# Patient Record
Sex: MALE | Race: WHITE | NOT HISPANIC OR LATINO | Employment: UNEMPLOYED | URBAN - METROPOLITAN AREA
[De-identification: names, ages, dates, MRNs, and addresses within clinical notes are randomized per-mention and may not be internally consistent; named-entity substitution may affect disease eponyms.]

---

## 2017-05-26 ENCOUNTER — ALLSCRIPTS OFFICE VISIT (OUTPATIENT)
Dept: OTHER | Facility: OTHER | Age: 47
End: 2017-05-26

## 2017-05-26 LAB
BILIRUB UR QL STRIP: NORMAL
CLARITY UR: NORMAL
COLOR UR: YELLOW
GLUCOSE (HISTORICAL): NORMAL
HGB UR QL STRIP.AUTO: NORMAL
KETONES UR STRIP-MCNC: NORMAL MG/DL
LEUKOCYTE ESTERASE UR QL STRIP: NORMAL
NITRITE UR QL STRIP: NORMAL
PH UR STRIP.AUTO: 6 [PH]
PROT UR STRIP-MCNC: NORMAL MG/DL
SP GR UR STRIP.AUTO: 1.02
UROBILINOGEN UR QL STRIP.AUTO: NORMAL

## 2018-01-13 VITALS
HEART RATE: 76 BPM | HEIGHT: 69 IN | WEIGHT: 204.13 LBS | SYSTOLIC BLOOD PRESSURE: 136 MMHG | DIASTOLIC BLOOD PRESSURE: 84 MMHG | BODY MASS INDEX: 30.23 KG/M2

## 2018-04-11 NOTE — PROGRESS NOTES
Assessment/Plan:   I have sent your inhaler and the cream you need for your psoriasis  I have provided you with a script to bridge you on your alprazolam   We have provided you with the contact information to speak or meet with our  for assistance finding a new mental health office  Continue as scheduled with your pain management and [facility] endocrinologist     Angeles Boone will call to schedule a follow-up so that we may complete your visit and health maintenance  No problem-specific Assessment & Plan notes found for this encounter  Diagnoses and all orders for this visit:    Mild intermittent asthma without complication  -     albuterol (VENTOLIN HFA) 90 mcg/act inhaler; Inhale 2 puffs every 6 (six) hours as needed for wheezing    Psoriasis  -     triamcinolone (KENALOG) 0 1 % ointment; Apply topically 2 (two) times a day for 180 days    Anxiety  -     ALPRAZolam (XANAX) 1 mg tablet; Take 1 tablet (1 mg total) by mouth 2 (two) times a day as needed for anxiety for up to 30 days  -     Ambulatory referral to Social Work; Future    Other orders  -     fluorometholone (FML) 0 1 % ophthalmic suspension;   -     ketoconazole (NIZORAL) 2 % shampoo;   -     minocycline (MINOCIN) 100 mg capsule;   -     naproxen (NAPROSYN) 500 mg tablet;   -     oxyCODONE (ROXICODONE) 15 mg immediate release tablet; Earliest Fill Date: 3/13/18   -     OXYCONTIN 30 MG T12A; Earliest Fill Date: 3/13/18   -     testosterone cypionate (DEPO-TESTOSTERONE) 200 mg/mL SOLN;   -     Discontinue: triamcinolone (KENALOG) 0 1 % ointment;   -     zolpidem (AMBIEN) 10 mg tablet;           Subjective:      Patient ID: Lupillo Gan  is a 50 y o  male  Patient coming in to establish care as a new patient  Record review shows patient has been on chronic pain medications for many years through Pain Management in [facility] MP was reviewed and shows patient has consistently been getting pain medication every 30 days going back to prior to 2016  PD MP also reports medications for anxiety alprazolam 1mg bid  Last script 10/2017  Patient continues to follow with pain management once a month  This is for chronic neck and back pain  Patient reports he is aware the last script for anxiety medication was 6 months ago however he states he is provided medication to take twice a day but does not take it daily sometimes only takes half a pill other days does not need to take it at all  Patient reports he is down to his last 8 pills and the thought of possibly being without this medication creates anxiety as it is  Patient reports he was only going to [facility] for his medications however was receiving therapy at a different location as was not happy with that therapy provided at that particular office  Patient states he has called at least 5 different locations to try to get re-established however has been told either they do not accept his Medicare or the weight was over 6-8 months  Advise patient we would get him an appointment or at least a phone conversation with our  who can work with him and his insurance to find a new location  I discussed with him as there is proper documentation of the medication he was receiving on a regular basis I was okay to provide a one-month bridge of his alprazolam       Record review shows patient has been being followed by a [facility] Endocrinology for hypogonadism  One visit from [address] [place] [address] Urology for prostate check reports that patient has been on testosterone replacement for the past 30 years  Patient also did have an EGD in 2016 after had chronic stomach pain and hiccups  The EGD demonstrated severe erosive esophagitis in the distal portion of the esophagus  It was also noted that he had an gastric ulcer  Duodenum was normal   Patient was to take a PPI twice a day and then get a follow-up scope in 8 weeks    The follow-up scope was noted to be canceled  Patient reports after stopping ibuprofen and making the dietary changes he no longer has the pain  States no longer on BP meds, was 3-4 years ago  States did change diet  States here for asthma meds, cream for psoriasis and anxiety pills      Asthma since child states only rescue inhaler most times not use for 1-2 months unless sick will need to use more  Reports did have more than 1 ER visit as a child and young teenager for asthma exacerbations  Reports has not been on any daily inhalers since that time  Uses triamcinolone for psoriasis for many years areas affected, scalp, behind ears, lower back and chest wall         Reports labs were completed by ENDO last months for lipids and glucose reports told normal      Remainder of exam needed to be deferred to a follow-up visit because patient was receiving phone calls from his ex-wife and his son that he forgot he was supposed to pick him up today  Patient reports he will call back and schedule a follow-up appointment so that we may continue with his health maintenance        The following portions of the patient's history were reviewed and updated as appropriate: allergies, current medications, past family history, past medical history, past social history, past surgical history and problem list     Review of Systems   Constitutional: Negative  HENT: Negative  Respiratory: Negative for cough and shortness of breath  As noted in HPI currently no respiratory symptoms   Cardiovascular: Negative  Negative for chest pain and palpitations  Gastrointestinal: Negative  Negative for abdominal pain and blood in stool  As noted in HPI with lifestyle changes abdominal pain resolved   Endocrine: Negative for cold intolerance and heat intolerance  Genitourinary: Negative  Negative for dysuria and urgency  Musculoskeletal: Positive for back pain and myalgias          Patient followed by pain management for chronic neck and low back pain  Skin: Positive for rash  As noted in HPI patient reports psoriasis  Neurological: Negative for dizziness and light-headedness  Hematological: Negative  Psychiatric/Behavioral: Negative for self-injury, sleep disturbance and suicidal ideas  The patient is nervous/anxious  Objective:      /80 (BP Location: Left arm, Patient Position: Sitting, Cuff Size: Standard)   Pulse 92   Temp 98 2 °F (36 8 °C) (Oral)   Ht 5' 7 5" (1 715 m)   Wt 93 4 kg (205 lb 14 6 oz)   BMI 31 77 kg/m²          Physical Exam   Constitutional: He appears well-developed and well-nourished  Cardiovascular: Normal rate, regular rhythm and normal heart sounds  Pulmonary/Chest: Effort normal and breath sounds normal    Abdominal: Soft  Bowel sounds are normal    Skin: Skin is warm and dry  Rash noted  On exam to his scalp there was minimal dryness behind right ear however no silver plaques were noted  Also to anterior chest wall once again no plaques noted  Slight hyper pigmentation from previous episodes  Psychiatric: He has a normal mood and affect   His behavior is normal  Judgment and thought content normal

## 2018-04-12 ENCOUNTER — OFFICE VISIT (OUTPATIENT)
Dept: INTERNAL MEDICINE CLINIC | Facility: CLINIC | Age: 48
End: 2018-04-12
Payer: MEDICARE

## 2018-04-12 VITALS
SYSTOLIC BLOOD PRESSURE: 104 MMHG | HEART RATE: 92 BPM | TEMPERATURE: 98.2 F | BODY MASS INDEX: 31.21 KG/M2 | HEIGHT: 68 IN | WEIGHT: 205.91 LBS | DIASTOLIC BLOOD PRESSURE: 80 MMHG

## 2018-04-12 DIAGNOSIS — F41.9 ANXIETY: ICD-10-CM

## 2018-04-12 DIAGNOSIS — L40.9 PSORIASIS: ICD-10-CM

## 2018-04-12 DIAGNOSIS — J45.20 MILD INTERMITTENT ASTHMA WITHOUT COMPLICATION: Primary | ICD-10-CM

## 2018-04-12 PROBLEM — E29.1 HYPOGONADISM IN MALE: Status: ACTIVE | Noted: 2018-04-12

## 2018-04-12 PROCEDURE — 99204 OFFICE O/P NEW MOD 45 MIN: CPT | Performed by: PHYSICIAN ASSISTANT

## 2018-04-12 RX ORDER — NAPROXEN 500 MG/1
TABLET ORAL
COMMUNITY
Start: 2018-02-09 | End: 2022-02-17 | Stop reason: ALTCHOICE

## 2018-04-12 RX ORDER — OXYCODONE HYDROCHLORIDE 30 MG/1
TABLET, FILM COATED, EXTENDED RELEASE ORAL
COMMUNITY
Start: 2018-03-13 | End: 2022-02-17 | Stop reason: ALTCHOICE

## 2018-04-12 RX ORDER — MINOCYCLINE HYDROCHLORIDE 100 MG/1
CAPSULE ORAL
COMMUNITY
Start: 2018-02-01 | End: 2020-05-29 | Stop reason: ALTCHOICE

## 2018-04-12 RX ORDER — FLUOROMETHOLONE 0.1 %
SUSPENSION, DROPS(FINAL DOSAGE FORM)(ML) OPHTHALMIC (EYE)
COMMUNITY
Start: 2018-02-01 | End: 2020-05-29 | Stop reason: ALTCHOICE

## 2018-04-12 RX ORDER — KETOCONAZOLE 20 MG/ML
SHAMPOO TOPICAL
COMMUNITY
Start: 2018-01-16 | End: 2022-02-17 | Stop reason: ALTCHOICE

## 2018-04-12 RX ORDER — TESTOSTERONE CYPIONATE 200 MG/ML
INJECTION INTRAMUSCULAR
COMMUNITY
Start: 2018-01-08

## 2018-04-12 RX ORDER — ALPRAZOLAM 1 MG/1
1 TABLET ORAL 2 TIMES DAILY PRN
Qty: 60 TABLET | Refills: 0 | Status: SHIPPED | OUTPATIENT
Start: 2018-04-12 | End: 2022-08-25

## 2018-04-12 RX ORDER — ALBUTEROL SULFATE 90 UG/1
2 AEROSOL, METERED RESPIRATORY (INHALATION) EVERY 6 HOURS PRN
Qty: 1 INHALER | Refills: 0 | Status: SHIPPED | OUTPATIENT
Start: 2018-04-12 | End: 2018-12-06 | Stop reason: SDUPTHER

## 2018-04-12 RX ORDER — ZOLPIDEM TARTRATE 10 MG/1
TABLET ORAL
COMMUNITY
Start: 2018-03-12 | End: 2022-02-17 | Stop reason: ALTCHOICE

## 2018-04-12 RX ORDER — OXYCODONE HYDROCHLORIDE 15 MG/1
TABLET ORAL
COMMUNITY
Start: 2018-03-13 | End: 2022-02-17 | Stop reason: ALTCHOICE

## 2018-04-13 ENCOUNTER — PATIENT OUTREACH (OUTPATIENT)
Dept: INTERNAL MEDICINE CLINIC | Facility: CLINIC | Age: 48
End: 2018-04-13

## 2018-04-19 ENCOUNTER — PATIENT OUTREACH (OUTPATIENT)
Dept: INTERNAL MEDICINE CLINIC | Facility: CLINIC | Age: 48
End: 2018-04-19

## 2018-05-14 ENCOUNTER — TRANSCRIBE ORDERS (OUTPATIENT)
Dept: LAB | Facility: HOSPITAL | Age: 48
End: 2018-05-14

## 2018-05-14 ENCOUNTER — APPOINTMENT (OUTPATIENT)
Dept: LAB | Facility: HOSPITAL | Age: 48
End: 2018-05-14
Payer: MEDICARE

## 2018-05-14 DIAGNOSIS — E29.1 3-OXO-5 ALPHA-STEROID DELTA 4-DEHYDROGENASE DEFICIENCY: ICD-10-CM

## 2018-05-14 DIAGNOSIS — E29.1 3-OXO-5 ALPHA-STEROID DELTA 4-DEHYDROGENASE DEFICIENCY: Primary | ICD-10-CM

## 2018-05-14 LAB
EST. AVERAGE GLUCOSE BLD GHB EST-MCNC: 100 MG/DL
HBA1C MFR BLD: 5.1 % (ref 4.2–6.3)

## 2018-05-14 PROCEDURE — 84403 ASSAY OF TOTAL TESTOSTERONE: CPT

## 2018-05-14 PROCEDURE — 84402 ASSAY OF FREE TESTOSTERONE: CPT

## 2018-05-14 PROCEDURE — 36415 COLL VENOUS BLD VENIPUNCTURE: CPT

## 2018-05-14 PROCEDURE — 83036 HEMOGLOBIN GLYCOSYLATED A1C: CPT

## 2018-05-16 LAB
TESTOST FREE SERPL-MCNC: 9.4 PG/ML (ref 6.8–21.5)
TESTOST SERPL-MCNC: 447 NG/DL (ref 264–916)

## 2018-10-22 ENCOUNTER — OFFICE VISIT (OUTPATIENT)
Dept: UROLOGY | Facility: CLINIC | Age: 48
End: 2018-10-22
Payer: COMMERCIAL

## 2018-10-22 ENCOUNTER — TELEPHONE (OUTPATIENT)
Dept: UROLOGY | Facility: AMBULATORY SURGERY CENTER | Age: 48
End: 2018-10-22

## 2018-10-22 VITALS — HEIGHT: 68 IN | WEIGHT: 207 LBS | BODY MASS INDEX: 31.37 KG/M2

## 2018-10-22 DIAGNOSIS — R31.29 MICROHEMATURIA: ICD-10-CM

## 2018-10-22 DIAGNOSIS — R31.29 MICROSCOPIC HEMATURIA: Primary | ICD-10-CM

## 2018-10-22 DIAGNOSIS — N39.0 URINARY TRACT INFECTION WITH HEMATURIA, SITE UNSPECIFIED: Primary | ICD-10-CM

## 2018-10-22 DIAGNOSIS — R31.9 URINARY TRACT INFECTION WITH HEMATURIA, SITE UNSPECIFIED: Primary | ICD-10-CM

## 2018-10-22 LAB
SL AMB  POCT GLUCOSE, UA: NORMAL
SL AMB LEUKOCYTE ESTERASE,UA: NORMAL
SL AMB POCT BILIRUBIN,UA: NORMAL
SL AMB POCT BLOOD,UA: NORMAL
SL AMB POCT CLARITY,UA: CLEAR
SL AMB POCT COLOR,UA: YELLOW
SL AMB POCT KETONES,UA: NORMAL
SL AMB POCT NITRITE,UA: NORMAL
SL AMB POCT PH,UA: 5
SL AMB POCT SPECIFIC GRAVITY,UA: 1.01
SL AMB POCT URINE PROTEIN: NORMAL
SL AMB POCT UROBILINOGEN: NORMAL

## 2018-10-22 PROCEDURE — 81002 URINALYSIS NONAUTO W/O SCOPE: CPT | Performed by: PHYSICIAN ASSISTANT

## 2018-10-22 PROCEDURE — 99213 OFFICE O/P EST LOW 20 MIN: CPT | Performed by: PHYSICIAN ASSISTANT

## 2018-10-22 NOTE — TELEPHONE ENCOUNTER
Fax received from Patient Alena Fernandez reviewed the info and advised there is nothing urologically urgent that needs attention right away  Spoke with patient and informed him that the NP will look over his records and we will get back to him with advice on further imaging and instructions  Patient verbalized understanding and agrees to plan

## 2018-10-22 NOTE — TELEPHONE ENCOUNTER
Patient was seen at [facility] today by Kylee Ha and scheduled for a CT abdomen and pelvis for stone study and also scheduled with Dr Patel Earing on 11/8/18 for cystoscopy with bilateral retrograde pyelogram with possible bladder biopsies

## 2018-10-22 NOTE — TELEPHONE ENCOUNTER
Patient seen by Dr César Rick once in 2017 for prostate exam and low testosterone  Patient called today to report he needs an appt with urologist ASAP per [facility]   He was there last evening due to bronchitis and told he has blood in urine along with 5 kidney stones  Patient c/o shaking, chills, but states he has no fever  Informed patient there is no info in chart to comment on what findings were found during his exam last night  Offered to schedule patient at [facility] office today, but patient states he feels horrible and cannot make it that far, only can make it to [facility] office  Informed patient there are no openings in [facility] schedule today, even looked throughout week and could not find any availabilities  Suggested to patient if he feels that poorly, he should go to ER, but patient states if he isn't going to see urologist at ER, there is no need to go  Patient was advised to have [facility] forward all records from last evening to office so that an appt can be better triaged  Patient was advised to call back later today

## 2018-10-22 NOTE — TELEPHONE ENCOUNTER
Called patient back to review recommendations and he reports he was able to get a ride to the [facility] location and is on his way to an appt there this afternoon for further evaluation, this was scheduled by the phone center when the patient returned call  Plan of care will now be based on the results of today's appt  [facility]

## 2018-10-22 NOTE — PROGRESS NOTES
UROLOGY PROGRESS NOTE   Patient Identifiers: Luan Marquez (MRN [id number])  Date of Service: 10/22/2018    Subjective:     49-year-old male seen once in the past for prostate exam with a history testosterone replacement  He went to urgent care yesterday with upper respiratory infection  His urinalysis showed microscopic blood  He has no symptoms of UTI  They did an x-ray and told him he had kidney stones  The x-ray shows   A possible 8 millimeter left renal stone and  multiple phleboliths in the pelvis  He has no abdominal or flank pain  He has never had stones before  He denies any family history of prostate bladder kidney disease  He does have male hypogonadism and sees an endocrinologist for hormone replacement  Patient has  no complaints  Objective:     VITALS:    There were no vitals filed for this visit          LABS:  Lab Results   Component Value Date    HGB 18 0 (H) 03/22/2016    HCT 53 (H) 03/22/2016   ]    Lab Results   Component Value Date    GLUCOSE 97 03/22/2016   ]        INPATIENT MEDS:    Current Outpatient Prescriptions:     albuterol (VENTOLIN HFA) 90 mcg/act inhaler, Inhale 2 puffs every 6 (six) hours as needed for wheezing, Disp: 1 Inhaler, Rfl: 0    naproxen (NAPROSYN) 500 mg tablet, , Disp: , Rfl:     oxyCODONE (ROXICODONE) 15 mg immediate release tablet, , Disp: , Rfl:     OXYCONTIN 30 MG T12A, , Disp: , Rfl:     testosterone cypionate (DEPO-TESTOSTERONE) 200 mg/mL SOLN, , Disp: , Rfl:     zolpidem (AMBIEN) 10 mg tablet, , Disp: , Rfl:     ALPRAZolam (XANAX) 1 mg tablet, Take 1 tablet (1 mg total) by mouth 2 (two) times a day as needed for anxiety for up to 30 days, Disp: 60 tablet, Rfl: 0    fluorometholone (FML) 0 1 % ophthalmic suspension, , Disp: , Rfl:     ketoconazole (NIZORAL) 2 % shampoo, , Disp: , Rfl:     minocycline (MINOCIN) 100 mg capsule, , Disp: , Rfl:     triamcinolone (KENALOG) 0 1 % ointment, Apply topically 2 (two) times a day for 180 days, Disp: 80 g, Rfl: 1      Physical Exam:   Ht 5' 7 5" (1 715 m)   Wt 93 9 kg (207 lb)   BMI 31 94 kg/m²   GEN: no acute distress    RESP: breathing comfortably with no accessory muscle use    ABD: soft, non-tender, non-distended   INCISION:    EXT: no significant peripheral edema       RADIOLOGY:     None     Assessment:    1  Microscopic hematuria   2  Male hypogonadism     Plan:   - schedule CT abdomen and pelvis for stone study    His creatinine is 1 3  - schedule cystoscopy with bilateral retrograde pyelogram possible bladder biopsies  -  -

## 2018-10-22 NOTE — TELEPHONE ENCOUNTER
Please schedule patient for office visit at preferred location  He should have CT scan obtained to evaluate potential stone burden, as CXR from urgent care center was not definitive  Patient should also have repeat urine testing  All orders placed in EPIC  Please review ER precautions with patient should his symptoms worsen  Will review all results at office visit

## 2018-10-30 DIAGNOSIS — R31.29 MICROHEMATURIA: ICD-10-CM

## 2018-11-07 ENCOUNTER — TELEPHONE (OUTPATIENT)
Dept: UROLOGY | Facility: CLINIC | Age: 48
End: 2018-11-07

## 2018-11-07 NOTE — TELEPHONE ENCOUNTER
Mary Ambriz from [facility] dept called this morning and stated that one of their nurses spoke with pt  This morning and he told them they he no longer needed the procedure that is scheduled for tomorrow 11/08/18  I did try to reach out to pt  To inquire why he thinks that this procedure is no longer needed  There was no answer when I called pt  So I did leave a message asking pt to please call our office back so I can speak with him

## 2018-11-07 NOTE — TELEPHONE ENCOUNTER
Pt  Returned my call from earlier and left a voicemail stating that he did not think that this procedure that is scheduled for tomorrow was necessary  I did try to call pt back again to let him know that I did speak with Dr Nakita Blanco and he would still like to proceed with the surgery  I will wait for pt  To return my call

## 2018-11-07 NOTE — TELEPHONE ENCOUNTER
Pt returned my call and said that he does not feel that he needs this procedure  Pt  Chucky Flank that he was told that he does not have any kidney stones by Kaye Nickerson before he had his CT scan  Pt said that he never heard anything regarding CT results  Pt would like to speak with either Reina Wiseman or Dr Deric Pacheco before rescheduling this procedure

## 2018-12-06 ENCOUNTER — OFFICE VISIT (OUTPATIENT)
Dept: GASTROENTEROLOGY | Facility: CLINIC | Age: 48
End: 2018-12-06
Payer: MEDICARE

## 2018-12-06 ENCOUNTER — TELEPHONE (OUTPATIENT)
Dept: FAMILY MEDICINE CLINIC | Facility: CLINIC | Age: 48
End: 2018-12-06

## 2018-12-06 VITALS
DIASTOLIC BLOOD PRESSURE: 88 MMHG | SYSTOLIC BLOOD PRESSURE: 137 MMHG | TEMPERATURE: 98.4 F | HEART RATE: 89 BPM | WEIGHT: 215 LBS | HEIGHT: 68 IN | BODY MASS INDEX: 32.58 KG/M2

## 2018-12-06 DIAGNOSIS — K21.00 GASTROESOPHAGEAL REFLUX DISEASE WITH ESOPHAGITIS: Primary | ICD-10-CM

## 2018-12-06 DIAGNOSIS — J45.20 MILD INTERMITTENT ASTHMA WITHOUT COMPLICATION: ICD-10-CM

## 2018-12-06 DIAGNOSIS — Z12.11 COLON CANCER SCREENING: ICD-10-CM

## 2018-12-06 DIAGNOSIS — R06.6 INTRACTABLE HICCUPS: ICD-10-CM

## 2018-12-06 PROCEDURE — 99204 OFFICE O/P NEW MOD 45 MIN: CPT | Performed by: INTERNAL MEDICINE

## 2018-12-06 RX ORDER — PANTOPRAZOLE SODIUM 40 MG/1
40 TABLET, DELAYED RELEASE ORAL 2 TIMES DAILY
Qty: 60 TABLET | Refills: 3 | Status: SHIPPED | OUTPATIENT
Start: 2018-12-06 | End: 2020-05-29 | Stop reason: ALTCHOICE

## 2018-12-06 RX ORDER — METOCLOPRAMIDE 10 MG/1
10 TABLET ORAL 4 TIMES DAILY
Qty: 30 TABLET | Refills: 0 | Status: SHIPPED | OUTPATIENT
Start: 2018-12-06 | End: 2020-05-29 | Stop reason: ALTCHOICE

## 2018-12-06 RX ORDER — PREDNISONE 20 MG/1
TABLET ORAL
COMMUNITY
Start: 2018-12-01 | End: 2022-02-17 | Stop reason: ALTCHOICE

## 2018-12-06 RX ORDER — BACLOFEN 10 MG/1
10 TABLET ORAL 3 TIMES DAILY
Qty: 30 TABLET | Refills: 0 | Status: SHIPPED | OUTPATIENT
Start: 2018-12-06 | End: 2022-02-17 | Stop reason: SDUPTHER

## 2018-12-06 RX ORDER — ALBUTEROL SULFATE 90 UG/1
2 AEROSOL, METERED RESPIRATORY (INHALATION) EVERY 6 HOURS PRN
Qty: 1 INHALER | Refills: 0 | Status: SHIPPED | OUTPATIENT
Start: 2018-12-06 | End: 2019-10-05 | Stop reason: SDUPTHER

## 2018-12-06 NOTE — TELEPHONE ENCOUNTER
CAN YOU PLACE ORDER FOR THIS PATIENT? HE HAS BEEN GOING TO [facility] FOR DIAGNOSIS BELOW        Esophagitis [K20 9]      Acute gastric ulcer with hemorrhage [K25 0]    PLEASE AND THANK YOU!

## 2018-12-06 NOTE — PROGRESS NOTES
[facility] - Outpatient Consultation  Hilda Goltz  46 y o  male MRN: [id number]  Encounter: [id number]          ASSESSMENT AND PLAN:      Giuliano Ramires was seen today for intractable hiccups    1  Gastroesophageal reflux disease with esophagitis, complicated by Intractable hiccups  Intractable hiccups for 3 consecutive days  He had similar presentations in the past treated by reglan and thorazine  He also has history of severe esophagitis and gastric ulcer about a year ago  Not on PPI or had follow up egd to confirm healing  I will prescribe protonix 40mg twice a day and Baclofen 10mg 3 times a day  If baclofen doesn't help, he should take Reglan 10 mg every 6 hours as needed  Stop NSAIDs but continue Tylenol as needed  Reflux precautions discussed  I recommended eating smaller portions  I advised he stops smoking  I will schedule him for an EGD to assess for esophagitis and PUD  Patient is agreeable to the procedure  2 Family history of colon cancer  Given his family history of colon cancer, I highly recommend a colonoscopy  Risks and benefits of the procedure were discussed  Risks include but aren't limited to bleeding, perforation, missed lesion, and infection  Patient is agreeable with the procedure  Bowel prep instructions given   ______________________________________________________________________    HPI:  Hilda Goltz  is a 46 y o  male for treatment of intractable hiccups  They have been occurring for the past 3 consecutive days with onset on December 3 after a big meal  He is unable to sleep due to this  He needs to bend over for some relief  He has tried Xanax and Ambien to sleep, but with no relief at all  He also tried Prilosec and Zantac, but nothing is helping  He  Was on prednisone 20 mg  Bronchitis  Prior EGD on 3/24/16 revealed severe esophagitis and gastric ulcer  He has a family history of colon cancer (his father  of colon cancer in his 63's)   Patient endorses using medical marijuana and vaping  Patient also has a history of asthma  REVIEW OF SYSTEMS:    CONSTITUTIONAL: Denies any fever, chills, rigors, and weight loss  HEENT: No earache or tinnitus  Denies hearing loss or visual disturbances  CARDIOVASCULAR: No chest pain or palpitations  RESPIRATORY: Denies any cough, hemoptysis, shortness of breath or dyspnea on exertion  GASTROINTESTINAL: As noted in the History of Present Illness  GENITOURINARY: No problems with urination  Denies any hematuria or dysuria  NEUROLOGIC: No dizziness or vertigo, denies headaches  MUSCULOSKELETAL: Denies any muscle or joint pain  SKIN: Denies skin rashes or itching  ENDOCRINE: Denies excessive thirst  Denies intolerance to heat or cold  PSYCHOSOCIAL: Denies depression or anxiety  Denies any recent memory loss  Historical Information   Past Medical History:   Diagnosis Date    Anxiety     Asthma     Benign essential HTN     last assessed 05/26/2017    Degenerative joint disease     Hiccoughs     Hypertension     Spinal stenosis      Past Surgical History:   Procedure Laterality Date    ESOPHAGOGASTRODUODENOSCOPY N/A 3/24/2016    Procedure: ESOPHAGOGASTRODUODENOSCOPY (EGD); Surgeon: Iram Canseco MD;  Location: [facility];   Service:     JOINT REPLACEMENT      right hip sx     Social History   History   Alcohol Use    Yes     Comment: Social drinker     History   Drug Use    Types: Marijuana     Comment: medicinal card obtained     History   Smoking Status    Never Smoker   Smokeless Tobacco    Never Used     Family History   Problem Relation Age of Onset    Cancer Mother     Cancer Father        Meds/Allergies       Current Outpatient Prescriptions:     albuterol (VENTOLIN HFA) 90 mcg/act inhaler    clarithromycin (BIAXIN XL) 500 MG 24 hr tablet    fluorometholone (FML) 0 1 % ophthalmic suspension    ketoconazole (NIZORAL) 2 % shampoo    minocycline (MINOCIN) 100 mg capsule   naproxen (NAPROSYN) 500 mg tablet    oxyCODONE (ROXICODONE) 15 mg immediate release tablet    OXYCONTIN 30 MG T12A    predniSONE 20 mg tablet    testosterone cypionate (DEPO-TESTOSTERONE) 200 mg/mL SOLN    zolpidem (AMBIEN) 10 mg tablet    ALPRAZolam (XANAX) 1 mg tablet    triamcinolone (KENALOG) 0 1 % ointment    No Known Allergies        Objective     Blood pressure 137/88, pulse 89, temperature 98 4 °F (36 9 °C), temperature source Tympanic, height 5' 7 5" (1 715 m), weight 97 5 kg (215 lb)  Body mass index is 33 18 kg/m²  PHYSICAL EXAM:      General Appearance:   Alert, cooperative, no distress   HEENT:   Normocephalic, atraumatic, anicteric      Neck:  Supple, symmetrical, trachea midline   Lungs:   Clear to auscultation bilaterally; no rales, rhonchi, positive for wheezing; respirations unlabored    Heart[de-identified]   Regular rate and rhythm; no murmur, rub, or gallop  Abdomen:   Soft, non-tender, non-distended; normal bowel sounds; no masses, no organomegaly    Genitalia:   Deferred    Rectal:   Deferred    Extremities:  No cyanosis, clubbing or edema    Pulses:  2+ and symmetric    Skin:  No jaundice, rashes, or lesions    Lymph nodes:  No palpable cervical lymphadenopathy        Lab Results:   No visits with results within 1 Day(s) from this visit  Latest known visit with results is:   Office Visit on 10/22/2018   Component Date Value    LEUKOCYTE ESTERASE,UA 10/22/2018 NEG     NITRITE,UA 10/22/2018 NEG     [facility] POCT UROBILINOGEN 10/22/2018 NEG     POCT URINE PROTEIN 10/22/2018 NEG      PH,UA 10/22/2018 5     BLOOD,UA 10/22/2018 ++     SPECIFIC GRAVITY,UA 10/22/2018 1 015     KETONES,UA 10/22/2018 NEG     BILIRUBIN,UA 10/22/2018 NEG     GLUCOSE, UA 10/22/2018 NEG      COLOR,UA 10/22/2018 Yellow     CLARITY,UA 10/22/2018 Clear          Radiology Results:   No results found      Attestation:   By signing my name below, Vianca Sarkar, attest that this documentation has been prepared under the direction and in the presence of Clari Graham MD  Electronically Signed: [facility]  12/6/18     I, Clari Graham, personally performed the services described in this documentation  All medical record entries made by the aleishaibtita were at my direction and in my presence  I have reviewed the chart and discharge instructions and agree that the record reflects my personal performance and is accurate and complete   Clari Graham MD  12/6/18

## 2018-12-06 NOTE — PATIENT INSTRUCTIONS
Colon/EGD scheduled 12/10/18 at [address] with Albaro Chavez instructions and coupon given in office   Wili xieed room 4 12:15pm

## 2018-12-07 DIAGNOSIS — Z12.11 COLON CANCER SCREENING: ICD-10-CM

## 2018-12-07 DIAGNOSIS — K21.00 GASTROESOPHAGEAL REFLUX DISEASE WITH ESOPHAGITIS: Primary | ICD-10-CM

## 2019-10-05 DIAGNOSIS — J45.20 MILD INTERMITTENT ASTHMA WITHOUT COMPLICATION: ICD-10-CM

## 2020-05-29 ENCOUNTER — TELEMEDICINE (OUTPATIENT)
Dept: INTERNAL MEDICINE CLINIC | Facility: CLINIC | Age: 50
End: 2020-05-29

## 2020-05-29 VITALS — WEIGHT: 225 LBS | BODY MASS INDEX: 33.33 KG/M2 | HEIGHT: 69 IN

## 2020-05-29 DIAGNOSIS — J45.20 MILD INTERMITTENT ASTHMA WITHOUT COMPLICATION: Primary | ICD-10-CM

## 2020-05-29 DIAGNOSIS — L40.9 PSORIASIS: ICD-10-CM

## 2020-05-29 PROBLEM — R06.6 INTRACTABLE HICCUPS: Status: RESOLVED | Noted: 2018-12-06 | Resolved: 2020-05-29

## 2020-05-29 PROCEDURE — 99213 OFFICE O/P EST LOW 20 MIN: CPT | Performed by: PHYSICIAN ASSISTANT

## 2020-05-29 RX ORDER — ALBUTEROL SULFATE 90 UG/1
2 AEROSOL, METERED RESPIRATORY (INHALATION) EVERY 6 HOURS PRN
Qty: 1 INHALER | Refills: 0 | Status: SHIPPED | OUTPATIENT
Start: 2020-05-29 | End: 2020-10-16 | Stop reason: SDUPTHER

## 2020-05-29 RX ORDER — TRIAMCINOLONE ACETONIDE 5 MG/G
CREAM TOPICAL 2 TIMES DAILY
Qty: 30 G | Refills: 0 | Status: SHIPPED | OUTPATIENT
Start: 2020-05-29

## 2020-05-29 RX ORDER — TESTOSTERONE ENANTHATE 200 MG/ML
INJECTION, SOLUTION INTRAMUSCULAR
COMMUNITY
Start: 2019-06-03 | End: 2022-06-03 | Stop reason: ALTCHOICE

## 2020-10-16 ENCOUNTER — TELEPHONE (OUTPATIENT)
Dept: INTERNAL MEDICINE CLINIC | Facility: CLINIC | Age: 50
End: 2020-10-16

## 2020-10-16 DIAGNOSIS — J45.20 MILD INTERMITTENT ASTHMA WITHOUT COMPLICATION: ICD-10-CM

## 2020-10-16 RX ORDER — ALBUTEROL SULFATE 90 UG/1
2 AEROSOL, METERED RESPIRATORY (INHALATION) EVERY 6 HOURS PRN
Qty: 1 INHALER | Refills: 0 | Status: SHIPPED | OUTPATIENT
Start: 2020-10-16 | End: 2021-06-08 | Stop reason: SDUPTHER

## 2020-10-16 RX ORDER — ALBUTEROL SULFATE 90 UG/1
2 AEROSOL, METERED RESPIRATORY (INHALATION) EVERY 6 HOURS PRN
Qty: 1 INHALER | Refills: 0 | Status: CANCELLED | OUTPATIENT
Start: 2020-10-16 | End: 2021-10-16

## 2021-02-16 ENCOUNTER — TELEPHONE (OUTPATIENT)
Dept: INTERNAL MEDICINE CLINIC | Facility: CLINIC | Age: 51
End: 2021-02-16

## 2021-02-17 NOTE — TELEPHONE ENCOUNTER
Patient is requesting that the labs be ordered so that he can have it done and then come in to review -- patient states he has a lot of things going on    Please review and advise

## 2021-02-24 DIAGNOSIS — Z11.3 ROUTINE SCREENING FOR STI (SEXUALLY TRANSMITTED INFECTION): Primary | ICD-10-CM

## 2021-02-25 NOTE — TELEPHONE ENCOUNTER
Spoke to the patient, he is asking if you can put orders in for all STD testing  He is not having any symptoms but was with a female that he said was very promiscuous and wants to have all testing done

## 2021-02-28 DIAGNOSIS — Z11.3 ROUTINE SCREENING FOR STI (SEXUALLY TRANSMITTED INFECTION): Primary | ICD-10-CM

## 2021-06-01 ENCOUNTER — APPOINTMENT (OUTPATIENT)
Dept: LAB | Facility: HOSPITAL | Age: 51
End: 2021-06-01
Payer: MEDICARE

## 2021-06-01 ENCOUNTER — TRANSCRIBE ORDERS (OUTPATIENT)
Dept: LAB | Facility: HOSPITAL | Age: 51
End: 2021-06-01

## 2021-06-01 DIAGNOSIS — E29.1 SECONDARY MALE HYPOGONADISM: ICD-10-CM

## 2021-06-01 DIAGNOSIS — R19.4 CHANGE IN BOWEL HABIT: Primary | ICD-10-CM

## 2021-06-01 DIAGNOSIS — Z11.3 ROUTINE SCREENING FOR STI (SEXUALLY TRANSMITTED INFECTION): ICD-10-CM

## 2021-06-01 DIAGNOSIS — R19.4 CHANGE IN BOWEL HABIT: ICD-10-CM

## 2021-06-01 LAB
ALBUMIN SERPL BCP-MCNC: 3.5 G/DL (ref 3.5–5)
ALP SERPL-CCNC: 71 U/L (ref 46–116)
ALT SERPL W P-5'-P-CCNC: 64 U/L (ref 12–78)
ANION GAP SERPL CALCULATED.3IONS-SCNC: 6 MMOL/L (ref 4–13)
AST SERPL W P-5'-P-CCNC: 49 U/L (ref 5–45)
BASOPHILS # BLD AUTO: 0.03 THOUSANDS/ΜL (ref 0–0.1)
BASOPHILS NFR BLD AUTO: 1 % (ref 0–1)
BILIRUB SERPL-MCNC: 0.73 MG/DL (ref 0.2–1)
BUN SERPL-MCNC: 16 MG/DL (ref 5–25)
CALCIUM SERPL-MCNC: 9.6 MG/DL (ref 8.3–10.1)
CHLORIDE SERPL-SCNC: 107 MMOL/L (ref 100–108)
CO2 SERPL-SCNC: 28 MMOL/L (ref 21–32)
CREAT SERPL-MCNC: 1.46 MG/DL (ref 0.6–1.3)
EOSINOPHIL # BLD AUTO: 0.39 THOUSAND/ΜL (ref 0–0.61)
EOSINOPHIL NFR BLD AUTO: 8 % (ref 0–6)
ERYTHROCYTE [DISTWIDTH] IN BLOOD BY AUTOMATED COUNT: 14.5 % (ref 11.6–15.1)
GFR SERPL CREATININE-BSD FRML MDRD: 55 ML/MIN/1.73SQ M
GLUCOSE P FAST SERPL-MCNC: 93 MG/DL (ref 65–99)
HCT VFR BLD AUTO: 46.3 % (ref 36.5–49.3)
HCV AB SER QL: NORMAL
HGB BLD-MCNC: 16.3 G/DL (ref 12–17)
IGA SERPL-MCNC: 183 MG/DL (ref 70–400)
IMM GRANULOCYTES # BLD AUTO: 0.03 THOUSAND/UL (ref 0–0.2)
IMM GRANULOCYTES NFR BLD AUTO: 1 % (ref 0–2)
LYMPHOCYTES # BLD AUTO: 1.03 THOUSANDS/ΜL (ref 0.6–4.47)
LYMPHOCYTES NFR BLD AUTO: 20 % (ref 14–44)
MCH RBC QN AUTO: 34.9 PG (ref 26.8–34.3)
MCHC RBC AUTO-ENTMCNC: 35.2 G/DL (ref 31.4–37.4)
MCV RBC AUTO: 99 FL (ref 82–98)
MONOCYTES # BLD AUTO: 0.46 THOUSAND/ΜL (ref 0.17–1.22)
MONOCYTES NFR BLD AUTO: 9 % (ref 4–12)
NEUTROPHILS # BLD AUTO: 3.25 THOUSANDS/ΜL (ref 1.85–7.62)
NEUTS SEG NFR BLD AUTO: 61 % (ref 43–75)
NRBC BLD AUTO-RTO: 0 /100 WBCS
PLATELET # BLD AUTO: 239 THOUSANDS/UL (ref 149–390)
PMV BLD AUTO: 8.3 FL (ref 8.9–12.7)
POTASSIUM SERPL-SCNC: 3.5 MMOL/L (ref 3.5–5.3)
PROT SERPL-MCNC: 6.9 G/DL (ref 6.4–8.2)
PSA SERPL-MCNC: 1.6 NG/ML (ref 0–4)
RBC # BLD AUTO: 4.67 MILLION/UL (ref 3.88–5.62)
SODIUM SERPL-SCNC: 141 MMOL/L (ref 136–145)
TESTOST SERPL-MCNC: 151 NG/DL (ref 95–948)
TSH SERPL DL<=0.05 MIU/L-ACNC: 2.49 UIU/ML (ref 0.36–3.74)
WBC # BLD AUTO: 5.19 THOUSAND/UL (ref 4.31–10.16)

## 2021-06-01 PROCEDURE — 36415 COLL VENOUS BLD VENIPUNCTURE: CPT

## 2021-06-01 PROCEDURE — 84403 ASSAY OF TOTAL TESTOSTERONE: CPT

## 2021-06-01 PROCEDURE — G0103 PSA SCREENING: HCPCS

## 2021-06-01 PROCEDURE — 87389 HIV-1 AG W/HIV-1&-2 AB AG IA: CPT

## 2021-06-01 PROCEDURE — 85025 COMPLETE CBC W/AUTO DIFF WBC: CPT

## 2021-06-01 PROCEDURE — 83516 IMMUNOASSAY NONANTIBODY: CPT

## 2021-06-01 PROCEDURE — 87591 N.GONORRHOEAE DNA AMP PROB: CPT

## 2021-06-01 PROCEDURE — 86592 SYPHILIS TEST NON-TREP QUAL: CPT

## 2021-06-01 PROCEDURE — 87491 CHLMYD TRACH DNA AMP PROBE: CPT

## 2021-06-01 PROCEDURE — 84443 ASSAY THYROID STIM HORMONE: CPT

## 2021-06-01 PROCEDURE — 86803 HEPATITIS C AB TEST: CPT

## 2021-06-01 PROCEDURE — 80053 COMPREHEN METABOLIC PANEL: CPT

## 2021-06-01 PROCEDURE — 82784 ASSAY IGA/IGD/IGG/IGM EACH: CPT

## 2021-06-02 LAB
C TRACH DNA SPEC QL NAA+PROBE: NEGATIVE
HIV 1+2 AB+HIV1 P24 AG SERPL QL IA: NORMAL
N GONORRHOEA DNA SPEC QL NAA+PROBE: NEGATIVE
RPR SER QL: NORMAL
TTG IGA SER-ACNC: <2 U/ML (ref 0–3)

## 2021-06-08 DIAGNOSIS — J45.20 MILD INTERMITTENT ASTHMA WITHOUT COMPLICATION: ICD-10-CM

## 2021-06-08 RX ORDER — ALBUTEROL SULFATE 90 UG/1
2 AEROSOL, METERED RESPIRATORY (INHALATION) EVERY 6 HOURS PRN
Qty: 8 G | Refills: 0 | Status: SHIPPED | OUTPATIENT
Start: 2021-06-08 | End: 2021-10-11 | Stop reason: SDUPTHER

## 2021-06-08 NOTE — TELEPHONE ENCOUNTER
While calling patient will lab results, patient requesting refills  I advised patient you may want to see him in office first as he has not been seen since 4/2018  Patient stated he is completely out and is asking if we can please send over to hold him over until he can make an appointment  He stated he will schedule appointment as soon as he has all his other specialty procedures completed

## 2021-06-09 ENCOUNTER — TELEPHONE (OUTPATIENT)
Dept: UROLOGY | Facility: HOSPITAL | Age: 51
End: 2021-06-09

## 2021-06-09 DIAGNOSIS — R31.0 GROSS HEMATURIA: Primary | ICD-10-CM

## 2021-06-09 NOTE — TELEPHONE ENCOUNTER
----- Message from Blu Hernandez sent at 6/8/2021  4:56 PM EDT -----  Regarding: Non-Urgent Medical Question  Contact: [phone]  Moisés I need to schedule appointment could you please call me [phone]

## 2021-06-10 ENCOUNTER — OFFICE VISIT (OUTPATIENT)
Dept: INTERNAL MEDICINE CLINIC | Facility: CLINIC | Age: 51
End: 2021-06-10

## 2021-06-10 VITALS
TEMPERATURE: 97.9 F | WEIGHT: 241 LBS | HEART RATE: 88 BPM | OXYGEN SATURATION: 98 % | DIASTOLIC BLOOD PRESSURE: 95 MMHG | SYSTOLIC BLOOD PRESSURE: 134 MMHG | BODY MASS INDEX: 35.59 KG/M2

## 2021-06-10 DIAGNOSIS — G47.00 INSOMNIA, UNSPECIFIED TYPE: ICD-10-CM

## 2021-06-10 DIAGNOSIS — R06.83 SNORING: ICD-10-CM

## 2021-06-10 DIAGNOSIS — R60.0 BILATERAL LOWER EXTREMITY EDEMA: ICD-10-CM

## 2021-06-10 DIAGNOSIS — E66.09 OBESITY DUE TO EXCESS CALORIES WITH SERIOUS COMORBIDITY, UNSPECIFIED CLASSIFICATION: ICD-10-CM

## 2021-06-10 DIAGNOSIS — Z91.89 RISK FACTORS FOR OBSTRUCTIVE SLEEP APNEA: ICD-10-CM

## 2021-06-10 DIAGNOSIS — I16.0 HYPERTENSIVE URGENCY: Primary | ICD-10-CM

## 2021-06-10 PROCEDURE — 99214 OFFICE O/P EST MOD 30 MIN: CPT | Performed by: PHYSICIAN ASSISTANT

## 2021-06-10 RX ORDER — HYDROCHLOROTHIAZIDE 12.5 MG/1
12.5 TABLET ORAL DAILY
Qty: 30 TABLET | Refills: 2 | Status: SHIPPED | OUTPATIENT
Start: 2021-06-10 | End: 2021-07-15 | Stop reason: ALTCHOICE

## 2021-06-10 RX ORDER — PANTOPRAZOLE SODIUM 40 MG/1
TABLET, DELAYED RELEASE ORAL
COMMUNITY
Start: 2021-05-27 | End: 2022-02-17 | Stop reason: ALTCHOICE

## 2021-06-10 RX ORDER — LOSARTAN POTASSIUM 50 MG/1
50 TABLET ORAL DAILY
Qty: 30 TABLET | Refills: 2 | Status: SHIPPED | OUTPATIENT
Start: 2021-06-10 | End: 2021-07-15 | Stop reason: ALTCHOICE

## 2021-06-10 NOTE — TELEPHONE ENCOUNTER
Patient stated he is coming in because he sees blood in his urine sometimes    like a kelvin-aid color and he is retaining  urine, does he need to speak with a nurse before scheduling   Patient transferred to nurse

## 2021-06-10 NOTE — TELEPHONE ENCOUNTER
Patient previously of the [place] office, seen 10/2018 for microscopic hematuria and kidney stones  Patient was to have CT scan, cystoscopy and possible biopsy, which he cancelled  Patient has not been seen since  Called and left message for return call, number provided  When patient returns call please inquire if he would like to be seen for previous issue: microscopic hematuria or a new issue

## 2021-06-10 NOTE — TELEPHONE ENCOUNTER
Patient transferred to [facility] at this time for further triage  Spoke with patient  He states he has been having visible blood in his urine for 6 months  He denies other urinary symptoms  He reports sometimes the blood is red like kelvin aid and other times it is pink  He also states he recently had colonoscopy and EGD  After that he had water retention in his legs and elevated blood pressure  Reviewed that should be followed up with cardiology and/or PCP  He states he did follow up with them, he was just concerned by the retention of water and felt it was a kidney issue  Reviewed this is likely a cardiology issue, but this can be discussed  Advised out concern is the visible blood in the urine  Reviewed we would likely again recommend imaging and cystoscopy, as previously recommended in 10/2018  He states he needs to be put to sleep for this  Reviewed this can be discussed at appt  Appt scheduled for 6/18/21 in the [facility] office per patient preference for location  Orders placed for urine testing to rule out infection  He knows to have this done prior to appt

## 2021-06-10 NOTE — PROGRESS NOTES
Assessment/Plan:      Diagnoses and all orders for this visit:    Hypertensive urgency  -     Echo complete with contrast if indicated; Future  -     losartan (COZAAR) 50 mg tablet; Take 1 tablet (50 mg total) by mouth daily  -     hydrochlorothiazide (HYDRODIURIL) 12 5 mg tablet; Take 1 tablet (12 5 mg total) by mouth daily    Risk factors for obstructive sleep apnea  -     Ambulatory referral to Sleep Medicine; Future  -     Echo complete with contrast if indicated; Future    Insomnia, unspecified type  -     Ambulatory referral to Sleep Medicine; Future    Snoring  -     Ambulatory referral to Sleep Medicine; Future    Obesity due to excess calories with serious comorbidity, unspecified classification  -     Ambulatory referral to Sleep Medicine; Future  -     Echo complete with contrast if indicated; Future    Bilateral lower extremity edema  -     hydrochlorothiazide (HYDRODIURIL) 12 5 mg tablet; Take 1 tablet (12 5 mg total) by mouth daily    Other orders  -     pantoprazole (PROTONIX) 40 mg tablet      61-year-old male presenting today for same-day urgent appointment for significantly elevated blood pressure standing to be hypertensive urgency prior and during his colonoscopy procedure through [facility] just a few hours ago  He was advised to see his PCP ASAP and came over to the office right from his procedure  Patient notes that he has a history of hypertension and had been on lisinopril 10 mg daily but he decided to stop it after losing weight and blood pressure normalizing, stopped on his own  He has noticed systolic intermittently when he checks it at home has been elevated  Again according to patient's reports of his blood pressure seems to have had hypertensive urgency but asymptomatic which is good  Patient reported systolic in the 473I  He does note that they gave him blood pressure medication, most likely through IV however we are uncertain what they gave him    He would not like lisinopril again as he noted it causing erectile dysfunction  Blood pressure currently stable at 134/95, again patient is asymptomatic and exam is otherwise unremarkable for any acute cardiovascular issue  Patient agreeable to starting blood pressure medicine, will start him on losartan 50 mg daily  I did also note on his exam he has 1 to 2+ pitting edema pretibial with numerous brown spots consistent with a venous stasis dermatitis  He states he has had chronic swelling for a long time now  Also to add to his medical history he does admit to drinking hard liquor typically before bed to help him sleep a few times a week, 3-4 drinks per episode  Have advised complete cessation of alcohol  I have advised low-sodium diet, leg elevation and compression stockings  I did discuss with patient before starting him on losartan possibility of using HCTZ  However patient states he thinks he had some of that at home from previous doctor but he did not take it because it was making him urinate more throughout the night  He is agreeable to restarting the hydrochlorothiazide and I am okay with him taking this with the losartan 50, as I did explain to patient he may need more than 1 blood pressure medication depending  I believe the HCTZ may help a little bit with extra fluid in the body and his lower extremity swelling  I strongly feel it will be beneficial to check an echocardiogram to evaluate the function of his heart, considering his poorly controlled hypertension for unknown period of time, as well as his lower extremity edema and chronic alcohol use  Order provided  Therefore patient agreeable to take losartan 50 mg and hydrochlorothiazide 12 5 mg daily  We will need to monitor lower extremity swelling as well  Patient also noted being told by anesthesiologist he should have sleep medicine consult and sleep study    Referral provided as patient seems to be high risk with body habitus, neck girth as well as symptoms  Regarding his general insomnia patient states he has been on Ambien in the past [facility] but has not taken it in a while  I told patient that alcohol is not an appropriate way to treat insomnia and we could consider other non controlled substance options for chronic insomnia such as Remeron, trazodone, or other  However since this was an acute same-day appointment I will defer this to his next follow-up  Can be discussed with Sleep Medicine or PCP  I will plan on having patient follow-up with PCP in about 2 weeks for reassessment of his blood pressure, lower extremity swelling, compliance check as well as follow-up for his echocardiogram and insomnia  Chief Complaint   Patient presents with    High blood pressure from [place] office today     Patient doesn't feel any symptoms       Subjective:     Patient ID: Viva Essex is a 44 y o  male     44y/o male here today for same day appt scheduled ASAP for HTN urgency at GI for colonoscopy today  Patient states he does have a history of high blood pressure, but blood pressure severely high before and during his colonoscopy with GI through [facility]  He states that he was told to contact PCP right away and get an appointment to have it addressed  States before procedure blood pressure was around 177/95, then went to 230/120  States was given BP medication by anesthesiologist but doesn't know name or how it was given  States has been off lisinopril now for years, stopped it on his own, because he lost weight and BP was better  States when he does check it at home intermittently systolic is usually 461Q  He states the lisinopril did cause sexual dysfunction so doesn't want that medication, if medication needs to be restarted        Also was told he needs to see sleep med because of difficulty with jaw maneuver by anesthesiologist   He does admit to Chronic snoring, poor sleep quality, never wakes feeling well rested, does nap throughout the day due to poor sleep quality  States he does use ETOH to help him sleep  He admits to using hard liquor 3 x a week, about 3-4 drinks per episode  States [facility]  Patient currently states that he feels fine and has no immediate concerns or symptoms aside from his blood pressure being elevated during his procedure earlier today  Blood pressure in the office currently 134/95  He denies any specific chest pain, shortness of breath, dizziness or lightheadedness, sudden vision changes, weakness  Review of Systems   Constitutional: Negative  Respiratory: Negative  Negative for chest tightness and shortness of breath  Cardiovascular: Positive for leg swelling  Negative for chest pain  Gastrointestinal: Negative  Genitourinary: Negative  Neurological: Negative for dizziness and numbness  Psychiatric/Behavioral: Positive for sleep disturbance  The following portions of the patient's history were reviewed and updated as appropriate: allergies, current medications, past family history, past medical history, past social history, past surgical history and problem list       Objective:     Physical Exam  Vitals signs reviewed  Constitutional:       General: He is not in acute distress  Appearance: He is obese  He is not ill-appearing or toxic-appearing  Neck:      Musculoskeletal: Neck supple  Vascular: Normal carotid pulses  Trachea: Phonation normal    Cardiovascular:      Rate and Rhythm: Normal rate and regular rhythm  Pulses: Normal pulses  Heart sounds: Normal heart sounds  Comments: Patient appears to have venous stasis dermatitis with multiple brown spots on his ankles and anterior shins  No erythema or open wounds or blistering  Pulmonary:      Effort: Pulmonary effort is normal       Breath sounds: Normal breath sounds     Musculoskeletal:      Right lower leg: 2+ Edema present  Left lower le+ Edema present  Lymphadenopathy:      Head:      Right side of head: No submandibular or tonsillar adenopathy  Left side of head: No submandibular or tonsillar adenopathy  Neurological:      Mental Status: He is alert and oriented to person, place, and time  Psychiatric:         Mood and Affect: Mood normal          Speech: Speech normal          Behavior: Behavior normal  Behavior is cooperative           Vitals:    06/10/21 1337   BP: 134/95   BP Location: Left arm   Patient Position: Sitting   Cuff Size: Large   Pulse: 88   Temp: 97 9 °F (36 6 °C)   TempSrc: Temporal   SpO2: 98%   Weight: 109 kg (241 lb)

## 2021-06-14 ENCOUNTER — HOSPITAL ENCOUNTER (OUTPATIENT)
Dept: NON INVASIVE DIAGNOSTICS | Facility: CLINIC | Age: 51
Discharge: HOME | End: 2021-06-14
Payer: MEDICARE

## 2021-06-14 DIAGNOSIS — E66.09 OBESITY DUE TO EXCESS CALORIES WITH SERIOUS COMORBIDITY, UNSPECIFIED CLASSIFICATION: ICD-10-CM

## 2021-06-14 DIAGNOSIS — Z91.89 RISK FACTORS FOR OBSTRUCTIVE SLEEP APNEA: ICD-10-CM

## 2021-06-14 DIAGNOSIS — I16.0 HYPERTENSIVE URGENCY: ICD-10-CM

## 2021-06-14 PROCEDURE — 93306 TTE W/DOPPLER COMPLETE: CPT

## 2021-06-15 PROCEDURE — 93306 TTE W/DOPPLER COMPLETE: CPT | Performed by: INTERNAL MEDICINE

## 2021-07-15 ENCOUNTER — OFFICE VISIT (OUTPATIENT)
Dept: INTERNAL MEDICINE CLINIC | Facility: CLINIC | Age: 51
End: 2021-07-15

## 2021-07-15 VITALS
TEMPERATURE: 98.1 F | BODY MASS INDEX: 35.99 KG/M2 | DIASTOLIC BLOOD PRESSURE: 96 MMHG | HEART RATE: 101 BPM | HEIGHT: 69 IN | OXYGEN SATURATION: 96 % | WEIGHT: 243 LBS | SYSTOLIC BLOOD PRESSURE: 151 MMHG

## 2021-07-15 DIAGNOSIS — Z13.220 SCREENING, LIPID: ICD-10-CM

## 2021-07-15 DIAGNOSIS — F41.9 ANXIETY: ICD-10-CM

## 2021-07-15 DIAGNOSIS — R21 RASH: ICD-10-CM

## 2021-07-15 DIAGNOSIS — R31.9 HEMATURIA, UNSPECIFIED TYPE: ICD-10-CM

## 2021-07-15 DIAGNOSIS — J45.30 MILD PERSISTENT ASTHMA WITHOUT COMPLICATION: ICD-10-CM

## 2021-07-15 DIAGNOSIS — E29.1 HYPOGONADISM IN MALE: ICD-10-CM

## 2021-07-15 DIAGNOSIS — I10 ESSENTIAL HYPERTENSION: Primary | ICD-10-CM

## 2021-07-15 DIAGNOSIS — R22.43 LOCALIZED SWELLING OF BOTH LOWER LEGS: ICD-10-CM

## 2021-07-15 DIAGNOSIS — G47.09 OTHER INSOMNIA: ICD-10-CM

## 2021-07-15 DIAGNOSIS — I87.2 VENOUS STASIS DERMATITIS OF BOTH LOWER EXTREMITIES: ICD-10-CM

## 2021-07-15 DIAGNOSIS — E66.01 CLASS 2 SEVERE OBESITY DUE TO EXCESS CALORIES WITH SERIOUS COMORBIDITY AND BODY MASS INDEX (BMI) OF 35.0 TO 35.9 IN ADULT (HCC): ICD-10-CM

## 2021-07-15 PROBLEM — E66.812 CLASS 2 SEVERE OBESITY DUE TO EXCESS CALORIES WITH SERIOUS COMORBIDITY AND BODY MASS INDEX (BMI) OF 35.0 TO 35.9 IN ADULT (HCC): Status: ACTIVE | Noted: 2021-07-15

## 2021-07-15 PROCEDURE — 99214 OFFICE O/P EST MOD 30 MIN: CPT | Performed by: PHYSICIAN ASSISTANT

## 2021-07-15 RX ORDER — LOSARTAN POTASSIUM AND HYDROCHLOROTHIAZIDE 12.5; 1 MG/1; MG/1
1 TABLET ORAL DAILY
Qty: 90 TABLET | Refills: 1 | Status: SHIPPED | OUTPATIENT
Start: 2021-07-15 | End: 2022-02-17 | Stop reason: SDUPTHER

## 2021-07-15 NOTE — PATIENT INSTRUCTIONS
On your visit today we reviewed your follow-up testing from your last visit  Good news is the echocardiogram does not show any significant abnormalities given your underlying high blood pressure  We did review that while your blood pressure is reportedly improved per your history it is still above goal and I have adjusted your medication  It will still be losartan /hydrochlorothiazide but at a stronger dose and in a combination pill this will make it easier for you to remember to take 1 pill daily  We also discussed the swelling in your legs that has been present for at least 1 year as well as the skin changes consistent with venous stasis  Please schedule the ultrasound for further evaluation  We also reviewed swelling in legs can be due to heart or kidney problems  We did review that on your blood tests it does show elevation in 1 of her kidney tests which can be caused from chronic high blood pressure that has not been treated  Please also avoid salty foods and all NSAIDs including ibuprofen, Motrin, Advil and Aleve as these can affect your kidneys  Please get the follow-up kidney test done screening for cholesterol also included in  labs and will review at your follow-up appointment  You are scheduled for consultation with Sleep Medicine next month  This is to address possible sleep apnea as well as your insomnia  For your asthma which has not been well controlled I have sent a prescription for [facility] inhaler this is to be used twice a day every day please remember to rinse and spit after use  Will re-evaluate asthma symptoms at your follow-up appointment and adjust medications if needed  On this visit you do not have any current rash consistent with psoriasis given her history and you are agreeable to referral to our dermatologist which has been provided today        Also reviewed the importance that if you develop any chest pain shortness of breath difficulty breathing to contact our office or go to the emergency room

## 2021-07-15 NOTE — PROGRESS NOTES
Assessment/Plan: On your visit today we reviewed your follow-up testing from your last visit  Good news is the echocardiogram does not show any significant abnormalities given your underlying high blood pressure  We did review that while your blood pressure is reportedly improved per your history it is still above goal and I have adjusted your medication  It will still be losartan /hydrochlorothiazide but at a stronger dose and in a combination pill this will make it easier for you to remember to take 1 pill daily  We also discussed the swelling in your legs that has been present for at least 1 year as well as the skin changes consistent with venous stasis  Please schedule the ultrasound for further evaluation  We also reviewed swelling in legs can be due to heart or kidney problems  We did review that on your blood tests it does show elevation in 1 of her kidney tests which can be caused from chronic high blood pressure that has not been treated  Please also avoid salty foods and all NSAIDs including ibuprofen, Motrin, Advil and Aleve as these can affect your kidneys  Please get the follow-up kidney test done screening for cholesterol also included in  labs and will review at your follow-up appointment  You are scheduled for consultation with Sleep Medicine next month  This is to address possible sleep apnea as well as your insomnia  For your asthma which has not been well controlled I have sent a prescription for [facility] inhaler this is to be used twice a day every day please remember to rinse and spit after use  Will re-evaluate asthma symptoms at your follow-up appointment and adjust medications if needed  On this visit you do not have any current rash consistent with psoriasis given her history and you are agreeable to referral to our dermatologist which has been provided today        Also reviewed the importance that if you develop any chest pain shortness of breath difficulty breathing to contact our office or go to the emergency room  No problem-specific Assessment & Plan notes found for this encounter  Diagnoses and all orders for this visit:    Essential hypertension  -     losartan-hydrochlorothiazide (HYZAAR) 100-12 5 MG per tablet; Take 1 tablet by mouth daily    Mild persistent asthma without complication  -     mometasone-formoterol (Dulera) 100-5 MCG/ACT inhaler; Inhale 2 puffs 2 (two) times a day Rinse mouth after use  Other insomnia    Localized swelling of both lower legs  -     VAS lower limb venous duplex study, complete bilateral; Future    Venous stasis dermatitis of both lower extremities    Rash  -     Ambulatory referral to Dermatology; Future    Class 2 severe obesity due to excess calories with serious comorbidity and body mass index (BMI) of 35 0 to 35 9 in [facility])    Anxiety    Screening, lipid  -     Comprehensive metabolic panel  -     Lipid Panel with Direct LDL reflex    Hypogonadism in male    Hematuria, unspecified type          Subjective:      Patient ID: Cee Guillen is a 44 y o  male  Pt here for follow up  Was seen for urgent care due to really elevated BP at GI, went to urgent care and given some meds  Was seen here and started on Losartan and HCTZ thinks taking but unsure  Admits not always taking the HCTZ due to inc urination  Is getting some swelling in his legs that started 1 year ago    Thinks needs daily med for asthma  Using his rescue inhaler 2X a day for past 2-3 months  Before that could go weeks without  Has not seen his dermatologist in over 1 year has PMH psoriasis,   On evaluation I do not see any psoriatic plaques or evidence of same  Patient states that when he shaves his head or his face it will get red and itchy but then goes away  Discussed that is not consistent with psoriasis  Patient was seeing a dermatologist but states every time they give him a cream at never works    Is agreeable to a new referral     Had ECHO and   No significant abnormalities noted  Ejection fraction was 60% no left ventricular or atrial hypertrophy  Sched with sleep med  Next month for insomnia possible sleep apnea,   Patient states he cannot fall asleep her stay asleep unless he takes Ambien or drink but he does state he never   Takes both at the same time  Patient states he just can not turn off the thoughts  Reviewed with patient that is something that I would suggest that we get him in with mental health services to assist however patient declines stating he has been through that before and it did not help  Patient does have 1+ nonpitting bilateral edema and evidence of some  venous stasis mottling  Discussed with patient based on the skin changes this has been happening for a lot longer and he does admit noticing it for over a year  Did review with patient that labs a month ago did show abnormalities on his kidney function likely secondary to uncontrolled high blood pressure  Patient's blood pressure remains elevated but he does state it was better than in the hospital when it was over 200  Will adjust his medication order follow-up testing to monitor kidney functions he also needs cholesterol checked  And get him scheduled for a follow-up  No definitive cause of heart failure or acute renal failure for bilateral lower extremity swelling  Will order Doppler ultrasound for further evaluation  Patient continues to follow with his endocrinologist for testosterone replacement  Also noted other than visit with other provider here last month had not been seen in the office since 2018  Further review of patient's record after the appointment was completed shows that he had contacted the urologist office regarding episodes of blood-tinged urine  He had been evaluated in 2018 was to be scheduled for cystoscopy which was not completed at that time    Patient was scheduled for Urology in  however did not show for that appointment in did not complete the urinary testing  Patient will be contacted to get the urine testing completed which I can review and provide new referral to Urology if he does have blood in his urine  The following portions of the patient's history were reviewed and updated as appropriate: allergies, current medications, past family history, past medical history, past social history, past surgical history and problem list     Review of Systems   Constitutional: Negative  Negative for chills and fever  HENT: Negative  Eyes: Negative  Negative for visual disturbance  Respiratory: Positive for wheezing  Negative for shortness of breath  Cardiovascular: Positive for leg swelling  Negative for chest pain and palpitations  Musculoskeletal: Negative  Skin: Positive for color change and rash  Neurological: Negative  Negative for dizziness, light-headedness and headaches  Psychiatric/Behavioral: Positive for sleep disturbance  The patient is nervous/anxious  Objective:      /96 (BP Location: Left arm, Patient Position: Sitting, Cuff Size: Large)   Pulse 101   Temp 98 1 °F (36 7 °C) (Temporal)   Ht 5' 9" (1 753 m)   Wt 110 kg (243 lb)   SpO2 96%   BMI 35 88 kg/m²          Physical Exam  Vitals and nursing note reviewed  Constitutional:       General: He is not in acute distress  Appearance: He is obese  HENT:      Head: Normocephalic  Eyes:      Conjunctiva/sclera: Conjunctivae normal    Cardiovascular:      Rate and Rhythm: Normal rate and regular rhythm  Heart sounds: Normal heart sounds  Pulmonary:      Effort: Pulmonary effort is normal       Breath sounds: Normal breath sounds  No wheezing  Abdominal:      General: Bowel sounds are normal    Musculoskeletal:      Right lower le+ Edema present  Left lower le+ Edema present  Skin:     Findings: No bruising or rash        Comments: Patient does not have any current psoriatic plaques and no evidence of previous plaques with hyper pigmented areas  Bilateral lower extremities with hyper pigmented spots more consistent with venous stasis  Neurological:      General: No focal deficit present  Mental Status: He is alert  Psychiatric:         Mood and Affect: Mood is anxious

## 2021-08-25 ENCOUNTER — OFFICE VISIT (OUTPATIENT)
Dept: SLEEP CENTER | Facility: CLINIC | Age: 51
End: 2021-08-25
Payer: MEDICARE

## 2021-08-25 VITALS
SYSTOLIC BLOOD PRESSURE: 158 MMHG | HEIGHT: 69 IN | HEART RATE: 102 BPM | DIASTOLIC BLOOD PRESSURE: 98 MMHG | BODY MASS INDEX: 35.87 KG/M2 | WEIGHT: 242.2 LBS

## 2021-08-25 DIAGNOSIS — Z91.89 RISK FACTORS FOR OBSTRUCTIVE SLEEP APNEA: ICD-10-CM

## 2021-08-25 DIAGNOSIS — G47.00 INSOMNIA, UNSPECIFIED TYPE: ICD-10-CM

## 2021-08-25 DIAGNOSIS — G47.33 OSA (OBSTRUCTIVE SLEEP APNEA): Primary | ICD-10-CM

## 2021-08-25 DIAGNOSIS — E66.09 OBESITY DUE TO EXCESS CALORIES WITH SERIOUS COMORBIDITY, UNSPECIFIED CLASSIFICATION: ICD-10-CM

## 2021-08-25 DIAGNOSIS — R06.83 SNORING: ICD-10-CM

## 2021-08-25 PROCEDURE — 99203 OFFICE O/P NEW LOW 30 MIN: CPT | Performed by: INTERNAL MEDICINE

## 2021-08-25 NOTE — PROGRESS NOTES
Consultation - [address] : 1970  MRN: [id number]      Assessment:  The patient has symptoms of obstructive sleep apnea and in fact had acute respiratory failure during endoscopy  Plan:  Polysomnography  I instructed the patient to bring Ambien with him, which he has at home  Follow up: After testing    History of Present Illness:   44 y o male with a longstanding history of loud snoring, excessive daytime sleepiness and awakening with a choking sensation  The patient also has a history of hypertension  While undergoing endoscopy, his airway could not be maintained and he required intubation  Patient has a number of musculoskeletal pains due to prior injuries and surgeries  He has severe insomnia and is unable to fall asleep without either anxiolytics, hypnotics or alcohol  He sleeps approximately 2 to 5 hours per night  He reports that he does not combined knees peer he occasionally has severe hiccups, which does not keep him awake  He reports that driving is a problem is he is going for more than 1 hour  The patient has unexplained abdominal distension        Review of Systems      Genitourinary need to urinate more than twice a night   Cardiology ankle/leg swelling   Gastrointestinal frequent heartburn/acid reflux   Neurology need to move extremities and numbness/tingling of an extremity   Constitutional weight change   Integumentary none   Psychiatry anxiety and depression   Musculoskeletal legs twitching/jerking   Pulmonary snoring   ENT none   Endocrine frequent urination   Hematological none           I have reviewed and updated the review of systems as necessary    Historical Information    Past Medical History:    GERD, asthma, hypertension, hypogonadism (the patient has a history of body building)    Family History: non-contributory    Social History     Socioeconomic History    Marital status: /Civil Union     Spouse name: None    Number of children: None    Years of education: None    Highest education level: None   Occupational History    None   Tobacco Use    Smoking status: Never Smoker    Smokeless tobacco: Never Used   Vaping Use    Vaping Use: Never used   Substance and Sexual Activity    Alcohol use: Yes     Comment: Social drinker    Drug use: Yes     Types: Marijuana     Comment: medicinal card obtained    Sexual activity: Yes     Partners: Female     Birth control/protection: None   Other Topics Concern    None   Social History Narrative    None     Social Determinants of Health     Financial Resource Strain:     Difficulty of Paying Living Expenses:    Food Insecurity:     Worried About Running Out of Food in the Last Year:     Ran Out of Food in the Last Year:    Transportation Needs:     Lack of Transportation (Medical):      Lack of Transportation (Non-Medical):    Physical Activity:     Days of Exercise per Week:     Minutes of Exercise per Session:    Stress:     Feeling of Stress :    Social Connections:     Frequency of Communication with Friends and Family:     Frequency of Social Gatherings with Friends and Family:     Attends Yazidi Services:     Active Member of Clubs or Organizations:     Attends Club or Organization Meetings:     Marital Status:    Intimate Partner Violence:     Fear of Current or Ex-Partner:     Emotionally Abused:     Physically Abused:     Sexually Abused:          Sleep Schedule: unremarkable    Snoring:    Yes    Witnessed Apnea:   no    Medications/Allergies:    Current Outpatient Medications:     albuterol (Ventolin HFA) 90 mcg/act inhaler, Inhale 2 puffs every 6 (six) hours as needed for wheezing, Disp: 8 g, Rfl: 0    ALPRAZolam (XANAX) 1 mg tablet, Take 1 tablet (1 mg total) by mouth 2 (two) times a day as needed for anxiety for up to 30 days (Patient taking differently: Take 1 mg by mouth as needed for anxiety ), Disp: 60 tablet, Rfl: 0    baclofen 10 mg tablet, Take 1 tablet (10 mg total) by mouth 3 (three) times a day, Disp: 30 tablet, Rfl: 0    Diclofenac Epolamine 1 3 % PTCH, Place 1 patch on the skin 2 (two) times a day, Disp: , Rfl:     ketoconazole (NIZORAL) 2 % shampoo, , Disp: , Rfl:     losartan-hydrochlorothiazide (HYZAAR) 100-12 5 MG per tablet, Take 1 tablet by mouth daily, Disp: 90 tablet, Rfl: 1    mometasone-formoterol (Dulera) 100-5 MCG/ACT inhaler, Inhale 2 puffs 2 (two) times a day Rinse mouth after use , Disp: 13 g, Rfl: 2    naproxen (NAPROSYN) 500 mg tablet, , Disp: , Rfl:     oxyCODONE (ROXICODONE) 15 mg immediate release tablet, , Disp: , Rfl:     OXYCONTIN 30 MG T12A, , Disp: , Rfl:     pantoprazole (PROTONIX) 40 mg tablet, , Disp: , Rfl:     predniSONE 20 mg tablet, , Disp: , Rfl:     testosterone cypionate (DEPO-TESTOSTERONE) 200 mg/mL SOLN, , Disp: , Rfl:     Testosterone Enanthate 200 MG/ML SOLN, 200 mg weekly IM E29 1, Disp: , Rfl:     triamcinolone (KENALOG) 0 5 % cream, Apply topically 2 (two) times a day, Disp: 30 g, Rfl: 0    zolpidem (AMBIEN) 10 mg tablet, , Disp: , Rfl:         No notes on file                  Objective:    Vital Signs:   Vitals:    08/25/21 1237   BP: 158/98   Pulse: 102   Weight: 110 kg (242 lb 3 2 oz)   Height: 5' 9" (1 753 m)     Neck Circumference: 18 5      [place] Sleepiness Scale: Total score: 10    Physical Exam:    General: Alert, appropriate, cooperative,  Severely overweight    Head: NC/AT,  mild retrognathia    Nose: No septal deviation, nares  not obstructed, mucosa normal    Throat: Airway diminished, tongue base thickened,  1+ tonsils visualized    Neck: Normal, no thyromegaly or lymphadenopathy, no JVD    Heart: RR, normal S1 and S2, no murmurs    Chest: Clear bilaterally    Extremity: No clubbing, cyanosis,  no edema    Skin: Warm, dry    Neuro: No motor abnormalities, cranial nerves appear intact      Counseling / Coordination of Care  A description of the counseling / coordination of care:   We discussed the pathophysiology of obstructive sleep apnea as well as the possible treatment options  We also discussed the rationale for positive airway pressure therapy  Board Certified Sleep Specialist    Portions of the record may have been created with voice recognition software  Occasional wrong word or "sound a like" substitutions may have occurred due to the inherent limitations of voice recognition software  Read the chart carefully and recognize, using context, where substitutions have occurred

## 2021-10-11 ENCOUNTER — HOSPITAL ENCOUNTER (OUTPATIENT)
Dept: SLEEP CENTER | Facility: CLINIC | Age: 51
Discharge: HOME | End: 2021-10-11
Payer: MEDICARE

## 2021-10-11 DIAGNOSIS — G47.33 OSA (OBSTRUCTIVE SLEEP APNEA): ICD-10-CM

## 2021-10-11 DIAGNOSIS — J45.20 MILD INTERMITTENT ASTHMA WITHOUT COMPLICATION: ICD-10-CM

## 2021-10-11 PROCEDURE — 95810 POLYSOM 6/> YRS 4/> PARAM: CPT

## 2021-10-11 RX ORDER — ALBUTEROL SULFATE 90 UG/1
2 AEROSOL, METERED RESPIRATORY (INHALATION) EVERY 6 HOURS PRN
Qty: 8 G | Refills: 0 | Status: SHIPPED | OUTPATIENT
Start: 2021-10-11 | End: 2021-12-17 | Stop reason: SDUPTHER

## 2021-10-15 ENCOUNTER — TELEPHONE (OUTPATIENT)
Dept: SLEEP CENTER | Facility: CLINIC | Age: 51
End: 2021-10-15

## 2021-10-17 DIAGNOSIS — G47.33 OSA (OBSTRUCTIVE SLEEP APNEA): Primary | ICD-10-CM

## 2021-12-17 DIAGNOSIS — J45.20 MILD INTERMITTENT ASTHMA WITHOUT COMPLICATION: ICD-10-CM

## 2021-12-17 RX ORDER — ALBUTEROL SULFATE 90 UG/1
2 AEROSOL, METERED RESPIRATORY (INHALATION) EVERY 6 HOURS PRN
Qty: 8 G | Refills: 3 | Status: SHIPPED | OUTPATIENT
Start: 2021-12-17 | End: 2022-12-17

## 2021-12-29 ENCOUNTER — TELEPHONE (OUTPATIENT)
Dept: INTERNAL MEDICINE CLINIC | Facility: CLINIC | Age: 51
End: 2021-12-29

## 2021-12-29 NOTE — TELEPHONE ENCOUNTER
Folder Color- BLUE    Name of Form- Pre op clearance     Form to be filled out by-    Form to be Faxed [phone]    Patient made aware of 10 business day policy

## 2021-12-30 ENCOUNTER — HOSPITAL ENCOUNTER (OUTPATIENT)
Dept: SLEEP CENTER | Facility: CLINIC | Age: 51
Discharge: HOME | End: 2021-12-30
Payer: MEDICARE

## 2021-12-30 DIAGNOSIS — G47.33 OSA (OBSTRUCTIVE SLEEP APNEA): ICD-10-CM

## 2021-12-30 PROCEDURE — 95811 POLYSOM 6/>YRS CPAP 4/> PARM: CPT

## 2021-12-30 PROCEDURE — 95811 POLYSOM 6/>YRS CPAP 4/> PARM: CPT | Performed by: INTERNAL MEDICINE

## 2021-12-30 NOTE — TELEPHONE ENCOUNTER
Clerical- please reach out to the patient to schedule a pre-op clearance appointment   Once appointment is made please send back to clinical

## 2021-12-31 NOTE — PROGRESS NOTES
Sleep Study Documentation    Pre-Sleep Study       Sleep testing procedure explained to patient:YES    Patient napped prior to study:YES- more than 30 minutes  Napped after 2PM: no    Caffeine:Dayshift worker after 12PM   Caffeine use:NO    Alcohol:Dayshift workers after 5PM: Alcohol use:NO    Typical day for patient:YES       Study Documentation    Sleep Study Indications: BOY, snoring, asthma, anxiety, GERD, hypertension, class 2 severe obesity,     Sleep Study: Treatment   Optimal PAP pressure: 16CM  Leak:Small  Snore:Eliminated  REM Obtained:yes  Supplemental O2: no    Minimum SaO2 84%  Baseline SaO2 94%  PAP mask tried (list all) large resmed airfit F20  PAP mask choice (final) large resmed airfit F20  PAP mask type:full face  PAP pressure at which snoring was eliminated 8CM  Minimum SaO2 at final PAP pressure 93%  Mode of Therapy:CPAP  ETCO2:No  CPAP changed to BiPAP:No    Mode of Therapy:CPAP    EKG abnormalities: no     EEG abnormalities: no    Sleep Study Recorded < 2 hours: N/A    Sleep Study Recorded > 2 hours but incomplete study: N/A    Sleep Study Recorded 6 hours but no sleep obtained: NO    Patient classification: employed       Post-Sleep Study    Medication used at bedtime or during sleep study:YES other prescription medications    Patient reports time it took to fall asleep:20 to 30 minutes    Patient reports waking up during study:3 or more times  Patient reports returning to sleep without difficulty  Patient reports sleeping 4 to 6 hours without dreaming  Patient reports sleep during study:typical    Patient rated sleepiness: Somewhat sleepy or tired    PAP treatment:yes: Post PAP treatment patient reports feeling better and  would wear PAP mask at home

## 2022-01-04 ENCOUNTER — TELEPHONE (OUTPATIENT)
Dept: SLEEP CENTER | Facility: CLINIC | Age: 52
End: 2022-01-04

## 2022-01-04 NOTE — TELEPHONE ENCOUNTER
Left message for the patient to call back for CPAP study results  APAP ordered   Patient needs DME set up and compliance appointments

## 2022-01-04 NOTE — TELEPHONE ENCOUNTER
Spoke with patient, advised above      Scheduled DME set up 1/18/2022 in [facility] representative aware    complaince follow up scheduled 4/28/2022, placed on wait list, compliance before 4/18/2022

## 2022-01-06 NOTE — TELEPHONE ENCOUNTER
LMOM to call back to schedule   Will have form scanned in for future reference until patient calls back

## 2022-02-02 ENCOUNTER — TELEPHONE (OUTPATIENT)
Dept: SLEEP CENTER | Facility: CLINIC | Age: 52
End: 2022-02-02

## 2022-02-02 NOTE — TELEPHONE ENCOUNTER
Lilo 3B,set @ 12-16cm, heated humidifier, standard tubing, F39i med ffm   Set up @ [facility],per script Dr Chacho Bernard

## 2022-02-17 ENCOUNTER — OFFICE VISIT (OUTPATIENT)
Dept: INTERNAL MEDICINE CLINIC | Facility: CLINIC | Age: 52
End: 2022-02-17

## 2022-02-17 VITALS
HEART RATE: 99 BPM | WEIGHT: 253 LBS | BODY MASS INDEX: 37.47 KG/M2 | DIASTOLIC BLOOD PRESSURE: 89 MMHG | SYSTOLIC BLOOD PRESSURE: 144 MMHG | HEIGHT: 69 IN | TEMPERATURE: 97.9 F

## 2022-02-17 DIAGNOSIS — R20.0 FINGER NUMBNESS: Primary | ICD-10-CM

## 2022-02-17 DIAGNOSIS — I10 ESSENTIAL HYPERTENSION: ICD-10-CM

## 2022-02-17 DIAGNOSIS — R06.6 INTRACTABLE HICCUPS: ICD-10-CM

## 2022-02-17 DIAGNOSIS — Z79.1 NSAID LONG-TERM USE: ICD-10-CM

## 2022-02-17 PROCEDURE — 99213 OFFICE O/P EST LOW 20 MIN: CPT | Performed by: INTERNAL MEDICINE

## 2022-02-17 RX ORDER — CAPSAICIN 0.025 %
1 CREAM (GRAM) TOPICAL 2 TIMES DAILY
Qty: 60 G | Refills: 0 | Status: SHIPPED | OUTPATIENT
Start: 2022-02-17 | End: 2022-06-03 | Stop reason: ALTCHOICE

## 2022-02-17 RX ORDER — BACLOFEN 10 MG/1
10 TABLET ORAL 2 TIMES DAILY PRN
Qty: 30 TABLET | Refills: 0 | Status: SHIPPED | OUTPATIENT
Start: 2022-02-17 | End: 2022-06-03 | Stop reason: SDUPTHER

## 2022-02-17 RX ORDER — LOSARTAN POTASSIUM AND HYDROCHLOROTHIAZIDE 12.5; 1 MG/1; MG/1
1 TABLET ORAL DAILY
Qty: 90 TABLET | Refills: 1 | Status: SHIPPED | OUTPATIENT
Start: 2022-02-17

## 2022-02-17 RX ORDER — GABAPENTIN 300 MG/1
300 CAPSULE ORAL
Qty: 30 CAPSULE | Refills: 1 | Status: SHIPPED | OUTPATIENT
Start: 2022-02-17 | End: 2022-06-03 | Stop reason: ALTCHOICE

## 2022-02-17 NOTE — PROGRESS NOTES
[facility] Visit Note  Birdie Payment 46 y o  male   MRN: [id number]    Assessment and Plan    Diagnoses and all orders for this visit:    Right hand finger numbness  In 2nd, 3rd, 4th digits  Negative tinel's sign, phalen +at 30 seconds for tingling in same 3 digits  4/5 strength in 1-4th No tenderness to medial or lateral epicondyles  No pain elicited with resistance against wrist extension  Despite negative history for repetitive motions or carrying heavy objects/pressure loading, symptoms are still most consistent with carpal tunnel syndrome  I advised the patient to buy an OTC wrist splint and to wear if for a minimum of 24 hrs after first getting it, only taking it off breifly eg while showering but making sure to wear it to sleep  We will revisit at next appt to see how symptoms have evolved and if we will need to pursue neuro/EMG  For pain:  -     capsaicin (ZOSTRIX) 0 025 % cream; Apply 1 application topically 2 (two) times a day To R wrist and fingers  -     Splint  -     gabapentin (Neurontin) 300 mg capsule; Take 1 capsule (300 mg total) by mouth daily at bedtime  -     Vitamin B12; Future - to r/o vitain deficiency leading to neuropathy    Essential hypertension  Elevated BP read today but patient states he has a cuff at home  I asked him to take BP and educated him on appropriate technique  We will readdress at next visit to see if he needs regimen increased or not  -     losartan-hydrochlorothiazide (HYZAAR) 100-12 5 MG per tablet; Take 1 tablet by mouth daily    NSAID long-term use  Patient states that he has been taking 600 mg ibuprofen every day  Strongly advised patient to discontinue NSAID use to help prevent further injury to his kidneys; will check Cr, BUN  -     Comprehensive metabolic panel;  Future    Intractable hiccups  Erratic occurrence, happens a few days a month to once every 6 months at times, but when they occur they can last 2-3 days at a time  -     baclofen 10 mg tablet; Take 1 tablet (10 mg total) by mouth 2 (two) times a day as needed for muscle spasms (Diaphragmatic spasm)    Sinus tachycardia  In low 100s on physical exam  Regular rhythm and valve sounds - continue to monitor in future visits  Health Maintenance:  Defer  Will need eventual Medicare AWV to address BMI and other issues        Schedule a nursing spirometry visit in next 7 days, follow-up appointment in 2 weeks for spirometry check/R finger pain/BP recheck  Chief Complaint: R hand finger numbness  Subjective     History of Present Illness:  Patient is a 45 yo M pmhx HTN, exercise-induced asthma w/o spirometry, BOY on CPAP, GERD, obesity (bmi 37) who presents with 2-day history of finger numbness from digits 2-4 on the R hand  Pt states he first noticed the pain at home while sitting on his couch watching TV  His hand was resting comfortably when he noticed a tingling and then pins/needles sensation  He states that the surface of these fingers feels numb to him but the pain comes and goes  Today, it was severe enough that he felt like he needed to see a doctor for it  He has tried motrin with limited relief, but otherwise has not tried heat/ice  He has tried stretching and flexing his wrist, elbow, and shoulder without improvement  He has not had this type of fiinger pain before  He denies local trauma, overuse (eg typing), or frequent exercise/stress on the fingers or wrist  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 and is mostly talking on the phone all day and has been rather sedentary  Patient sleeps on his back to use CPAP and does not toss/turn or bear weight on his arms while asleep  He has unintentionally gained 50 lbs in 2 years, attributing it to junk food diet but feels like at least half of it is fluid retention  He used to bodybuild and used anabolic steroids for 10 years but never had any heart issues   He has needed to use his inhaler more frequently, primarily upon waking, to relieve wheezing (although his exercise-induced asthma was diagnosed w/o spirometry)  This is despite consistent use of CPAP machine  Echo last year (2021) ordered for similar office presentation of shortness of breath was normal in anatomy and function  Pt also c/o intractable hiccups that last 2-3 days at a time and persist through sleep  He does not have them today at visit but is requesting a PRN medicine to help terminate them so he can get better sleep  He confirmed these hiccups by showing me a video of when they happen  They appear to be a strong diaphragmatic spasm every 7-10 seconds  Pt states he does not exercise anymore and is very sedentary  He denies hx of tobacco products  He drinks alcohol 2 cocktails qOD  He uses marijuana once a month in either smoked or edible format  Review of Systems   Constitutional: Negative for chills and fever  Respiratory: Positive for shortness of breath and wheezing (only with awakening in the morning)  Negative for cough  No orthopnea   Cardiovascular: Negative for chest pain and palpitations  Gastrointestinal: Negative for nausea and vomiting  Musculoskeletal: Negative for gait problem  Skin: Negative for rash  Neurological: Negative for tremors and headaches               Current Outpatient Medications:     albuterol (Ventolin HFA) 90 mcg/act inhaler, Inhale 2 puffs every 6 (six) hours as needed for wheezing, Disp: 8 g, Rfl: 3    ALPRAZolam (XANAX) 1 mg tablet, Take 1 tablet (1 mg total) by mouth 2 (two) times a day as needed for anxiety for up to 30 days (Patient taking differently: Take 1 mg by mouth as needed for anxiety ), Disp: 60 tablet, Rfl: 0    Diclofenac Epolamine 1 3 % PTCH, Place 1 patch on the skin 2 (two) times a day, Disp: , Rfl:     losartan-hydrochlorothiazide (HYZAAR) 100-12 5 MG per tablet, Take 1 tablet by mouth daily, Disp: 90 tablet, Rfl: 1    testosterone cypionate (DEPO-TESTOSTERONE) 200 mg/mL SOLN, , Disp: , Rfl:     Testosterone Enanthate 200 MG/ML SOLN, 200 mg weekly IM E29 1, Disp: , Rfl:     baclofen 10 mg tablet, Take 1 tablet (10 mg total) by mouth 2 (two) times a day as needed for muscle spasms (Diaphragmatic spasm), Disp: 30 tablet, Rfl: 0    capsaicin (ZOSTRIX) 0 025 % cream, Apply 1 application topically 2 (two) times a day To R wrist and fingers, Disp: 60 g, Rfl: 0    gabapentin (Neurontin) 300 mg capsule, Take 1 capsule (300 mg total) by mouth daily at bedtime, Disp: 30 capsule, Rfl: 1    triamcinolone (KENALOG) 0 5 % cream, Apply topically 2 (two) times a day (Patient not taking: Reported on 2/17/2022 ), Disp: 30 g, Rfl: 0  Past Medical History:   Diagnosis Date    Anxiety     Asthma     Benign essential HTN     last assessed 05/26/2017    Degenerative joint disease     Hiccoughs     Hypertension     Skin cancer     Spinal stenosis      Past Surgical History:   Procedure Laterality Date    ESOPHAGOGASTRODUODENOSCOPY N/A 3/24/2016    Procedure: ESOPHAGOGASTRODUODENOSCOPY (EGD); Surgeon: Radha Guerra MD;  Location: [facility];   Service:     JOINT REPLACEMENT      right hip sx     Social History     Socioeconomic History    Marital status: /Civil Union     Spouse name: Not on file    Number of children: Not on file    Years of education: Not on file    Highest education level: Not on file   Occupational History    Not on file   Tobacco Use    Smoking status: Never Smoker    Smokeless tobacco: Never Used   Vaping Use    Vaping Use: Never used   Substance and Sexual Activity    Alcohol use: Yes     Comment: Social drinker    Drug use: Yes     Types: Marijuana     Comment: medicinal card obtained    Sexual activity: Yes     Partners: Female     Birth control/protection: None   Other Topics Concern    Not on file   Social History Narrative    Not on file     Social Determinants of Health     Financial Resource Strain: Not on file Food Insecurity: Not on file   Transportation Needs: Not on file   Physical Activity: Not on file   Stress: Not on file   Social Connections: Not on file   Intimate Partner Violence: Not on file   Housing Stability: Not on file     Family History   Problem Relation Age of Onset    Cancer Mother     Cancer Father      No Known Allergies    Objective     Vitals:    02/17/22 1644   BP: 144/89   BP Location: Right arm   Patient Position: Sitting   Cuff Size: Large   Pulse: 99   Temp: 97 9 °F (36 6 °C)   TempSrc: Temporal   Weight: 115 kg (253 lb)   Height: 5' 9" (1 753 m)       Physical exam:   Physical Exam  Vitals reviewed  Constitutional:       General: He is not in acute distress  Appearance: He is obese  He is not ill-appearing or diaphoretic  Eyes:      General: No scleral icterus  Extraocular Movements: Extraocular movements intact  Cardiovascular:      Rate and Rhythm: Regular rhythm  Tachycardia present  Heart sounds: No murmur heard  No friction rub  No gallop  Pulmonary:      Effort: Pulmonary effort is normal  No respiratory distress  Breath sounds: No wheezing, rhonchi or rales  Abdominal:      Palpations: Abdomen is soft  Tenderness: There is no abdominal tenderness  Musculoskeletal:      Right lower leg: Edema (3+ pitting) present  Left lower leg: Edema (3+ pitting) present  Comments: -tinel's sign b/l  +phalen on R wrist  -finkelstein b/l  -tenderness to palpation of medial and lateral epicondyles b/l  Skin:     General: Skin is warm and dry  Comments: Venostasis changes along anterior tibia bilaterally   Neurological:      Mental Status: He is alert and oriented to person, place, and time  Psychiatric:         Mood and Affect: Mood normal               MD Luigi Renner [facility] Internal Medicine PGY-1  [address] , [address] [place] [address]    ==  PLEASE NOTE:  This encounter was completed utilizing the M- Mod[employer]Fluency Direct Speech Voice Recognition Software  Grammatical errors, random word insertions, pronoun errors and incomplete sentences are occasional consequences of the system due to software limitations, ambient noise and hardware issues  These may be missed by proof reading prior to affixing electronic signature  Any questions or concerns about the content, text or information contained within the body of this dictation should be directly addressed to the physician for clarification  Please do not hesitate to call me directly if you have any any questions or concerns

## 2022-02-18 ENCOUNTER — TELEPHONE (OUTPATIENT)
Dept: INTERNAL MEDICINE CLINIC | Facility: CLINIC | Age: 52
End: 2022-02-18

## 2022-02-18 NOTE — TELEPHONE ENCOUNTER
Patient was seen 2/17/22 with Dr Milagros Lantigua   Spirometry scheduled for 2/24/22 1pm with the nurse   At check out, please schedule the follow up visit      "Return in about 4 weeks (around 3/17/2022), or 2 weeks follow-up for spirometry results, R finger pain, BP recheck"    thanks

## 2022-06-01 ENCOUNTER — TELEPHONE (OUTPATIENT)
Dept: INTERNAL MEDICINE CLINIC | Facility: CLINIC | Age: 52
End: 2022-06-01

## 2022-06-01 NOTE — TELEPHONE ENCOUNTER
Patient will be going to have lab work done 6/2/22 and wants to know if he needs further labs done CBC   --- he has CMP, lipid panel vit B12, urine test and urinalysis     Patient is scared of needles and would prefer to have all labs ordered that are required  Patient is having abdominal bloating/hard/pain when he eats and leg swelling     Gained 50 lbs within a year    Please check all pending labs and see if there are any further labs that the patient needs done    thanks

## 2022-06-03 ENCOUNTER — APPOINTMENT (OUTPATIENT)
Dept: LAB | Facility: CLINIC | Age: 52
End: 2022-06-03
Payer: MEDICARE

## 2022-06-03 ENCOUNTER — OFFICE VISIT (OUTPATIENT)
Dept: INTERNAL MEDICINE CLINIC | Facility: CLINIC | Age: 52
End: 2022-06-03

## 2022-06-03 VITALS
HEART RATE: 92 BPM | WEIGHT: 250 LBS | SYSTOLIC BLOOD PRESSURE: 131 MMHG | BODY MASS INDEX: 37.03 KG/M2 | HEIGHT: 69 IN | TEMPERATURE: 98.1 F | DIASTOLIC BLOOD PRESSURE: 92 MMHG

## 2022-06-03 DIAGNOSIS — E29.1 HYPOGONADISM IN MALE: ICD-10-CM

## 2022-06-03 DIAGNOSIS — R06.6 INTRACTABLE HICCUPS: ICD-10-CM

## 2022-06-03 DIAGNOSIS — G47.09 OTHER INSOMNIA: ICD-10-CM

## 2022-06-03 DIAGNOSIS — R31.0 GROSS HEMATURIA: ICD-10-CM

## 2022-06-03 DIAGNOSIS — R20.0 FINGER NUMBNESS: ICD-10-CM

## 2022-06-03 DIAGNOSIS — E66.01 CLASS 2 SEVERE OBESITY DUE TO EXCESS CALORIES WITH SERIOUS COMORBIDITY AND BODY MASS INDEX (BMI) OF 35.0 TO 35.9 IN ADULT (HCC): ICD-10-CM

## 2022-06-03 DIAGNOSIS — G47.33 OSA (OBSTRUCTIVE SLEEP APNEA): ICD-10-CM

## 2022-06-03 DIAGNOSIS — I10 ESSENTIAL HYPERTENSION: Primary | ICD-10-CM

## 2022-06-03 DIAGNOSIS — N18.31 STAGE 3A CHRONIC KIDNEY DISEASE (HCC): ICD-10-CM

## 2022-06-03 DIAGNOSIS — R31.29 MICROSCOPIC HEMATURIA: ICD-10-CM

## 2022-06-03 DIAGNOSIS — I10 ESSENTIAL HYPERTENSION: ICD-10-CM

## 2022-06-03 DIAGNOSIS — J45.20 MILD INTERMITTENT ASTHMA WITHOUT COMPLICATION: ICD-10-CM

## 2022-06-03 LAB
25(OH)D3 SERPL-MCNC: 23.6 NG/ML (ref 30–100)
ALBUMIN SERPL BCP-MCNC: 3.6 G/DL (ref 3.5–5)
ALP SERPL-CCNC: 60 U/L (ref 46–116)
ALT SERPL W P-5'-P-CCNC: 77 U/L (ref 12–78)
ANION GAP SERPL CALCULATED.3IONS-SCNC: 2 MMOL/L (ref 4–13)
AST SERPL W P-5'-P-CCNC: 43 U/L (ref 5–45)
BACTERIA UR QL AUTO: ABNORMAL /HPF
BILIRUB SERPL-MCNC: 0.72 MG/DL (ref 0.2–1)
BILIRUB UR QL STRIP: NEGATIVE
BUN SERPL-MCNC: 12 MG/DL (ref 5–25)
CALCIUM SERPL-MCNC: 9.5 MG/DL (ref 8.3–10.1)
CHLORIDE SERPL-SCNC: 104 MMOL/L (ref 100–108)
CHOLEST SERPL-MCNC: 196 MG/DL
CLARITY UR: CLEAR
CO2 SERPL-SCNC: 32 MMOL/L (ref 21–32)
COLOR UR: YELLOW
CREAT SERPL-MCNC: 1.46 MG/DL (ref 0.6–1.3)
GFR SERPL CREATININE-BSD FRML MDRD: 54 ML/MIN/1.73SQ M
GLUCOSE P FAST SERPL-MCNC: 96 MG/DL (ref 65–99)
GLUCOSE UR STRIP-MCNC: NEGATIVE MG/DL
HDLC SERPL-MCNC: 52 MG/DL
HGB UR QL STRIP.AUTO: NEGATIVE
KETONES UR STRIP-MCNC: NEGATIVE MG/DL
LDLC SERPL CALC-MCNC: 120 MG/DL (ref 0–100)
LEUKOCYTE ESTERASE UR QL STRIP: ABNORMAL
NITRITE UR QL STRIP: NEGATIVE
NON-SQ EPI CELLS URNS QL MICRO: ABNORMAL /HPF
PH UR STRIP.AUTO: 6.5 [PH]
POTASSIUM SERPL-SCNC: 3.8 MMOL/L (ref 3.5–5.3)
PROT SERPL-MCNC: 7.1 G/DL (ref 6.4–8.2)
PROT UR STRIP-MCNC: ABNORMAL MG/DL
RBC #/AREA URNS AUTO: ABNORMAL /HPF
SODIUM SERPL-SCNC: 138 MMOL/L (ref 136–145)
SP GR UR STRIP.AUTO: 1.02 (ref 1–1.03)
T4 FREE SERPL-MCNC: 0.97 NG/DL (ref 0.76–1.46)
TRIGL SERPL-MCNC: 120 MG/DL
TSH SERPL DL<=0.05 MIU/L-ACNC: 2.54 UIU/ML (ref 0.45–4.5)
UROBILINOGEN UR STRIP-ACNC: <2 MG/DL
VIT B12 SERPL-MCNC: 241 PG/ML (ref 100–900)
WBC #/AREA URNS AUTO: ABNORMAL /HPF

## 2022-06-03 PROCEDURE — 84443 ASSAY THYROID STIM HORMONE: CPT

## 2022-06-03 PROCEDURE — 80053 COMPREHEN METABOLIC PANEL: CPT | Performed by: PHYSICIAN ASSISTANT

## 2022-06-03 PROCEDURE — 87147 CULTURE TYPE IMMUNOLOGIC: CPT

## 2022-06-03 PROCEDURE — 36415 COLL VENOUS BLD VENIPUNCTURE: CPT | Performed by: PHYSICIAN ASSISTANT

## 2022-06-03 PROCEDURE — 82306 VITAMIN D 25 HYDROXY: CPT

## 2022-06-03 PROCEDURE — 84439 ASSAY OF FREE THYROXINE: CPT

## 2022-06-03 PROCEDURE — 81001 URINALYSIS AUTO W/SCOPE: CPT

## 2022-06-03 PROCEDURE — 80061 LIPID PANEL: CPT | Performed by: PHYSICIAN ASSISTANT

## 2022-06-03 PROCEDURE — 82607 VITAMIN B-12: CPT

## 2022-06-03 PROCEDURE — 99215 OFFICE O/P EST HI 40 MIN: CPT | Performed by: HOSPITALIST

## 2022-06-03 PROCEDURE — 87086 URINE CULTURE/COLONY COUNT: CPT

## 2022-06-03 RX ORDER — MONTELUKAST SODIUM 10 MG/1
10 TABLET ORAL
Qty: 30 TABLET | Refills: 3 | Status: SHIPPED | OUTPATIENT
Start: 2022-06-03

## 2022-06-03 RX ORDER — DEXAMETHASONE 4 MG/1
2 TABLET ORAL 2 TIMES DAILY
Qty: 12 G | Refills: 2 | Status: SHIPPED | OUTPATIENT
Start: 2022-06-03

## 2022-06-03 RX ORDER — BACLOFEN 10 MG/1
10 TABLET ORAL 2 TIMES DAILY PRN
Qty: 30 TABLET | Refills: 0 | Status: SHIPPED | OUTPATIENT
Start: 2022-06-03

## 2022-06-03 RX ORDER — ZOLPIDEM TARTRATE 10 MG/1
5 TABLET ORAL
Qty: 30 TABLET | Refills: 0 | Status: SHIPPED | OUTPATIENT
Start: 2022-06-03 | End: 2022-06-20 | Stop reason: SDUPTHER

## 2022-06-03 NOTE — PROGRESS NOTES
[address] [facility]  INTERNAL MEDICINE OFFICE VISIT     PATIENT INFORMATION     Qing Haji   46 y o  male   MRN: [id number]    ASSESSMENT/PLAN     1  Essential hypertension  Assessment & Plan:   Echo within normal limits January 2022   Denies any chest pain, palpitations or changes in vision   Compliant with losartan hydrochlorothiazide 100-12 5 mg daily, may consider increasing in future   Continue tobacco abstinence   Advised patient to cut down on drinking however will revisit again in future   May also be compounded by obstructive sleep apnea, please see BOY for more details of assessment and plan   continue healthy diet and lifestyle modifications   will rule out secondary causes in setting of weight gain such as Cushing with a m  cortisol and urine metanephrines    Orders:  -     Cortisol Level, AM Specimen; Future  -     Metanephrine, Fractionated Plasma Free; Future  -     Metanephrines Fractionated, urine, 24 hour; Future  -     TSH + Free T4; Future  -     Comprehensive metabolic panel; Future  -     Vitamin D 25 hydroxy; Future    2  Intractable hiccups  -     baclofen 10 mg tablet; Take 1 tablet (10 mg total) by mouth 2 (two) times a day as needed for muscle spasms (Diaphragmatic spasm)    3  Mild intermittent asthma without complication  Assessment & Plan:   Patient does not have maintenance inhaler, prescribed  Encouraged patient to avoid allergen triggers and secondhand smoke   Continue zyrtec however may up titrate to 4 times amount dosage   prescribed montelukast  Albuterol p r n  Consider PFTs in future    Orders:  -     fluticasone (Flovent HFA) 110 MCG/ACT inhaler; Inhale 2 puffs 2 (two) times a day Rinse mouth after use  -     montelukast (SINGULAIR) 10 mg tablet; Take 1 tablet (10 mg total) by mouth daily at bedtime    4   BOY (obstructive sleep apnea)  Assessment & Plan:   Patient on compliant with sleep apnea a machine and previous AHI 40     encourage patient to get mask refitting   patient does have deviated septum and declines ENT referral at this time, will revisit in future and encourage patient to strongly reconsider steptal surgery   extensively educated and counseled patient for over 30 minutes in regards to  Risk of stroke, heart attack and death from sleep apnea if remains untreated,  Prognosis and physiology   consider inspire implantation   continue healthy diet and lifestyle modifications      5  Microscopic hematuria  -     UA w Reflex to Microscopic w Reflex to Culture -Lab Collect; Future; Expected date: 06/03/2022    6  Other insomnia  -     zolpidem (AMBIEN) 10 mg tablet; Take 0 5 tablets (5 mg total) by mouth daily at bedtime as needed for sleep    7  Stage 3a chronic kidney disease [facility])  Assessment & Plan:  Lab Results   Component Value Date    EGFR 55 06/01/2021    EGFR 59 2 03/22/2016    CREATININE 1 46 (H) 06/01/2021      repeat CMP  Avoid NSAIDs   Treat hypertension as stated above      8  Class 2 severe obesity due to excess calories with serious comorbidity and body mass index (BMI) of 35 0 to 35 9 in adult [facility])  Assessment & Plan: May be secondary to elevated cortisol differential diagnosis includes Cushing 1st hypothyroidism versus depression versus hypogonadism and compounding conditions     TSH, BOY mask refitting,  Vitamin-D and  A m  cortisol ordered, will follow-up   Continue healthy diet and exercise lifestyle modifications  Will continue counseling on decreasing alcohol intake    Orders:  -     TSH + Free T4; Future  -     Vitamin D 25 hydroxy; Future    9  Hypogonadism in male  Assessment & Plan:   Does have history of anabolic steroid use as was  several years ago  Does follow endocrinology and has testosterone replacement, will defer to them   Stable          Schedule a follow-up appointment in 6-8 weeks with me for review of labs, recheck HTN and discussion of sleep apnea/deviated septum sx      HEALTH MAINTENANCE There is no immunization history on file for this patient  Immunizations:  Needs flu and covid  Screening:  Needs depression and MAWV    BMI Counseling: Body mass index is 36 92 kg/m²  The BMI is above normal  Nutrition recommendations include limiting drinks that contain sugar, moderation in carbohydrate intake, increasing intake of lean protein and reducing intake of cholesterol  Exercise recommendations include exercising 3-5 times per week  Rationale for BMI follow-up plan is due to patient being overweight or obese  CHIEF COMPLAINT     Chief Complaint   Patient presents with    Follow-up     Abdominal pain & bloating for 2 years, gained 50 pounds within a year  Bilateral leg swelling      HISTORY OF PRESENT ILLNESS     Patient is a 46year old male pmhx of HTN, BOY (non-compliant on machine), Hypogonadism, anxiety, insomnia who presents today with complaints of weight gain  Patient states that he has had weight gain of approximately 40-50 lb over the past 2 years  He states he has severe abdominal discomfort and states his abdomen is "as hard as a brick"  He has changed his diet,  And use to be a  so this is distressing for him  His bowel movements are regular  He has had an extensive workup to investigate his abdominal swelling and discomfort including EGD, colonoscopy, CT scan, echo all of which were within normal limits  He also endorses intractable hiccups which, intermittently and alleviated by baclofen  He states it is random and has no increase in frequency with certain foods, weight or position  States he is compliant with his medication however he is not compliant with his CPAP machine as his mask which is too tight  Patient does have fhx colon cancer in dad,  Ultimately which did cause  His death    Patient has previous colonoscopy unremarkable however he did code and patient is afraid of undergoing 2nd colonoscopy or any other procedures which may involve anesthesia  Patient does endorse drinking 216 outs cans of beer approximately every 3 days he does have a medical marijuana card and remote history of snorting cocaine  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     patient denies any fatigue, fever, changes in vision, nausea, vomiting, diarrhea, constipation, heat or cold intolerance, issues with urinating, shortness of breath or cough  Patient does endorse sleep disturbance primarily from snoring and sleep apnea  REVIEW OF SYSTEMS     Review of Systems   Constitutional: Positive for unexpected weight change  Negative for chills, fatigue and fever  Eyes: Negative for visual disturbance  Respiratory: Negative for cough and shortness of breath  Cardiovascular: Negative for chest pain and palpitations  Gastrointestinal: Negative for constipation, diarrhea, nausea and vomiting  Endocrine: Negative for cold intolerance and heat intolerance  Neurological: Positive for headaches  Psychiatric/Behavioral: Positive for sleep disturbance  OBJECTIVE     Vitals:    06/03/22 1120   BP: 131/92   BP Location: Left arm   Patient Position: Sitting   Cuff Size: Large   Pulse: 92   Temp: 98 1 °F (36 7 °C)   TempSrc: Temporal   Weight: 113 kg (250 lb)   Height: 5' 9" (1 753 m)     Physical Exam  Constitutional:       Appearance: He is obese  He is not ill-appearing  HENT:      Mouth/Throat:      Mouth: Mucous membranes are moist    Eyes:      Conjunctiva/sclera: Conjunctivae normal    Cardiovascular:      Rate and Rhythm: Normal rate and regular rhythm  Heart sounds: No murmur heard  Pulmonary:      Effort: Pulmonary effort is normal       Breath sounds: No wheezing or rales  Abdominal:      General: Bowel sounds are normal       Palpations: Abdomen is soft  Tenderness: There is no abdominal tenderness  There is no guarding  Musculoskeletal:      Right lower leg: Edema (mild, trace) present        Left lower leg: Edema (mild, trace) present  Skin:     General: Skin is warm and dry  Findings: No erythema  Neurological:      Mental Status: He is alert  Psychiatric:         Mood and Affect: Mood normal          Behavior: Behavior normal        CURRENT MEDICATIONS     Current Outpatient Medications:     albuterol (Ventolin HFA) 90 mcg/act inhaler, Inhale 2 puffs every 6 (six) hours as needed for wheezing, Disp: 8 g, Rfl: 3    ALPRAZolam (XANAX) 1 mg tablet, Take 1 tablet (1 mg total) by mouth 2 (two) times a day as needed for anxiety for up to 30 days (Patient taking differently: Take 1 mg by mouth as needed for anxiety), Disp: 60 tablet, Rfl: 0    baclofen 10 mg tablet, Take 1 tablet (10 mg total) by mouth 2 (two) times a day as needed for muscle spasms (Diaphragmatic spasm), Disp: 30 tablet, Rfl: 0    fluticasone (Flovent HFA) 110 MCG/ACT inhaler, Inhale 2 puffs 2 (two) times a day Rinse mouth after use , Disp: 12 g, Rfl: 2    losartan-hydrochlorothiazide (HYZAAR) 100-12 5 MG per tablet, Take 1 tablet by mouth daily, Disp: 90 tablet, Rfl: 1    montelukast (SINGULAIR) 10 mg tablet, Take 1 tablet (10 mg total) by mouth daily at bedtime, Disp: 30 tablet, Rfl: 3    testosterone cypionate (DEPO-TESTOSTERONE) 200 mg/mL SOLN, , Disp: , Rfl:     triamcinolone (KENALOG) 0 5 % cream, Apply topically 2 (two) times a day, Disp: 30 g, Rfl: 0    zolpidem (AMBIEN) 10 mg tablet, Take 0 5 tablets (5 mg total) by mouth daily at bedtime as needed for sleep, Disp: 30 tablet, Rfl: 0    PAST MEDICAL & SURGICAL HISTORY     Past Medical History:   Diagnosis Date    Anxiety     Asthma     Benign essential HTN     last assessed 05/26/2017    Degenerative joint disease     Hiccoughs     Hypertension     Skin cancer     Spinal stenosis      Past Surgical History:   Procedure Laterality Date    ESOPHAGOGASTRODUODENOSCOPY N/A 3/24/2016    Procedure: ESOPHAGOGASTRODUODENOSCOPY (EGD);   Surgeon: Nereida Garrett MD;  [facility]; Service:     JOINT REPLACEMENT      right hip sx     SOCIAL & FAMILY HISTORY     Social History     Socioeconomic History    Marital status: /Civil Union     Spouse name: Not on file    Number of children: Not on file    Years of education: Not on file    Highest education level: Not on file   Occupational History    Not on file   Tobacco Use    Smoking status: Never Smoker    Smokeless tobacco: Never Used   Vaping Use    Vaping Use: Never used   Substance and Sexual Activity    Alcohol use: Yes     Comment: Social drinker    Drug use: Yes     Types: Marijuana     Comment: medicinal card obtained    Sexual activity: Yes     Partners: Female     Birth control/protection: None   Other Topics Concern    Not on file   Social History Narrative    Not on file     Social Determinants of Health     Financial Resource Strain: Not on file   Food Insecurity: Not on file   Transportation Needs: Not on file   Physical Activity: Not on file   Stress: Not on file   Social Connections: Not on file   Intimate Partner Violence: Not on file   Housing Stability: Not on file     Social History     Substance and Sexual Activity   Alcohol Use Yes    Comment: Social drinker     Substance and Sexual Activity   Alcohol Use Yes    Comment: Social drinker        Substance and Sexual Activity   Drug Use Yes    Types: Marijuana    Comment: medicinal card obtained     Social History     Tobacco Use   Smoking Status Never Smoker   Smokeless Tobacco Never Used     Family History   Problem Relation Age of Onset    Cancer Mother     Cancer Father      ==  DO Luigi Collado [facility]  [address] [facility] [place] , [address] [address] [facility] [address] [facility]  Office: [phone]  Fax: [phone]

## 2022-06-04 LAB — BACTERIA UR CULT: ABNORMAL

## 2022-06-07 DIAGNOSIS — I10 ESSENTIAL HYPERTENSION: Primary | ICD-10-CM

## 2022-06-07 NOTE — TELEPHONE ENCOUNTER
Hi there,    The patient has orders from last week that are still awaiting completion, including TSH, cortisol, and metanephrine testing  These three are blood tests, but he also has a 24 hr urine collection for metanephrines  I have added on a 24 hr cortisol to the order today since they can use the same sample for both tests and has better reliability than the AM cortisol  Please advise patient to visit the lab to  his urine collection container that he will void into for 24 hrs before bringing it back to lab next-day  He will also need AM cortisol to be collected exactly at 8 AM if possible, so I would advise him to get to the lab early/ask them for specific instructions on how to time it appropriately for their collection on either day  He will also need to write down the time of first urine collection to ensure it is 24 hrs total  No additional testing beyond these above tests for now  Thank you      Betty Fernando MD   PGY-1 Internal Medicine  [facility]

## 2022-06-09 DIAGNOSIS — M79.89 LEG SWELLING: Primary | ICD-10-CM

## 2022-06-09 DIAGNOSIS — E55.9 VITAMIN D DEFICIENCY: ICD-10-CM

## 2022-06-09 RX ORDER — MELATONIN
1000 DAILY
Qty: 30 TABLET | Refills: 5 | Status: SHIPPED | OUTPATIENT
Start: 2022-06-09 | End: 2022-12-06

## 2022-06-09 RX ORDER — HYDROCORTISONE ACETATE 0.5 %
300 CREAM (GRAM) TOPICAL DAILY
Qty: 30 CAPSULE | Refills: 1 | Status: SHIPPED | OUTPATIENT
Start: 2022-06-09 | End: 2022-06-10

## 2022-06-09 NOTE — TELEPHONE ENCOUNTER
Called patient and extensively discussed results of his labs  Explained that <10k colonies GBS is likely skin contaminant  Thyroid function WNL, lipids do not warrant medical therapy at this time (ASCVD risk 5 3%)  He was concerned for low vitamin D for which I wrote for Vitamin D  Also c/o chronic leg swelling so I have written for horse chestnut for 60 days and this can be reviewed at next visit       Dane Lou MD   PGY-1 Internal Medicine  [facility]

## 2022-06-09 NOTE — TELEPHONE ENCOUNTER
Patient called back  Please call patient to instruct him of this, patient also has multiple questions in regards to lab results from 6/3/2022  Please call patient and review  Informed patient he will receive a call from the Doctor to review

## 2022-06-10 DIAGNOSIS — M79.89 LEG SWELLING: Primary | ICD-10-CM

## 2022-06-10 RX ORDER — HYDROCORTISONE ACETATE 0.5 %
300 CREAM (GRAM) TOPICAL DAILY
Qty: 60 CAPSULE | Refills: 0 | Status: SHIPPED | OUTPATIENT
Start: 2022-06-10

## 2022-06-10 NOTE — TELEPHONE ENCOUNTER
I already wrote scripts for D3 yesterday to [facility]  They should be available there  If I write a hard copy script he will need to pick it up  I have changed it to print and signed it  He can  the script from our office at his convenience      Polina Queen MD   PGY-1 Internal Medicine  [facility]

## 2022-06-10 NOTE — TELEPHONE ENCOUNTER
Patient called regarding scripts for the D3 (which he can get over the counter but states he does not take anything over the counter because FDA does not approve those type of Supplements)     The [address]  does not carry that  I  spoke to [facility] Pharmacist suggested that he should try [employer] or a [facility] stores    Patient is requesting a hard copy script so he can take it the [email or web address] store or [facility] himself

## 2022-06-20 ENCOUNTER — APPOINTMENT (OUTPATIENT)
Dept: LAB | Facility: CLINIC | Age: 52
End: 2022-06-20
Payer: MEDICARE

## 2022-06-20 ENCOUNTER — TELEPHONE (OUTPATIENT)
Dept: INTERNAL MEDICINE CLINIC | Facility: CLINIC | Age: 52
End: 2022-06-20

## 2022-06-20 DIAGNOSIS — E29.1 SECONDARY MALE HYPOGONADISM: ICD-10-CM

## 2022-06-20 DIAGNOSIS — I10 ESSENTIAL HYPERTENSION: ICD-10-CM

## 2022-06-20 DIAGNOSIS — G47.09 OTHER INSOMNIA: ICD-10-CM

## 2022-06-20 LAB
CORTIS AM PEAK SERPL-MCNC: 4.8 UG/DL (ref 4.2–22.4)
HCT VFR BLD AUTO: 51.6 % (ref 36.5–49.3)
HGB BLD-MCNC: 17.9 G/DL (ref 12–17)
PSA SERPL-MCNC: 1.7 NG/ML (ref 0–4)

## 2022-06-20 PROCEDURE — 82533 TOTAL CORTISOL: CPT

## 2022-06-20 PROCEDURE — 36415 COLL VENOUS BLD VENIPUNCTURE: CPT

## 2022-06-20 PROCEDURE — 84403 ASSAY OF TOTAL TESTOSTERONE: CPT

## 2022-06-20 PROCEDURE — G0103 PSA SCREENING: HCPCS

## 2022-06-20 PROCEDURE — 83835 ASSAY OF METANEPHRINES: CPT

## 2022-06-20 PROCEDURE — 85018 HEMOGLOBIN: CPT

## 2022-06-20 PROCEDURE — 85014 HEMATOCRIT: CPT

## 2022-06-20 PROCEDURE — 84402 ASSAY OF FREE TESTOSTERONE: CPT

## 2022-06-20 RX ORDER — ZOLPIDEM TARTRATE 10 MG/1
10 TABLET ORAL
Qty: 30 TABLET | Refills: 0 | Status: SHIPPED | OUTPATIENT
Start: 2022-06-20 | End: 2022-08-31 | Stop reason: SDUPTHER

## 2022-06-20 NOTE — TELEPHONE ENCOUNTER
Patient is requesting a cat scan order be placed or an ultrasound order be placed to rule out a hernia on his belly button/abdomen area  Patient states so far today he only had 3 cookies and his stomach is super upset and painful  He met with Dr Thomas on June 3rd and forgot to have his PCP place these orders  He stresses the need of getting this done being that his dad had a hernia in the past and ended up passing shortly after  Patient is almost certain that he has a hernia and would like to take care of it now while it is not progressed  Being that the patient has a medicare plan insurance may not cover the cat scan right away with out doctors notes pertaining to this so I suggested an ultrasound maybe his first resort  Please place this STAT, patient is in severe pain

## 2022-06-20 NOTE — TELEPHONE ENCOUNTER
Pt stopped into the office today to report that he was given the wrong dose of Ambien  Per patient he has been taking 10mg  1 tablet daily at bedtime for many years and the recent dosage that was sent over was incorrect (Take 0 5 tablets (5 mg total) by mouth daily at bedtime as needed for sleep)  Pt reports that he has been taking the full 10mg dosage regardless and will be running out of medication soon  Pt is requesting his script be corrected and sent to his pharmacy, [facility]

## 2022-06-21 ENCOUNTER — TELEPHONE (OUTPATIENT)
Dept: UROLOGY | Facility: AMBULATORY SURGERY CENTER | Age: 52
End: 2022-06-21

## 2022-06-21 DIAGNOSIS — K43.9 HERNIA OF ABDOMINAL WALL: ICD-10-CM

## 2022-06-21 DIAGNOSIS — R06.6 INTRACTABLE HICCUPS: Primary | ICD-10-CM

## 2022-06-21 LAB
TESTOST FREE SERPL-MCNC: 21.9 PG/ML (ref 7.2–24)
TESTOST SERPL-MCNC: 717 NG/DL (ref 264–916)

## 2022-06-21 NOTE — TELEPHONE ENCOUNTER
Please inform patient that ultrasound placed  Please advise laxative +/- bentyl, etc if no bowel moment in last 2-3 days to trial symptom relief  If he is in extreme pain or not passing gas, he should go to ED to be evaluated for obstruction  If fevers, hernia may be incarcerated (closed off and infected), which would also warrant hospital evaluation/surgical intervention   Thank you

## 2022-06-22 NOTE — TELEPHONE ENCOUNTER
Called patient, made patient aware as per note  Patient understood and had no questions at this time   Gave patient central scheduling phone number [phone]

## 2022-06-28 LAB
METANEPH FREE SERPL-MCNC: 29 PG/ML (ref 0–88)
NORMETANEPHRINE SERPL-MCNC: 196.2 PG/ML (ref 0–244)

## 2022-07-01 ENCOUNTER — HOSPITAL ENCOUNTER (OUTPATIENT)
Dept: RADIOLOGY | Facility: HOSPITAL | Age: 52
Discharge: HOME | End: 2022-07-01
Payer: MEDICARE

## 2022-07-01 ENCOUNTER — TELEPHONE (OUTPATIENT)
Dept: INTERNAL MEDICINE CLINIC | Facility: CLINIC | Age: 52
End: 2022-07-01

## 2022-07-01 DIAGNOSIS — R06.6 INTRACTABLE HICCUPS: ICD-10-CM

## 2022-07-01 DIAGNOSIS — K43.9 HERNIA OF ABDOMINAL WALL: ICD-10-CM

## 2022-07-01 DIAGNOSIS — K42.9 PERIUMBILICAL HERNIA: Primary | ICD-10-CM

## 2022-07-01 PROCEDURE — 76705 ECHO EXAM OF ABDOMEN: CPT

## 2022-07-01 RX ORDER — SENNOSIDES 8.6 MG
650 CAPSULE ORAL EVERY 8 HOURS PRN
Qty: 30 TABLET | Refills: 0 | Status: SHIPPED | OUTPATIENT
Start: 2022-07-01

## 2022-07-01 NOTE — TELEPHONE ENCOUNTER
Tiger text from [facility] sent to Dr Mahsa Carpenter regarding this patient  Message was as follows:    [address] is read and has immediate findings on Zuly Allen  [birth year]  Report is in Epic for your review      Can you please review this as doc of the day and advise

## 2022-07-01 NOTE — TELEPHONE ENCOUNTER
Reviewed ultrasound findings  There is an irreducible fat containing hernia near his belly button  Placed referral for General surgery  He will likely have to have elective repair of his hernia  Also prescribed Tylenol for pain  Patient is to avoid NSAIDs (Advil, Motrin) as he has chronic kidney disease  If pain becomes unbearable please have patient go to the emergency department

## 2022-07-27 ENCOUNTER — CONSULT (OUTPATIENT)
Dept: SURGERY | Facility: CLINIC | Age: 52
End: 2022-07-27
Payer: MEDICARE

## 2022-07-27 VITALS
BODY MASS INDEX: 37.23 KG/M2 | HEART RATE: 100 BPM | DIASTOLIC BLOOD PRESSURE: 100 MMHG | RESPIRATION RATE: 14 BRPM | WEIGHT: 251.4 LBS | HEIGHT: 69 IN | SYSTOLIC BLOOD PRESSURE: 167 MMHG

## 2022-07-27 DIAGNOSIS — K42.9 PERIUMBILICAL HERNIA: ICD-10-CM

## 2022-07-27 PROCEDURE — 99204 OFFICE O/P NEW MOD 45 MIN: CPT | Performed by: SURGERY

## 2022-07-27 NOTE — ASSESSMENT & PLAN NOTE
I reviewed the ultrasound  I discussed with him what a hernia is and how would be repaired  I discussed the procedure in detail including risks, benefits, alternatives, and what to expect postoperatively  He understands and has to think things over  He will give us call if he wishes to go ahead

## 2022-07-27 NOTE — PROGRESS NOTES
Office Visit - General Surgery  Jillian Crabtree MRN: [id number]  Encounter: [id number]    Assessment and Plan  Problem List Items Addressed This Visit        Other    Periumbilical hernia     I reviewed the ultrasound  I discussed with him what a hernia is and how would be repaired  I discussed the procedure in detail including risks, benefits, alternatives, and what to expect postoperatively  He understands and has to think things over  He will give us call if he wishes to go ahead  Chief Complaint:  Jillian Crabtree is a 46 y o  male who presents for Hernia (Consult umbilical hernia )    Subjective  46year old male comes in for umbilical hernia evaluation  He has had this for some time  Little bit of discomfort at the site  He also has been having some abdominal bloating and fullness after eating  No real change in bowel or bladder habits    Past Medical History:   Diagnosis Date    Anxiety     Asthma     Benign essential HTN     last assessed 05/26/2017    Degenerative joint disease     Hiccoughs     Hypertension     Skin cancer     Spinal stenosis        Past Surgical History:   Procedure Laterality Date    ESOPHAGOGASTRODUODENOSCOPY N/A 3/24/2016    Procedure: ESOPHAGOGASTRODUODENOSCOPY (EGD); Surgeon: Jeremiah Rodrigues MD;  [facility];   Service:     JOINT REPLACEMENT      right hip sx       Family History   Problem Relation Age of Onset    Cancer Mother     Cancer Father        Social History     Tobacco Use    Smoking status: Never Smoker    Smokeless tobacco: Never Used   Vaping Use    Vaping Use: Never used   Substance Use Topics    Alcohol use: Yes     Comment: Social drinker    Drug use: Yes     Types: Marijuana     Comment: medicinal card obtained        Medications  Current Outpatient Medications on File Prior to Visit   Medication Sig Dispense Refill    acetaminophen (TYLENOL) 650 mg CR tablet Take 1 tablet (650 mg total) by mouth every 8 (eight) hours as needed for mild pain 30 tablet 0    albuterol (Ventolin HFA) 90 mcg/act inhaler Inhale 2 puffs every 6 (six) hours as needed for wheezing 8 g 3    ALPRAZolam (XANAX) 1 mg tablet Take 1 tablet (1 mg total) by mouth 2 (two) times a day as needed for anxiety for up to 30 days (Patient taking differently: Take 1 mg by mouth as needed for anxiety) 60 tablet 0    baclofen 10 mg tablet Take 1 tablet (10 mg total) by mouth 2 (two) times a day as needed for muscle spasms (Diaphragmatic spasm) 30 tablet 0    cholecalciferol (VITAMIN D3) 1,000 units tablet Take 1 tablet (1,000 Units total) by mouth daily Start by taking 3000 units daily for one month, then continue on 1000 units daily  30 tablet 5    fluticasone (Flovent HFA) 110 MCG/ACT inhaler Inhale 2 puffs 2 (two) times a day Rinse mouth after use  12 g 2    Horse [place] 300 MG CAPS Take 1 capsule (300 mg total) by mouth daily 60 capsule 0    losartan-hydrochlorothiazide (HYZAAR) 100-12 5 MG per tablet Take 1 tablet by mouth daily 90 tablet 1    montelukast (SINGULAIR) 10 mg tablet Take 1 tablet (10 mg total) by mouth daily at bedtime 30 tablet 3    testosterone cypionate (DEPO-TESTOSTERONE) 200 mg/mL SOLN       triamcinolone (KENALOG) 0 5 % cream Apply topically 2 (two) times a day 30 g 0    zolpidem (AMBIEN) 10 mg tablet Take 1 tablet (10 mg total) by mouth daily at bedtime as needed for sleep 30 tablet 0     No current facility-administered medications on file prior to visit  Allergies  No Known Allergies    Review of Systems   Constitutional: Negative for chills and fever  HENT: Negative for ear pain and sore throat  Eyes: Negative for pain and visual disturbance  Respiratory: Negative for cough and shortness of breath  Cardiovascular: Negative for chest pain and palpitations  Gastrointestinal: Negative for abdominal pain and vomiting  Genitourinary: Negative for dysuria and hematuria  Musculoskeletal: Negative for arthralgias and back pain  Skin: Negative for color change and rash  Neurological: Negative for seizures and syncope  All other systems reviewed and are negative  Objective  Vitals:    07/27/22 1452   BP: 167/100   Pulse: 100   Resp: 14       Physical Exam  Vitals and nursing note reviewed  Constitutional:       General: He is not in acute distress  Appearance: He is well-developed  He is obese  He is not diaphoretic  HENT:      Head: Normocephalic and atraumatic  Right Ear: External ear normal       Left Ear: External ear normal    Eyes:      General: No scleral icterus  Conjunctiva/sclera: Conjunctivae normal    Neck:      Thyroid: No thyromegaly  Trachea: No tracheal deviation  Cardiovascular:      Rate and Rhythm: Normal rate and regular rhythm  Heart sounds: Normal heart sounds  No murmur heard  Pulmonary:      Effort: Pulmonary effort is normal  No respiratory distress  Breath sounds: Normal breath sounds  No wheezing or rales  Abdominal:      General: There is no distension  Palpations: Abdomen is soft  There is no mass  Tenderness: There is no abdominal tenderness  There is no guarding or rebound  Comments: Bulging at the umbilicus and partially reducible  About 2 cm across  Remaining abdomen soft nontender   Musculoskeletal:         General: Normal range of motion  Cervical back: Normal range of motion and neck supple  Lymphadenopathy:      Cervical: No cervical adenopathy  Skin:     General: Skin is warm and dry  Neurological:      Mental Status: He is alert and oriented to person, place, and time  Psychiatric:         Behavior: Behavior normal          Thought Content:  Thought content normal          Judgment: Judgment normal

## 2022-08-01 ENCOUNTER — TELEPHONE (OUTPATIENT)
Dept: INTERNAL MEDICINE CLINIC | Facility: CLINIC | Age: 52
End: 2022-08-01

## 2022-08-01 NOTE — TELEPHONE ENCOUNTER
Folder Color- RED    Name of Form- ASD- PHYSICIAN ORDER FORM FOR SLEEP DEVICE    Form to be filled out by- DR [facility]    Form to be Faxed [phone]    Patient made aware of 10 business day policy

## 2022-08-02 ENCOUNTER — TELEPHONE (OUTPATIENT)
Dept: INTERNAL MEDICINE CLINIC | Facility: CLINIC | Age: 52
End: 2022-08-02

## 2022-08-02 DIAGNOSIS — G47.09 OTHER INSOMNIA: Primary | ICD-10-CM

## 2022-08-02 NOTE — TELEPHONE ENCOUNTER
----- Message from Harvin Eisenmenger sent at 8/2/2022  2:17 PM EDT -----  Regarding: PHYSICIAN REFERRAL/ORDER REQUIRED  Patient is scheduled w/ sleep medicine physician 8/10 (not a study) and requires physician referral/order from PCP office    Please enter in epic    Dx:  [phone]

## 2022-08-03 NOTE — TELEPHONE ENCOUNTER
Form completed by Dr Ade Graff and placed along with office notes in RED clerical folder to be faxed

## 2022-08-08 NOTE — TELEPHONE ENCOUNTER
Johanne Hills from [facility] called and stated that they needed an attendings signature on the physician order form that was received  Dr Nina Mckeon signed next to Dr Bere Johnson signature and form faxed back to [facility]  Copy placed in scanning folder to be scanned to chart

## 2022-08-16 ENCOUNTER — TELEPHONE (OUTPATIENT)
Dept: INTERNAL MEDICINE CLINIC | Facility: CLINIC | Age: 52
End: 2022-08-16

## 2022-08-16 DIAGNOSIS — J45.20 MILD INTERMITTENT ASTHMA WITHOUT COMPLICATION: ICD-10-CM

## 2022-08-16 RX ORDER — FLUTICASONE PROPIONATE 110 UG/1
2 AEROSOL, METERED RESPIRATORY (INHALATION) 2 TIMES DAILY
Qty: 36 G | Refills: 2 | Status: SHIPPED | OUTPATIENT
Start: 2022-08-16

## 2022-08-16 NOTE — TELEPHONE ENCOUNTER
Teagan @ Dr Natali Roa wants to speak to someone in clinical regarding Kamille  [birth year] wants to find out if there are any notes that the patient cannot tolerate the cpap or addendem can be made     Please call Teagan at [phone]    thanks

## 2022-08-17 ENCOUNTER — OFFICE VISIT (OUTPATIENT)
Dept: SLEEP CENTER | Facility: CLINIC | Age: 52
End: 2022-08-17
Payer: MEDICARE

## 2022-08-17 VITALS
DIASTOLIC BLOOD PRESSURE: 100 MMHG | WEIGHT: 251 LBS | SYSTOLIC BLOOD PRESSURE: 148 MMHG | HEIGHT: 69 IN | BODY MASS INDEX: 37.18 KG/M2

## 2022-08-17 DIAGNOSIS — G47.33 OSA (OBSTRUCTIVE SLEEP APNEA): Primary | ICD-10-CM

## 2022-08-17 DIAGNOSIS — G47.09 OTHER INSOMNIA: ICD-10-CM

## 2022-08-17 PROCEDURE — 99214 OFFICE O/P EST MOD 30 MIN: CPT | Performed by: INTERNAL MEDICINE

## 2022-08-17 NOTE — TELEPHONE ENCOUNTER
Please review office note from 6/3/22 and addend to add whether or not patient can tolerate the CPAP   Thanks

## 2022-08-17 NOTE — PROGRESS NOTES
Progress Note - Sleep Center   Rafat Mendoza TQD:[birth year] MRN: [id number]      Reason for Visit:    46 y o male with severe obstructive sleep apnea (AHI = 40 6)    Assessment:  The patient was unable to tolerate positive airway pressure therapy  We explored therapeutic alternatives including a mandibular advancement device  I have authorized use of a MAD and the patient has identified a company that will make the appliance for him  Plan:  Mandibular advancement device    Follow up: One year    History of Present Illness: The patient was diagnosed with severe obstructive sleep apnea and underwent a positive airway pressure titration  He was unable to tolerate treatment and had to discontinue  Review of Systems      Genitourinary none   Cardiology ankle/leg swelling   Gastrointestinal frequent heartburn/acid reflux   Neurology numbness/tingling of an extremity   Constitutional none   Integumentary none   Psychiatry none   Musculoskeletal none   Pulmonary none   ENT none   Endocrine none   Hematological none       I have reviewed and updated the review of systems as necessary     Historical Information    Past Medical History:   Diagnosis Date    Anxiety     Asthma     Benign essential HTN     last assessed 05/26/2017    Degenerative joint disease     Hiccoughs     Hypertension     Skin cancer     Spinal stenosis          Past Surgical History:   Procedure Laterality Date    ESOPHAGOGASTRODUODENOSCOPY N/A 3/24/2016    Procedure: ESOPHAGOGASTRODUODENOSCOPY (EGD); Surgeon: Keiko Montgomery MD;  Location: [facility];   Service:     JOINT REPLACEMENT      right hip sx         Social History     Socioeconomic History    Marital status: /Civil Union     Spouse name: Not on file    Number of children: Not on file    Years of education: Not on file    Highest education level: Not on file   Occupational History    Not on file   Tobacco Use    Smoking status: Never Smoker    Smokeless tobacco: Never Used   Vaping Use    Vaping Use: Never used   Substance and Sexual Activity    Alcohol use: Yes     Comment: Social drinker    Drug use: Yes     Types: Marijuana     Comment: medicinal card obtained    Sexual activity: Yes     Partners: Female     Birth control/protection: None   Other Topics Concern    Not on file   Social History Narrative    Not on file     Social Determinants of Health     Financial Resource Strain: Not on file   Food Insecurity: Not on file   Transportation Needs: Not on file   Physical Activity: Not on file   Stress: Not on file   Social Connections: Not on file   Intimate Partner Violence: Not on file   Housing Stability: Not on file           History   Alcohol use: Not on file       History   Smoking Status    Not on file   Smokeless Tobacco    Not on file       Family History:   Family History   Problem Relation Age of Onset    Cancer Mother     Cancer Father        Medications/Allergies:      Current Outpatient Medications:     acetaminophen (TYLENOL) 650 mg CR tablet, Take 1 tablet (650 mg total) by mouth every 8 (eight) hours as needed for mild pain, Disp: 30 tablet, Rfl: 0    albuterol (Ventolin HFA) 90 mcg/act inhaler, Inhale 2 puffs every 6 (six) hours as needed for wheezing, Disp: 8 g, Rfl: 3    ALPRAZolam (XANAX) 1 mg tablet, Take 1 tablet (1 mg total) by mouth 2 (two) times a day as needed for anxiety for up to 30 days (Patient taking differently: Take 1 mg by mouth as needed for anxiety), Disp: 60 tablet, Rfl: 0    baclofen 10 mg tablet, Take 1 tablet (10 mg total) by mouth 2 (two) times a day as needed for muscle spasms (Diaphragmatic spasm), Disp: 30 tablet, Rfl: 0    cholecalciferol (VITAMIN D3) 1,000 units tablet, Take 1 tablet (1,000 Units total) by mouth daily Start by taking 3000 units daily for one month, then continue on 1000 units daily  , Disp: 30 tablet, Rfl: 5    fluticasone (Flovent HFA) 110 MCG/ACT inhaler, Inhale 2 puffs 2 (two) times a day Rinse mouth after use , Disp: 36 g, Rfl: 2    Horse [place] 300 MG CAPS, Take 1 capsule (300 mg total) by mouth daily, Disp: 60 capsule, Rfl: 0    losartan-hydrochlorothiazide (HYZAAR) 100-12 5 MG per tablet, Take 1 tablet by mouth daily, Disp: 90 tablet, Rfl: 1    montelukast (SINGULAIR) 10 mg tablet, Take 1 tablet (10 mg total) by mouth daily at bedtime, Disp: 30 tablet, Rfl: 3    testosterone cypionate (DEPO-TESTOSTERONE) 200 mg/mL SOLN, , Disp: , Rfl:     triamcinolone (KENALOG) 0 5 % cream, Apply topically 2 (two) times a day, Disp: 30 g, Rfl: 0    zolpidem (AMBIEN) 10 mg tablet, Take 1 tablet (10 mg total) by mouth daily at bedtime as needed for sleep, Disp: 30 tablet, Rfl: 0      Objective    Vital Signs:   Vitals:    08/17/22 0954   BP: 148/100   Weight: 114 kg (251 lb)   Height: 5' 9" (1 753 m)     [place] Sleepiness Scale: Total score: 9    Physical Exam:    General: Alert, appropriate, cooperative, overweight    Head: NC/AT    Skin: Warm, dry    Neuro: No motor abnormalities, cranial nerves appear intact    Psych: Normal affect            BENITA Barr    Board Certified Sleep Specialist

## 2022-08-18 NOTE — TELEPHONE ENCOUNTER
Dr Daniel Arreola office called again requesting an addendum to office note to state if patient can or cannot tolerate CPAP  Please addend note as as you have time!

## 2022-08-26 ENCOUNTER — TELEPHONE (OUTPATIENT)
Dept: SLEEP CENTER | Facility: CLINIC | Age: 52
End: 2022-08-26

## 2022-08-26 NOTE — TELEPHONE ENCOUNTER
I called (# [phone]) Cathleen's daughter Glory Kathleen today for f/u on yesterday's conversation. Glory Kathleen did talk with SREEKANTH LovettR RN. See telephone note. I was able to change pt's 4/12 at 10:20 am with Maeve Padron, HF NP to Virtual visit due to the difficulties of transporting pt to appts. [facility] did send out Physical therapist today. They were able to get pt to [facility] and to chair with max assist. Therapist told Glory Kathleen that pt may need inpt rehab. The plan is for therapist to come back to her home on Monday and re-evaluate pt and then decide at that point if pt needs inpt therapy care. If this is the case, the dtr is aware the facility can assist on getting pt to the 4/16 TAVR outpt testing appts. Glory Kathleen appreciative of call. She has our [facility]  number if needed. Patient left message stating Dr Baudilio Evans was supposed to fax a letter to [address] [place] [address] saying he is unable to tolerate CPAP  Office note from 8/17/22 stating this was faxed as requested  [phone]  Left message for patient and made aware

## 2022-08-31 DIAGNOSIS — L40.9 PSORIASIS: ICD-10-CM

## 2022-08-31 DIAGNOSIS — G47.09 OTHER INSOMNIA: ICD-10-CM

## 2022-08-31 NOTE — TELEPHONE ENCOUNTER
Patient left message requesting refills  Please review if this is appropriate to refill as you're covering for Dr Daisy Dubose

## 2022-09-01 RX ORDER — TRIAMCINOLONE ACETONIDE 5 MG/G
CREAM TOPICAL 2 TIMES DAILY
Qty: 30 G | Refills: 0 | Status: SHIPPED | OUTPATIENT
Start: 2022-09-01

## 2022-09-01 NOTE — TELEPHONE ENCOUNTER
I am on night shift and unfortunately cannot addend the note without attestation by attending removed   Thank you

## 2022-09-01 NOTE — TELEPHONE ENCOUNTER
Could you please remove your attestation from Dr Jami Salter note on 6/3/22 so that she can addend her note  Thanks

## 2022-09-02 ENCOUNTER — TELEPHONE (OUTPATIENT)
Dept: INTERNAL MEDICINE CLINIC | Facility: CLINIC | Age: 52
End: 2022-09-02

## 2022-09-02 DIAGNOSIS — I10 ESSENTIAL HYPERTENSION: ICD-10-CM

## 2022-09-02 DIAGNOSIS — J45.20 MILD INTERMITTENT ASTHMA WITHOUT COMPLICATION: ICD-10-CM

## 2022-09-02 RX ORDER — ZOLPIDEM TARTRATE 10 MG/1
10 TABLET ORAL
Qty: 30 TABLET | Refills: 0 | Status: SHIPPED | OUTPATIENT
Start: 2022-09-02 | End: 2022-10-24 | Stop reason: SDUPTHER

## 2022-09-02 RX ORDER — LOSARTAN POTASSIUM AND HYDROCHLOROTHIAZIDE 12.5; 1 MG/1; MG/1
1 TABLET ORAL DAILY
Qty: 90 TABLET | Refills: 1 | Status: SHIPPED | OUTPATIENT
Start: 2022-09-02

## 2022-09-02 RX ORDER — ALBUTEROL SULFATE 90 UG/1
2 AEROSOL, METERED RESPIRATORY (INHALATION) EVERY 6 HOURS PRN
Qty: 8 G | Refills: 3 | Status: SHIPPED | OUTPATIENT
Start: 2022-09-02 | End: 2023-09-02

## 2022-09-02 NOTE — TELEPHONE ENCOUNTER
Requested Prescriptions     Pending Prescriptions Disp Refills    losartan-hydrochlorothiazide (HYZAAR) 100-12 5 MG per tablet 90 tablet 1     Sig: Take 1 tablet by mouth daily    albuterol (Ventolin HFA) 90 mcg/act inhaler 8 g 3     Sig: Inhale 2 puffs every 6 (six) hours as needed for wheezing

## 2022-09-02 NOTE — TELEPHONE ENCOUNTER
[facility]  He thought it was a URI but he indeed did test positive  Right now he feels as though he does not need to see a doctor  He says all he has is a headache he is treating it with motrin  I advised patient if he does start to experience SOB, tight ness of the chest, chills, fever or any severe symptoms to give us a call or go to the nearest ED  Patient did also understand he must remain quarantined for 5 days and wear a mask if he goes out in public after quarintine  He verbalized understanding   OLEG

## 2022-09-21 DIAGNOSIS — J45.20 MILD INTERMITTENT ASTHMA WITHOUT COMPLICATION: ICD-10-CM

## 2022-09-21 NOTE — TELEPHONE ENCOUNTER
Requested Prescriptions     Pending Prescriptions Disp Refills    montelukast (SINGULAIR) 10 mg tablet 30 tablet 3     Sig: Take 1 tablet (10 mg total) by mouth daily at bedtime

## 2022-09-22 RX ORDER — MONTELUKAST SODIUM 10 MG/1
10 TABLET ORAL
Qty: 90 TABLET | Refills: 3 | Status: SHIPPED | OUTPATIENT
Start: 2022-09-22

## 2022-09-23 ENCOUNTER — RA CDI HCC (OUTPATIENT)
Dept: OTHER | Facility: HOSPITAL | Age: 52
End: 2022-09-23

## 2022-09-23 NOTE — PROGRESS NOTES
[address]  coding opportunities       Chart reviewed, no opportunity found: CHART REVIEWED, NO OPPORTUNITY FOUND        Patients Insurance     Medicare Insurance: Medicare

## 2022-10-24 DIAGNOSIS — G47.09 OTHER INSOMNIA: ICD-10-CM

## 2022-10-24 NOTE — PROGRESS NOTES
Bariatric Behavioral Health Evaluation  Presenting Problem:  Amanda Tamayo  is a 46 y o    male   :  1970   Presents for a bariatric evaluation for surgery  Patient has history weight loss attempts and has struggled with success  Is the patient seeking Bariatric Surgery Eval?  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  Physical Activity: no physical activity    Family History-Drug or alcohol hx: no    family obesity: no     traditions- around food continued to adulthood: none   The patient explained he gained weight over past three years  In the past it was easy for him to gain and lose the weight, but now he is having difficulty losing  Busy with work he stopped working out  Eat whatever he wants  Currently reports he can hardly finish a hamburg because when he eats it hurts   The patient also shared he does not eat slow  Reporting he is a grazer, snacker, late night eater, boredom eating, mindless eating  Hydration drinks water all day and currently is retain water  Drinks alcohol periodically  rum and coke and coffee one time a month  Mental Health, Trauma and Substance use Assessment    Psychiatric/Psychological Treatment Diagnosis: Anxiety (xanax) prescribed by his PCP  History of Eating Disorders: no     Outpatient Counselor yes hx of seeing a   and marriage counselor  Psychiatrist yes couple of years ago  Have you had any Mental Health Treatment? No   Drug and/or Alcohol use and treatment history: no  Have you had any Substance Use Treatment? no    Tobacco/Vaping History: periodically smokes marijuana vape's and smokes "joints"  Domestic Violence: no     Abuse or Trauma History: no      Risk Assessment    Supports: reported to be intact  Risk of harm to self or others: (SI/HI/S/A) no    Presence of Audio/Visual Hallucinations: no    Access to weapons: not reported during interview  Observation: This interview only (SW and RD will continue working with the patient throughout the program)  Based on the previous information, the client presents the following risk of harm to self or others: low      Physical/Mental Health Status:     Appearance: appropriate  Sociability: average  Affect: appropriate  Mood: calm  Thought Process: coherent  Speech: normal  Content: no impairment  Orientation: person  Yes , place  Yes , and time  Yes   Insight: emotional  good    BARIATRIC SURGERY EDUCATION CHECKLIST    Patient has received the following education related to the bariatric surgery process and understands:    Patients may be required to complete a psychiatric evaluation and receive clearance for surgery from mental health provider  Patients who undergo weight loss surgery are at higher risk of increased mental health concerns and suicide attempts      Patients may be required to complete a full substance abuse evaluation and then complete all treatment recommendations prior to surgery  If diagnosis of abuse/dependence results, patient may be required to remain sober for one (1) year before having bariatric surgery  Patients on psychiatric medications should check with their provider to discuss psychiatric medications and the changes in absorption  Patient should discuss all time release medications with provider and take all medications as prescribed  The recommendation is that there is no use of any tobacco products, Hookah or vape's for the bariatric post-operation patient  Bariatric surgery patients should not consume alcohol as a post-operative patient as it may increase risk of numerous health conditions including but not limited to alcohol abuse and ulcers  There is a possibility of weight regain if patient does not follow all program guidelines and recommendations  Bariatric surgery patients should exercise thirty (30) to sixty (60) minutes per day to maintain post-surgical weight loss  Research indicates that bariatric patients are more successful when they see a therapist for up to two (2) years post-op  Patients will follow all medical and dietary recommendations provided  Patient will keep all scheduled appointments and follow up with their physician for a minimum of five (5) years  Patient will take all vitamins as recommended  Post-operative vitamins are life-long  There is a goal month set  All requirements should be met by this time  Don't wait to get started! There is a deadline month set  All requirements must be finished by this time and if not, the patient will be halted in the surgery process  The patient can be referred to the medical weight management program or can come back to the surgical program once the unfinished tasks from the previous program are completed        Female patients of childbearing years are informed that pregnancy is not recommended until 12 months post-op  Recommendations: Recommended for surgery  yes         Note :  Patient presented for behavioral health evaluation for the bariatric program  Positive Mental Health dx of Anxiety  Hx of therapy  Discussed benefits of out-pt therapy as he is going through the process of bariatric surgery  Hx of seeing Psychiatry  No hx of Drug and/or Alcohol abuse or treatment  Patient yo's  Patient was educated on alternatives currently has a medical marijuana card  Patient educated regarding the impact of nicotine and alcohol on the post surgery bariatric patient  Patient did not report  family history of tobacco and alcohol addiction  Patient meets criteria for surgery at this time and is referred to the bariatric surgeon  Goal: 1- start being more mindful with eating habits  : Chery HUERTA

## 2022-10-25 ENCOUNTER — OFFICE VISIT (OUTPATIENT)
Dept: BARIATRICS | Facility: CLINIC | Age: 52
End: 2022-10-25

## 2022-10-25 VITALS
HEART RATE: 85 BPM | BODY MASS INDEX: 39.66 KG/M2 | TEMPERATURE: 97.8 F | DIASTOLIC BLOOD PRESSURE: 82 MMHG | WEIGHT: 252.7 LBS | SYSTOLIC BLOOD PRESSURE: 144 MMHG | HEIGHT: 67 IN

## 2022-10-25 DIAGNOSIS — E66.01 CLASS 2 SEVERE OBESITY DUE TO EXCESS CALORIES WITH SERIOUS COMORBIDITY AND BODY MASS INDEX (BMI) OF 35.0 TO 35.9 IN ADULT (HCC): ICD-10-CM

## 2022-10-25 DIAGNOSIS — E66.9 OBESITY, UNSPECIFIED: Primary | ICD-10-CM

## 2022-10-25 DIAGNOSIS — Z01.818 PREOPERATIVE CLEARANCE: ICD-10-CM

## 2022-10-25 DIAGNOSIS — R63.5 ABNORMAL WEIGHT GAIN: ICD-10-CM

## 2022-10-25 DIAGNOSIS — N18.31 STAGE 3A CHRONIC KIDNEY DISEASE (HCC): ICD-10-CM

## 2022-10-25 DIAGNOSIS — G47.33 OSA (OBSTRUCTIVE SLEEP APNEA): ICD-10-CM

## 2022-10-25 DIAGNOSIS — Z01.812 BLOOD TESTS PRIOR TO TREATMENT OR PROCEDURE: ICD-10-CM

## 2022-10-25 PROCEDURE — RECHECK: Performed by: DIETITIAN, REGISTERED

## 2022-10-25 RX ORDER — TACROLIMUS 1 MG/G
OINTMENT TOPICAL
COMMUNITY
Start: 2022-09-30

## 2022-10-25 RX ORDER — NEEDLES, DISPOSABLE 25GX5/8"
NEEDLE, DISPOSABLE MISCELLANEOUS
COMMUNITY
Start: 2022-07-25

## 2022-10-25 RX ORDER — SYRINGE WITH NEEDLE, 1 ML 25GX5/8"
SYRINGE, EMPTY DISPOSABLE MISCELLANEOUS
COMMUNITY
Start: 2022-07-25

## 2022-10-25 NOTE — PROGRESS NOTES
Bariatric Nutrition Assessment Note    Type of surgery    Preop no months required  Surgery Date: Tentative February-March 2023  Deadline month June 2023  Surgeon: TBTIMMY    Nutrition Assessment   Lebron Lalo  46 y o   male     Wt with BMI of 25: 159 6lbs  Pre-Op Excess Wt: 93 1lbs  Ht 5' 7" (1 702 m)   Wt 115 kg (252 lb 11 2 oz)   BMI 39 58 kg/m²     [place] Equation:     Weight maintenance= 2343 kcal/day  Estimated calories for weight loss 4962-7743 kcal/day ( 1-2# per wk wt loss - sedentary )  Estimated protein needs 72 5-108 8g/day (1 0-1 5 gms/kg IBW )   Estimated fluid needs 2388-5241 ml/day (30-35 ml/kg IBW )      Weight History   Onset of Obesity: Childhood  Family history of obesity: No  Wt Loss Attempts: Exercise  Self Created Diets (Portion Control, Healthy Food Choices, etc )  Patient has tried the above for 6 months or more with insufficient weight loss or weight regain, which is why patient has requested to be evaluated for weight loss surgery today  Maximum Wt Lost: lost 90lbs as a kid in a residential asthma treatment program    Review of History and Medications   Past Medical History:   Diagnosis Date   • Anxiety    • Asthma    • Benign essential HTN     last assessed 05/26/2017   • Degenerative joint disease    • Hiccoughs    • Hypertension    • Skin cancer    • Spinal stenosis      Past Surgical History:   Procedure Laterality Date   • ESOPHAGOGASTRODUODENOSCOPY N/A 3/24/2016    Procedure: ESOPHAGOGASTRODUODENOSCOPY (EGD); Surgeon: Manish Montiel MD;  Location: [facility];   Service:    • JOINT REPLACEMENT      right hip sx     Social History     Socioeconomic History   • Marital status: /Civil Union     Spouse name: Not on file   • Number of children: Not on file   • Years of education: Not on file   • Highest education level: Not on file   Occupational History   • Not on file   Tobacco Use   • Smoking status: Never Smoker   • Smokeless tobacco: Never Used   Vaping Use   • Vaping Use: Never used   Substance and Sexual Activity   • Alcohol use: Yes     Comment: Social drinker   • Drug use: Yes     Types: Marijuana     Comment: medicinal card obtained   • Sexual activity: Yes     Partners: Female     Birth control/protection: None   Other Topics Concern   • Not on file   Social History Narrative   • Not on file     Social Determinants of Health     Financial Resource Strain: Not on file   Food Insecurity: Not on file   Transportation Needs: Not on file   Physical Activity: Not on file   Stress: Not on file   Social Connections: Not on file   Intimate Partner Violence: Not on file   Housing Stability: Not on file       Current Outpatient Medications:   •  acetaminophen (TYLENOL) 650 mg CR tablet, Take 1 tablet (650 mg total) by mouth every 8 (eight) hours as needed for mild pain, Disp: 30 tablet, Rfl: 0  •  albuterol (Ventolin HFA) 90 mcg/act inhaler, Inhale 2 puffs every 6 (six) hours as needed for wheezing, Disp: 8 g, Rfl: 3  •  ALPRAZolam (XANAX) 1 mg tablet, Take 1 tablet (1 mg total) by mouth 2 (two) times a day as needed for anxiety for up to 30 days (Patient taking differently: Take 1 mg by mouth as needed for anxiety), Disp: 60 tablet, Rfl: 0  •  baclofen 10 mg tablet, Take 1 tablet (10 mg total) by mouth 2 (two) times a day as needed for muscle spasms (Diaphragmatic spasm), Disp: 30 tablet, Rfl: 0  •  cholecalciferol (VITAMIN D3) 1,000 units tablet, Take 1 tablet (1,000 Units total) by mouth daily Start by taking 3000 units daily for one month, then continue on 1000 units daily  , Disp: 30 tablet, Rfl: 5  •  fluticasone (Flovent HFA) 110 MCG/ACT inhaler, Inhale 2 puffs 2 (two) times a day Rinse mouth after use , Disp: 36 g, Rfl: 2  •  Horse [place] 300 MG CAPS, Take 1 capsule (300 mg total) by mouth daily, Disp: 60 capsule, Rfl: 0  •  losartan-hydrochlorothiazide (HYZAAR) 100-12 5 MG per tablet, Take 1 tablet by mouth daily, Disp: 90 tablet, Rfl: 1  •  montelukast (SINGULAIR) 10 mg tablet, Take 1 tablet (10 mg total) by mouth daily at bedtime, Disp: 90 tablet, Rfl: 3  •  testosterone cypionate (DEPO-TESTOSTERONE) 200 mg/mL SOLN, , Disp: , Rfl:   •  triamcinolone (KENALOG) 0 5 % cream, Apply topically 2 (two) times a day, Disp: 30 g, Rfl: 0  •  zolpidem (AMBIEN) 10 mg tablet, Take 1 tablet (10 mg total) by mouth daily at bedtime as needed for sleep, Disp: 30 tablet, Rfl: 0     Food Intake and Lifestyle Assessment   Food Intake Assessment completed via usual diet recall  Pt reports he has no regular meal schedule-varies from day to day  Wakes at 9:30am  Breakfast: none  Snack: wheat thins, doritos   Lunch: none  Snack: hard boiled eggs, tuna, hamburger meat, tuna steaks, salmon steaks, boneless chicken breast  Dinner: none  Snack: PB+J or PB+banana or glass of milk, M+Ms  Beverage intake: water, [place]  Protein supplement: none currently  Pt used to work for [employer]  Estimated protein intake per day: unable to estimate based on pt's current/variable food recall  Estimated fluid intake per day: 64oz  Meals eaten away from home: once a week [employer]  Typical meal pattern: 0 meals per day and multiple snacks per day  Eating Behaviors: Consumption of high calorie/ high fat foods, Large portion sizes, Frequent snacking/ grazing, Mindless eating, Emotional eating, Craves sweet foods and Craves salty foods  Food allergies or intolerances: No Known Allergies NKFA  Cultural or Orthodox considerations: none    Physical Assessment  Physical Activity  Types of exercise: Pt used to be a  and   No exercise currently  Sedentary  Desk job  Current physical limitations: needs shoulder replacement- ball and socket  Previous R hip replacement  Pt does have some fluid retention/swelling in one leg  Pt reports he gets intermittent intractable hiccups that can last for days and he is hoping the bariatric surgery will help resolve this as well      Pt reports he has severe BOY, tried CPAP and could not get used to it so is having a mouth device made for him  Pt reports he had an EGD a little over a year ago where he "coded " Pt reports they found multiple ulcers on his EGD  Psychosocial Assessment   Support systems: lives alone  Socioeconomic factors: works from home, talks on phone all day for work  Nutrition Diagnosis  Diagnosis: Overweight / Obesity (NC-3 3), Excessive energy intake (NI-1 5) and Disordered eating pattern (NB-1 5)  Related to: Limited adherence to nutrition-related recommendations, Physical inactivity, Excessive energy intake and Inability or lack of desire to manage self-care  As Evidenced by: BMI >25, Excessive energy intake, Unintentional weight gain and Reports of disorded eating patterns     Nutrition Prescription: Recommend the following diet  Low fat, Low sugar, High protein and Regular    Interventions and Teaching   Discussed pre-op and post-op nutrition guidelines  Patient educated and handouts provided  Surgical changes to stomach / GI  Capacity of post-surgery stomach  Adequate hydration  Sugar and fat restriction to decrease "dumping syndrome"  Suggestions for pre-op diet  Nutrition considerations after surgery  Protein supplements  Dietary and lifestyle changes  Possible problems with poor eating habits  Potential for food intolerance after surgery, and ways to deal with them including: lactose intolerance, nausea, reflux, vomiting, diarrhea, food intolerance, appetite changes, gas  Vitamin / Mineral supplementation of Multivitamin with minerals and Vitamin D  Pt currently takes a daily multivitamin  Education provided to: patient    Barriers to learning: pt very talkative and difficult to redirect at times  Readiness to change: preparation    Prior research on procedure: internet and pre-op class    Comprehension: verbalizes understanding     Expected Compliance: good    Recommendations  Pt is an appropriate candidate for surgery   Yes  5% wt loss=12 635#=Day of surgery goal of 240 065#  BMI of 45=846 5lbs  Pt had TSH+Lipid panel drawn 6/3/22  Needs CBC+CMP  Ordered today      Evaluation / Monitoring  Dietitian to Monitor: Eating pattern as discussed Tolerance of nutrition prescription Body weight Lab values Physical activity Bowel pattern    Goals  Food journal, Exercise 30 minutes 5 times per week, Complete lession plans 1-6, Eat 3 meals per day and Eliminate mindless snacking    Time Spent:   1 Hour

## 2022-10-26 RX ORDER — ZOLPIDEM TARTRATE 10 MG/1
10 TABLET ORAL
Qty: 30 TABLET | Refills: 0 | Status: SHIPPED | OUTPATIENT
Start: 2022-10-26

## 2022-10-27 DIAGNOSIS — E66.9 OBESITY, UNSPECIFIED: ICD-10-CM

## 2022-10-27 DIAGNOSIS — N18.31 STAGE 3A CHRONIC KIDNEY DISEASE (HCC): Primary | ICD-10-CM

## 2022-10-28 ENCOUNTER — TELEPHONE (OUTPATIENT)
Dept: NEPHROLOGY | Facility: CLINIC | Age: 52
End: 2022-10-28

## 2022-11-02 NOTE — TELEPHONE ENCOUNTER
cNew Patient Intake Form   Patient Details   King Vernon     1970     [id number]     Insurance Information   Name of [employer]    Does the patient need an insurance referral? no   If patient has [employer], please ask if they will be using their [facility]  Appointment Information   Who is calling to schedule? If not patient, what is callers name? Patient    Referring Provider Dr Rosalind Montero    Referring Provider Number [id number]   Reason for Appt (Diagnosis) Stage 3a chronic kidney disease ([facility] )  E66 9 (ICD-10-CM) - Obesity, unspecified  And clearance      Is patient aware of why they are being referred? yes   Does Patient have labs done at [place] [address]? If not, where do they go? yes   Has patient had labs / urine work done? List date of most recent lab / urine work yes   Has patient had a BMP & CBC done in the past 2 years? If so, list the date yes   Has patient been hospitalized recently? If yes, list name and location of hospital they were in no   Has patient been seen by a Nephrologist before? If yes, list name, location and phone number Yes - patient stated he saw us before    Has patient been see by another Speciality before (ex  Neurology, urology, cardiology)? If yes, please list name, and speciality  Bariatrics endo sleep med    Has the patient had imaging done? If so, list the most recent date and type of imagineg yes   Does the patient has a stone analysis report if history of kidney stones? no   Appointment Details   Is there a referral on file?  yes   Appointment Date 11/10/2022   Location [place]    Miscellaneous

## 2022-11-09 ENCOUNTER — TELEPHONE (OUTPATIENT)
Dept: NEPHROLOGY | Facility: CLINIC | Age: 52
End: 2022-11-09

## 2022-11-09 NOTE — TELEPHONE ENCOUNTER
Appointment Confirmation   Person confirmed appointment with  If not patient, name of the person Patient    Date and time of appointment 11/10  2p    Patient acknowledged and will be at appointment? yes    Did you advise the patient that they will need a urine sample if they are a new patient?  Yes    Did you advise the patient to bring their current medications for verification? (including any OTC) Yes    Additional Information

## 2022-11-10 ENCOUNTER — TELEPHONE (OUTPATIENT)
Dept: OTHER | Facility: OTHER | Age: 52
End: 2022-11-10

## 2022-11-10 NOTE — TELEPHONE ENCOUNTER
Patient is calling regarding cancelling an appointment      Date/Time: 11 10 22    Patient was rescheduled: YES [] NO [x]    Patient requesting call back to reschedule: YES [x] NO []

## 2022-11-10 NOTE — TELEPHONE ENCOUNTER
Pt called, to cancel upcoming appointment due to family emergency   A call back was request at best convenience to reschedule appointment

## 2022-11-11 ENCOUNTER — TELEPHONE (OUTPATIENT)
Dept: NEPHROLOGY | Facility: CLINIC | Age: 52
End: 2022-11-11

## 2022-11-11 NOTE — TELEPHONE ENCOUNTER
Spoke with Patient and schedule appointment for 1/6/23 with Dr Berkley Pollard in the [facility]

## 2022-11-11 NOTE — TELEPHONE ENCOUNTER
Spoke to the patient and he stated that he needs a December appointment in the afternoon for clearance     Left message stating we have an opening on December 14 with Dr Giovana Corral at 2:00 pm in our [place] office       Patient had a consult appointment on  11/10/22 and was cancel

## 2022-11-15 NOTE — PROGRESS NOTES
Name             Michelle Barr Fearing      Starting weight 252 7  Last time weight 252 7  Today's weight 252 4       Surgery month: Feb/March    Surgery deadline: June  Surgeon: Dr Jeannette Rasmussen Follow Up Note:    Preop no months required    Mental health and wellness - Patient presents to the office today  for weight check and support visit  The pt shared he had some concerns about when he gets up from bed, he will get the hiccups for a couple of breaths  He shared that he knows what he needs to do to eat well and be active but struggles because his body hurts and with his medical issues this affects his motivation  Discussed healthy coping skills to implement  Reviewed workflow    Eating behaviors - Difficulty following 30/60 rule  Late night eating discussed alternatives  Three meals a day sometimes unhealthy eating  Slow eating Hydration-water a lot alcohol beverages  Started drinking protein drinks  Activity - low    Progress toward program requirements    Workflow:  · Psych and/or D+A Clearance: done  · PCP Letter: pending  · Support Group: pending  · Surgeon Appt :pending  12/9/22  · EGD : pending  · Cardiac Risk Assessment: pending  · Sleep Studies: hx of BOY  · Bloodwork: pending  · Nicotine test:non-smoker      Reviewed and discussed  Patient educated and handouts provided    Adequate hydration  Exercise  Meal planning and preparation  Lifestyle changes  Possible problems with poor eating habits      Goal: 1-attend support group 2- follow up with PCP 3-lab work 4-mindful choices   Next appointment: 12/9/22 Dr Vickie Rivas

## 2022-11-17 ENCOUNTER — OFFICE VISIT (OUTPATIENT)
Dept: BARIATRICS | Facility: CLINIC | Age: 52
End: 2022-11-17

## 2022-11-17 VITALS — BODY MASS INDEX: 39.53 KG/M2 | WEIGHT: 252.4 LBS

## 2022-11-17 DIAGNOSIS — E66.9 OBESITY, CLASS II, BMI 35-39.9: Primary | ICD-10-CM

## 2022-11-29 ENCOUNTER — TELEPHONE (OUTPATIENT)
Dept: INTERNAL MEDICINE CLINIC | Facility: CLINIC | Age: 52
End: 2022-11-29

## 2022-11-29 NOTE — TELEPHONE ENCOUNTER
Reina Quesada from [employer] called stating that in order for medicare to pay for his oral medication for his sleep apnea the chart notes from 6/3/22 need to be very specific  You wrote "patient states he is subjectively unable to tolerate CPAP 2/2 mask not fitting correctly and overall discomfort"  Reina Quesada was wondering if an addendum to note could be made to say that "patient can not tolerate CPAP due to claustrophobia  Please advise  *If changes made, clinical fax to [phone]

## 2022-12-01 NOTE — TELEPHONE ENCOUNTER
Could you please remove you attestation from office note on 6/3/22 so that Dr Queen Clement can make an addendum

## 2022-12-01 NOTE — TELEPHONE ENCOUNTER
For me to make changes, this note will have to be routed to Dr Brennon Jaimes first to remove his attestation, then I will be able to create an addendum with the needed semantics for insurance coverage

## 2022-12-15 ENCOUNTER — CONSULT (OUTPATIENT)
Dept: BARIATRICS | Facility: CLINIC | Age: 52
End: 2022-12-15

## 2022-12-15 VITALS
WEIGHT: 250.5 LBS | DIASTOLIC BLOOD PRESSURE: 92 MMHG | BODY MASS INDEX: 39.31 KG/M2 | TEMPERATURE: 97.7 F | HEART RATE: 96 BPM | HEIGHT: 67 IN | OXYGEN SATURATION: 97 % | SYSTOLIC BLOOD PRESSURE: 160 MMHG

## 2022-12-15 DIAGNOSIS — E66.01 MORBID (SEVERE) OBESITY DUE TO EXCESS CALORIES (HCC): Primary | ICD-10-CM

## 2022-12-15 RX ORDER — TESTOSTERONE ENANTHATE 200 MG/ML
INJECTION, SOLUTION INTRAMUSCULAR
COMMUNITY
Start: 2022-10-27

## 2022-12-15 NOTE — PROGRESS NOTES
BARIATRIC CONSULT-INITIAL - BARIATRIC SURGERY  Bernard Garcia 45 y o  male MRN: [id number]  Unit/Bed#:  Encounter: [id number]      HPI:  Bernard Garcia is a 45 y o  male who presents with morbid obesity to discuss weight loss options  Review of Systems    Historical Information   Past Medical History:   Diagnosis Date   • Anxiety    • Asthma    • Benign essential HTN     last assessed 05/26/2017   • Degenerative joint disease    • Hiccoughs    • Hypertension    • Skin cancer    • Spinal stenosis      Past Surgical History:   Procedure Laterality Date   • ESOPHAGOGASTRODUODENOSCOPY N/A 3/24/2016    Procedure: ESOPHAGOGASTRODUODENOSCOPY (EGD); Surgeon: Milagro Rosa MD;  Location: [facility]; Service:    • JOINT REPLACEMENT      right hip sx     Social History   Social History     Substance and Sexual Activity   Alcohol Use Yes    Comment: Social drinker     Social History     Substance and Sexual Activity   Drug Use Yes   • Types: Marijuana    Comment: medicinal card obtained     Social History     Tobacco Use   Smoking Status Never   Smokeless Tobacco Never     Family History: non-contributory    Meds/Allergies   all medications and allergies reviewed  No Known Allergies    Objective       Current Vitals:   Blood Pressure: 160/92 (12/15/22 1405)  Pulse: 96 (12/15/22 1405)  Temperature: 97 7 °F (36 5 °C) (12/15/22 1405)  Temp Source: Tympanic (12/15/22 1405)  Height: 5' 7" (170 2 cm) (12/15/22 1405)  Weight - Scale: 114 kg (250 lb 8 oz) (12/15/22 1405)  SpO2: 97 % (12/15/22 1405)  Body mass index is 39 23 kg/m²  Invasive Devices     None                 Physical Exam    Lab Results: I have personally reviewed pertinent lab results  Imaging: I have personally reviewed pertinent reports  EKG, Pathology, and Other Studies: I have personally reviewed pertinent reports        Code Status: [unfilled]  Advance Directive and Living Will:      Power of :    POLST: Assessment/PLAN:            Patient has a long history of morbid obesity and is presenting to discuss the surgical weight loss options  Despite the patient best efforts patient was unable to lose any meaningful or sustainable weight using nonsurgical means  We had a long discussion regarding all the surgical weight-loss options at our disposal at this point and reviewed the risks and benefits of each procedure in details as it relates to her age, BMI and medical conditions  Patient elected to undergo Sleeve Gastrectomy  EGD showed [facility] findings done at [facility]    Risks and benefits were explained to the patient  We also discussed the importance and need of a preoperative workup to make sure that the patient can undergo the procedure safely  Preoperative workup includes sleep apnea screening, cardiac evaluation, nutrition/psych and preoperative EGD  Risks and benefits of all the preoperative diagnostic tests were discussed with the patient including but not limited to the upper endoscopy  Alternatives to surgery and alternative forms of surgery were also explained  Postsurgical commitment and aftercare programs were discussed and explained to the patient in details   In terms of comorbidities patient suffers mostly of   Past Medical History:   Diagnosis Date   • Anxiety    • Asthma    • Benign essential HTN     last assessed 05/26/2017   • Degenerative joint disease    • Hiccoughs    • Hypertension    • Skin cancer    • Spinal stenosis        I informed the patient that the rate of resolution of comorbid conditions following weight loss surgery is between 60 and 90% depending on the severity of the specific medical condition  I discussed and educated the patient regarding the different components of our multidisciplinary program and the importance of compliance and follow-up in the postoperative period  All questions answered  Patient understands risks and benefits   An image of the procedure was also shown to the patient  After showing the image we discussed all the technical aspects of the procedure and also the potential complications including but not limited to gastrointestinal perforation, leak, obstruction, stricture and hemorrhage  I spent 30 min with the patient more than 50% of the time was spent educating the patient and coordinating care

## 2022-12-19 ENCOUNTER — TRANSITIONAL CARE MANAGEMENT (OUTPATIENT)
Dept: INTERNAL MEDICINE CLINIC | Facility: CLINIC | Age: 52
End: 2022-12-19

## 2022-12-21 ENCOUNTER — TRANSCRIBE ORDERS (OUTPATIENT)
Dept: LAB | Facility: CLINIC | Age: 52
End: 2022-12-21

## 2022-12-21 ENCOUNTER — APPOINTMENT (OUTPATIENT)
Dept: LAB | Facility: CLINIC | Age: 52
End: 2022-12-21

## 2022-12-21 ENCOUNTER — OFFICE VISIT (OUTPATIENT)
Dept: WOUND CARE | Facility: HOSPITAL | Age: 52
End: 2022-12-21

## 2022-12-21 VITALS
RESPIRATION RATE: 20 BRPM | HEART RATE: 92 BPM | SYSTOLIC BLOOD PRESSURE: 165 MMHG | TEMPERATURE: 97.8 F | DIASTOLIC BLOOD PRESSURE: 96 MMHG | BODY MASS INDEX: 37.89 KG/M2 | HEIGHT: 68 IN | WEIGHT: 250 LBS

## 2022-12-21 DIAGNOSIS — N18.31 STAGE 3A CHRONIC KIDNEY DISEASE (HCC): ICD-10-CM

## 2022-12-21 DIAGNOSIS — E66.01 CLASS 2 SEVERE OBESITY DUE TO EXCESS CALORIES WITH SERIOUS COMORBIDITY AND BODY MASS INDEX (BMI) OF 35.0 TO 35.9 IN ADULT (HCC): ICD-10-CM

## 2022-12-21 DIAGNOSIS — Z01.818 PREOPERATIVE CLEARANCE: ICD-10-CM

## 2022-12-21 DIAGNOSIS — R63.5 ABNORMAL WEIGHT GAIN: ICD-10-CM

## 2022-12-21 DIAGNOSIS — S81.802A OPEN WOUND OF LEFT LOWER LEG, INITIAL ENCOUNTER: Primary | ICD-10-CM

## 2022-12-21 DIAGNOSIS — E29.1 SECONDARY MALE HYPOGONADISM: ICD-10-CM

## 2022-12-21 DIAGNOSIS — G47.33 OSA (OBSTRUCTIVE SLEEP APNEA): ICD-10-CM

## 2022-12-21 DIAGNOSIS — Z01.812 BLOOD TESTS PRIOR TO TREATMENT OR PROCEDURE: ICD-10-CM

## 2022-12-21 DIAGNOSIS — E29.1 SECONDARY MALE HYPOGONADISM: Primary | ICD-10-CM

## 2022-12-21 LAB
ALBUMIN SERPL BCP-MCNC: 3.7 G/DL (ref 3.5–5)
ALP SERPL-CCNC: 55 U/L (ref 46–116)
ALT SERPL W P-5'-P-CCNC: 49 U/L (ref 12–78)
ANION GAP SERPL CALCULATED.3IONS-SCNC: 5 MMOL/L (ref 4–13)
AST SERPL W P-5'-P-CCNC: 26 U/L (ref 5–45)
BILIRUB SERPL-MCNC: 0.56 MG/DL (ref 0.2–1)
BUN SERPL-MCNC: 15 MG/DL (ref 5–25)
CALCIUM SERPL-MCNC: 9.3 MG/DL (ref 8.3–10.1)
CHLORIDE SERPL-SCNC: 104 MMOL/L (ref 96–108)
CO2 SERPL-SCNC: 30 MMOL/L (ref 21–32)
CREAT SERPL-MCNC: 1.33 MG/DL (ref 0.6–1.3)
ERYTHROCYTE [DISTWIDTH] IN BLOOD BY AUTOMATED COUNT: 13.5 % (ref 11.6–15.1)
GFR SERPL CREATININE-BSD FRML MDRD: 60 ML/MIN/1.73SQ M
GLUCOSE SERPL-MCNC: 98 MG/DL (ref 65–140)
HCT VFR BLD AUTO: 48.7 % (ref 36.5–49.3)
HGB BLD-MCNC: 16.4 G/DL (ref 12–17)
MCH RBC QN AUTO: 33.5 PG (ref 26.8–34.3)
MCHC RBC AUTO-ENTMCNC: 33.7 G/DL (ref 31.4–37.4)
MCV RBC AUTO: 100 FL (ref 82–98)
PLATELET # BLD AUTO: 257 THOUSANDS/UL (ref 149–390)
PMV BLD AUTO: 9.1 FL (ref 8.9–12.7)
POTASSIUM SERPL-SCNC: 3.6 MMOL/L (ref 3.5–5.3)
PROT SERPL-MCNC: 6.9 G/DL (ref 6.4–8.4)
RBC # BLD AUTO: 4.89 MILLION/UL (ref 3.88–5.62)
SODIUM SERPL-SCNC: 139 MMOL/L (ref 135–147)
WBC # BLD AUTO: 7.12 THOUSAND/UL (ref 4.31–10.16)

## 2022-12-21 RX ORDER — LIDOCAINE 40 MG/G
CREAM TOPICAL ONCE
Status: COMPLETED | OUTPATIENT
Start: 2022-12-21 | End: 2022-12-21

## 2022-12-21 RX ADMIN — LIDOCAINE: 40 CREAM TOPICAL at 14:33

## 2022-12-21 NOTE — PATIENT INSTRUCTIONS
Orders Placed This Encounter   Procedures    Wound compression and edema control     Elastic Tubular Stocking size F    Tubular elastic bandage: Apply from base of toes to behind the knee  Apply in AM, may remove for sleep  Avoid prolonged standing in one place  Elevate leg(s) above the level of the heart when sitting or as much as possible  This was applied today  Standing Status:   Future     Standing Expiration Date:   12/21/2023    Wound cleansing and dressings     Left lateral leg wound:  Wound care every other day  On dressing change days remove the dressing and shower  Do not leave the wound open to the air, redress immediately after the shower  Do not apply alcohol or peroxide  Apply dermagran gauze cut to fit the wound  Cover with gauze and secure with rolled gauze and tape  This was applied today  Standing Status:   Future     Standing Expiration Date:   12/21/2023    Wound miscellaneous orders     A request for dermagran gauze, 4x4 gauze, rolled gauze and tape will be faxed to [facility]  Their phone number is [phone]       Standing Status:   Future     Standing Expiration Date:   12/21/2023

## 2022-12-21 NOTE — PROGRESS NOTES
Patient ID: Sweetie Bergman is a 45 y o  male Date of Birth 1970     My role is Foot, Ankle and Wound Specialist    Chief Complaint   Patient presents with   • New Patient Visit     Left lateral leg wound       Dog bite of Left lower leg    Subjective:   Letty Lea is here for the first time with a chronic wound from an old dog bite on the lower leg  The following portions of the patient's history were reviewed and updated as appropriate:   Patient Active Problem List   Diagnosis   • Mild intermittent asthma without complication   • Psoriasis   • Hypogonadism in male   • Anxiety   • Microscopic hematuria   • Gastroesophageal reflux disease with esophagitis   • Intractable hiccups   • Colon cancer screening   • Essential hypertension   • Class 2 severe obesity due to excess calories with serious comorbidity and body mass index (BMI) of 35 0 to 35 9 in [facility])   • BOY (obstructive sleep apnea)   • Stage 3a chronic kidney disease (HCC)   • Periumbilical hernia   • Open wound of left lower leg     Past Medical History:   Diagnosis Date   • Anxiety    • Asthma    • Benign essential HTN     last assessed 05/26/2017   • Degenerative joint disease    • Hiccoughs    • Hypertension    • Skin cancer    • Spinal stenosis      Past Surgical History:   Procedure Laterality Date   • ESOPHAGOGASTRODUODENOSCOPY N/A 3/24/2016    Procedure: ESOPHAGOGASTRODUODENOSCOPY (EGD); Surgeon: Ike De Paz MD;  Location: [facility];   Service:    • JOINT REPLACEMENT      right hip sx     Social History     Socioeconomic History   • Marital status: /Civil Union     Spouse name: None   • Number of children: None   • Years of education: None   • Highest education level: None   Occupational History   • None   Tobacco Use   • Smoking status: Never   • Smokeless tobacco: Never   Vaping Use   • Vaping Use: Never used   Substance and Sexual Activity   • Alcohol use: Yes     Comment: Social drinker   • Drug use: Yes     Types: Marijuana Comment: medicinal card obtained   • Sexual activity: Yes     Partners: Female     Birth control/protection: None   Other Topics Concern   • None   Social History Narrative   • None     Social Determinants of Health     Financial Resource Strain: Not on file   Food Insecurity: Not on file   Transportation Needs: Not on file   Physical Activity: Not on file   Stress: Not on file   Social Connections: Not on file   Intimate Partner Violence: Not on file   Housing Stability: Not on file        Current Outpatient Medications:   •  acetaminophen (TYLENOL) 650 mg CR tablet, Take 1 tablet (650 mg total) by mouth every 8 (eight) hours as needed for mild pain, Disp: 30 tablet, Rfl: 0  •  albuterol (Ventolin HFA) 90 mcg/act inhaler, Inhale 2 puffs every 6 (six) hours as needed for wheezing, Disp: 8 g, Rfl: 3  •  ALPRAZolam (XANAX) 1 mg tablet, Take 1 tablet (1 mg total) by mouth 2 (two) times a day as needed for anxiety for up to 30 days (Patient taking differently: Take 1 mg by mouth as needed for anxiety), Disp: 60 tablet, Rfl: 0  •  B-D 3CC LUER-ANTONELLA SYR 25GX1" 25G X 1" 3 ML MISC, , Disp: , Rfl:   •  baclofen 10 mg tablet, Take 1 tablet (10 mg total) by mouth 2 (two) times a day as needed for muscle spasms (Diaphragmatic spasm), Disp: 30 tablet, Rfl: 0  •  BD Hypodermic Needle 18G X 1" MISC, , Disp: , Rfl:   •  cholecalciferol (VITAMIN D3) 1,000 units tablet, Take 1 tablet (1,000 Units total) by mouth daily Start by taking 3000 units daily for one month, then continue on 1000 units daily   (Patient not taking: Reported on 10/25/2022), Disp: 30 tablet, Rfl: 5  •  fluticasone (Flovent HFA) 110 MCG/ACT inhaler, Inhale 2 puffs 2 (two) times a day Rinse mouth after use , Disp: 36 g, Rfl: 2  •  [place] 300 MG CAPS, Take 1 capsule (300 mg total) by mouth daily (Patient not taking: Reported on 10/25/2022), Disp: 60 capsule, Rfl: 0  •  losartan-hydrochlorothiazide (HYZAAR) 100-12 5 MG per tablet, Take 1 tablet by mouth daily, Disp: 90 tablet, Rfl: 1  •  montelukast (SINGULAIR) 10 mg tablet, Take 1 tablet (10 mg total) by mouth daily at bedtime, Disp: 90 tablet, Rfl: 3  •  NEEDLE, DISP, 18 G 18G X 1" MISC, 1 weekly to draw up testosterone, Disp: , Rfl:   •  tacrolimus (PROTOPIC) 0 1 % ointment, , Disp: , Rfl:   •  testosterone cypionate (DEPO-TESTOSTERONE) 200 mg/mL SOLN, , Disp: , Rfl:   •  Testosterone Enanthate 200 MG/ML SOLN, , Disp: , Rfl:   •  triamcinolone (KENALOG) 0 5 % cream, Apply topically 2 (two) times a day, Disp: 30 g, Rfl: 0  •  zolpidem (AMBIEN) 10 mg tablet, Take 1 tablet (10 mg total) by mouth daily at bedtime as needed for sleep, Disp: 30 tablet, Rfl: 0  No current facility-administered medications for this visit  Family History   Problem Relation Age of Onset   • Cancer Mother    • Cancer Father       Review of Systems   Constitutional: Negative  Respiratory: Negative  Cardiovascular: Negative  Skin: Positive for wound  Allergies  Patient has no known allergies  Objective:  /96   Pulse 92   Temp 97 8 °F (36 6 °C)   Resp 20   Ht 5' 8" (1 727 m)   Wt 113 kg (250 lb)   BMI 38 01 kg/m²     Physical Exam  Vitals and nursing note reviewed  Constitutional:       General: He is not in acute distress  Appearance: Normal appearance  He is not ill-appearing, toxic-appearing or diaphoretic  HENT:      Head: Normocephalic and atraumatic  Pulmonary:      Effort: Pulmonary effort is normal       Breath sounds: Normal breath sounds  Musculoskeletal:         General: Normal range of motion  Neurological:      General: No focal deficit present  Mental Status: He is alert and oriented to person, place, and time             Wound 12/21/22 Traumatic Leg Left;Lateral (Active)   Wound Image   12/21/22 1444   Wound Description Yellow;Slough 12/21/22 1432   Tammy-wound Assessment Yellow-brown;Edema 12/21/22 1432   Wound Length (cm) 1 cm 12/21/22 1432   Wound Width (cm) 0 8 cm 12/21/22 1432 Wound Depth (cm) 0 2 cm 12/21/22 1432   Wound Surface Area (cm^2) 0 8 cm^2 12/21/22 1432   Wound Volume (cm^3) 0 16 cm^3 12/21/22 1432   Calculated Wound Volume (cm^3) 0 16 cm^3 12/21/22 1432   Drainage Amount Scant 12/21/22 1432   Drainage Description Serosanguineous 12/21/22 1432   Non-staged Wound Description Full thickness 12/21/22 1432   Dressing Status Other (Comment) 12/21/22 1432           Wound 12/21/22 Traumatic Leg Left;Lateral (Active)   Wound Image   12/21/22 1444   Wound Description Yellow;Slough 12/21/22 1432   Tammy-wound Assessment Yellow-brown;Edema 12/21/22 1432   Wound Length (cm) 1 cm 12/21/22 1432   Wound Width (cm) 0 8 cm 12/21/22 1432   Wound Depth (cm) 0 2 cm 12/21/22 1432   Wound Surface Area (cm^2) 0 8 cm^2 12/21/22 1432   Wound Volume (cm^3) 0 16 cm^3 12/21/22 1432   Calculated Wound Volume (cm^3) 0 16 cm^3 12/21/22 1432   Drainage Amount Scant 12/21/22 1432   Drainage Description Serosanguineous 12/21/22 1432   Non-staged Wound Description Full thickness 12/21/22 1432   Dressing Status Other (Comment) 12/21/22 1432                         Diagnosis:  1  Open wound of left lower leg, initial encounter  -     lidocaine (LMX) 4 % cream  -     Wound compression and edema control; Future  -     Wound cleansing and dressings; Future  -     Wound miscellaneous orders; Future        Diagnosis ICD-10-CM Associated Orders   1  Open wound of left lower leg, initial encounter  S81 802A lidocaine (LMX) 4 % cream     Wound compression and edema control     Wound cleansing and dressings     Wound miscellaneous orders           ASSESSMENT    Problems:    Chronic illness / Problem not at goal with exacerbation, progression or side effects of treatment  1  Chronic lower leg wound          PLAN    This wound needs regular sharp debridements and moist dressings  Jasper Hall ordered       Debridement   Wound 12/21/22 Traumatic Leg Left;Lateral    Universal Protocol:  Consent: Verbal consent obtained  Risks and benefits: risks, benefits and alternatives were discussed  Consent given by: patient  Time out: Immediately prior to procedure a "time out" was called to verify the correct patient, procedure, equipment, support staff and site/side marked as required    Timeout called at: 12/21/2022 3:06 PM   Patient understanding: patient states understanding of the procedure being performed  Patient identity confirmed: verbally with patient      Debridement type: surgical  Level of debridement: subcutaneous tissue  Pain control: lidocaine 4%  Pre-debridement measurements  Length (cm): 1  Width (cm): 0 8  Depth (cm): 0 2  Surface Area (cm^2): 0 8  Volume (cm^3): 0 16    Post-debridement measurements  Length (cm): 1  Width (cm): 0 8  Depth (cm): 0 2  Percent debrided: 100%  Surface Area (cm^2): 0 8  Area debrided (cm^2): 0 8  Volume (cm^3): 0 16  Tissue and other material debrided: subcutaneous tissue  Devitalized tissue debrided: necrotic debris  Instrument(s) utilized: curette  Bleeding: small  Hemostasis obtained with: pressure  Procedural pain (0-10): 3  Post-procedural pain: 1   Response to treatment: procedure was tolerated well

## 2023-01-05 ENCOUNTER — OFFICE VISIT (OUTPATIENT)
Dept: CARDIOLOGY CLINIC | Facility: CLINIC | Age: 53
End: 2023-01-05

## 2023-01-05 VITALS
SYSTOLIC BLOOD PRESSURE: 122 MMHG | BODY MASS INDEX: 38.43 KG/M2 | WEIGHT: 253.6 LBS | HEART RATE: 89 BPM | HEIGHT: 68 IN | DIASTOLIC BLOOD PRESSURE: 70 MMHG

## 2023-01-05 DIAGNOSIS — E66.9 OBESITY, UNSPECIFIED: ICD-10-CM

## 2023-01-05 DIAGNOSIS — R06.02 EXERTIONAL SHORTNESS OF BREATH: Primary | ICD-10-CM

## 2023-01-05 DIAGNOSIS — R60.0 PEDAL EDEMA: ICD-10-CM

## 2023-01-05 DIAGNOSIS — E66.01 MORBID (SEVERE) OBESITY DUE TO EXCESS CALORIES (HCC): ICD-10-CM

## 2023-01-05 DIAGNOSIS — N18.31 STAGE 3A CHRONIC KIDNEY DISEASE (HCC): ICD-10-CM

## 2023-01-05 NOTE — PROGRESS NOTES
Consultation - Cardiology   Renetta Grossman 45 y o  male MRN: [id number]  Unit/Bed#:  Encounter: [id number]      Assessment and Plan: Active Problems:    * No active hospital problems  *    68-year-old man with history of obesity, secondary hypogonadism, long-term testosterone use, childhood asthma and dyspepsia presents for preoperative risk evaluation for bariatric surgery  He is planned to undergo sleeve gastrectomy with Dr Milagro Richardson  The date is to be determined  He is a sedentary man  He used to be an amateur weightlifter and used to participate in [address] [place] [address] [employer] but for the past 2 years he has gained significant weight and has reduced exercise capacity  He does get short of breath on minimal exertion  He also complains of early satiety and dyspepsia with abdominal distention  He has undergone GI work-up including multiple EGDs and colonoscopies without resolution of his symptoms  He has further cardiac risk factors apart from obesity he does have hypertension which is controlled on losartan and hydrochlorothiazide combination  He denies smoking  Bariatric surgery is a low risk surgery and he can proceed without awaiting results of additional testing  However due to his exertional shortness of breath (which could be related to his obesity and asthma) we will perform an exercise stress test and echocardiogram   He has had an echo in the past which was unremarkable which is 1/2-year ago  #Exertional shortness of breath  #Unilateral leg swelling -DVT ruled out in the past  #Abdominal distention and dyspepsia -follows up with GI  #Obesity  #Testosterone use and secondary hypogonadism  #Hypertension controlled on medications    Plan:  -Obtain exercise stress test  -Obtain TTE  -Patient is at acceptable cardiac risk for low risk surgery    Her cardiac work-up has been initiated for his symptoms, we do not need to wait for results to proceed with surgery      History of Present Illness   Physician Requesting Consult: No att  providers found  Reason for Consult / Principal Problem: Preop risk assessment  HPI: Elyssa Khalil is a 45y o  year old male with above-mentioned cardiac history who presents to the hospital for preop risk assessment for bariatric surgery  His risk factors include hypertension and obesity  He used to be an amateur weightlifter but in the past year she has gained weight and has become sedentary  He does not follow a healthy diet  He does have abdominal discomfort, distention, dyspepsia and left leg swelling which are unexplained  He does have exertional shortness of breath and is limited by his shortness of breath but he has crabs due to asthma  He has had stress tests in the past which have been unremarkable the results of which are not visible to me  Consults    Review of Systems:  Review of Systems  All negative except as mentioned above    Historical Information   Past Medical History:   Diagnosis Date   • Anxiety    • Asthma    • Benign essential HTN     last assessed 05/26/2017   • Degenerative joint disease    • Hiccoughs    • Hypertension    • Skin cancer    • Spinal stenosis      Past Surgical History:   Procedure Laterality Date   • ESOPHAGOGASTRODUODENOSCOPY N/A 3/24/2016    Procedure: ESOPHAGOGASTRODUODENOSCOPY (EGD); Surgeon: Gely Lundberg MD;  Location: [facility]; Service:    • JOINT REPLACEMENT      right hip sx     Social History     Substance and Sexual Activity   Alcohol Use Yes    Comment: Social drinker     Social History     Substance and Sexual Activity   Drug Use Yes   • Types: Marijuana    Comment: medicinal card obtained     Social History     Tobacco Use   Smoking Status Never   Smokeless Tobacco Never     Family History: non-contributory    Meds/Allergies   all current active meds have been reviewed  No Known Allergies    Objective   Vitals: Blood pressure 122/70, pulse 89, height 5' 8" (1 727 m), weight 115 kg (253 lb 9 6 oz)  , Body mass index is 38 56 kg/m²  , Physical Exam:  Physical Exam  General: alert, oriented X 3 , comfortable  Neck: Difficult to assess JVD  Cardiac: normal S1, S2, no m/r/g  Respiratory: normal breath sounds, no wheezes or crackles  Abdomen: Abdomen is firm, hernia is palpated   extremities: Left leg is bandaged    Lab Results:     No results found for: CKTOTAL, CKMB, CKMBINDEX, TROPONINI    Lab Results   Component Value Date    GLUCOSE 97 03/22/2016    CALCIUM 9 3 12/21/2022    K 3 6 12/21/2022    CO2 30 12/21/2022     12/21/2022    BUN 15 12/21/2022    CREATININE 1 33 (H) 12/21/2022       Lab Results   Component Value Date    WBC 7 12 12/21/2022    HGB 16 4 12/21/2022    HCT 48 7 12/21/2022     (H) 12/21/2022     12/21/2022       No results found for: CHOL  Lab Results   Component Value Date    HDL 52 06/03/2022     Lab Results   Component Value Date    LDLCALC 120 (H) 06/03/2022     Lab Results   Component Value Date    TRIG 120 06/03/2022       Lab Results   Component Value Date    ALT 49 12/21/2022    AST 26 12/21/2022               Imaging: I have personally reviewed pertinent reports         EKG: NSR, no acute ST changes

## 2023-01-09 ENCOUNTER — TELEPHONE (OUTPATIENT)
Dept: NEPHROLOGY | Facility: CLINIC | Age: 53
End: 2023-01-09

## 2023-01-09 NOTE — TELEPHONE ENCOUNTER
Appointment Confirmation   Person confirmed appointment with  If not patient, name of the person Patient    Date and time of appointment 1/10/23  1:00 PM    Patient acknowledged and will be at appointment? yes    Did you advise the patient that they will need a urine sample if they are a new patient?  Yes    Did you advise the patient to bring their current medications for verification? (including any OTC) Yes    Additional Information

## 2023-01-11 ENCOUNTER — OFFICE VISIT (OUTPATIENT)
Dept: WOUND CARE | Facility: HOSPITAL | Age: 53
End: 2023-01-11

## 2023-01-11 VITALS
RESPIRATION RATE: 18 BRPM | TEMPERATURE: 97.2 F | HEART RATE: 84 BPM | SYSTOLIC BLOOD PRESSURE: 134 MMHG | DIASTOLIC BLOOD PRESSURE: 79 MMHG

## 2023-01-11 DIAGNOSIS — S81.802A OPEN WOUND OF LEFT LOWER LEG, INITIAL ENCOUNTER: Primary | ICD-10-CM

## 2023-01-11 RX ORDER — LIDOCAINE 40 MG/G
CREAM TOPICAL ONCE
Status: COMPLETED | OUTPATIENT
Start: 2023-01-11 | End: 2023-01-11

## 2023-01-11 RX ADMIN — LIDOCAINE: 40 CREAM TOPICAL at 13:51

## 2023-01-11 NOTE — PATIENT INSTRUCTIONS
Orders Placed This Encounter   Procedures    Wound cleansing and dressings     Elastic Tubular Stocking size F     Tubular elastic bandage: Apply from base of toes to behind the knee  Apply in AM, may remove for sleep  Avoid prolonged standing in one place  Elevate leg(s) above the level of the heart when sitting or as much as possible  This was applied today  Wound cleansing and dressings      Left lateral leg wound:      Wound care every other day  On dressing change days remove the dressing and shower  Do not leave the wound open to the air, redress immediately after the shower  Do not apply alcohol or peroxide  Apply dermagran gauze cut to fit the wound  Cover with gauze and secure with rolled gauze and tape  This was applied today       Standing Status:   Future     Standing Expiration Date:   1/11/2024

## 2023-01-11 NOTE — PROGRESS NOTES
Patient ID: Kamille Engle is a 45 y o  male Date of Birth 1970     My role is Foot, Ankle and Wound Specialist    Chief Complaint   Patient presents with   • Follow Up Wound Care Visit     Left leg wound       Leg wound Left    Subjective:   Massiel Cardenas is here for the first time with a chronic wound from an old dog bite on the lower leg  The following portions of the patient's history were reviewed and updated as appropriate:   Patient Active Problem List   Diagnosis   • Mild intermittent asthma without complication   • Psoriasis   • Hypogonadism in male   • Anxiety   • Microscopic hematuria   • Gastroesophageal reflux disease with esophagitis   • Intractable hiccups   • Colon cancer screening   • Essential hypertension   • Class 2 severe obesity due to excess calories with serious comorbidity and body mass index (BMI) of 35 0 to 35 9 in [facility])   • BOY (obstructive sleep apnea)   • Stage 3a chronic kidney disease (HCC)   • Periumbilical hernia   • Open wound of left lower leg     Past Medical History:   Diagnosis Date   • Anxiety    • Asthma    • Benign essential HTN     last assessed 2017   • Degenerative joint disease    • Hiccoughs    • Hypertension    • Skin cancer    • Spinal stenosis      Past Surgical History:   Procedure Laterality Date   • ESOPHAGOGASTRODUODENOSCOPY N/A 3/24/2016    Procedure: ESOPHAGOGASTRODUODENOSCOPY (EGD); Surgeon: Deirdre Miramontes MD;  Location: [facility];   Service:    • JOINT REPLACEMENT      right hip sx     Social History     Socioeconomic History   • Marital status: /Civil Union     Spouse name: None   • Number of children: None   • Years of education: None   • Highest education level: None   Occupational History   • None   Tobacco Use   • Smoking status: Never   • Smokeless tobacco: Never   Vaping Use   • Vaping Use: Never used   Substance and Sexual Activity   • Alcohol use: Yes     Comment: Social drinker   • Drug use: Yes     Types: Marijuana     Comment: medicinal card obtained   • Sexual activity: Yes     Partners: Female     Birth control/protection: None   Other Topics Concern   • None   Social History Narrative   • None     Social Determinants of Health     Financial Resource Strain: Not on file   Food Insecurity: Not on file   Transportation Needs: Not on file   Physical Activity: Not on file   Stress: Not on file   Social Connections: Not on file   Intimate Partner Violence: Not on file   Housing Stability: Not on file        Current Outpatient Medications:   •  acetaminophen (TYLENOL) 650 mg CR tablet, Take 1 tablet (650 mg total) by mouth every 8 (eight) hours as needed for mild pain, Disp: 30 tablet, Rfl: 0  •  albuterol (Ventolin HFA) 90 mcg/act inhaler, Inhale 2 puffs every 6 (six) hours as needed for wheezing, Disp: 8 g, Rfl: 3  •  ALPRAZolam (XANAX) 1 mg tablet, Take 1 tablet (1 mg total) by mouth 2 (two) times a day as needed for anxiety for up to 30 days (Patient taking differently: Take 1 mg by mouth as needed for anxiety), Disp: 60 tablet, Rfl: 0  •  B-D 3CC LUER-ANTONELLA SYR 25GX1" 25G X 1" 3 ML MISC, , Disp: , Rfl:   •  baclofen 10 mg tablet, Take 1 tablet (10 mg total) by mouth 2 (two) times a day as needed for muscle spasms (Diaphragmatic spasm), Disp: 30 tablet, Rfl: 0  •  BD Hypodermic Needle 18G X 1" MISC, , Disp: , Rfl:   •  cholecalciferol (VITAMIN D3) 1,000 units tablet, Take 1 tablet (1,000 Units total) by mouth daily Start by taking 3000 units daily for one month, then continue on 1000 units daily   (Patient not taking: Reported on 10/25/2022), Disp: 30 tablet, Rfl: 5  •  fluticasone (Flovent HFA) 110 MCG/ACT inhaler, Inhale 2 puffs 2 (two) times a day Rinse mouth after use , Disp: 36 g, Rfl: 2  •  [place] 300 MG CAPS, Take 1 capsule (300 mg total) by mouth daily (Patient not taking: Reported on 10/25/2022), Disp: 60 capsule, Rfl: 0  •  losartan-hydrochlorothiazide (HYZAAR) 100-12 5 MG per tablet, Take 1 tablet by mouth daily, Disp: 90 tablet, Rfl: 1  •  montelukast (SINGULAIR) 10 mg tablet, Take 1 tablet (10 mg total) by mouth daily at bedtime, Disp: 90 tablet, Rfl: 3  •  NEEDLE, DISP, 18 G 18G X 1" MISC, 1 weekly to draw up testosterone, Disp: , Rfl:   •  tacrolimus (PROTOPIC) 0 1 % ointment, , Disp: , Rfl:   •  testosterone cypionate (DEPO-TESTOSTERONE) 200 mg/mL SOLN, , Disp: , Rfl:   •  Testosterone Enanthate 200 MG/ML SOLN, , Disp: , Rfl:   •  triamcinolone (KENALOG) 0 5 % cream, Apply topically 2 (two) times a day, Disp: 30 g, Rfl: 0  •  zolpidem (AMBIEN) 10 mg tablet, TAKE ONE TABLET BY MOUTH DAILY AT BEDTIME AS NEEDED FOR SLEEP, Disp: 30 tablet, Rfl: 0  No current facility-administered medications for this visit  Family History   Problem Relation Age of Onset   • Cancer Mother    • Cancer Father       Review of Systems   Constitutional: Negative  Respiratory: Negative  Cardiovascular: Negative  Skin: Positive for wound  Allergies  Patient has no known allergies  Objective:  /79   Pulse 84   Temp (!) 97 2 °F (36 2 °C)   Resp 18     Physical Exam  Vitals and nursing note reviewed  Constitutional:       General: He is not in acute distress  Appearance: Normal appearance  He is not ill-appearing, toxic-appearing or diaphoretic  HENT:      Head: Normocephalic and atraumatic  Pulmonary:      Effort: Pulmonary effort is normal       Breath sounds: Normal breath sounds  Musculoskeletal:         General: Normal range of motion  Neurological:      General: No focal deficit present  Mental Status: He is alert and oriented to person, place, and time             Wound 12/21/22 Traumatic Leg Left;Lateral (Active)   Wound Image   01/11/23 1400   Wound Description Yellow;Slough;Granulation tissue 01/11/23 1348   Tammy-wound Assessment Scar Tissue;Edema 01/11/23 1348   Wound Length (cm) 0 7 cm 01/11/23 1348   Wound Width (cm) 0 8 cm 01/11/23 1348   Wound Depth (cm) 0 2 cm 01/11/23 1348   Wound Surface Area (cm^2) 0 56 cm^2 01/11/23 1348   Wound Volume (cm^3) 0 112 cm^3 01/11/23 1348   Calculated Wound Volume (cm^3) 0 11 cm^3 01/11/23 1348   Change in Wound Size % 31 25 01/11/23 1348   Drainage Amount Small 01/11/23 1348   Drainage Description Serosanguineous; Yellow 01/11/23 1348   Non-staged Wound Description Full thickness 01/11/23 1348   Dressing Status Intact 01/11/23 1348           Wound 12/21/22 Traumatic Leg Left;Lateral (Active)   Wound Image   01/11/23 1400   Wound Description Yellow;Slough;Granulation tissue 01/11/23 1348   Tammy-wound Assessment Scar Tissue;Edema 01/11/23 1348   Wound Length (cm) 0 7 cm 01/11/23 1348   Wound Width (cm) 0 8 cm 01/11/23 1348   Wound Depth (cm) 0 2 cm 01/11/23 1348   Wound Surface Area (cm^2) 0 56 cm^2 01/11/23 1348   Wound Volume (cm^3) 0 112 cm^3 01/11/23 1348   Calculated Wound Volume (cm^3) 0 11 cm^3 01/11/23 1348   Change in Wound Size % 31 25 01/11/23 1348   Drainage Amount Small 01/11/23 1348   Drainage Description Serosanguineous; Yellow 01/11/23 1348   Non-staged Wound Description Full thickness 01/11/23 1348   Dressing Status Intact 01/11/23 1348                         Diagnosis:  1  Open wound of left lower leg, initial encounter  -     lidocaine (LMX) 4 % cream  -     Wound cleansing and dressings; Future        Diagnosis ICD-10-CM Associated Orders   1  Open wound of left lower leg, initial encounter  S81 802A lidocaine (LMX) 4 % cream     Wound cleansing and dressings           ASSESSMENT    Problems:    Chronic illness / Problem not at goal with exacerbation, progression or side effects of treatment  1  Healing wound Left lower leg        PLAN    The wound is about 40% smaller over 3 weeks  Will continue the same care plan  Sharp debridement performed  Debridement   Wound 12/21/22 Traumatic Leg Left;Lateral    Universal Protocol:  Consent: Verbal consent obtained    Risks and benefits: risks, benefits and alternatives were discussed  Consent given by: patient  Time out: Immediately prior to procedure a "time out" was called to verify the correct patient, procedure, equipment, support staff and site/side marked as required    Timeout called at: 1/11/2023 2:19 PM   Patient understanding: patient states understanding of the procedure being performed  Patient identity confirmed: verbally with patient      Performed by: physician  Debridement type: surgical  Level of debridement: subcutaneous tissue  Pain control: lidocaine 4%  Pre-debridement measurements  Length (cm): 0 7  Width (cm): 0 8  Depth (cm): 0 2  Surface Area (cm^2): 0 56  Volume (cm^3): 0 11    Post-debridement measurements  Length (cm): 0 7  Width (cm): 0 8  Depth (cm): 0 2  Percent debrided: 100%  Surface Area (cm^2): 0 56  Area debrided (cm^2): 0 56  Volume (cm^3): 0 11  Tissue and other material debrided: subcutaneous tissue  Devitalized tissue debrided: biofilm and fibrin  Instrument(s) utilized: curette  Bleeding: small  Hemostasis obtained with: pressure  Procedural pain (0-10): 3  Post-procedural pain: 0   Response to treatment: procedure was tolerated well

## 2023-01-17 ENCOUNTER — TELEPHONE (OUTPATIENT)
Dept: OTHER | Facility: OTHER | Age: 53
End: 2023-01-17

## 2023-01-17 ENCOUNTER — HOSPITAL ENCOUNTER (OUTPATIENT)
Dept: NON INVASIVE DIAGNOSTICS | Facility: CLINIC | Age: 53
Discharge: HOME | End: 2023-01-17

## 2023-01-17 VITALS
HEART RATE: 84 BPM | WEIGHT: 253 LBS | SYSTOLIC BLOOD PRESSURE: 134 MMHG | BODY MASS INDEX: 38.34 KG/M2 | DIASTOLIC BLOOD PRESSURE: 79 MMHG | HEIGHT: 68 IN

## 2023-01-17 VITALS
DIASTOLIC BLOOD PRESSURE: 82 MMHG | SYSTOLIC BLOOD PRESSURE: 134 MMHG | HEIGHT: 68 IN | BODY MASS INDEX: 38.34 KG/M2 | OXYGEN SATURATION: 100 % | WEIGHT: 253 LBS | HEART RATE: 97 BPM

## 2023-01-17 DIAGNOSIS — R06.02 EXERTIONAL SHORTNESS OF BREATH: ICD-10-CM

## 2023-01-17 DIAGNOSIS — R60.0 PEDAL EDEMA: ICD-10-CM

## 2023-01-17 LAB
AORTIC ROOT: 2.8 CM
APICAL FOUR CHAMBER EJECTION FRACTION: 59 %
ASCENDING AORTA: 3.3 CM
E WAVE DECELERATION TIME: 199 MS
FRACTIONAL SHORTENING: 36 (ref 28–44)
INTERVENTRICULAR SEPTUM IN DIASTOLE (PARASTERNAL SHORT AXIS VIEW): 1 CM
INTERVENTRICULAR SEPTUM: 1 CM (ref 0.6–1.1)
LAAS-AP2: 13.1 CM2
LAAS-AP4: 11.9 CM2
LEFT ATRIUM SIZE: 3.3 CM
LEFT INTERNAL DIMENSION IN SYSTOLE: 2.8 CM (ref 2.1–4)
LEFT VENTRICULAR INTERNAL DIMENSION IN DIASTOLE: 4.4 CM (ref 3.5–6)
LEFT VENTRICULAR POSTERIOR WALL IN END DIASTOLE: 1.1 CM
LEFT VENTRICULAR STROKE VOLUME: 58 ML
LVSV (TEICH): 58 ML
MAX HR PERCENT: 92 %
MAX HR: 153 BPM
MV E'TISSUE VEL-SEP: 9 CM/S
MV PEAK A VEL: 0.93 M/S
MV PEAK E VEL: 68 CM/S
MV STENOSIS PRESSURE HALF TIME: 58 MS
MV VALVE AREA P 1/2 METHOD: 3.79
RATE PRESSURE PRODUCT: NORMAL
RIGHT ATRIAL 2D VOLUME: 20 ML
RIGHT ATRIUM AREA SYSTOLE A4C: 10.6 CM2
RIGHT VENTRICLE ID DIMENSION: 3.2 CM
SL CV LEFT ATRIUM LENGTH A2C: 4.6 CM
SL CV LV EF: 60
SL CV PED ECHO LEFT VENTRICLE DIASTOLIC VOLUME (MOD BIPLANE) 2D: 87 ML
SL CV PED ECHO LEFT VENTRICLE SYSTOLIC VOLUME (MOD BIPLANE) 2D: 29 ML
SL CV STRESS RECOVERY BP: NORMAL MMHG
SL CV STRESS RECOVERY HR: 110 BPM
SL CV STRESS RECOVERY O2 SAT: 98 %
SL CV STRESS STAGE REACHED: 2
STRESS ANGINA INDEX: 0
STRESS BASELINE BP: NORMAL MMHG
STRESS BASELINE HR: 97 BPM
STRESS O2 SAT REST: 100 %
STRESS PEAK HR: 146 BPM
STRESS POST ESTIMATED WORKLOAD: 7 METS
STRESS POST EXERCISE DUR MIN: 6 MIN
STRESS POST EXERCISE DUR SEC: 0 SEC
STRESS POST O2 SAT PEAK: 99 %
STRESS POST PEAK BP: 258 MMHG
TR MAX PG: 13 MMHG
TR PEAK VELOCITY: 1.8 M/S
TRICUSPID ANNULAR PLANE SYSTOLIC EXCURSION: 2.2 CM
TRICUSPID VALVE PEAK REGURGITATION VELOCITY: 1.8 M/S

## 2023-01-17 NOTE — TELEPHONE ENCOUNTER
Pt had his stress test today and received a vm to make a follow-up apt  Pt is rtn call for apt  Please call

## 2023-01-18 ENCOUNTER — TELEPHONE (OUTPATIENT)
Dept: CARDIOLOGY CLINIC | Facility: CLINIC | Age: 53
End: 2023-01-18

## 2023-01-18 LAB
MAX DIASTOLIC BP: 98 MMHG
MAX HEART RATE: 153 BPM
MAX PREDICTED HEART RATE: 166 BPM
MAX. SYSTOLIC BP: 258 MMHG
PROTOCOL NAME: NORMAL
REASON FOR TERMINATION: NORMAL
TARGET HR FORMULA: NORMAL
TEST INDICATION: NORMAL
TIME IN EXERCISE PHASE: NORMAL

## 2023-01-18 NOTE — TELEPHONE ENCOUNTER
Called patient 1-18 and M for patient to call back and schedule an appointment with Dr Cesario Awad - Post stress test

## 2023-01-18 NOTE — TELEPHONE ENCOUNTER
P/C had a scheduled stress test yesterday but it was cancellated due to /98   Pt felt fine no other S/S  Pt doesn't take BP at home    Losartan-hydrochlorothiazide 100/12 5 mg qd    Ov 1/5/2023  C/b [id number]    Next step?     Please advise

## 2023-01-19 DIAGNOSIS — I10 PRIMARY HYPERTENSION: Primary | ICD-10-CM

## 2023-01-19 RX ORDER — LOSARTAN POTASSIUM AND HYDROCHLOROTHIAZIDE 25; 100 MG/1; MG/1
1 TABLET ORAL DAILY
Qty: 30 TABLET | Refills: 1 | Status: SHIPPED | OUTPATIENT
Start: 2023-01-19

## 2023-01-19 NOTE — PROGRESS NOTES
I discussed the results of his tests on phone  His blood pressure in clinic was controlled but he had hypertensive response to stress with a systolic blood pressure of 256 mmHg without symptoms  He is on Losartan-HCTZ 100-12 5 mg at home  He does not that although he does not take blood pressure at home it does fluctuate a lot at clinic visits and he has seen it go to 488F-015Z mmHg systolic  I am hesitant to add metoprolol at the moment because he has severe Asthma which is now controlled but was difficult to control in the past    I also want to avoid Amlodipine has he has severe unexplained unilateral leg swelling  At this point I will increased Losartan-HCTZ to 100-25 mg QD  I advised him to get a BMP in a week and make a diary of his blood pressure  Patient also told me that he takes high doses of NSAIDS daily including multiple pills of high dose Ibuprofen a day  The medications are for his severe MSK comorbidies  He has tried steroid injections, PT and opioids in the past but those things either did not help or caused intolerable side effects (sluggishness and constipation)  He advised him to reach out to his PCP and get set up with pain management because he has hypertension and CKD  His Echocardiogram was unremarkable and he did not have ischemic ECG at peak stress  He is at acceptable risk for bariatric surgery and he does not need any additional cardiac testing before the surgery  Aspirin can be held 3 days prior to surgery

## 2023-01-20 NOTE — TELEPHONE ENCOUNTER
Patient spoke with Dr Cheli Pickens on 1/19/23 and is scheduled for his regular appointment in April

## 2023-01-25 NOTE — PROGRESS NOTES
Bariatric Nutrition Follow-Up Note    Type of surgery    Preop no months required  Surgery Date: Tentative February-March 2023  Deadline month June 2023  Surgeon: Dr Sirera Castano reports he was initially interested in RNY, then after speaking with Dr Silvino Chavez leaning towards VSG  Pt still unsure at this time, requested to discuss with Dr Silvino Chavez to discuss type of surgery  Nutrition Assessment   Camila Lindsay  45 y o   male     Wt with BMI of 25: 159 6lbs  Pre-Op Excess Wt: 93 1lbs  Wt 113 kg (249 lb 3 2 oz)   BMI 37 89 kg/m²    Wt Readings from Last 3 Encounters:   01/26/23 113 kg (249 lb 3 2 oz)   01/17/23 115 kg (253 lb)   01/17/23 115 kg (253 lb)     Pre-op weight goals:  5% wt loss=12 635#=Day of surgery goal of 240 065#  79UUW=925 5lbs     [place]-   Hakanor Equation:     Weight maintenance= 2343 kcal/day  Estimated calories for weight loss 9435-6713 kcal/day ( 1-2# per wk wt loss - sedentary )  Estimated protein needs 72 5-108 8g/day (1 0-1 5 gms/kg IBW )   Estimated fluid needs 7376-6185 ml/day (30-35 ml/kg IBW )      Weight History   Onset of Obesity: Childhood  Family history of obesity: No  Wt Loss Attempts: Exercise  Self Created Diets (Portion Control, Healthy Food Choices, etc )  Patient has tried the above for 6 months or more with insufficient weight loss or weight regain, which is why patient has requested to be evaluated for weight loss surgery today  Maximum Wt Lost: lost 90lbs as a kid in a residential asthma treatment program    Review of History and Medications   Past Medical History:   Diagnosis Date   • Anxiety    • Asthma    • Benign essential HTN     last assessed 05/26/2017   • Degenerative joint disease    • Hiccoughs    • Hypertension    • Skin cancer    • Spinal stenosis      Past Surgical History:   Procedure Laterality Date   • ESOPHAGOGASTRODUODENOSCOPY N/A 3/24/2016    Procedure: ESOPHAGOGASTRODUODENOSCOPY (EGD);   Surgeon: Chintan Navarrete MD;  Location: [facility]; Service:    • JOINT REPLACEMENT      right hip sx     Social History     Socioeconomic History   • Marital status: /Civil Union     Spouse name: Not on file   • Number of children: Not on file   • Years of education: Not on file   • Highest education level: Not on file   Occupational History   • Not on file   Tobacco Use   • Smoking status: Never   • Smokeless tobacco: Never   Vaping Use   • Vaping Use: Never used   Substance and Sexual Activity   • Alcohol use: Yes     Comment: Social drinker   • Drug use: Yes     Types: Marijuana     Comment: medicinal card obtained   • Sexual activity: Yes     Partners: Female     Birth control/protection: None   Other Topics Concern   • Not on file   Social History Narrative   • Not on file     Social Determinants of Health     Financial Resource Strain: Not on file   Food Insecurity: Not on file   Transportation Needs: Not on file   Physical Activity: Not on file   Stress: Not on file   Social Connections: Not on file   Intimate Partner Violence: Not on file   Housing Stability: Not on file       Current Outpatient Medications:   •  acetaminophen (TYLENOL) 650 mg CR tablet, Take 1 tablet (650 mg total) by mouth every 8 (eight) hours as needed for mild pain, Disp: 30 tablet, Rfl: 0  •  albuterol (Ventolin HFA) 90 mcg/act inhaler, Inhale 2 puffs every 6 (six) hours as needed for wheezing, Disp: 8 g, Rfl: 3  •  ALPRAZolam (XANAX) 1 mg tablet, Take 1 tablet (1 mg total) by mouth 2 (two) times a day as needed for anxiety for up to 30 days (Patient taking differently: Take 1 mg by mouth as needed for anxiety), Disp: 60 tablet, Rfl: 0  •  B-D 3CC LUER-ANTONELLA SYR 25GX1" 25G X 1" 3 ML MISC, , Disp: , Rfl:   •  baclofen 10 mg tablet, Take 1 tablet (10 mg total) by mouth 2 (two) times a day as needed for muscle spasms (Diaphragmatic spasm), Disp: 30 tablet, Rfl: 0  •  BD Hypodermic Needle 18G X 1" MISC, , Disp: , Rfl:   •  cholecalciferol (VITAMIN D3) 1,000 units tablet, Take 1 tablet (1,000 Units total) by mouth daily Start by taking 3000 units daily for one month, then continue on 1000 units daily  (Patient not taking: Reported on 10/25/2022), Disp: 30 tablet, Rfl: 5  •  fluticasone (Flovent HFA) 110 MCG/ACT inhaler, Inhale 2 puffs 2 (two) times a day Rinse mouth after use , Disp: 36 g, Rfl: 2  •  [place] 300 MG CAPS, Take 1 capsule (300 mg total) by mouth daily (Patient not taking: Reported on 10/25/2022), Disp: 60 capsule, Rfl: 0  •  losartan-hydrochlorothiazide (HYZAAR) 100-25 MG per tablet, Take 1 tablet by mouth daily, Disp: 30 tablet, Rfl: 1  •  montelukast (SINGULAIR) 10 mg tablet, Take 1 tablet (10 mg total) by mouth daily at bedtime, Disp: 90 tablet, Rfl: 3  •  NEEDLE, DISP, 18 G 18G X 1" MISC, 1 weekly to draw up testosterone, Disp: , Rfl:   •  tacrolimus (PROTOPIC) 0 1 % ointment, , Disp: , Rfl:   •  testosterone cypionate (DEPO-TESTOSTERONE) 200 mg/mL SOLN, , Disp: , Rfl:   •  Testosterone Enanthate 200 MG/ML SOLN, , Disp: , Rfl:   •  triamcinolone (KENALOG) 0 5 % cream, Apply topically 2 (two) times a day, Disp: 30 g, Rfl: 0  •  zolpidem (AMBIEN) 10 mg tablet, TAKE ONE TABLET BY MOUTH DAILY AT BEDTIME AS NEEDED FOR SLEEP, Disp: 30 tablet, Rfl: 0     Food Intake and Lifestyle Assessment   Food Intake Assessment completed via usual diet recall  Pt reports he has no regular meal schedule-varies from day to day  Pt reports that all he ate today was a protein shake and some grapes  Usual diet recall:  Wakes at 9:30am  Breakfast: none  Snack: wheat thins, doritos   Lunch: none  Snack: hard boiled eggs, tuna, hamburger meat, tuna steaks, salmon steaks, boneless chicken breast  Dinner: none  Snack: PB+J or PB+banana or glass of milk, M+Ms  Beverage intake: water, [place]  Protein supplement: none currently  Pt used to work for [employer]  Estimated protein intake per day: unable to estimate based on pt's current/variable food recall    Estimated protein intake today=30g  Estimated fluid intake per day: 64+oz  Meals eaten away from home: once a week [facility]  Typical meal pattern: 0 meals per day and multiple snacks per day  Eating Behaviors: Consumption of high calorie/ high fat foods, Large portion sizes, Frequent snacking/ grazing, Mindless eating, Emotional eating, Craves sweet foods and Craves salty foods  Pt reports he does not eat very much, often skips meals, busy during the day  Pt reports that any time he eats he feels stomach pains like overfull/bloating/distention feeling after several bites, and therefore dislikes eating a lot  Pt denies eating any high sodium foods including soup, lunchmeats and cheeses, frozen entrees, canned vegetables, etc   Pt also states he does not salt his food  Food allergies or intolerances: No Known Allergies NKFA  Cultural or Episcopal considerations: none    Physical Assessment  Physical Activity  Types of exercise: Pt used to be a  and   No exercise currently  Sedentary  Desk job  Current physical limitations: needs shoulder replacement- ball and socket  Previous R hip replacement  Pt does have some fluid retention/swelling in one leg  Pt reports he gets intermittent intractable hiccups that can last for days and he is hoping the bariatric surgery will help resolve this as well  Pt reports he has severe BOY, tried CPAP and could not get used to it so had a mouth device made for him    Pt reports he had an EGD a little over a year ago where he "coded " Pt reports they found multiple ulcers on his EGD  Psychosocial Assessment   Support systems: lives alone  Socioeconomic factors: works from home, talks on phone all day for work      Nutrition Diagnosis-continued  Diagnosis: Overweight / Obesity (NC-3 3), Excessive energy intake (NI-1 5) and Disordered eating pattern (NB-1 5)  Related to: Limited adherence to nutrition-related recommendations, Physical inactivity, Excessive energy intake and Inability or lack of desire to manage self-care  As Evidenced by: BMI >25, Excessive energy intake, Unintentional weight gain and Reports of disorded eating patterns     Nutrition Prescription: Recommend the following diet  Low fat, Low sugar, High protein and Regular    Interventions and Teaching   Discussed pre-op and post-op nutrition guidelines  Patient educated and handouts provided  Surgical changes to stomach / GI  Capacity of post-surgery stomach  Adequate hydration  Sugar and fat restriction to decrease "dumping syndrome"  Suggestions for pre-op diet  Nutrition considerations after surgery  Protein supplements  Dietary and lifestyle changes  Possible problems with poor eating habits  Potential for food intolerance after surgery, and ways to deal with them including: lactose intolerance, nausea, reflux, vomiting, diarrhea, food intolerance, appetite changes, gas  Vitamin / Mineral supplementation of Multivitamin with minerals and Vitamin D  Pt currently takes a daily multivitamin  Discussed post-operative portion sizes progression, and discussed need for soft, moist meats post-operatively  Discussed needs for high potency supplements lifelong to prevent malnutrition  Discussed common contributors to weight regain  Education provided to: patient    Barriers to learning: pt reports he already has good knowledge of healthy nutrition and what to eat from his time as a   Readiness to change: action    Prior research on procedure: internet and pre-op class    Comprehension: verbalizes understanding     Expected Compliance: good    Recommendations  Pt is an appropriate candidate for surgery   Yes    Evaluation / Monitoring  Dietitian to Monitor: Eating pattern as discussed Tolerance of nutrition prescription Body weight Lab values Physical activity Bowel pattern    Goals  Food journal, Exercise 30 minutes 5 times per week, Complete lession plans 1-6, Eat 3 meals per day and Eliminate mindless snacking    Workflow: (Incomplete in bold):  • Psych and/or D+A Clearance: not needed  • PCP Letter: not done  • Support Group: done 12/19/22  • Surgeon Appt done 12/15/22  • EGD had one done last year  • Cardiac Risk Assessment: Consult completed 1/5/23  stress test and echo completed 1/17/23  Cleared by cardiology 1/19/23  • Nephrology clearance: consult scheduled 2/23/23  • Sleep Studies has mouth guard  • Blood work TSH+Lipids done 6/3/22- good until 6/3/23  CBC+CMP done 12/21/22- good until 6/21/23  • Nicotine test nonsmoker  • 6 Month Pre-Operative Program: not needed  • Weight Loss 5% wt loss=12 635#=Day of surgery goal of 240 065#      Time Spent:   30 minutes

## 2023-01-26 ENCOUNTER — OFFICE VISIT (OUTPATIENT)
Dept: BARIATRICS | Facility: CLINIC | Age: 53
End: 2023-01-26

## 2023-01-26 VITALS — BODY MASS INDEX: 37.89 KG/M2 | WEIGHT: 249.2 LBS

## 2023-01-26 DIAGNOSIS — K21.00 GASTROESOPHAGEAL REFLUX DISEASE WITH ESOPHAGITIS, UNSPECIFIED WHETHER HEMORRHAGE: ICD-10-CM

## 2023-01-26 DIAGNOSIS — G47.33 OSA (OBSTRUCTIVE SLEEP APNEA): ICD-10-CM

## 2023-01-26 DIAGNOSIS — E66.01 CLASS 2 SEVERE OBESITY DUE TO EXCESS CALORIES WITH SERIOUS COMORBIDITY AND BODY MASS INDEX (BMI) OF 35.0 TO 35.9 IN ADULT (HCC): Primary | ICD-10-CM

## 2023-01-26 DIAGNOSIS — I10 ESSENTIAL HYPERTENSION: ICD-10-CM

## 2023-02-01 ENCOUNTER — OFFICE VISIT (OUTPATIENT)
Dept: WOUND CARE | Facility: HOSPITAL | Age: 53
End: 2023-02-01

## 2023-02-01 VITALS
DIASTOLIC BLOOD PRESSURE: 88 MMHG | TEMPERATURE: 97 F | HEART RATE: 97 BPM | SYSTOLIC BLOOD PRESSURE: 123 MMHG | RESPIRATION RATE: 16 BRPM

## 2023-02-01 DIAGNOSIS — S81.802A OPEN WOUND OF LEFT LOWER LEG, INITIAL ENCOUNTER: Primary | ICD-10-CM

## 2023-02-01 RX ORDER — LIDOCAINE 40 MG/G
CREAM TOPICAL ONCE
Status: COMPLETED | OUTPATIENT
Start: 2023-02-01 | End: 2023-02-01

## 2023-02-01 RX ADMIN — LIDOCAINE: 40 CREAM TOPICAL at 13:59

## 2023-02-01 NOTE — PROGRESS NOTES
Patient ID: Jeanine Álvarez is a 45 y o  male Date of Birth 1970     My role is Foot, Ankle and Wound Specialist    Chief Complaint   Patient presents with   • Follow Up Wound Care Visit     Left leg wound        Dog bite    Subjective:   Madi Harrison is here for the first time with a chronic wound from an old dog bite on the lower leg  The following portions of the patient's history were reviewed and updated as appropriate:   Patient Active Problem List   Diagnosis   • Mild intermittent asthma without complication   • Psoriasis   • Hypogonadism in male   • Anxiety   • Microscopic hematuria   • Gastroesophageal reflux disease with esophagitis   • Intractable hiccups   • Colon cancer screening   • Essential hypertension   • Class 2 severe obesity due to excess calories with serious comorbidity and body mass index (BMI) of 35 0 to 35 9 in [facility])   • BOY (obstructive sleep apnea)   • Stage 3a chronic kidney disease (HCC)   • Periumbilical hernia   • Open wound of left lower leg     Past Medical History:   Diagnosis Date   • Anxiety    • Asthma    • Benign essential HTN     last assessed 05/26/2017   • Degenerative joint disease    • Hiccoughs    • Hypertension    • Skin cancer    • Spinal stenosis      Past Surgical History:   Procedure Laterality Date   • ESOPHAGOGASTRODUODENOSCOPY N/A 3/24/2016    Procedure: ESOPHAGOGASTRODUODENOSCOPY (EGD); Surgeon: Martina Leonardo MD;  Location: [facility]; Service:    • JOINT REPLACEMENT      right hip sx     Social History     Socioeconomic History   • Marital status: /Civil Union     Spouse name: Not on file   • Number of children: Not on file   • Years of education: Not on file   • Highest education level: Not on file   Occupational History   • Not on file   Tobacco Use   • Smoking status: Never   • Smokeless tobacco: Never   Vaping Use   • Vaping Use: Never used   Substance and Sexual Activity   • Alcohol use: Yes     Comment: Social drinker   • Drug use:  Yes Types: Marijuana     Comment: medicinal card obtained   • Sexual activity: Yes     Partners: Female     Birth control/protection: None   Other Topics Concern   • Not on file   Social History Narrative   • Not on file     Social Determinants of Health     Financial Resource Strain: Not on file   Food Insecurity: Not on file   Transportation Needs: Not on file   Physical Activity: Not on file   Stress: Not on file   Social Connections: Not on file   Intimate Partner Violence: Not on file   Housing Stability: Not on file        Current Outpatient Medications:   •  acetaminophen (TYLENOL) 650 mg CR tablet, Take 1 tablet (650 mg total) by mouth every 8 (eight) hours as needed for mild pain, Disp: 30 tablet, Rfl: 0  •  albuterol (Ventolin HFA) 90 mcg/act inhaler, Inhale 2 puffs every 6 (six) hours as needed for wheezing, Disp: 8 g, Rfl: 3  •  ALPRAZolam (XANAX) 1 mg tablet, Take 1 tablet (1 mg total) by mouth 2 (two) times a day as needed for anxiety for up to 30 days (Patient taking differently: Take 1 mg by mouth as needed for anxiety), Disp: 60 tablet, Rfl: 0  •  B-D 3CC LUER-ANTONELLA SYR 25GX1" 25G X 1" 3 ML MISC, , Disp: , Rfl:   •  baclofen 10 mg tablet, Take 1 tablet (10 mg total) by mouth 2 (two) times a day as needed for muscle spasms (Diaphragmatic spasm), Disp: 30 tablet, Rfl: 0  •  BD Hypodermic Needle 18G X 1" MISC, , Disp: , Rfl:   •  cholecalciferol (VITAMIN D3) 1,000 units tablet, Take 1 tablet (1,000 Units total) by mouth daily Start by taking 3000 units daily for one month, then continue on 1000 units daily   (Patient not taking: Reported on 10/25/2022), Disp: 30 tablet, Rfl: 5  •  fluticasone (Flovent HFA) 110 MCG/ACT inhaler, Inhale 2 puffs 2 (two) times a day Rinse mouth after use , Disp: 36 g, Rfl: 2  •  H[place] 300 MG CAPS, Take 1 capsule (300 mg total) by mouth daily (Patient not taking: Reported on 10/25/2022), Disp: 60 capsule, Rfl: 0  •  losartan-hydrochlorothiazide (HYZAAR) 100-25 MG per tablet, Take 1 tablet by mouth daily, Disp: 30 tablet, Rfl: 1  •  montelukast (SINGULAIR) 10 mg tablet, Take 1 tablet (10 mg total) by mouth daily at bedtime, Disp: 90 tablet, Rfl: 3  •  NEEDLE, DISP, 18 G 18G X 1" MISC, 1 weekly to draw up testosterone, Disp: , Rfl:   •  tacrolimus (PROTOPIC) 0 1 % ointment, , Disp: , Rfl:   •  testosterone cypionate (DEPO-TESTOSTERONE) 200 mg/mL SOLN, , Disp: , Rfl:   •  Testosterone Enanthate 200 MG/ML SOLN, , Disp: , Rfl:   •  triamcinolone (KENALOG) 0 5 % cream, Apply topically 2 (two) times a day, Disp: 30 g, Rfl: 0  •  zolpidem (AMBIEN) 10 mg tablet, TAKE ONE TABLET BY MOUTH DAILY AT BEDTIME AS NEEDED FOR SLEEP, Disp: 30 tablet, Rfl: 0  No current facility-administered medications for this visit  Family History   Problem Relation Age of Onset   • Cancer Mother    • Cancer Father       Review of Systems   Constitutional: Negative  Respiratory: Negative  Cardiovascular: Negative  Skin: Positive for wound  Allergies  Patient has no known allergies  Objective:  /88   Pulse 97   Temp (!) 97 °F (36 1 °C)   Resp 16     Physical Exam  Vitals and nursing note reviewed  Constitutional:       General: He is not in acute distress  Appearance: Normal appearance  He is not ill-appearing, toxic-appearing or diaphoretic  HENT:      Head: Normocephalic and atraumatic  Pulmonary:      Effort: Pulmonary effort is normal       Breath sounds: Normal breath sounds  Musculoskeletal:         General: Normal range of motion  Neurological:      General: No focal deficit present  Mental Status: He is alert and oriented to person, place, and time  Wound 12/21/22 Traumatic Leg Left;Lateral (Active)   Wound Image   02/01/23 1351   Wound Description Yellow;Slough;Granulation tissue; Epithelialization 02/01/23 1354   Tammy-wound Assessment Scar Tissue 02/01/23 1354   Wound Length (cm) 0 7 cm 02/01/23 1354   Wound Width (cm) 0 7 cm 02/01/23 1354   Wound Depth (cm) 0 1 cm 02/01/23 1354   Wound Surface Area (cm^2) 0 49 cm^2 02/01/23 1354   Wound Volume (cm^3) 0 049 cm^3 02/01/23 1354   Calculated Wound Volume (cm^3) 0 05 cm^3 02/01/23 1354   Change in Wound Size % 68 75 02/01/23 1354   Drainage Amount Small 02/01/23 1354   Drainage Description Serosanguineous; Yellow 02/01/23 1354   Non-staged Wound Description Full thickness 02/01/23 1354   Dressing Status Intact 02/01/23 1354           Wound 12/21/22 Traumatic Leg Left;Lateral (Active)   Wound Image   02/01/23 1351   Wound Description Yellow;Slough;Granulation tissue; Epithelialization 02/01/23 1354   Tammy-wound Assessment Scar Tissue 02/01/23 1354   Wound Length (cm) 0 7 cm 02/01/23 1354   Wound Width (cm) 0 7 cm 02/01/23 1354   Wound Depth (cm) 0 1 cm 02/01/23 1354   Wound Surface Area (cm^2) 0 49 cm^2 02/01/23 1354   Wound Volume (cm^3) 0 049 cm^3 02/01/23 1354   Calculated Wound Volume (cm^3) 0 05 cm^3 02/01/23 1354   Change in Wound Size % 68 75 02/01/23 1354   Drainage Amount Small 02/01/23 1354   Drainage Description Serosanguineous; Yellow 02/01/23 1354   Non-staged Wound Description Full thickness 02/01/23 1354   Dressing Status Intact 02/01/23 1354                         Diagnosis:  1  Open wound of left lower leg, initial encounter  -     lidocaine (LMX) 4 % cream  -     Wound cleansing and dressings; Future        Diagnosis ICD-10-CM Associated Orders   1  Open wound of left lower leg, initial encounter  S81 802A lidocaine (LMX) 4 % cream     Wound cleansing and dressings           ASSESSMENT    Problems:    Chronic illness / Problem not at goal with exacerbation, progression or side effects of treatment  1  Healing dog bite wound        PLAN    The wound is healing well  I debrided it  Continue same dressings and reassess in two weeks  Debridement   Wound 12/21/22 Traumatic Leg Left;Lateral    Universal Protocol:  Consent: Verbal consent obtained    Risks and benefits: risks, benefits and alternatives were discussed  Consent given by: patient  Time out: Immediately prior to procedure a "time out" was called to verify the correct patient, procedure, equipment, support staff and site/side marked as required    Timeout called at: 2/1/2023 3:41 PM   Patient understanding: patient states understanding of the procedure being performed  Patient identity confirmed: verbally with patient      Performed by: physician  Debridement type: surgical  Level of debridement: subcutaneous tissue  Pain control: lidocaine 4%  Pre-debridement measurements  Length (cm): 0 7  Width (cm): 0 7  Depth (cm): 0 1  Surface Area (cm^2): 0 49  Volume (cm^3): 0 05    Post-debridement measurements  Length (cm): 0 7  Width (cm): 0 7  Depth (cm): 0 1  Percent debrided: 100%  Surface Area (cm^2): 0 49  Area debrided (cm^2): 0 49  Volume (cm^3): 0 05  Tissue and other material debrided: subcutaneous tissue  Devitalized tissue debrided: biofilm and fibrin  Instrument(s) utilized: curette  Bleeding: small  Hemostasis obtained with: pressure  Procedural pain (0-10): 0  Post-procedural pain: 0   Response to treatment: procedure was tolerated well

## 2023-02-01 NOTE — PATIENT INSTRUCTIONS
Orders Placed This Encounter   Procedures    Wound cleansing and dressings     Elastic Tubular Stocking size F       Tubular elastic bandage: Apply from base of toes to behind the knee  Apply in AM, may remove for sleep  Avoid prolonged standing in one place  Elevate leg(s) above the level of the heart when sitting or as much as possible  This was applied today  Wound cleansing and dressings       Left lateral leg wound:       Wound care every other day  On dressing change days remove the dressing and shower  Do not leave the wound open to the air, redress immediately after the shower  Apply dermagran gauze cut to fit the wound   Cover with gauze and secure with rolled gauze and tape  This was applied today  Standing Status:   Future     Standing Expiration Date:   2/1/2024       Orders Placed This Encounter   Procedures    Wound cleansing and dressings     Elastic Tubular Stocking size F       Tubular elastic bandage: Apply from base of toes to behind the knee  Apply in AM, may remove for sleep  Avoid prolonged standing in one place  Elevate leg(s) above the level of the heart when sitting or as much as possible  This was applied today  Wound cleansing and dressings       Left lateral leg wound:       Wound care every other day  On dressing change days remove the dressing and shower  Do not leave the wound open to the air, redress immediately after the shower  Apply dermagran gauze cut to fit the wound   Cover with gauze and secure with rolled gauze and tape  This was applied today       Standing Status:   Future     Standing Expiration Date:   2/1/2024

## 2023-02-15 ENCOUNTER — OFFICE VISIT (OUTPATIENT)
Dept: WOUND CARE | Facility: HOSPITAL | Age: 53
End: 2023-02-15

## 2023-02-15 VITALS
HEART RATE: 105 BPM | DIASTOLIC BLOOD PRESSURE: 93 MMHG | TEMPERATURE: 98.4 F | SYSTOLIC BLOOD PRESSURE: 169 MMHG | RESPIRATION RATE: 12 BRPM

## 2023-02-15 DIAGNOSIS — G47.09 OTHER INSOMNIA: ICD-10-CM

## 2023-02-15 DIAGNOSIS — S81.802A OPEN WOUND OF LEFT LOWER LEG, INITIAL ENCOUNTER: Primary | ICD-10-CM

## 2023-02-15 RX ORDER — ZOLPIDEM TARTRATE 10 MG/1
TABLET ORAL
Qty: 30 TABLET | Refills: 0 | Status: SHIPPED | OUTPATIENT
Start: 2023-02-15

## 2023-02-15 RX ORDER — ZOLPIDEM TARTRATE 10 MG/1
10 TABLET ORAL
Qty: 30 TABLET | Refills: 0 | OUTPATIENT
Start: 2023-02-15

## 2023-02-15 RX ORDER — LIDOCAINE 40 MG/G
CREAM TOPICAL ONCE
Status: COMPLETED | OUTPATIENT
Start: 2023-02-15 | End: 2023-02-15

## 2023-02-15 RX ADMIN — LIDOCAINE: 40 CREAM TOPICAL at 14:49

## 2023-02-15 NOTE — PROGRESS NOTES
Patient ID: Anitra Latham is a 45 y o  male Date of Birth 1970     My role is Foot, Ankle and Wound Specialist    No chief complaint on file  Dog bite wound    Subjective:   Jimbo Rivera is here for the first time with a chronic wound from an old dog bite on the lower leg  The following portions of the patient's history were reviewed and updated as appropriate:   Patient Active Problem List   Diagnosis   • Mild intermittent asthma without complication   • Psoriasis   • Hypogonadism in male   • Anxiety   • Microscopic hematuria   • Gastroesophageal reflux disease with esophagitis   • Intractable hiccups   • Colon cancer screening   • Essential hypertension   • Class 2 severe obesity due to excess calories with serious comorbidity and body mass index (BMI) of 35 0 to 35 9 in [facility])   • BOY (obstructive sleep apnea)   • Stage 3a chronic kidney disease (HCC)   • Periumbilical hernia   • Open wound of left lower leg     Past Medical History:   Diagnosis Date   • Anxiety    • Asthma    • Benign essential HTN     last assessed 05/26/2017   • Degenerative joint disease    • Hiccoughs    • Hypertension    • Skin cancer    • Spinal stenosis      Past Surgical History:   Procedure Laterality Date   • ESOPHAGOGASTRODUODENOSCOPY N/A 3/24/2016    Procedure: ESOPHAGOGASTRODUODENOSCOPY (EGD); Surgeon: Suellen Olmos MD;  Location: [facility];   Service:    • JOINT REPLACEMENT      right hip sx     Social History     Socioeconomic History   • Marital status: /Civil Union     Spouse name: Not on file   • Number of children: Not on file   • Years of education: Not on file   • Highest education level: Not on file   Occupational History   • Not on file   Tobacco Use   • Smoking status: Never   • Smokeless tobacco: Never   Vaping Use   • Vaping Use: Never used   Substance and Sexual Activity   • Alcohol use: Yes     Comment: Social drinker   • Drug use: Yes     Types: Marijuana     Comment: medicinal card obtained   • Sexual activity: Yes     Partners: Female     Birth control/protection: None   Other Topics Concern   • Not on file   Social History Narrative   • Not on file     Social Determinants of Health     Financial Resource Strain: Not on file   Food Insecurity: Not on file   Transportation Needs: Not on file   Physical Activity: Not on file   Stress: Not on file   Social Connections: Not on file   Intimate Partner Violence: Not on file   Housing Stability: Not on file        Current Outpatient Medications:   •  acetaminophen (TYLENOL) 650 mg CR tablet, Take 1 tablet (650 mg total) by mouth every 8 (eight) hours as needed for mild pain, Disp: 30 tablet, Rfl: 0  •  albuterol (Ventolin HFA) 90 mcg/act inhaler, Inhale 2 puffs every 6 (six) hours as needed for wheezing, Disp: 8 g, Rfl: 3  •  ALPRAZolam (XANAX) 1 mg tablet, Take 1 tablet (1 mg total) by mouth 2 (two) times a day as needed for anxiety for up to 30 days (Patient taking differently: Take 1 mg by mouth as needed for anxiety), Disp: 60 tablet, Rfl: 0  •  B-D 3CC LUER-ANTONELLA SYR 25GX1" 25G X 1" 3 ML MISC, , Disp: , Rfl:   •  baclofen 10 mg tablet, Take 1 tablet (10 mg total) by mouth 2 (two) times a day as needed for muscle spasms (Diaphragmatic spasm), Disp: 30 tablet, Rfl: 0  •  BD Hypodermic Needle 18G X 1" MISC, , Disp: , Rfl:   •  cholecalciferol (VITAMIN D3) 1,000 units tablet, Take 1 tablet (1,000 Units total) by mouth daily Start by taking 3000 units daily for one month, then continue on 1000 units daily   (Patient not taking: Reported on 10/25/2022), Disp: 30 tablet, Rfl: 5  •  fluticasone (Flovent HFA) 110 MCG/ACT inhaler, Inhale 2 puffs 2 (two) times a day Rinse mouth after use , Disp: 36 g, Rfl: 2  •  Horse [place] 300 MG CAPS, Take 1 capsule (300 mg total) by mouth daily (Patient not taking: Reported on 10/25/2022), Disp: 60 capsule, Rfl: 0  •  losartan-hydrochlorothiazide (HYZAAR) 100-25 MG per tablet, Take 1 tablet by mouth daily, Disp: 30 tablet, Rfl: 1  •  montelukast (SINGULAIR) 10 mg tablet, Take 1 tablet (10 mg total) by mouth daily at bedtime, Disp: 90 tablet, Rfl: 3  •  NEEDLE, DISP, 18 G 18G X 1" MISC, 1 weekly to draw up testosterone, Disp: , Rfl:   •  tacrolimus (PROTOPIC) 0 1 % ointment, , Disp: , Rfl:   •  testosterone cypionate (DEPO-TESTOSTERONE) 200 mg/mL SOLN, , Disp: , Rfl:   •  Testosterone Enanthate 200 MG/ML SOLN, , Disp: , Rfl:   •  triamcinolone (KENALOG) 0 5 % cream, Apply topically 2 (two) times a day, Disp: 30 g, Rfl: 0  •  zolpidem (AMBIEN) 10 mg tablet, TAKE ONE TABLET BY MOUTH DAILY AT BEDTIME AS NEEDED FOR SLEEP, Disp: 30 tablet, Rfl: 0    Current Facility-Administered Medications:   •  lidocaine (LMX) 4 % cream, , Topical, Once, Jesse Pheasant, DPM  Family History   Problem Relation Age of Onset   • Cancer Mother    • Cancer Father       Review of Systems   Constitutional: Negative  Respiratory: Negative  Cardiovascular: Negative  Skin: Positive for wound  Allergies  Patient has no known allergies  Objective:  /93   Pulse 105   Temp 98 4 °F (36 9 °C)   Resp 12     Physical Exam  Vitals and nursing note reviewed  Constitutional:       General: He is not in acute distress  Appearance: Normal appearance  He is not ill-appearing, toxic-appearing or diaphoretic  HENT:      Head: Normocephalic and atraumatic  Pulmonary:      Effort: Pulmonary effort is normal       Breath sounds: Normal breath sounds  Musculoskeletal:         General: Normal range of motion  Neurological:      General: No focal deficit present  Mental Status: He is alert and oriented to person, place, and time  Wound 12/21/22 Traumatic Leg Left;Lateral (Active)   Wound Image   02/15/23 1416   Wound Description Yellow;Slough; Epithelialization;Pink 02/15/23 1400   Tammy-wound Assessment Scar Tissue;Pink;Purple 02/15/23 1400   Wound Length (cm) 0 6 cm 02/15/23 1400   Wound Width (cm) 0 3 cm 02/15/23 1400   Wound Depth (cm) 0 1 cm 02/15/23 1400   Wound Surface Area (cm^2) 0 18 cm^2 02/15/23 1400   Wound Volume (cm^3) 0 018 cm^3 02/15/23 1400   Calculated Wound Volume (cm^3) 0 02 cm^3 02/15/23 1400   Change in Wound Size % 87 5 02/15/23 1400   Drainage Amount Small 02/15/23 1400   Drainage Description Serosanguineous 02/15/23 1400   Non-staged Wound Description Full thickness 02/15/23 1400   Patient Tolerance Tolerated well 02/15/23 1400   Dressing Status Intact 02/15/23 1400           Wound 12/21/22 Traumatic Leg Left;Lateral (Active)   Wound Image   02/15/23 1416   Wound Description Yellow;Slough; Epithelialization;Pink 02/15/23 1400   Tammy-wound Assessment Scar Tissue;Pink;Purple 02/15/23 1400   Wound Length (cm) 0 6 cm 02/15/23 1400   Wound Width (cm) 0 3 cm 02/15/23 1400   Wound Depth (cm) 0 1 cm 02/15/23 1400   Wound Surface Area (cm^2) 0 18 cm^2 02/15/23 1400   Wound Volume (cm^3) 0 018 cm^3 02/15/23 1400   Calculated Wound Volume (cm^3) 0 02 cm^3 02/15/23 1400   Change in Wound Size % 87 5 02/15/23 1400   Drainage Amount Small 02/15/23 1400   Drainage Description Serosanguineous 02/15/23 1400   Non-staged Wound Description Full thickness 02/15/23 1400   Patient Tolerance Tolerated well 02/15/23 1400   Dressing Status Intact 02/15/23 1400                         Diagnosis:  1  Open wound of left lower leg, initial encounter  -     lidocaine (LMX) 4 % cream  -     Wound cleansing and dressings; Future        Diagnosis ICD-10-CM Associated Orders   1  Open wound of left lower leg, initial encounter  S81 802A lidocaine (LMX) 4 % cream     Wound cleansing and dressings           ASSESSMENT    Problems:    Chronic illness / Problem not at goal with exacerbation, progression or side effects of treatment  1  Dog bite wound        PLAN    The wound is smaller  I debrided it  Continue Dermagran  Debridement   Wound 12/21/22 Traumatic Leg Left;Lateral    Universal Protocol:  Consent: Verbal consent obtained    Risks and benefits: risks, benefits and alternatives were discussed  Consent given by: patient  Time out: Immediately prior to procedure a "time out" was called to verify the correct patient, procedure, equipment, support staff and site/side marked as required    Timeout called at: 2/15/2023 2:48 PM   Patient understanding: patient states understanding of the procedure being performed  Patient identity confirmed: verbally with patient      Performed by: physician  Debridement type: surgical  Level of debridement: subcutaneous tissue  Pain control: lidocaine 4%  Pre-debridement measurements  Length (cm): 0 6  Width (cm): 0 3  Depth (cm): 0 1  Surface Area (cm^2): 0 18  Volume (cm^3): 0 02    Post-debridement measurements  Length (cm): 0 6  Width (cm): 0 3  Depth (cm): 0 1  Percent debrided: 100%  Surface Area (cm^2): 0 18  Area debrided (cm^2): 0 18  Volume (cm^3): 0 02  Tissue and other material debrided: subcutaneous tissue  Devitalized tissue debrided: biofilm  Instrument(s) utilized: curette  Bleeding: small  Hemostasis obtained with: pressure  Procedural pain (0-10): 0  Post-procedural pain: 0   Response to treatment: procedure was tolerated well

## 2023-02-22 ENCOUNTER — TELEPHONE (OUTPATIENT)
Dept: OTHER | Facility: OTHER | Age: 53
End: 2023-02-22

## 2023-02-22 NOTE — TELEPHONE ENCOUNTER
Pt called to cancel their apt for today as pt does not feel well  Please call pt back to reschedule

## 2023-02-23 ENCOUNTER — TELEPHONE (OUTPATIENT)
Dept: NEPHROLOGY | Facility: CLINIC | Age: 53
End: 2023-02-23

## 2023-03-08 ENCOUNTER — OFFICE VISIT (OUTPATIENT)
Dept: WOUND CARE | Facility: HOSPITAL | Age: 53
End: 2023-03-08

## 2023-03-08 VITALS
RESPIRATION RATE: 20 BRPM | HEART RATE: 87 BPM | DIASTOLIC BLOOD PRESSURE: 81 MMHG | SYSTOLIC BLOOD PRESSURE: 140 MMHG | TEMPERATURE: 97.5 F

## 2023-03-08 DIAGNOSIS — S81.802A OPEN WOUND OF LEFT LOWER LEG, INITIAL ENCOUNTER: Primary | ICD-10-CM

## 2023-03-08 RX ORDER — LIDOCAINE 40 MG/G
CREAM TOPICAL ONCE
Status: COMPLETED | OUTPATIENT
Start: 2023-03-08 | End: 2023-03-08

## 2023-03-08 RX ADMIN — LIDOCAINE 1 APPLICATION.: 40 CREAM TOPICAL at 14:40

## 2023-03-08 NOTE — PATIENT INSTRUCTIONS
Orders Placed This Encounter   Procedures    Wound cleansing and dressings     --Elastic Tubular Stocking size F:  Tubular elastic bandage: Apply from base of toes to behind the knee  Apply in AM, may remove for sleep  Avoid prolonged standing in one place  Elevate leg(s) above the level of the heart when sitting or as much as possible  This was applied today  --Wound cleansing and dressings   Left lateral leg wound:      Wound care every other day  On dressing change days remove the dressing and shower  Do not leave the wound open to the air, redress immediately after the shower  Apply dermagran gauze cut to fit the wound   Cover with gauze and secure with rolled gauze and tape  This was applied today       Standing Status:   Future     Standing Expiration Date:   3/8/2024

## 2023-03-09 ENCOUNTER — TELEPHONE (OUTPATIENT)
Dept: BARIATRICS | Facility: CLINIC | Age: 53
End: 2023-03-09

## 2023-03-09 NOTE — PROGRESS NOTES
Patient ID: Leann Baumann is a 45 y o  male Date of Birth 1970     My role is Foot, Ankle and Wound Specialist    Chief Complaint   Patient presents with   • Follow Up Wound Care Visit     LLE wound       Dog bite wound on leg    Subjective:   Pasquale Rodriguez is here for the first time with a chronic wound from an old dog bite on the lower leg  The following portions of the patient's history were reviewed and updated as appropriate:   Patient Active Problem List   Diagnosis   • Mild intermittent asthma without complication   • Psoriasis   • Hypogonadism in male   • Anxiety   • Microscopic hematuria   • Gastroesophageal reflux disease with esophagitis   • Intractable hiccups   • Colon cancer screening   • Essential hypertension   • Class 2 severe obesity due to excess calories with serious comorbidity and body mass index (BMI) of 35 0 to 35 9 in [facility])   • BOY (obstructive sleep apnea)   • Stage 3a chronic kidney disease (HCC)   • Periumbilical hernia   • Open wound of left lower leg     Past Medical History:   Diagnosis Date   • Anxiety    • Asthma    • Benign essential HTN     last assessed 05/26/2017   • Degenerative joint disease    • Hiccoughs    • Hypertension    • Skin cancer    • Spinal stenosis      Past Surgical History:   Procedure Laterality Date   • ESOPHAGOGASTRODUODENOSCOPY N/A 3/24/2016    Procedure: ESOPHAGOGASTRODUODENOSCOPY (EGD); Surgeon: Angelina Masters MD;  Location: [facility]; Service:    • JOINT REPLACEMENT      right hip sx     Social History     Socioeconomic History   • Marital status: /Civil Union     Spouse name: Not on file   • Number of children: Not on file   • Years of education: Not on file   • Highest education level: Not on file   Occupational History   • Not on file   Tobacco Use   • Smoking status: Never   • Smokeless tobacco: Never   Vaping Use   • Vaping Use: Never used   Substance and Sexual Activity   • Alcohol use: Yes     Comment: Social drinker   • Drug use:  Yes Types: Marijuana     Comment: medicinal card obtained   • Sexual activity: Yes     Partners: Female     Birth control/protection: None   Other Topics Concern   • Not on file   Social History Narrative   • Not on file     Social Determinants of Health     Financial Resource Strain: Not on file   Food Insecurity: Not on file   Transportation Needs: Not on file   Physical Activity: Not on file   Stress: Not on file   Social Connections: Not on file   Intimate Partner Violence: Not on file   Housing Stability: Not on file        Current Outpatient Medications:   •  acetaminophen (TYLENOL) 650 mg CR tablet, Take 1 tablet (650 mg total) by mouth every 8 (eight) hours as needed for mild pain, Disp: 30 tablet, Rfl: 0  •  albuterol (Ventolin HFA) 90 mcg/act inhaler, Inhale 2 puffs every 6 (six) hours as needed for wheezing, Disp: 8 g, Rfl: 3  •  ALPRAZolam (XANAX) 1 mg tablet, Take 1 tablet (1 mg total) by mouth 2 (two) times a day as needed for anxiety for up to 30 days (Patient taking differently: Take 1 mg by mouth as needed for anxiety), Disp: 60 tablet, Rfl: 0  •  B-D 3CC LUER-ANTONELLA SYR 25GX1" 25G X 1" 3 ML MISC, , Disp: , Rfl:   •  baclofen 10 mg tablet, Take 1 tablet (10 mg total) by mouth 2 (two) times a day as needed for muscle spasms (Diaphragmatic spasm), Disp: 30 tablet, Rfl: 0  •  BD Hypodermic Needle 18G X 1" MISC, , Disp: , Rfl:   •  cholecalciferol (VITAMIN D3) 1,000 units tablet, Take 1 tablet (1,000 Units total) by mouth daily Start by taking 3000 units daily for one month, then continue on 1000 units daily   (Patient not taking: Reported on 10/25/2022), Disp: 30 tablet, Rfl: 5  •  fluticasone (Flovent HFA) 110 MCG/ACT inhaler, Inhale 2 puffs 2 (two) times a day Rinse mouth after use , Disp: 36 g, Rfl: 2  •  Horse [place] 300 MG CAPS, Take 1 capsule (300 mg total) by mouth daily (Patient not taking: Reported on 10/25/2022), Disp: 60 capsule, Rfl: 0  •  losartan-hydrochlorothiazide (HYZAAR) 100-25 MG per tablet, Take 1 tablet by mouth daily, Disp: 30 tablet, Rfl: 1  •  montelukast (SINGULAIR) 10 mg tablet, Take 1 tablet (10 mg total) by mouth daily at bedtime, Disp: 90 tablet, Rfl: 3  •  NEEDLE, DISP, 18 G 18G X 1" MISC, 1 weekly to draw up testosterone, Disp: , Rfl:   •  tacrolimus (PROTOPIC) 0 1 % ointment, , Disp: , Rfl:   •  testosterone cypionate (DEPO-TESTOSTERONE) 200 mg/mL SOLN, , Disp: , Rfl:   •  Testosterone Enanthate 200 MG/ML SOLN, , Disp: , Rfl:   •  triamcinolone (KENALOG) 0 5 % cream, Apply topically 2 (two) times a day, Disp: 30 g, Rfl: 0  •  zolpidem (AMBIEN) 10 mg tablet, TAKE ONE TABLET BY MOUTH DAILY AT BEDTIME AS NEEDED FOR SLEEP, Disp: 30 tablet, Rfl: 0  No current facility-administered medications for this visit  Family History   Problem Relation Age of Onset   • Cancer Mother    • Cancer Father       Review of Systems   Constitutional: Negative  Respiratory: Negative  Cardiovascular: Negative  Skin: Positive for wound  Allergies  Patient has no known allergies  Objective:  /81   Pulse 87   Temp 97 5 °F (36 4 °C)   Resp 20     Physical Exam  Vitals and nursing note reviewed  Constitutional:       General: He is not in acute distress  Appearance: Normal appearance  He is not ill-appearing, toxic-appearing or diaphoretic  HENT:      Head: Normocephalic and atraumatic  Pulmonary:      Effort: Pulmonary effort is normal       Breath sounds: Normal breath sounds  Musculoskeletal:         General: Normal range of motion  Neurological:      General: No focal deficit present  Mental Status: He is alert and oriented to person, place, and time             Wound 12/21/22 Traumatic Leg Left;Lateral (Active)   Wound Image   03/08/23 1430   Wound Description Dry;Epithelialization;Light purple;Slough;Granulation tissue 03/08/23 1430   Tammy-wound Assessment Scar Tissue;Pink;Purple;Dry 03/08/23 1430   Wound Length (cm) 0 3 cm 03/08/23 1430   Wound Width (cm) 0 2 cm 03/08/23 1430   Wound Depth (cm) 0 1 cm 03/08/23 1430   Wound Surface Area (cm^2) 0 06 cm^2 03/08/23 1430   Wound Volume (cm^3) 0 006 cm^3 03/08/23 1430   Calculated Wound Volume (cm^3) 0 01 cm^3 03/08/23 1430   Change in Wound Size % 93 75 03/08/23 1430   Drainage Amount Scant 03/08/23 1430   Drainage Description Serous 03/08/23 1430   Non-staged Wound Description Full thickness 03/08/23 1430   Patient Tolerance Tolerated well 03/08/23 1430   Dressing Status Intact 03/08/23 1430           Wound 12/21/22 Traumatic Leg Left;Lateral (Active)   Wound Image   03/08/23 1430   Wound Description Dry;Epithelialization;Light purple;Slough;Granulation tissue 03/08/23 1430   Tammy-wound Assessment Scar Tissue;Pink;Purple;Dry 03/08/23 1430   Wound Length (cm) 0 3 cm 03/08/23 1430   Wound Width (cm) 0 2 cm 03/08/23 1430   Wound Depth (cm) 0 1 cm 03/08/23 1430   Wound Surface Area (cm^2) 0 06 cm^2 03/08/23 1430   Wound Volume (cm^3) 0 006 cm^3 03/08/23 1430   Calculated Wound Volume (cm^3) 0 01 cm^3 03/08/23 1430   Change in Wound Size % 93 75 03/08/23 1430   Drainage Amount Scant 03/08/23 1430   Drainage Description Serous 03/08/23 1430   Non-staged Wound Description Full thickness 03/08/23 1430   Patient Tolerance Tolerated well 03/08/23 1430   Dressing Status Intact 03/08/23 1430                         Diagnosis:  1  Open wound of left lower leg, initial encounter  -     lidocaine (LMX) 4 % cream  -     Wound cleansing and dressings; Future        Diagnosis ICD-10-CM Associated Orders   1  Open wound of left lower leg, initial encounter  S81 802A lidocaine (LMX) 4 % cream     Wound cleansing and dressings           ASSESSMENT    Problems:    Chronic illness / Problem not at goal with exacerbation, progression or side effects of treatment  1  Healing leg wound      PLAN    Selective debridement performed  Will continue the same local care       Debridement   Wound 12/21/22 Traumatic Leg Left;Lateral    Universal Protocol:  Consent: Verbal consent obtained  Risks and benefits: risks, benefits and alternatives were discussed  Consent given by: patient  Time out: Immediately prior to procedure a "time out" was called to verify the correct patient, procedure, equipment, support staff and site/side marked as required    Timeout called at: 3/8/2023 8:20 AM   Patient understanding: patient states understanding of the procedure being performed  Patient identity confirmed: verbally with patient      Performed by: physician  Debridement type: selective  Pain control: lidocaine 4%  Pre-debridement measurements  Length (cm): 0 3  Width (cm): 0 2  Depth (cm): 0 1  Surface Area (cm^2): 0 06  Volume (cm^3): 0 01    Post-debridement measurements  Length (cm): 0 3  Width (cm): 0 2  Depth (cm): 0 1  Percent debrided: 100%  Surface Area (cm^2): 0 06  Area debrided (cm^2): 0 06  Volume (cm^3): 0 01  Devitalized tissue debrided: biofilm  Instrument(s) utilized: curette  Bleeding: none  Hemostasis obtained with: not applicable  Procedural pain (0-10): 0  Post-procedural pain: 0   Response to treatment: procedure was tolerated well

## 2023-03-09 NOTE — TELEPHONE ENCOUNTER
Lm to see if still interested in have bariatric surgery  Please contact the office at [phone] to schedule monthly appointments  IM

## 2023-03-13 ENCOUNTER — TELEPHONE (OUTPATIENT)
Dept: BARIATRICS | Facility: CLINIC | Age: 53
End: 2023-03-13

## 2023-03-25 DIAGNOSIS — G47.09 OTHER INSOMNIA: ICD-10-CM

## 2023-03-27 ENCOUNTER — TELEPHONE (OUTPATIENT)
Dept: BARIATRICS | Facility: CLINIC | Age: 53
End: 2023-03-27

## 2023-03-28 RX ORDER — ZOLPIDEM TARTRATE 10 MG/1
TABLET ORAL
Qty: 30 TABLET | Refills: 0 | Status: SHIPPED | OUTPATIENT
Start: 2023-03-28

## 2023-03-28 NOTE — PROGRESS NOTES
"  Name             Marianna Lizarraga      Starting weight 252 7  Last time weight 249 2  Today's weight 251 3  BMI: 38 21    Surgery month: Feb/March    Surgery deadline: June  Surgeon:Dr Payam Villar  Type of Surgery: Sleeve    Behavioral Health Follow Up Note:        Mental health and wellness -   Patient presents to the office today for weight check and support visit  The pt reports he has not been feeling well his stomach feels \"hard\"  Having trouble bending over and SOB because of weight  Reports feeling upset because he does not understand why his stomach feels this way  Pt reports he feels good with his MH       Eating behaviors - Three meals a day  Snacks in between \"sometimes\"  Hydration-water 1/2 a gallon a day no coffee two beers on occasion  Discussed decreasing alcoholic beverages because of medical issues that can occur after surgery  Ice tea, juices, protein drinks once in a while  Activity - none reports he can because of his SOB  Progress toward program requirements    Workflow:  • Psych and/or D+A Clearance: not needed  • PCP Letter: not done  • Support Group: done 12/19/22  • Surgeon Appt done 12/15/22  • EGD had one done last year  • Cardiac Risk Assessment: Consult completed 1/5/23  stress test and echo completed 1/17/23  Cleared by cardiology 1/19/23  • Nephrology clearance: consult scheduled 2/23/23  • Sleep Studies has mouth guard process of getting an implant not \"really using it\"   • Blood work TSH+Lipids done 6/3/22- good until 6/3/23  CBC+CMP done 12/21/22- good until 6/21/23    • Nicotine test nonsmoker     Reviewed and discussed  Adequate hydration  Exercise  Meal planning and preparation  Lifestyle changes  Possible problems with poor eating habits  Techniques for self monitoring and keeping daily food journal    Goal:  1- 1-mindful eating   Next appointment:4/27/23 RD  "

## 2023-03-29 ENCOUNTER — OFFICE VISIT (OUTPATIENT)
Dept: BARIATRICS | Facility: CLINIC | Age: 53
End: 2023-03-29

## 2023-03-29 VITALS — BODY MASS INDEX: 38.21 KG/M2 | WEIGHT: 251.3 LBS

## 2023-03-29 DIAGNOSIS — E66.9 OBESITY, CLASS II, BMI 35-39.9: Primary | ICD-10-CM

## 2023-04-06 ENCOUNTER — OFFICE VISIT (OUTPATIENT)
Dept: CARDIOLOGY CLINIC | Facility: CLINIC | Age: 53
End: 2023-04-06

## 2023-04-06 VITALS
HEIGHT: 69 IN | HEART RATE: 82 BPM | SYSTOLIC BLOOD PRESSURE: 144 MMHG | OXYGEN SATURATION: 95 % | BODY MASS INDEX: 37.52 KG/M2 | WEIGHT: 253.3 LBS | DIASTOLIC BLOOD PRESSURE: 86 MMHG

## 2023-04-06 DIAGNOSIS — K92.1 HEMATOCHEZIA: ICD-10-CM

## 2023-04-06 DIAGNOSIS — I10 PRIMARY HYPERTENSION: Primary | ICD-10-CM

## 2023-04-06 DIAGNOSIS — R60.0 PEDAL EDEMA: ICD-10-CM

## 2023-04-06 RX ORDER — CARVEDILOL 12.5 MG/1
12.5 TABLET ORAL 2 TIMES DAILY WITH MEALS
Qty: 60 TABLET | Refills: 1 | Status: SHIPPED | OUTPATIENT
Start: 2023-04-06

## 2023-04-06 NOTE — PROGRESS NOTES
Consultation - Cardiology   Jair Maldonado 45 y o  male MRN: [id number]  Unit/Bed#:  Encounter: [id number]      Assessment and Plan: Active Problems:    * No active hospital problems  *    57-year-old man with history of obesity, hypogonadism with long-term testosterone use, childhood asthma and dyspepsia initially seen by me on  for pre-operative risk assessment for bariatric surgery  Due to his complaints of shortness of breath I had asked him undergo exercise stress test  He did not have ischemia during stress but he did have hypertensive response with a systolic blood pressure of 258 mmHg  Following stress I had doubled his Hyzaar  Today he comes for follow up  His blood pressure is in 140s and all his recent prior visits show elevated blood pressure with systolics in 316Z mmHg  He takes frequent Afrin and Motrin  He has chronic uncontrolled sleep apnea and insomnia  All these factors contribute to hypertension  He is also complaining of hematochezia with new bright red blood in stool  His prior colonoscopy was incomplete due to PEA arrest in the setting of hypoxia  He does have abdominal discomfort, early satiety  His father  of colon cancer at the age of 61  #Unilateral leg swelling   # Uncontrolled hypertension with multiple uncontrolled risk factors  #Abdominal distention and dyspepsia -follows up with GI  #Obesity  #Testosterone use     Plan:  Recommended him to follow up GI due to risk of colon cancer    - Add coreg 12 5 mg BID  - DVT study of left leg   If DVT study is negative I will do venography to rule out May-Thurner Syndrome      Consults    Review of Systems:  Review of Systems  All negative except as mentioned above    Historical Information   Past Medical History:   Diagnosis Date   • Anxiety    • Asthma    • Benign essential HTN     last assessed 2017   • Degenerative joint disease    • Hiccoughs    • Hypertension    • Skin cancer    • Spinal stenosis      Past Surgical "History:   Procedure Laterality Date   • ESOPHAGOGASTRODUODENOSCOPY N/A 3/24/2016    Procedure: ESOPHAGOGASTRODUODENOSCOPY (EGD); Surgeon: Rosa Pelletier MD;  Location: [facility]; Service:    • JOINT REPLACEMENT      right hip sx     Social History     Substance and Sexual Activity   Alcohol Use Yes    Comment: Social drinker     Social History     Substance and Sexual Activity   Drug Use Yes   • Types: Marijuana    Comment: medicinal card obtained     Social History     Tobacco Use   Smoking Status Never   Smokeless Tobacco Never     Family History: non-contributory    Meds/Allergies   all current active meds have been reviewed  No Known Allergies    Objective   Vitals: Blood pressure 144/86, pulse 82, height 5' 9\" (1 753 m), weight 115 kg (253 lb 4 8 oz), SpO2 95 %  , Body mass index is 37 41 kg/m²  ,     Physical Exam:  Physical Exam  General: alert, oriented X 3 , comfortable  Neck: Difficult to assess JVD  Cardiac: normal S1, S2, no m/r/g  Respiratory: normal breath sounds, no wheezes or crackles  Abdomen: Abdomen is firm, hernia is palpated   extremities: Left leg is swollen, there is pitting edema    Lab Results:     No results found for: CKTOTAL, CKMB, CKMBINDEX, TROPONINI    Lab Results   Component Value Date    GLUCOSE 97 03/22/2016    CALCIUM 9 3 12/21/2022    K 3 6 12/21/2022    CO2 30 12/21/2022     12/21/2022    BUN 15 12/21/2022    CREATININE 1 33 (H) 12/21/2022       Lab Results   Component Value Date    WBC 7 12 12/21/2022    HGB 16 4 12/21/2022    HCT 48 7 12/21/2022     (H) 12/21/2022     12/21/2022       No results found for: CHOL  Lab Results   Component Value Date    HDL 52 06/03/2022     Lab Results   Component Value Date    LDLCALC 120 (H) 06/03/2022     Lab Results   Component Value Date    TRIG 120 06/03/2022       Lab Results   Component Value Date    ALT 49 12/21/2022    AST 26 12/21/2022               Imaging: I have personally reviewed pertinent reports    "

## 2023-04-07 ENCOUNTER — OFFICE VISIT (OUTPATIENT)
Dept: WOUND CARE | Facility: HOSPITAL | Age: 53
End: 2023-04-07

## 2023-04-07 VITALS
HEART RATE: 72 BPM | TEMPERATURE: 97.1 F | SYSTOLIC BLOOD PRESSURE: 153 MMHG | DIASTOLIC BLOOD PRESSURE: 91 MMHG | RESPIRATION RATE: 20 BRPM

## 2023-04-07 DIAGNOSIS — S81.802A OPEN WOUND OF LEFT LOWER LEG, INITIAL ENCOUNTER: Primary | ICD-10-CM

## 2023-04-07 NOTE — PROGRESS NOTES
Patient ID: Krystle Nascimento is a 45 y o  male Date of Birth 1970     My role is Foot, Ankle and Wound Specialist    PLAN:    The wound is healed  I recommend he discontinue dressings and apply 1% cortisone cream PRN pruritus  He is discharged PRN  Wound Instructions  Orders Placed This Encounter   Procedures   • Wound cleansing and dressings     Wound location:  Left leg  Your wound has now healed  Please continue to monitor area and call [facility] if wound would reopen  Continue good skin care:  Cleanse area with mild soap and water gently  Pat dry  Non scented moisturizer to skin  Apply Cortisone 10 to area at bedtime to help with itching  Moisturizer was applied today at the [facility]  Standing Status:   Future     Standing Expiration Date:   4/7/2024         SUBJECTIVE:    Chief Complaint   Patient presents with   • Follow Up Wound Care Visit     LLE yenifer Quinteros is here for the first time with a chronic wound from an old dog bite on the lower leg        The following portions of the patient's history were reviewed and updated as appropriate:   Patient Active Problem List   Diagnosis   • Mild intermittent asthma without complication   • Psoriasis   • Hypogonadism in male   • Anxiety   • Microscopic hematuria   • Gastroesophageal reflux disease with esophagitis   • Intractable hiccups   • Colon cancer screening   • HTN (hypertension)   • Class 2 severe obesity due to excess calories with serious comorbidity and body mass index (BMI) of 35 0 to 35 9 in adult [facility])   • BOY (obstructive sleep apnea)   • Stage 3a chronic kidney disease (HCC)   • Periumbilical hernia   • Open wound of left lower leg     Past Medical History:   Diagnosis Date   • Anxiety    • Asthma    • Benign essential HTN     last assessed 05/26/2017   • Degenerative joint disease    • Hiccoughs    • Hypertension    • Skin cancer    • Spinal stenosis      Past Surgical History:   Procedure Laterality Date   • "ESOPHAGOGASTRODUODENOSCOPY N/A 3/24/2016    Procedure: ESOPHAGOGASTRODUODENOSCOPY (EGD); Surgeon: Andrzej Luz MD;  Location: [facility];   Service:    • JOINT REPLACEMENT      right hip sx     Social History     Socioeconomic History   • Marital status: /Civil Union     Spouse name: Not on file   • Number of children: Not on file   • Years of education: Not on file   • Highest education level: Not on file   Occupational History   • Not on file   Tobacco Use   • Smoking status: Never   • Smokeless tobacco: Never   Vaping Use   • Vaping Use: Never used   Substance and Sexual Activity   • Alcohol use: Yes     Comment: Social drinker   • Drug use: Yes     Types: Marijuana     Comment: medicinal card obtained   • Sexual activity: Yes     Partners: Female     Birth control/protection: None   Other Topics Concern   • Not on file   Social History Narrative   • Not on file     Social Determinants of Health     Financial Resource Strain: Not on file   Food Insecurity: Not on file   Transportation Needs: Not on file   Physical Activity: Not on file   Stress: Not on file   Social Connections: Not on file   Intimate Partner Violence: Not on file   Housing Stability: Not on file        Current Outpatient Medications:   •  acetaminophen (TYLENOL) 650 mg CR tablet, Take 1 tablet (650 mg total) by mouth every 8 (eight) hours as needed for mild pain, Disp: 30 tablet, Rfl: 0  •  albuterol (Ventolin HFA) 90 mcg/act inhaler, Inhale 2 puffs every 6 (six) hours as needed for wheezing, Disp: 8 g, Rfl: 3  •  ALPRAZolam (XANAX) 1 mg tablet, Take 1 tablet (1 mg total) by mouth 2 (two) times a day as needed for anxiety for up to 30 days (Patient taking differently: Take 1 mg by mouth as needed for anxiety), Disp: 60 tablet, Rfl: 0  •  B-D 3CC LUER-ANTONELLA SYR 25GX1\" 25G X 1\" 3 ML MISC, , Disp: , Rfl:   •  baclofen 10 mg tablet, Take 1 tablet (10 mg total) by mouth 2 (two) times a day as needed for muscle spasms (Diaphragmatic spasm), Disp: " "30 tablet, Rfl: 0  •  BD Hypodermic Needle 18G X 1\" MISC, , Disp: , Rfl:   •  carvedilol (COREG) 12 5 mg tablet, Take 1 tablet (12 5 mg total) by mouth 2 (two) times a day with meals, Disp: 60 tablet, Rfl: 1  •  cholecalciferol (VITAMIN D3) 1,000 units tablet, Take 1 tablet (1,000 Units total) by mouth daily Start by taking 3000 units daily for one month, then continue on 1000 units daily  (Patient not taking: Reported on 10/25/2022), Disp: 30 tablet, Rfl: 5  •  fluticasone (Flovent HFA) 110 MCG/ACT inhaler, Inhale 2 puffs 2 (two) times a day Rinse mouth after use , Disp: 36 g, Rfl: 2  •  [place] 300 MG CAPS, Take 1 capsule (300 mg total) by mouth daily (Patient not taking: Reported on 10/25/2022), Disp: 60 capsule, Rfl: 0  •  losartan-hydrochlorothiazide (HYZAAR) 100-25 MG per tablet, Take 1 tablet by mouth daily, Disp: 30 tablet, Rfl: 1  •  montelukast (SINGULAIR) 10 mg tablet, Take 1 tablet (10 mg total) by mouth daily at bedtime, Disp: 90 tablet, Rfl: 3  •  NEEDLE, DISP, 18 G 18G X 1\" MISC, 1 weekly to draw up testosterone, Disp: , Rfl:   •  tacrolimus (PROTOPIC) 0 1 % ointment, , Disp: , Rfl:   •  testosterone cypionate (DEPO-TESTOSTERONE) 200 mg/mL SOLN, , Disp: , Rfl:   •  Testosterone Enanthate 200 MG/ML SOLN, , Disp: , Rfl:   •  triamcinolone (KENALOG) 0 5 % cream, Apply topically 2 (two) times a day, Disp: 30 g, Rfl: 0  •  zolpidem (AMBIEN) 10 mg tablet, TAKE ONE TABLET BY MOUTH DAILY AT BEDTIME AS NEEDED FOR SLEEP, Disp: 30 tablet, Rfl: 0  Family History   Problem Relation Age of Onset   • Cancer Mother    • Cancer Father       Review of Systems   Constitutional: Negative  Respiratory: Negative  Cardiovascular: Negative  Skin: Positive for wound  Allergies  Patient has no known allergies  OBJECTIVE:  /91   Pulse 72   Temp (!) 97 1 °F (36 2 °C)   Resp 20     Physical Exam  Vitals and nursing note reviewed  Constitutional:       General: He is not in acute distress       " Appearance: Normal appearance  He is not ill-appearing, toxic-appearing or diaphoretic  HENT:      Head: Normocephalic and atraumatic  Pulmonary:      Effort: Pulmonary effort is normal       Breath sounds: Normal breath sounds  Musculoskeletal:         General: Normal range of motion  Neurological:      General: No focal deficit present  Mental Status: He is alert and oriented to person, place, and time             Wound 12/21/22 Traumatic Leg Left;Lateral (Active)   Wound Image   04/07/23 1121   Wound Description Dry;Light purple;Epithelialization 04/07/23 1128   Tammy-wound Assessment Scar Tissue;Dry 04/07/23 1128   Wound Length (cm) 0 cm 04/07/23 1128   Wound Width (cm) 0 cm 04/07/23 1128   Wound Depth (cm) 0 cm 04/07/23 1128   Wound Surface Area (cm^2) 0 cm^2 04/07/23 1128   Wound Volume (cm^3) 0 cm^3 04/07/23 1128   Calculated Wound Volume (cm^3) 0 cm^3 04/07/23 1128   Change in Wound Size % 100 04/07/23 1128   Drainage Amount None 04/07/23 1128   Drainage Description Serous 03/08/23 1430   Non-staged Wound Description Not applicable 61/13/47 8853   Patient Tolerance Tolerated well 03/08/23 1430   Dressing Status Intact 03/08/23 1430           Wound 12/21/22 Traumatic Leg Left;Lateral (Active)   Wound Image   04/07/23 1121   Wound Description Dry;Light purple;Epithelialization 04/07/23 1128   Tammy-wound Assessment Scar Tissue;Dry 04/07/23 1128   Wound Length (cm) 0 cm 04/07/23 1128   Wound Width (cm) 0 cm 04/07/23 1128   Wound Depth (cm) 0 cm 04/07/23 1128   Wound Surface Area (cm^2) 0 cm^2 04/07/23 1128   Wound Volume (cm^3) 0 cm^3 04/07/23 1128   Calculated Wound Volume (cm^3) 0 cm^3 04/07/23 1128   Change in Wound Size % 100 04/07/23 1128   Drainage Amount None 04/07/23 1128   Drainage Description Serous 03/08/23 1430   Non-staged Wound Description Not applicable 39/72/51 5866   Patient Tolerance Tolerated well 03/08/23 1430   Dressing Status Intact 03/08/23 1439 Diagnosis:  1  Open wound of left lower leg, initial encounter  -     Wound cleansing and dressings; Future        Diagnosis ICD-10-CM Associated Orders   1   Open wound of left lower leg, initial encounter  S81 802A Wound cleansing and dressings

## 2023-04-07 NOTE — PATIENT INSTRUCTIONS
Orders Placed This Encounter   Procedures    Wound cleansing and dressings     Wound location:  Left leg  Your wound has now healed  Please continue to monitor area and call [facility] if wound would reopen  Continue good skin care:  Cleanse area with mild soap and water gently  Pat dry  Non scented moisturizer to skin  Apply Cortisone 10 to area at bedtime to help with itching  Moisturizer was applied today at the [facility]       Standing Status:   Future     Standing Expiration Date:   4/7/2024

## 2023-04-24 ENCOUNTER — CONSULT (OUTPATIENT)
Dept: NEPHROLOGY | Facility: CLINIC | Age: 53
End: 2023-04-24

## 2023-04-24 VITALS
BODY MASS INDEX: 37.92 KG/M2 | WEIGHT: 256 LBS | OXYGEN SATURATION: 96 % | DIASTOLIC BLOOD PRESSURE: 86 MMHG | HEIGHT: 69 IN | HEART RATE: 89 BPM | SYSTOLIC BLOOD PRESSURE: 140 MMHG

## 2023-04-24 DIAGNOSIS — E66.01 CLASS 2 SEVERE OBESITY DUE TO EXCESS CALORIES WITH SERIOUS COMORBIDITY AND BODY MASS INDEX (BMI) OF 37.0 TO 37.9 IN ADULT (HCC): ICD-10-CM

## 2023-04-24 DIAGNOSIS — I12.9 BENIGN HYPERTENSION WITH CKD (CHRONIC KIDNEY DISEASE) STAGE III (HCC): ICD-10-CM

## 2023-04-24 DIAGNOSIS — N18.30 BENIGN HYPERTENSION WITH CKD (CHRONIC KIDNEY DISEASE) STAGE III (HCC): ICD-10-CM

## 2023-04-24 DIAGNOSIS — N18.31 STAGE 3A CHRONIC KIDNEY DISEASE (HCC): Primary | ICD-10-CM

## 2023-04-24 RX ORDER — LOSARTAN POTASSIUM 100 MG/1
100 TABLET ORAL DAILY
Qty: 90 TABLET | Refills: 3 | Status: SHIPPED | OUTPATIENT
Start: 2023-04-24 | End: 2023-07-23

## 2023-04-24 RX ORDER — AMOXICILLIN 500 MG/1
CAPSULE ORAL
COMMUNITY
Start: 2023-04-06

## 2023-04-24 RX ORDER — CHLORTHALIDONE 25 MG/1
25 TABLET ORAL DAILY
Qty: 90 TABLET | Refills: 3 | Status: SHIPPED | OUTPATIENT
Start: 2023-04-24 | End: 2023-07-23

## 2023-04-24 NOTE — PATIENT INSTRUCTIONS
"Your BP is elevated and above goal   We would recommend switching from Hyzaar to Losartan and chlorthalidone separately  We will do blood test and urine tests now to evaluate your kidney function  After starting chlorthalidone we will check your blood work in 1 month  We will bring you back to the office in 3 months  2 Gram Low Sodium Diet     A 2 gram sodium diet restricts the amount of sodium in the diet to no more than 2 g or 2000 mg daily  Limiting the amount of sodium is often used to help lower blood pressure  It is important if you have heart, liver, or kidney problems  Many foods contain sodium for flavor and sometimes as a preservative  When the amount of sodium in a diet needs to be low, it is important to know what to look for when choosing foods and drinks  The following includes some information and guidelines to help make it easier for you to adapt to a low sodium diet  QUICK TIPS     Do not add salt to food  Avoid convenience items and fast food  Choose unsalted snack foods  Buy lower sodium products, often labeled as \"lower sodium\" or \"no salt added  \"   Check food labels to learn how much sodium is in 1 serving  When eating at a restaurant, ask that your food be prepared with less salt or none, if possible  READING FOOD LABELS FOR SODIUM INFORMATION     The nutrition facts label is a good place to find how much sodium is in foods  Look for products with no more than 500 to 600 mg of sodium per meal and no more than 150 mg per serving  Remember that 2 g = 2000 mg  The food label may also list foods as:   Sodium-free: Less than 5 mg in a serving  Very low sodium: 35 mg or less in a serving  Low-sodium: 140 mg or less in a serving  Light in sodium: 50% less sodium in a serving  For example, if a food that usually has 300 mg of sodium is changed to become light in sodium, it will have 150 mg of sodium  Reduced sodium: 25% less sodium in a serving   For example, if a food " that usually has 400 mg of sodium is changed to reduced sodium, it will have 300 mg of sodium  CHOOSING FOODS     Grains     Avoid: Salted crackers and snack items  Some cereals, including instant hot cereals  Bread stuffing and biscuit mixes  Seasoned rice or pasta mixes  Choose: Unsalted snack items  Low-sodium cereals, oats, puffed wheat and rice, shredded wheat  English muffins and bread  Pasta  Meats     Avoid: Salted, canned, smoked, spiced, pickled meats, including fish and poultry  [place], ham, sausage, cold cuts, hot dogs, anchovies  Choose: Low-sodium canned tuna and salmon  Fresh or frozen meat, poultry, and fish  Dairy   Avoid: Processed cheese and spreads  Cottage cheese  Buttermilk and condensed milk  Regular cheese  Choose: Milk  Low-sodium cottage cheese  Yogurt  Sour cream  Low-sodium cheese  Fruits and Vegetables     Avoid: Regular canned vegetables  Regular canned tomato sauce and paste  Frozen vegetables in sauces  [facility]  Sauerkraut  Choose: Low-sodium canned vegetables  Low-sodium tomato sauce and paste  Frozen or fresh vegetables  Fresh and frozen fruit  Condiments     Avoid: Canned and packaged gravies  [facility] sauce  Tartar sauce  Barbecue sauce  Soy sauce  Steak sauce  Ketchup  Onion, garlic, and table salt  Meat flavorings and tenderizers  Choose: Fresh and dried herbs and spices  Low-sodium varieties of mustard and ketchup  Lemon juice  Tabasco sauce  Horseradish      SAMPLE: 2 GRAM SODIUM MEAL PLAN     Breakfast / Sodium (mg)   1 cup low-fat milk / 193 mg   2 slices whole-wheat toast / 270 mg   1 tbs heart-healthy margarine / 153 mg   1 hard-boiled egg / 139 mg   1 small orange / 0 mg    Lunch / Sodium (mg)   1 cup raw carrots / 76 mg   ½ cup hummus / 298 mg   1 cup low-fat milk / 143 mg   ½ cup red grapes / 2 mg   1 whole-wheat cornelius bread / 356 mg    Dinner / Sodium (mg)   1 cup whole-wheat pasta / 2 mg   1 cup low-sodium tomato sauce / 73 mg   3 oz lean ground beef / 57 mg   1 small side salad (1 cup raw spinach leaves, ½ cup cucumber, ¼ cup yellow bell pepper) with 1 tsp olive oil and 1 tsp red wine vinegar / 25 mg    Snack / Sodium (mg)   1 container low-fat vanilla yogurt / 107 mg   3 chinedu cracker squares / 127 mg    Nutrient Analysis   Calories: 2033   Protein: 77 g   Carbohydrate: 282 g   Fat: 72 g   Sodium: 1971 mg

## 2023-04-24 NOTE — PROGRESS NOTES
NEPHROLOGY OUTPATIENT CONSULTATION   Mandi Loco 45 y o  male MRN: [id number]  Date: 04/24/23  Reason for consultation: Chronic kidney disease    ASSESSMENT and PLAN:  33-year-old male was seen in nephrology office today for evaluation of chronic kidney disease  # Chronic Kidney Disease Stage III-A  - Etiology/risk factors: Longstanding history of hypertension, longstanding history of NSAID use  - Suspect that serum creatinine underestimates kidney function and he may have increased creatinine generation due to high muscle mass  - Baseline Cr: 1 33-1 57 since 2017  - Urinalysis: Trace protein by dipstick analysis in 06/2022 in setting of pyuria, check urinalysis with microscopy  - Proteinuria: Check urine albumin to creatinine ratio and urine protein to creatinine ratio  - Imaging: No hydronephrosis on CT abdomen 03/2016, check renal ultrasound to look at renal parenchyma  - Check Cystatin C and 24-hour creatinine clearance to do a better estimate of GFR  - Discussed risk factor reduction to slow progression of chronic kidney disease  • Intensive blood pressure control  • Weight loss  • Avoidance of NSAIDs     I have personally discussed the risks and benefits of the surgery from a renal standpoint with the patient in depth, and advised the patient about the risk of VALENTINE and the course of VALENTINE  • Fluids to administer: Isolyte 250 cc total over 2 hours   • Medication Recommendations:  • Minimize any IV contrast use (If IV contrast is used, please check BMP POD # 1)  • Hold NSAIDs for at least 10 days prior to surgery  • He was advised to hold losartan and thiazide diuretic on day of surgery  • Hemodynamic Recommendations:  • Ideally, target MAPs greater than 65 mmHg in the perioperative period, and minimize operative time with MAPs < 65 mmHg  • Avoid intraop hemodynamic instability to decrease risk for VALENTINE occurrence    • Other Recommendations:  • Please consult the Nephrology team when the patient is admitted    # Hypertension/Volume  - Several risk factors for hypertension including longstanding excessive use of NSAIDs, untreated sleep apnea and obesity  - Plasma metanephrine and normetanephrine within normal limits  - Testosterone use can also lead to elevated blood pressure  - Goal BP <120/80 per KDIGO guidelines   - Volume status: Euvolemic, he has left lower extremity swelling and is currently being worked up for VTE  - Status: Blood pressure currently above goal  - Current antihypertensive regimen: Losartan-hydrochlorothiazide 100-25 mg once daily, Coreg 12 5 mg twice daily  - Changes: Switch hydrochlorothiazide to chlorthalidone 25 mg daily (chlorthalidone has a longer duration of action and is a more potent thiazide diuretic with better blood pressure reduction and better cardiovascular profile)  - Continue losartan 100 mg daily and Coreg 12 5 mg twice daily  - Discussed low-sodium diet in addition to low-salt diet and educational material was provided    # Screening for Anemia   - Target Hb: More than 10 g/dL  - Most recent hemoglobin: 16 4 g/dL    # Electrolytes/Acid Base status   - Electrolytes and acid base status within normal limits  - Patient was advised to check BMP/magnesium 1 month after switching from hydrochlorothiazide to chlorthalidone    # CKD Mineral and Bone Disorder   - Goal Ca 8 5-10 mg/dL, goal Phos 2 7-4 6 mg/dL, goal iPTH 30-70 pg/mL  - Check iPTH level    # Class II obesity  - Patient is currently being evaluated for bariatric surgery  - Weight loss will definitely help with regarding the progression of chronic kidney disease    HISTORY OF PRESENT ILLNESS:  Requesting Physician: DO Eliecer Gupta Yomi is a 45 y o  male who has history of hypertension for more than 30 years  He is currently on Hyzaar and recently started on Coreg  He has history of taking several doses of ibuprofen on a daily basis for several years    He has history of sleep apnea and cannot tolerate CPAP due to nasal septal issues  He has history of secondary hypogonadism and is currently on testosterone  He used to be a  and used to lift heavy weights and was previously using creatine supplements  He has now been gaining weight  He complains of swelling in his left leg  He complains of increase in abdominal girth with stiffness  He complains of shortness of breath  He has a good urinary stream but complains of nocturia     >> Major risk factors for CKD  - Diabetes: No   - Hypertension: Yes more than 30 years    - Age ? 52 years: No  - Family history of kidney disease: No   - Obesity or metabolic syndrome: Yes     >> Medical history evaluation   - Prior kidney disease or dialysis: No   - Incidental hematuria in the past: Yes   - Urinary symptoms: Stream is good but has nocturia x 5 at night   - History of foamy or frothy urine: No   - History of nephrolithiasis: No  - Diseases that share risk factors with CKD: HTN  - Systemic diseases that might affect kidney: No  - History of use of medications that might affect renal function: Ibuprofen 10 tablets a day for most of his life     PAST MEDICAL HISTORY:  Past Medical History:   Diagnosis Date   • Anxiety    • Asthma    • Benign essential HTN     last assessed 05/26/2017   • Degenerative joint disease    • Hiccoughs    • Hypertension    • Skin cancer    • Spinal stenosis        PAST SURGICAL HISTORY:  Past Surgical History:   Procedure Laterality Date   • ESOPHAGOGASTRODUODENOSCOPY N/A 3/24/2016    Procedure: ESOPHAGOGASTRODUODENOSCOPY (EGD); Surgeon: Иван Capone MD;  Location: [facility];   Service:    • JOINT REPLACEMENT      right hip sx       ALLERGIES:  No Known Allergies    SOCIAL HISTORY:  Social History     Substance and Sexual Activity   Alcohol Use Yes    Comment: Social drinker     Social History     Substance and Sexual Activity   Drug Use Yes   • Types: Marijuana    Comment: medicinal card obtained     Social History     Tobacco "Use   Smoking Status Never   Smokeless Tobacco Never       FAMILY HISTORY:  Family History   Problem Relation Age of Onset   • Cancer Mother    • Cancer Father        MEDICATIONS:    Current Outpatient Medications:   •  acetaminophen (TYLENOL) 650 mg CR tablet, Take 1 tablet (650 mg total) by mouth every 8 (eight) hours as needed for mild pain, Disp: 30 tablet, Rfl: 0  •  albuterol (Ventolin HFA) 90 mcg/act inhaler, Inhale 2 puffs every 6 (six) hours as needed for wheezing, Disp: 8 g, Rfl: 3  •  ALPRAZolam (XANAX) 1 mg tablet, Take 1 tablet (1 mg total) by mouth 2 (two) times a day as needed for anxiety for up to 30 days (Patient taking differently: Take 1 mg by mouth as needed for anxiety), Disp: 60 tablet, Rfl: 0  •  amoxicillin (AMOXIL) 500 mg capsule, , Disp: , Rfl:   •  B-D 3CC LUER-ANTONELLA SYR 25GX1\" 25G X 1\" 3 ML MISC, , Disp: , Rfl:   •  baclofen 10 mg tablet, Take 1 tablet (10 mg total) by mouth 2 (two) times a day as needed for muscle spasms (Diaphragmatic spasm), Disp: 30 tablet, Rfl: 0  •  carvedilol (COREG) 12 5 mg tablet, Take 1 tablet (12 5 mg total) by mouth 2 (two) times a day with meals, Disp: 60 tablet, Rfl: 1  •  chlorthalidone 25 mg tablet, Take 1 tablet (25 mg total) by mouth daily, Disp: 90 tablet, Rfl: 3  •  fluticasone (Flovent HFA) 110 MCG/ACT inhaler, Inhale 2 puffs 2 (two) times a day Rinse mouth after use , Disp: 36 g, Rfl: 2  •  losartan (COZAAR) 100 MG tablet, Take 1 tablet (100 mg total) by mouth daily, Disp: 90 tablet, Rfl: 3  •  montelukast (SINGULAIR) 10 mg tablet, Take 1 tablet (10 mg total) by mouth daily at bedtime, Disp: 90 tablet, Rfl: 3  •  tacrolimus (PROTOPIC) 0 1 % ointment, , Disp: , Rfl:   •  testosterone cypionate (DEPO-TESTOSTERONE) 200 mg/mL SOLN, , Disp: , Rfl:   •  Testosterone Enanthate 200 MG/ML SOLN, , Disp: , Rfl:   •  triamcinolone (KENALOG) 0 5 % cream, Apply topically 2 (two) times a day, Disp: 30 g, Rfl: 0  •  zolpidem (AMBIEN) 10 mg tablet, TAKE ONE TABLET BY " "MOUTH DAILY AT BEDTIME AS NEEDED FOR SLEEP, Disp: 30 tablet, Rfl: 0  •  BD Hypodermic Needle 18G X 1\" MISC, , Disp: , Rfl:   •  cholecalciferol (VITAMIN D3) 1,000 units tablet, Take 1 tablet (1,000 Units total) by mouth daily Start by taking 3000 units daily for one month, then continue on 1000 units daily  (Patient not taking: Reported on 10/25/2022), Disp: 30 tablet, Rfl: 5  •  Horse Datto 300 MG CAPS, Take 1 capsule (300 mg total) by mouth daily (Patient not taking: Reported on 10/25/2022), Disp: 60 capsule, Rfl: 0  •  NEEDLE, DISP, 18 G 18G X 1\" MISC, 1 weekly to draw up testosterone, Disp: , Rfl:     REVIEW OF SYSTEMS:  Review of Systems   Constitutional: Negative for chills and fever  HENT: Negative for ear pain and sore throat  Eyes: Negative for pain and visual disturbance  Respiratory: Positive for shortness of breath  Negative for cough  Cardiovascular: Positive for leg swelling  Negative for chest pain and palpitations  Gastrointestinal: Negative for abdominal pain and vomiting  Genitourinary: Negative for dysuria and hematuria  Musculoskeletal: Positive for arthralgias  Negative for back pain  Skin: Negative for color change and rash  Neurological: Negative for seizures and syncope  All other systems reviewed and are negative  All the systems were reviewed and were negative except as documented on the HPI  PHYSICAL EXAMINATION:  /86 (BP Location: Left arm, Patient Position: Sitting, Cuff Size: Adult)   Pulse 89   Ht 5' 9\" (1 753 m)   Wt 116 kg (256 lb)   SpO2 96%   BMI 37 80 kg/m²   Current Weight: Weight - Scale: 116 kg (256 lb) Body mass index is 37 8 kg/m²  Physical Exam  Constitutional:       Appearance: Normal appearance  HENT:      Head: Normocephalic and atraumatic  Mouth/Throat:      Mouth: Mucous membranes are moist       Pharynx: Oropharynx is clear  Cardiovascular:      Rate and Rhythm: Normal rate and regular rhythm        Pulses: Normal " pulses  Heart sounds: Normal heart sounds  Pulmonary:      Effort: Pulmonary effort is normal       Breath sounds: Normal breath sounds  Abdominal:      General: Bowel sounds are normal       Palpations: Abdomen is soft  Musculoskeletal:         General: Normal range of motion  Right lower leg: No edema  Left lower leg: Edema present  Skin:     General: Skin is warm and dry  Neurological:      General: No focal deficit present  Mental Status: He is alert and oriented to person, place, and time  Mental status is at baseline  Psychiatric:         Mood and Affect: Mood normal          Behavior: Behavior normal          Thought Content:  Thought content normal          Judgment: Judgment normal          LABORATORY RESULTS:  Lab Results   Component Value Date    K 3 6 12/21/2022     12/21/2022    CO2 30 12/21/2022    BUN 15 12/21/2022    CREATININE 1 33 (H) 12/21/2022    GLUCOSE 97 03/22/2016    GLUF 96 06/03/2022    CALCIUM 9 3 12/21/2022    AST 26 12/21/2022    ALT 49 12/21/2022    ALKPHOS 55 12/21/2022    EGFR 60 12/21/2022     Lab Results   Component Value Date    WBC 7 12 12/21/2022    HGB 16 4 12/21/2022    HCT 48 7 12/21/2022     (H) 12/21/2022     12/21/2022     Lab Results   Component Value Date    CALCIUM 9 3 12/21/2022

## 2023-05-01 ENCOUNTER — TELEPHONE (OUTPATIENT)
Dept: BARIATRICS | Facility: CLINIC | Age: 53
End: 2023-05-01

## 2023-05-01 NOTE — TELEPHONE ENCOUNTER
Called pt to reschedule missed Rd F/U from 4/27/23  Left voicemail for pt with office hours and contact # for pt to return call to reschedule next office visit  Pt informed via voicemail that if we do not hear from him within a week we will assume no longer interested in surgery program at this time and his surgery case will be halted

## 2023-05-09 ENCOUNTER — TRANSCRIBE ORDERS (OUTPATIENT)
Dept: ADMINISTRATIVE | Facility: HOSPITAL | Age: 53
End: 2023-05-09

## 2023-05-09 DIAGNOSIS — E29.1 3-OXO-5 ALPHA-STEROID DELTA 4-DEHYDROGENASE DEFICIENCY: Primary | ICD-10-CM

## 2023-05-15 ENCOUNTER — HOSPITAL ENCOUNTER (OUTPATIENT)
Dept: NON INVASIVE DIAGNOSTICS | Facility: HOSPITAL | Age: 53
Discharge: HOME | End: 2023-05-15

## 2023-05-15 ENCOUNTER — HOSPITAL ENCOUNTER (OUTPATIENT)
Dept: RADIOLOGY | Facility: HOSPITAL | Age: 53
Discharge: HOME | End: 2023-05-15
Attending: INTERNAL MEDICINE

## 2023-05-15 DIAGNOSIS — N18.31 STAGE 3A CHRONIC KIDNEY DISEASE (HCC): ICD-10-CM

## 2023-05-15 DIAGNOSIS — N18.30 BENIGN HYPERTENSION WITH CKD (CHRONIC KIDNEY DISEASE) STAGE III (HCC): ICD-10-CM

## 2023-05-15 DIAGNOSIS — I12.9 BENIGN HYPERTENSION WITH CKD (CHRONIC KIDNEY DISEASE) STAGE III (HCC): ICD-10-CM

## 2023-05-15 DIAGNOSIS — N13.30 HYDRONEPHROSIS, UNSPECIFIED HYDRONEPHROSIS TYPE: Primary | ICD-10-CM

## 2023-05-15 DIAGNOSIS — R22.43 LOCALIZED SWELLING OF BOTH LOWER LEGS: ICD-10-CM

## 2023-05-16 ENCOUNTER — TELEPHONE (OUTPATIENT)
Dept: NEPHROLOGY | Facility: CLINIC | Age: 53
End: 2023-05-16

## 2023-05-16 ENCOUNTER — TELEPHONE (OUTPATIENT)
Dept: UROLOGY | Facility: AMBULATORY SURGERY CENTER | Age: 53
End: 2023-05-16

## 2023-05-16 NOTE — TELEPHONE ENCOUNTER
Called Urology and spoke to Jodie martel  She will be notifying nurses that patient needs to be seen and scheduled asap  He was a patient in 2018 but needs to be re established  They were going to reach out  I called patient letting him know they would be calling  Also I scheduled CT with Anastasia Card  for 1:00 5- [facility]  And notified patient of appointment time and advised if Urology didn't contact soon please call back and provided him the [facility] number

## 2023-05-16 NOTE — TELEPHONE ENCOUNTER
New Patient    What is the reason for the patient’s appointment?: nephrology office calling due to pt having referral in chart and pt needs to be seen asap Hydronephrosis, unspecified hydronephrosis type    What office location does the patient prefer?: [place]     Does patient have Imaging/Lab Results:n/a    Have patient records been requested?:  If No, are the records showing in Epic: records in epic       INSURANCE:  Do we accept the patient's insurance or is the patient Self-Pay?: yes     Insurance Provider: Medicare [address] [place] [address]:  Member ID#: [id number]    Plan #- [id number]  OD#-[id number]      HISTORY:   Has the patient had any previous Urologist(s)?: Devon Dempsey 2018     Was the patient seen in the ED?:n/a     Has the patient had any outside testing done?: n/a     Does the patient have a personal history of cancer?: n/a     Pt call [phone]

## 2023-05-16 NOTE — TELEPHONE ENCOUNTER
----- Message from Grazyna Perez MD sent at 5/15/2023  6:13 PM EDT -----  Regarding: Urology appointment and scheduling of CT  Please help this patient to get urology appointment ASAP and scheduling of CT abdomen  Orders have been placed  Thank you

## 2023-05-17 ENCOUNTER — HOSPITAL ENCOUNTER (OUTPATIENT)
Dept: RADIOLOGY | Facility: HOSPITAL | Age: 53
Discharge: HOME | End: 2023-05-17
Attending: INTERNAL MEDICINE

## 2023-05-17 DIAGNOSIS — N13.30 HYDRONEPHROSIS, UNSPECIFIED HYDRONEPHROSIS TYPE: ICD-10-CM

## 2023-05-17 NOTE — TELEPHONE ENCOUNTER
Patient called stating he needs afternoon appointment  Patient rescheduled for 07/19/23 at 130 pm with Bijan Mccoy in [place]      Patient can be reached at [phone]

## 2023-05-17 NOTE — TELEPHONE ENCOUNTER
LM for Master Stephen that we have him scheduled from 7/5 @ 8:30 at our [place] clinic with Сергей Menezes - He is to call back and confirm

## 2023-05-17 NOTE — TELEPHONE ENCOUNTER
Patient last seen by Subhash Nobles  He was to have Cysto and possible biopsy and canceled - he had blood  In urine  He had u/s completed on  5/15 and and scheduled for Ct scan from nephrology    Us:  Mild to moderate left-sided hydronephrosis with some left-sided cortical thinning compared to the right, suggesting chronicity of hydronephrosis      Otherwise unremarkable sonographic appearance of the kidneys      Please advise appointment time and time frme

## 2023-05-19 NOTE — ASSESSMENT & PLAN NOTE
Does have history of anabolic steroid use as was  several years ago  Does follow endocrinology and has testosterone replacement, will defer to them   Stable
Echo within normal limits January 2022   Denies any chest pain, palpitations or changes in vision   Compliant with losartan hydrochlorothiazide 100-12 5 mg daily, may consider increasing in future   Continue tobacco abstinence   Advised patient to cut down on drinking however will revisit again in future   May also be compounded by obstructive sleep apnea, please see BOY for more details of assessment and plan   continue healthy diet and lifestyle modifications   will rule out secondary causes in setting of weight gain such as [place] with a m  cortisol and urine metanephrines
Lab Results   Component Value Date    EGFR 55 06/01/2021    EGFR 59 2 03/22/2016    CREATININE 1 46 (H) 06/01/2021      repeat CMP  Avoid NSAIDs   Treat hypertension as stated above
May be secondary to elevated cortisol differential diagnosis includes Cushing 1st hypothyroidism versus depression versus hypogonadism and compounding conditions     TSH, BOY mask refitting,  Vitamin-D and  A m  cortisol ordered, will follow-up   Continue healthy diet and exercise lifestyle modifications  Will continue counseling on decreasing alcohol intake
Patient does not have maintenance inhaler, prescribed  Encouraged patient to avoid allergen triggers and secondhand smoke   Continue zyrtec however may up titrate to 4 times amount dosage   prescribed montelukast  Albuterol p r n    Consider PFTs in future
Patient on compliant with sleep apnea a machine and previous AHI 40     encourage patient to get mask refitting   patient does have deviated septum and declines ENT referral at this time, will revisit in future and encourage patient to strongly reconsider steptal surgery   extensively educated and counseled patient for over 30 minutes in regards to  Risk of stroke, heart attack and death from sleep apnea if remains untreated,  Prognosis and physiology   consider inspire implantation   continue healthy diet and lifestyle modifications
Diabetes

## 2023-05-22 ENCOUNTER — PREP FOR PROCEDURE (OUTPATIENT)
Dept: UROLOGY | Facility: AMBULATORY SURGERY CENTER | Age: 53
End: 2023-05-22

## 2023-05-22 ENCOUNTER — TELEPHONE (OUTPATIENT)
Dept: UROLOGY | Facility: AMBULATORY SURGERY CENTER | Age: 53
End: 2023-05-22

## 2023-05-22 ENCOUNTER — TELEPHONE (OUTPATIENT)
Dept: NEPHROLOGY | Facility: CLINIC | Age: 53
End: 2023-05-22

## 2023-05-22 DIAGNOSIS — N20.1 LEFT URETERAL CALCULUS: Primary | ICD-10-CM

## 2023-05-22 RX ORDER — CEFAZOLIN SODIUM 2 G/50ML
2000 SOLUTION INTRAVENOUS ONCE
Status: CANCELLED | OUTPATIENT
Start: 2023-05-22 | End: 2023-05-22

## 2023-05-22 RX ORDER — SODIUM CHLORIDE 9 MG/ML
125 INJECTION, SOLUTION INTRAVENOUS CONTINUOUS
Status: CANCELLED | OUTPATIENT
Start: 2023-05-22

## 2023-05-22 NOTE — TELEPHONE ENCOUNTER
Per Dr Ramila Lakhani I called pt to discuss scheduling him for a appt tomorrow at the [facility] office at 10:15 AM  There was no answer so I did leave a voicemail asking pt to call our office back to discuss

## 2023-05-22 NOTE — TELEPHONE ENCOUNTER
Called and spoke to the patient to inform him of Dr Mejia Sa request that he complete lab work today  Patient states that he has a broken toe so will not be going anywhere soon  Also had questions regarding possible surgery and treatment plan  Informed patient that Dr Javed Chris would call him to discuss

## 2023-05-22 NOTE — TELEPHONE ENCOUNTER
----- Message from Jacobo Moser MD sent at 5/22/2023 12:02 PM EDT -----  Tried calling the patient to discuss the results  He has a large 2 2 cm proximal left ureteral calculus that needs surgery ASAP as discussed with urology  Urology will make arrangements for surgery and will reach out to him regarding further plan  Please ask him to get his lab work done today that was ordered on most recent office visit

## 2023-05-23 ENCOUNTER — OFFICE VISIT (OUTPATIENT)
Dept: BARIATRICS | Facility: CLINIC | Age: 53
End: 2023-05-23

## 2023-05-23 ENCOUNTER — TELEPHONE (OUTPATIENT)
Dept: INTERNAL MEDICINE CLINIC | Facility: CLINIC | Age: 53
End: 2023-05-23

## 2023-05-23 ENCOUNTER — APPOINTMENT (OUTPATIENT)
Dept: LAB | Facility: CLINIC | Age: 53
End: 2023-05-23
Payer: MEDICARE

## 2023-05-23 ENCOUNTER — OFFICE VISIT (OUTPATIENT)
Dept: UROLOGY | Facility: AMBULATORY SURGERY CENTER | Age: 53
End: 2023-05-23

## 2023-05-23 VITALS — HEIGHT: 67 IN | WEIGHT: 252.2 LBS | BODY MASS INDEX: 39.58 KG/M2

## 2023-05-23 VITALS
DIASTOLIC BLOOD PRESSURE: 80 MMHG | SYSTOLIC BLOOD PRESSURE: 132 MMHG | OXYGEN SATURATION: 95 % | BODY MASS INDEX: 37.92 KG/M2 | HEART RATE: 86 BPM | HEIGHT: 69 IN | WEIGHT: 256 LBS

## 2023-05-23 DIAGNOSIS — E66.9 OBESITY, CLASS II, BMI 35-39.9: Primary | ICD-10-CM

## 2023-05-23 DIAGNOSIS — N18.30 BENIGN HYPERTENSION WITH CKD (CHRONIC KIDNEY DISEASE) STAGE III (HCC): ICD-10-CM

## 2023-05-23 DIAGNOSIS — N18.31 STAGE 3A CHRONIC KIDNEY DISEASE (HCC): ICD-10-CM

## 2023-05-23 DIAGNOSIS — E66.01 CLASS 2 SEVERE OBESITY DUE TO EXCESS CALORIES WITH SERIOUS COMORBIDITY AND BODY MASS INDEX (BMI) OF 37.0 TO 37.9 IN ADULT (HCC): ICD-10-CM

## 2023-05-23 DIAGNOSIS — G47.33 OSA (OBSTRUCTIVE SLEEP APNEA): ICD-10-CM

## 2023-05-23 DIAGNOSIS — I10 PRIMARY HYPERTENSION: ICD-10-CM

## 2023-05-23 DIAGNOSIS — E66.01 MORBID (SEVERE) OBESITY DUE TO EXCESS CALORIES (HCC): ICD-10-CM

## 2023-05-23 DIAGNOSIS — E29.1 3-OXO-5 ALPHA-STEROID DELTA 4-DEHYDROGENASE DEFICIENCY: ICD-10-CM

## 2023-05-23 DIAGNOSIS — N20.1 LEFT URETERAL CALCULUS: Primary | ICD-10-CM

## 2023-05-23 DIAGNOSIS — I10 ESSENTIAL HYPERTENSION: ICD-10-CM

## 2023-05-23 DIAGNOSIS — Z01.812 BLOOD TESTS PRIOR TO TREATMENT OR PROCEDURE: ICD-10-CM

## 2023-05-23 DIAGNOSIS — I12.9 BENIGN HYPERTENSION WITH CKD (CHRONIC KIDNEY DISEASE) STAGE III (HCC): ICD-10-CM

## 2023-05-23 DIAGNOSIS — I10 ESSENTIAL HYPERTENSION: Primary | ICD-10-CM

## 2023-05-23 LAB
ALBUMIN SERPL BCP-MCNC: 3.7 G/DL (ref 3.5–5)
ANION GAP SERPL CALCULATED.3IONS-SCNC: 1 MMOL/L (ref 4–13)
BACTERIA UR QL AUTO: ABNORMAL /HPF
BILIRUB UR QL STRIP: NEGATIVE
BUN SERPL-MCNC: 15 MG/DL (ref 5–25)
CALCIUM SERPL-MCNC: 9.7 MG/DL (ref 8.3–10.1)
CHLORIDE SERPL-SCNC: 105 MMOL/L (ref 96–108)
CLARITY UR: CLEAR
CO2 SERPL-SCNC: 30 MMOL/L (ref 21–32)
COLOR UR: ABNORMAL
CREAT SERPL-MCNC: 1.42 MG/DL (ref 0.6–1.3)
CREAT UR-MCNC: 82.7 MG/DL
CREAT UR-MCNC: 82.7 MG/DL
GFR SERPL CREATININE-BSD FRML MDRD: 55 ML/MIN/1.73SQ M
GLUCOSE P FAST SERPL-MCNC: 103 MG/DL (ref 65–99)
GLUCOSE UR STRIP-MCNC: NEGATIVE MG/DL
HCT VFR BLD AUTO: 46 % (ref 36.5–49.3)
HGB BLD-MCNC: 16.2 G/DL (ref 12–17)
HGB UR QL STRIP.AUTO: ABNORMAL
HYALINE CASTS #/AREA URNS LPF: ABNORMAL /LPF
KETONES UR STRIP-MCNC: NEGATIVE MG/DL
LEUKOCYTE ESTERASE UR QL STRIP: ABNORMAL
MAGNESIUM SERPL-MCNC: 2.1 MG/DL (ref 1.6–2.6)
MICROALBUMIN UR-MCNC: 27 MG/L (ref 0–20)
MICROALBUMIN/CREAT 24H UR: 33 MG/G CREATININE (ref 0–30)
NITRITE UR QL STRIP: NEGATIVE
NON-SQ EPI CELLS URNS QL MICRO: ABNORMAL /HPF
PH UR STRIP.AUTO: 7.5 [PH]
PHOSPHATE SERPL-MCNC: 2.4 MG/DL (ref 2.7–4.5)
POTASSIUM SERPL-SCNC: 3.8 MMOL/L (ref 3.5–5.3)
PROT UR STRIP-MCNC: NEGATIVE MG/DL
PROT UR-MCNC: 14 MG/DL
PROT/CREAT UR: 0.17 MG/G{CREAT} (ref 0–0.1)
PSA SERPL-MCNC: 2.01 NG/ML (ref 0–4)
PTH-INTACT SERPL-MCNC: 92.6 PG/ML (ref 12–88)
RBC #/AREA URNS AUTO: ABNORMAL /HPF
SODIUM SERPL-SCNC: 136 MMOL/L (ref 135–147)
SP GR UR STRIP.AUTO: 1.01 (ref 1–1.03)
T4 FREE SERPL-MCNC: 0.92 NG/DL (ref 0.61–1.12)
TESTOST SERPL-MSCNC: 204 NG/DL
TSH SERPL DL<=0.05 MIU/L-ACNC: 1.87 UIU/ML (ref 0.45–4.5)
UROBILINOGEN UR STRIP-ACNC: <2 MG/DL
WBC #/AREA URNS AUTO: ABNORMAL /HPF

## 2023-05-23 PROCEDURE — 85018 HEMOGLOBIN: CPT

## 2023-05-23 PROCEDURE — 83970 ASSAY OF PARATHORMONE: CPT

## 2023-05-23 PROCEDURE — 82610 CYSTATIN C: CPT

## 2023-05-23 PROCEDURE — 83735 ASSAY OF MAGNESIUM: CPT

## 2023-05-23 PROCEDURE — 85014 HEMATOCRIT: CPT

## 2023-05-23 PROCEDURE — 84439 ASSAY OF FREE THYROXINE: CPT

## 2023-05-23 PROCEDURE — 36415 COLL VENOUS BLD VENIPUNCTURE: CPT

## 2023-05-23 PROCEDURE — 84443 ASSAY THYROID STIM HORMONE: CPT

## 2023-05-23 PROCEDURE — 80069 RENAL FUNCTION PANEL: CPT

## 2023-05-23 PROCEDURE — 84153 ASSAY OF PSA TOTAL: CPT

## 2023-05-23 PROCEDURE — 84403 ASSAY OF TOTAL TESTOSTERONE: CPT

## 2023-05-23 NOTE — TELEPHONE ENCOUNTER
Patient called in to request that  a letter of clearance get faxed to [facility]  Patient said that he just left an appointment with weight management and they are requiring that all of his doctors send a letter to clear him  I explained to patient that he has not been seen in awhile so we should schedule an appointment to be sure  Patient said that he do not think it is necessary  Please fax letter to [phone]  Patient would like a call once letter is faxed

## 2023-05-23 NOTE — LETTER
May 23, 2023     [facility], [address]  [address] [id number]    Patient: Mehrdad Gu   YOB: 1970   Date of Visit: 5/23/2023       Dear Dr Itzel Quintero: Thank you for referring Williams Pena to me for evaluation  Below are my notes for this consultation  If you have questions, please do not hesitate to call me  I look forward to following your patient along with you  Sincerely,        German Valverde MD        CC: MD German Zamora MD  5/23/2023 12:12 PM  Sign when Signing Visit  5/23/2023    Mehrdad Gu  1970  [id number]        Assessment  2+ centimeter left proximal ureteral calculus with left hydronephrosis      Discussion  I had a lengthy discussion with Larinda Bence in the office today regarding his 2 cm left proximal ureteral calculus  Based on the size of the stone and the location I feel that the chance of him passing this spontaneously is minimal   I recommend cystoscopy with left ureteroscopy with holmium laser lithotripsy  Risk of the procedure including, but not limited to bleeding, infection, sepsis, ureteral injury, and need for additional surgery based on the large size of the stone  He understands that I will place a left ureteral stent after the procedure which can cause urgency and frequency of urination  At this time he wishes to proceed  History of Present Illness  45 y o  male with a history of chronic kidney disease with a slightly elevated creatinine in the mid 1 range  This prompted work-up by nephrology including an ultrasound  This identified a large stone in the left proximal ureter  He had a follow-up CT scan noncontrast which I have reviewed  He was referred more urgently by Dr Thanh Best from nephrology  He presents today denying any significant flank pain  He denies any nausea, vomiting, fever, or chills  He denies any hematuria    He states that he has a history of hypogonadism    He has been on testosterone supplementation since his late 25s  A PSA from July 2022 was 1 7 and stable from 1 6 prior        AUA Symptom Score  AUA SYMPTOM SCORE    Flowsheet Row Most Recent Value   AUA SYMPTOM SCORE    How often have you had a sensation of not emptying your bladder completely after you finished urinating? 2 (P)     How often have you had to urinate again less than two hours after you finished urinating? 5 (P)     How often have you found you stopped and started again several times when you urinate? 0 (P)     How often have you found it difficult to postpone urination? 5 (P)     How often have you had a weak urinary stream? 2 (P)     How often have you had to push or strain to begin urination? 0 (P)     How many times did you most typically get up to urinate from the time you went to bed at night until the time you got up in the morning? 5 (P)     Quality of Life: If you were to spend the rest of your life with your urinary condition just the way it is now, how would you feel about that? 3 (P)     AUA SYMPTOM SCORE 19 (P)           Review of Systems  Review of Systems   Constitutional: Negative  HENT: Negative  Eyes: Negative  Respiratory: Negative  Cardiovascular: Negative  Gastrointestinal: Negative  Endocrine: Negative  Genitourinary:        Per HPI   Musculoskeletal: Negative  Skin: Negative  Allergic/Immunologic: Negative  Neurological: Negative  Hematological: Negative  Psychiatric/Behavioral: Negative  Past Medical History  Past Medical History:   Diagnosis Date   • Anxiety    • Asthma    • Benign essential HTN     last assessed 05/26/2017   • Degenerative joint disease    • Hiccoughs    • Hypertension    • Skin cancer    • Spinal stenosis        Past Social History  Past Surgical History:   Procedure Laterality Date   • ESOPHAGOGASTRODUODENOSCOPY N/A 3/24/2016    Procedure: ESOPHAGOGASTRODUODENOSCOPY (EGD); Surgeon: Gregg Allen MD;  Location: [facility];   Service:    • "JOINT REPLACEMENT      right hip sx       Past Family History  Family History   Problem Relation Age of Onset   • Cancer Mother    • Cancer Father        Past Social history  Social History     Socioeconomic History   • Marital status: /Civil Union     Spouse name: Not on file   • Number of children: Not on file   • Years of education: Not on file   • Highest education level: Not on file   Occupational History   • Not on file   Tobacco Use   • Smoking status: Never   • Smokeless tobacco: Never   Vaping Use   • Vaping Use: Never used   Substance and Sexual Activity   • Alcohol use: Yes     Comment: Social drinker   • Drug use: Yes     Types: Marijuana     Comment: medicinal card obtained   • Sexual activity: Yes     Partners: Female     Birth control/protection: None   Other Topics Concern   • Not on file   Social History Narrative   • Not on file     Social Determinants of Health     Financial Resource Strain: Not on file   Food Insecurity: Not on file   Transportation Needs: Not on file   Physical Activity: Not on file   Stress: Not on file   Social Connections: Not on file   Intimate Partner Violence: Not on file   Housing Stability: Not on file       Current Medications  Current Outpatient Medications   Medication Sig Dispense Refill   • acetaminophen (TYLENOL) 650 mg CR tablet Take 1 tablet (650 mg total) by mouth every 8 (eight) hours as needed for mild pain 30 tablet 0   • albuterol (Ventolin HFA) 90 mcg/act inhaler Inhale 2 puffs every 6 (six) hours as needed for wheezing 8 g 3   • amoxicillin (AMOXIL) 500 mg capsule      • B-D 3CC LUER-ANTONELLA SYR 25GX1\" 25G X 1\" 3 ML MISC      • baclofen 10 mg tablet Take 1 tablet (10 mg total) by mouth 2 (two) times a day as needed for muscle spasms (Diaphragmatic spasm) 30 tablet 0   • carvedilol (COREG) 12 5 mg tablet Take 1 tablet (12 5 mg total) by mouth 2 (two) times a day with meals 60 tablet 1   • chlorthalidone 25 mg tablet Take 1 tablet (25 mg total) by mouth " "daily 90 tablet 3   • fluticasone (Flovent HFA) 110 MCG/ACT inhaler Inhale 2 puffs 2 (two) times a day Rinse mouth after use  36 g 2   • losartan (COZAAR) 100 MG tablet Take 1 tablet (100 mg total) by mouth daily 90 tablet 3   • montelukast (SINGULAIR) 10 mg tablet Take 1 tablet (10 mg total) by mouth daily at bedtime 90 tablet 3   • tacrolimus (PROTOPIC) 0 1 % ointment      • testosterone cypionate (DEPO-TESTOSTERONE) 200 mg/mL SOLN      • Testosterone Enanthate 200 MG/ML SOLN      • triamcinolone (KENALOG) 0 5 % cream Apply topically 2 (two) times a day 30 g 0   • zolpidem (AMBIEN) 10 mg tablet TAKE ONE TABLET BY MOUTH DAILY AT BEDTIME AS NEEDED FOR SLEEP 30 tablet 0   • ALPRAZolam (XANAX) 1 mg tablet Take 1 tablet (1 mg total) by mouth 2 (two) times a day as needed for anxiety for up to 30 days (Patient taking differently: Take 1 mg by mouth as needed for anxiety) 60 tablet 0   • cholecalciferol (VITAMIN D3) 1,000 units tablet Take 1 tablet (1,000 Units total) by mouth daily Start by taking 3000 units daily for one month, then continue on 1000 units daily  (Patient not taking: Reported on 10/25/2022) 30 tablet 5     No current facility-administered medications for this visit  Allergies  No Known Allergies    Past Medical History, Social History, Family History, medications and allergies were reviewed  Vitals  Vitals:    05/23/23 1019   BP: 132/80   BP Location: Left arm   Patient Position: Sitting   Cuff Size: Extra-Large   Pulse: 86   SpO2: 95%   Weight: 116 kg (256 lb)   Height: 5' 9\" (1 753 m)       Physical Exam  Physical Exam  On examination he is in no acute distress  Cardiac is regular  No respiratory distress    Abdomen is benign      Results  Lab Results   Component Value Date    PSA 1 7 06/20/2022    PSA 1 6 06/01/2021     Lab Results   Component Value Date    GLUCOSE 97 03/22/2016    CALCIUM 9 3 12/21/2022    K 3 6 12/21/2022    CO2 30 12/21/2022     12/21/2022    BUN 15 12/21/2022    " CREATININE 1 33 (H) 12/21/2022     Lab Results   Component Value Date    WBC 7 12 12/21/2022    HGB 16 4 12/21/2022    HCT 48 7 12/21/2022     (H) 12/21/2022     12/21/2022         Office Urine Dip  No results found for this or any previous visit (from the past 1 hour(s)) ]

## 2023-05-23 NOTE — H&P (VIEW-ONLY)
5/23/2023    Ronny Villatoro  1970  [id number]        Assessment  2+ centimeter left proximal ureteral calculus with left hydronephrosis      Discussion  I had a lengthy discussion with Reagan Garcia in the office today regarding his 2 cm left proximal ureteral calculus  Based on the size of the stone and the location I feel that the chance of him passing this spontaneously is minimal   I recommend cystoscopy with left ureteroscopy with holmium laser lithotripsy  Risk of the procedure including, but not limited to bleeding, infection, sepsis, ureteral injury, and need for additional surgery based on the large size of the stone  He understands that I will place a left ureteral stent after the procedure which can cause urgency and frequency of urination  At this time he wishes to proceed  History of Present Illness  45 y o  male with a history of chronic kidney disease with a slightly elevated creatinine in the mid 1 range  This prompted work-up by nephrology including an ultrasound  This identified a large stone in the left proximal ureter  He had a follow-up CT scan noncontrast which I have reviewed  He was referred more urgently by Dr Rosy Anderson from nephrology  He presents today denying any significant flank pain  He denies any nausea, vomiting, fever, or chills  He denies any hematuria    He states that he has a history of hypogonadism  He has been on testosterone supplementation since his late 25s    A PSA from July 2022 was 1 7 and stable from 1 6 prior        AUA Symptom Score  AUA SYMPTOM SCORE    Flowsheet Row Most Recent Value   AUA SYMPTOM SCORE    How often have you had a sensation of not emptying your bladder completely after you finished urinating? 2 (P)     How often have you had to urinate again less than two hours after you finished urinating? 5 (P)     How often have you found you stopped and started again several times when you urinate? 0 (P)     How often have you found it difficult to postpone urination? 5 (P)     How often have you had a weak urinary stream? 2 (P)     How often have you had to push or strain to begin urination? 0 (P)     How many times did you most typically get up to urinate from the time you went to bed at night until the time you got up in the morning? 5 (P)     Quality of Life: If you were to spend the rest of your life with your urinary condition just the way it is now, how would you feel about that? 3 (P)     AUA SYMPTOM SCORE 19 (P)           Review of Systems  Review of Systems   Constitutional: Negative  HENT: Negative  Eyes: Negative  Respiratory: Negative  Cardiovascular: Negative  Gastrointestinal: Negative  Endocrine: Negative  Genitourinary:        Per HPI   Musculoskeletal: Negative  Skin: Negative  Allergic/Immunologic: Negative  Neurological: Negative  Hematological: Negative  Psychiatric/Behavioral: Negative  Past Medical History  Past Medical History:   Diagnosis Date   • Anxiety    • Asthma    • Benign essential HTN     last assessed 05/26/2017   • Degenerative joint disease    • Hiccoughs    • Hypertension    • Skin cancer    • Spinal stenosis        Past Social History  Past Surgical History:   Procedure Laterality Date   • ESOPHAGOGASTRODUODENOSCOPY N/A 3/24/2016    Procedure: ESOPHAGOGASTRODUODENOSCOPY (EGD); Surgeon: Vic Phoenix MD;  Location: [facility];   Service:    • JOINT REPLACEMENT      right hip sx       Past Family History  Family History   Problem Relation Age of Onset   • Cancer Mother    • Cancer Father        Past Social history  Social History     Socioeconomic History   • Marital status: /Civil Union     Spouse name: Not on file   • Number of children: Not on file   • Years of education: Not on file   • Highest education level: Not on file   Occupational History   • Not on file   Tobacco Use   • Smoking status: Never   • Smokeless tobacco: Never   Vaping Use   • Vaping Use: Never used   Substance "and Sexual Activity   • Alcohol use: Yes     Comment: Social drinker   • Drug use: Yes     Types: Marijuana     Comment: medicinal card obtained   • Sexual activity: Yes     Partners: Female     Birth control/protection: None   Other Topics Concern   • Not on file   Social History Narrative   • Not on file     Social Determinants of Health     Financial Resource Strain: Not on file   Food Insecurity: Not on file   Transportation Needs: Not on file   Physical Activity: Not on file   Stress: Not on file   Social Connections: Not on file   Intimate Partner Violence: Not on file   Housing Stability: Not on file       Current Medications  Current Outpatient Medications   Medication Sig Dispense Refill   • acetaminophen (TYLENOL) 650 mg CR tablet Take 1 tablet (650 mg total) by mouth every 8 (eight) hours as needed for mild pain 30 tablet 0   • albuterol (Ventolin HFA) 90 mcg/act inhaler Inhale 2 puffs every 6 (six) hours as needed for wheezing 8 g 3   • amoxicillin (AMOXIL) 500 mg capsule      • B-D 3CC LUER-ANTONELLA SYR 25GX1\" 25G X 1\" 3 ML MISC      • baclofen 10 mg tablet Take 1 tablet (10 mg total) by mouth 2 (two) times a day as needed for muscle spasms (Diaphragmatic spasm) 30 tablet 0   • carvedilol (COREG) 12 5 mg tablet Take 1 tablet (12 5 mg total) by mouth 2 (two) times a day with meals 60 tablet 1   • chlorthalidone 25 mg tablet Take 1 tablet (25 mg total) by mouth daily 90 tablet 3   • fluticasone (Flovent HFA) 110 MCG/ACT inhaler Inhale 2 puffs 2 (two) times a day Rinse mouth after use   36 g 2   • losartan (COZAAR) 100 MG tablet Take 1 tablet (100 mg total) by mouth daily 90 tablet 3   • montelukast (SINGULAIR) 10 mg tablet Take 1 tablet (10 mg total) by mouth daily at bedtime 90 tablet 3   • tacrolimus (PROTOPIC) 0 1 % ointment      • testosterone cypionate (DEPO-TESTOSTERONE) 200 mg/mL SOLN      • Testosterone Enanthate 200 MG/ML SOLN      • triamcinolone (KENALOG) 0 5 % cream Apply topically 2 (two) times a " "day 30 g 0   • zolpidem (AMBIEN) 10 mg tablet TAKE ONE TABLET BY MOUTH DAILY AT BEDTIME AS NEEDED FOR SLEEP 30 tablet 0   • ALPRAZolam (XANAX) 1 mg tablet Take 1 tablet (1 mg total) by mouth 2 (two) times a day as needed for anxiety for up to 30 days (Patient taking differently: Take 1 mg by mouth as needed for anxiety) 60 tablet 0   • cholecalciferol (VITAMIN D3) 1,000 units tablet Take 1 tablet (1,000 Units total) by mouth daily Start by taking 3000 units daily for one month, then continue on 1000 units daily  (Patient not taking: Reported on 10/25/2022) 30 tablet 5     No current facility-administered medications for this visit  Allergies  No Known Allergies    Past Medical History, Social History, Family History, medications and allergies were reviewed  Vitals  Vitals:    05/23/23 1019   BP: 132/80   BP Location: Left arm   Patient Position: Sitting   Cuff Size: Extra-Large   Pulse: 86   SpO2: 95%   Weight: 116 kg (256 lb)   Height: 5' 9\" (1 753 m)       Physical Exam  Physical Exam  On examination he is in no acute distress  Cardiac is regular  No respiratory distress    Abdomen is benign      Results  Lab Results   Component Value Date    PSA 1 7 06/20/2022    PSA 1 6 06/01/2021     Lab Results   Component Value Date    GLUCOSE 97 03/22/2016    CALCIUM 9 3 12/21/2022    K 3 6 12/21/2022    CO2 30 12/21/2022     12/21/2022    BUN 15 12/21/2022    CREATININE 1 33 (H) 12/21/2022     Lab Results   Component Value Date    WBC 7 12 12/21/2022    HGB 16 4 12/21/2022    HCT 48 7 12/21/2022     (H) 12/21/2022     12/21/2022         Office Urine Dip  No results found for this or any previous visit (from the past 1 hour(s)) ]  "

## 2023-05-23 NOTE — PROGRESS NOTES
5/23/2023    Durga Chen  1970  [id number]        Assessment  2+ centimeter left proximal ureteral calculus with left hydronephrosis      Discussion  I had a lengthy discussion with Gerry Maikel in the office today regarding his 2 cm left proximal ureteral calculus  Based on the size of the stone and the location I feel that the chance of him passing this spontaneously is minimal   I recommend cystoscopy with left ureteroscopy with holmium laser lithotripsy  Risk of the procedure including, but not limited to bleeding, infection, sepsis, ureteral injury, and need for additional surgery based on the large size of the stone  He understands that I will place a left ureteral stent after the procedure which can cause urgency and frequency of urination  At this time he wishes to proceed  History of Present Illness  45 y o  male with a history of chronic kidney disease with a slightly elevated creatinine in the mid 1 range  This prompted work-up by nephrology including an ultrasound  This identified a large stone in the left proximal ureter  He had a follow-up CT scan noncontrast which I have reviewed  He was referred more urgently by Dr Hemalatha Olmos from nephrology  He presents today denying any significant flank pain  He denies any nausea, vomiting, fever, or chills  He denies any hematuria    He states that he has a history of hypogonadism  He has been on testosterone supplementation since his late 25s    A PSA from July 2022 was 1 7 and stable from 1 6 prior        AUA Symptom Score  AUA SYMPTOM SCORE    Flowsheet Row Most Recent Value   AUA SYMPTOM SCORE    How often have you had a sensation of not emptying your bladder completely after you finished urinating? 2 (P)     How often have you had to urinate again less than two hours after you finished urinating? 5 (P)     How often have you found you stopped and started again several times when you urinate? 0 (P)     How often have you found it difficult to postpone urination? 5 (P)     How often have you had a weak urinary stream? 2 (P)     How often have you had to push or strain to begin urination? 0 (P)     How many times did you most typically get up to urinate from the time you went to bed at night until the time you got up in the morning? 5 (P)     Quality of Life: If you were to spend the rest of your life with your urinary condition just the way it is now, how would you feel about that? 3 (P)     AUA SYMPTOM SCORE 19 (P)           Review of Systems  Review of Systems   Constitutional: Negative  HENT: Negative  Eyes: Negative  Respiratory: Negative  Cardiovascular: Negative  Gastrointestinal: Negative  Endocrine: Negative  Genitourinary:        Per HPI   Musculoskeletal: Negative  Skin: Negative  Allergic/Immunologic: Negative  Neurological: Negative  Hematological: Negative  Psychiatric/Behavioral: Negative  Past Medical History  Past Medical History:   Diagnosis Date   • Anxiety    • Asthma    • Benign essential HTN     last assessed 05/26/2017   • Degenerative joint disease    • Hiccoughs    • Hypertension    • Skin cancer    • Spinal stenosis        Past Social History  Past Surgical History:   Procedure Laterality Date   • ESOPHAGOGASTRODUODENOSCOPY N/A 3/24/2016    Procedure: ESOPHAGOGASTRODUODENOSCOPY (EGD); Surgeon: Tana Colón MD;  Location: [facility];   Service:    • JOINT REPLACEMENT      right hip sx       Past Family History  Family History   Problem Relation Age of Onset   • Cancer Mother    • Cancer Father        Past Social history  Social History     Socioeconomic History   • Marital status: /Civil Union     Spouse name: Not on file   • Number of children: Not on file   • Years of education: Not on file   • Highest education level: Not on file   Occupational History   • Not on file   Tobacco Use   • Smoking status: Never   • Smokeless tobacco: Never   Vaping Use   • Vaping Use: Never used   Substance "and Sexual Activity   • Alcohol use: Yes     Comment: Social drinker   • Drug use: Yes     Types: Marijuana     Comment: medicinal card obtained   • Sexual activity: Yes     Partners: Female     Birth control/protection: None   Other Topics Concern   • Not on file   Social History Narrative   • Not on file     Social Determinants of Health     Financial Resource Strain: Not on file   Food Insecurity: Not on file   Transportation Needs: Not on file   Physical Activity: Not on file   Stress: Not on file   Social Connections: Not on file   Intimate Partner Violence: Not on file   Housing Stability: Not on file       Current Medications  Current Outpatient Medications   Medication Sig Dispense Refill   • acetaminophen (TYLENOL) 650 mg CR tablet Take 1 tablet (650 mg total) by mouth every 8 (eight) hours as needed for mild pain 30 tablet 0   • albuterol (Ventolin HFA) 90 mcg/act inhaler Inhale 2 puffs every 6 (six) hours as needed for wheezing 8 g 3   • amoxicillin (AMOXIL) 500 mg capsule      • B-D 3CC LUER-ANTONELLA SYR 25GX1\" 25G X 1\" 3 ML MISC      • baclofen 10 mg tablet Take 1 tablet (10 mg total) by mouth 2 (two) times a day as needed for muscle spasms (Diaphragmatic spasm) 30 tablet 0   • carvedilol (COREG) 12 5 mg tablet Take 1 tablet (12 5 mg total) by mouth 2 (two) times a day with meals 60 tablet 1   • chlorthalidone 25 mg tablet Take 1 tablet (25 mg total) by mouth daily 90 tablet 3   • fluticasone (Flovent HFA) 110 MCG/ACT inhaler Inhale 2 puffs 2 (two) times a day Rinse mouth after use   36 g 2   • losartan (COZAAR) 100 MG tablet Take 1 tablet (100 mg total) by mouth daily 90 tablet 3   • montelukast (SINGULAIR) 10 mg tablet Take 1 tablet (10 mg total) by mouth daily at bedtime 90 tablet 3   • tacrolimus (PROTOPIC) 0 1 % ointment      • testosterone cypionate (DEPO-TESTOSTERONE) 200 mg/mL SOLN      • Testosterone Enanthate 200 MG/ML SOLN      • triamcinolone (KENALOG) 0 5 % cream Apply topically 2 (two) times a " "day 30 g 0   • zolpidem (AMBIEN) 10 mg tablet TAKE ONE TABLET BY MOUTH DAILY AT BEDTIME AS NEEDED FOR SLEEP 30 tablet 0   • ALPRAZolam (XANAX) 1 mg tablet Take 1 tablet (1 mg total) by mouth 2 (two) times a day as needed for anxiety for up to 30 days (Patient taking differently: Take 1 mg by mouth as needed for anxiety) 60 tablet 0   • cholecalciferol (VITAMIN D3) 1,000 units tablet Take 1 tablet (1,000 Units total) by mouth daily Start by taking 3000 units daily for one month, then continue on 1000 units daily  (Patient not taking: Reported on 10/25/2022) 30 tablet 5     No current facility-administered medications for this visit  Allergies  No Known Allergies    Past Medical History, Social History, Family History, medications and allergies were reviewed  Vitals  Vitals:    05/23/23 1019   BP: 132/80   BP Location: Left arm   Patient Position: Sitting   Cuff Size: Extra-Large   Pulse: 86   SpO2: 95%   Weight: 116 kg (256 lb)   Height: 5' 9\" (1 753 m)       Physical Exam  Physical Exam  On examination he is in no acute distress  Cardiac is regular  No respiratory distress    Abdomen is benign      Results  Lab Results   Component Value Date    PSA 1 7 06/20/2022    PSA 1 6 06/01/2021     Lab Results   Component Value Date    GLUCOSE 97 03/22/2016    CALCIUM 9 3 12/21/2022    K 3 6 12/21/2022    CO2 30 12/21/2022     12/21/2022    BUN 15 12/21/2022    CREATININE 1 33 (H) 12/21/2022     Lab Results   Component Value Date    WBC 7 12 12/21/2022    HGB 16 4 12/21/2022    HCT 48 7 12/21/2022     (H) 12/21/2022     12/21/2022         Office Urine Dip  No results found for this or any previous visit (from the past 1 hour(s)) ]  "

## 2023-05-23 NOTE — PROGRESS NOTES
Bariatric Nutrition Follow-Up Note    Type of surgery    Preop no months required  Surgery Date: Tentative February-March 2023  Deadline month June 2023  Surgeon: Dr Jenelle Hurtado reports he was initially interested in RNY, then after speaking with Dr Osmani Gonzalez leaning towards VSG  Pt still unsure at this time, requested to discuss with Dr Osmani Gonzalez to discuss type of surgery  Nutrition Assessment   Berta Eng  45 y o   male   Wt with BMI of 25: 159 6lbs  Pre-Op Excess Wt: 93 1lbs  There were no vitals taken for this visit  Wt Readings from Last 3 Encounters:   05/23/23 116 kg (256 lb)   04/24/23 116 kg (256 lb)   04/06/23 115 kg (253 lb 4 8 oz)     Pre-op weight goals:  5% wt loss=12 635#=Day of surgery goal of 240 065#  88CWX=462 5lbs     [place] Equation:     Weight maintenance= 2343 kcal/day  Estimated calories for weight loss 2124-1017 kcal/day (1-2# per wk wt loss - sedentary)  Estimated protein needs 72 5-108 8g/day (1 0-1 5 gms/kg IBW)  Estimated fluid needs 4164-3515 ml/day (30-35 ml/kg IBW)    Weight History   Onset of Obesity: Childhood  Family history of obesity: No  Wt Loss Attempts: Exercise  Self Created Diets (Portion Control, Healthy Food Choices, etc )  Patient has tried the above for 6 months or more with insufficient weight loss or weight regain, which is why patient has requested to be evaluated for weight loss surgery today  Maximum Wt Lost: lost 90lbs as a kid in a residential asthma treatment program    Review of History and Medications   Past Medical History:   Diagnosis Date   • Anxiety    • Asthma    • Benign essential HTN     last assessed 05/26/2017   • Degenerative joint disease    • Hiccoughs    • Hypertension    • Skin cancer    • Spinal stenosis      Past Surgical History:   Procedure Laterality Date   • ESOPHAGOGASTRODUODENOSCOPY N/A 3/24/2016    Procedure: ESOPHAGOGASTRODUODENOSCOPY (EGD); Surgeon: Manjit Mehta MD;  Location: [facility];   Service:    • "JOINT REPLACEMENT      right hip sx     Social History     Socioeconomic History   • Marital status: /Civil Union     Spouse name: Not on file   • Number of children: Not on file   • Years of education: Not on file   • Highest education level: Not on file   Occupational History   • Not on file   Tobacco Use   • Smoking status: Never   • Smokeless tobacco: Never   Vaping Use   • Vaping Use: Never used   Substance and Sexual Activity   • Alcohol use: Yes     Comment: Social drinker   • Drug use: Yes     Types: Marijuana     Comment: medicinal card obtained   • Sexual activity: Yes     Partners: Female     Birth control/protection: None   Other Topics Concern   • Not on file   Social History Narrative   • Not on file     Social Determinants of Health     Financial Resource Strain: Not on file   Food Insecurity: Not on file   Transportation Needs: Not on file   Physical Activity: Not on file   Stress: Not on file   Social Connections: Not on file   Intimate Partner Violence: Not on file   Housing Stability: Not on file       Current Outpatient Medications:   •  acetaminophen (TYLENOL) 650 mg CR tablet, Take 1 tablet (650 mg total) by mouth every 8 (eight) hours as needed for mild pain, Disp: 30 tablet, Rfl: 0  •  albuterol (Ventolin HFA) 90 mcg/act inhaler, Inhale 2 puffs every 6 (six) hours as needed for wheezing, Disp: 8 g, Rfl: 3  •  ALPRAZolam (XANAX) 1 mg tablet, Take 1 tablet (1 mg total) by mouth 2 (two) times a day as needed for anxiety for up to 30 days (Patient taking differently: Take 1 mg by mouth as needed for anxiety), Disp: 60 tablet, Rfl: 0  •  amoxicillin (AMOXIL) 500 mg capsule, , Disp: , Rfl:   •  B-D 3CC LUER-ANTONELLA SYR 25GX1\" 25G X 1\" 3 ML MISC, , Disp: , Rfl:   •  baclofen 10 mg tablet, Take 1 tablet (10 mg total) by mouth 2 (two) times a day as needed for muscle spasms (Diaphragmatic spasm), Disp: 30 tablet, Rfl: 0  •  carvedilol (COREG) 12 5 mg tablet, Take 1 tablet (12 5 mg total) by mouth 2 " (two) times a day with meals, Disp: 60 tablet, Rfl: 1  •  chlorthalidone 25 mg tablet, Take 1 tablet (25 mg total) by mouth daily, Disp: 90 tablet, Rfl: 3  •  cholecalciferol (VITAMIN D3) 1,000 units tablet, Take 1 tablet (1,000 Units total) by mouth daily Start by taking 3000 units daily for one month, then continue on 1000 units daily  (Patient not taking: Reported on 10/25/2022), Disp: 30 tablet, Rfl: 5  •  fluticasone (Flovent HFA) 110 MCG/ACT inhaler, Inhale 2 puffs 2 (two) times a day Rinse mouth after use , Disp: 36 g, Rfl: 2  •  losartan (COZAAR) 100 MG tablet, Take 1 tablet (100 mg total) by mouth daily, Disp: 90 tablet, Rfl: 3  •  montelukast (SINGULAIR) 10 mg tablet, Take 1 tablet (10 mg total) by mouth daily at bedtime, Disp: 90 tablet, Rfl: 3  •  tacrolimus (PROTOPIC) 0 1 % ointment, , Disp: , Rfl:   •  testosterone cypionate (DEPO-TESTOSTERONE) 200 mg/mL SOLN, , Disp: , Rfl:   •  Testosterone Enanthate 200 MG/ML SOLN, , Disp: , Rfl:   •  triamcinolone (KENALOG) 0 5 % cream, Apply topically 2 (two) times a day, Disp: 30 g, Rfl: 0  •  zolpidem (AMBIEN) 10 mg tablet, TAKE ONE TABLET BY MOUTH DAILY AT BEDTIME AS NEEDED FOR SLEEP, Disp: 30 tablet, Rfl: 0     Food Intake and Lifestyle Assessment   Food Intake Assessment completed via usual diet recall  Pt reports he has no regular meal schedule-varies from day to day  Pt reports that all he ate today was a protein shake and some grapes  Usual diet recall: 24 hour recall  Wakes at 9:30am  Breakfast: none hard boile egg and piece of toast  Snack: wheat thins, doritos   Lunch: none protein shake  Snack: hard boiled eggs, tuna, hamburger meat, tuna steaks, salmon steaks, boneless chicken breast  Dinner: none rice and beans  Snack: PB+J or PB+banana or glass of milk, M+Ms  Beverage intake: water, [place]  Protein supplement: none currently    Pt used to work for [employer]  Estimated protein intake per day: unable to estimate based on pt's "current/variable food recall  Estimated protein intake today=30g  Estimated fluid intake per day: 64+oz  Meals eaten away from home: once a week [employer]  Typical meal pattern: 0 meals per day and multiple snacks per day    Eating Behaviors: Consumption of high calorie/ high fat foods, Large portion sizes, Frequent snacking/ grazing, Mindless eating, Emotional eating, Craves sweet foods and Craves salty foods  Pt reports he does not eat very much, often skips meals, busy during the day  Pt reports that any time he eats he feels stomach pains like overfull/bloating/distention feeling after several bites, and therefore dislikes eating a lot  ongoing  Pt denies eating any high sodium foods including soup, lunchmeats and cheeses, frozen entrees, canned vegetables, etc   Pt also states he does not salt his food  Food allergies or intolerances: No Known Allergies NKFA  Cultural or Caodaism considerations: none    Physical Assessment  Physical Activity  Types of exercise: Pt used to be a  and   No exercise currently  Sedentary  Desk job  Current physical limitations: needs shoulder replacement- ball and socket  Previous R hip replacement  Pt does have some fluid retention/swelling in one leg  Pt reports he gets intermittent intractable hiccups that can last for days and he is hoping the bariatric surgery will help resolve this as well  Pt reports he has severe BOY, tried CPAP and could not get used to it so had a mouth device made for him    Pt reports he had an EGD a little over a year ago where he \"coded  \" Pt reports they found multiple ulcers on his EGD  Psychosocial Assessment   Support systems: lives alone  Socioeconomic factors: works from home, talks on phone all day for work      Nutrition Diagnosis-continued  Diagnosis: Overweight / Obesity (NC-3 3), Excessive energy intake (NI-1 5) and Disordered eating pattern (NB-1 5)  Related to: Limited adherence to " "nutrition-related recommendations, Physical inactivity, Excessive energy intake and Inability or lack of desire to manage self-care  As Evidenced by: BMI >25, Excessive energy intake, Unintentional weight gain and Reports of disorded eating patterns     Nutrition Prescription: Recommend the following diet  Low fat, Low sugar, High protein and Regular    Interventions and Teaching   Discussed pre-op and post-op nutrition guidelines  Patient educated and handouts provided  Surgical changes to stomach / GI  Capacity of post-surgery stomach  Adequate hydration  Sugar and fat restriction to decrease \"dumping syndrome\"  Suggestions for pre-op diet  Nutrition considerations after surgery  Protein supplements  Dietary and lifestyle changes  Possible problems with poor eating habits  Potential for food intolerance after surgery, and ways to deal with them including: lactose intolerance, nausea, reflux, vomiting, diarrhea, food intolerance, appetite changes, gas  Vitamin / Mineral supplementation of Multivitamin with minerals and Vitamin D  Pt currently takes a daily multivitamin  Discussed post-operative portion sizes progression, and discussed need for soft, moist meats post-operatively  Discussed needs for high potency supplements lifelong to prevent malnutrition  Discussed common contributors to weight regain  Education provided to: patient    Barriers to learning: pt reports he already has good knowledge of healthy nutrition and what to eat from his time as a   Readiness to change: action    Prior research on procedure: internet and pre-op class    Comprehension: verbalizes understanding     Expected Compliance: good    Recommendations  Pt is an appropriate candidate for surgery   Yes    Evaluation / Monitoring  Dietitian to Monitor: Eating pattern as discussed Tolerance of nutrition prescription Body weight Lab values Physical activity Bowel pattern    Goals  Food journal, Exercise 30 minutes 5 " times per week, Complete lession plans 1-6, Eat 3 meals per day and Eliminate mindless snacking    Workflow: (Incomplete in bold):  • Psych and/or D+A Clearance: not needed  • PCP Letter: not done  • Support Group: done 12/19/22  • Surgeon Appt done 12/15/22  • EGD had one done two years ago 6/10/2021  • Cardiac Risk Assessment: Consult completed 1/5/23  stress test and echo completed 1/17/23  Cleared by cardiology 1/19/23  • Nephrology clearance: consult done 4/24/23  • Sleep Studies has mouth guard  • Blood work TSH+Lipids done 6/3/22- good until 6/3/23  CBC+CMP done 12/21/22- good until 6/21/23, updated labs ordered today  • Nicotine test nonsmoker  • 6 Month Pre-Operative Program: not needed  • Weight Loss 5% wt loss=12 635#=Day of surgery goal of 240 065#      Time Spent:   30 minutes

## 2023-05-23 NOTE — TELEPHONE ENCOUNTER
Spoke to patient and advised him that he does need an appt to be medically cleared by PCP  Patient will call back as he is currently driving to schedule a pre-op clearance

## 2023-05-24 ENCOUNTER — APPOINTMENT (OUTPATIENT)
Dept: PREADMISSION TESTING | Facility: HOSPITAL | Age: 53
End: 2023-05-24

## 2023-05-24 LAB — BACTERIA UR CULT: NORMAL

## 2023-05-24 NOTE — PRE-PROCEDURE INSTRUCTIONS
Pre-Surgery Instructions:   Medication Instructions   • acetaminophen (TYLENOL) 650 mg CR tablet Uses PRN- OK to take day of surgery   • albuterol (Ventolin HFA) 90 mcg/act inhaler Uses PRN- OK to take day of surgery   • ALPRAZolam (XANAX) 1 mg tablet Uses PRN- OK to take day of surgery   • baclofen 10 mg tablet Uses PRN- OK to take day of surgery   • carvedilol (COREG) 12 5 mg tablet Take day of surgery  • chlorthalidone 25 mg tablet Hold day of surgery  • fluticasone (Flovent HFA) 110 MCG/ACT inhaler Take day of surgery  • losartan (COZAAR) 100 MG tablet Hold day of surgery  • tacrolimus (PROTOPIC) 0 1 % ointment Hold day of surgery  • testosterone cypionate (DEPO-TESTOSTERONE) 200 mg/mL SOLN Every 2 weeks   • triamcinolone (KENALOG) 0 5 % cream Hold day of surgery  • zolpidem (AMBIEN) 10 mg tablet Take night before surgery     Spoke with pt via phone  Advised hospital location will call with arrival time night before surgery  NPO after midnight the night before surgery, including chewing gum and hard candy  A small sip of water is allowed to take approved medications the morning of surgery  Instructed to leave contacts/jewelery/valuables at home  Ok to wear dentures, glasses and hearing aides into the hospital - they will be removed for surgery  No smoking or alcohol consumption 24 hours prior to surgery  Avoid all Aspirin products and OTC Vit/Supp, NSAIDS  Tylenol ok to take prn  Showering instructions reviewed for night before and morning of surgery using surgical soap or antibacterial soap  Patient verbalized understanding of all of the above, including medication instructions

## 2023-05-25 ENCOUNTER — ANESTHESIA (OUTPATIENT)
Dept: PERIOP | Facility: HOSPITAL | Age: 53
End: 2023-05-25

## 2023-05-25 ENCOUNTER — APPOINTMENT (OUTPATIENT)
Dept: RADIOLOGY | Facility: HOSPITAL | Age: 53
End: 2023-05-25

## 2023-05-25 ENCOUNTER — HOSPITAL ENCOUNTER (OUTPATIENT)
Facility: HOSPITAL | Age: 53
Setting detail: OUTPATIENT SURGERY
Discharge: HOME | End: 2023-05-25
Attending: UROLOGY | Admitting: UROLOGY

## 2023-05-25 ENCOUNTER — TELEPHONE (OUTPATIENT)
Dept: UROLOGY | Facility: AMBULATORY SURGERY CENTER | Age: 53
End: 2023-05-25

## 2023-05-25 ENCOUNTER — ANESTHESIA EVENT (OUTPATIENT)
Dept: PERIOP | Facility: HOSPITAL | Age: 53
End: 2023-05-25

## 2023-05-25 VITALS
HEIGHT: 69 IN | SYSTOLIC BLOOD PRESSURE: 146 MMHG | OXYGEN SATURATION: 95 % | TEMPERATURE: 97.5 F | BODY MASS INDEX: 37.03 KG/M2 | DIASTOLIC BLOOD PRESSURE: 90 MMHG | HEART RATE: 70 BPM | RESPIRATION RATE: 16 BRPM | WEIGHT: 250 LBS

## 2023-05-25 DIAGNOSIS — N20.1 LEFT URETERAL CALCULUS: ICD-10-CM

## 2023-05-25 DIAGNOSIS — N20.0 NEPHROLITHIASIS: Primary | ICD-10-CM

## 2023-05-25 LAB
CYSTATIN C SERPL-MCNC: 0.95 MG/L (ref 0.67–1.14)
GFR/BSA.PRED SERPLBLD CYS-BASED-ARV: 85 ML/MIN/1.73

## 2023-05-25 DEVICE — INLAY OPTIMA URETERAL STENT W/O GUIDEWIRE
Type: IMPLANTABLE DEVICE | Site: URETER | Status: FUNCTIONAL
Brand: BARD® INLAY OPTIMA® URETERAL STENT

## 2023-05-25 RX ORDER — CEFAZOLIN SODIUM 2 G/50ML
2000 SOLUTION INTRAVENOUS ONCE
Status: COMPLETED | OUTPATIENT
Start: 2023-05-25 | End: 2023-05-25

## 2023-05-25 RX ORDER — LABETALOL HYDROCHLORIDE 5 MG/ML
10 INJECTION, SOLUTION INTRAVENOUS
Status: DISCONTINUED | OUTPATIENT
Start: 2023-05-25 | End: 2023-05-25 | Stop reason: HOSPADM

## 2023-05-25 RX ORDER — FENTANYL CITRATE 50 UG/ML
INJECTION, SOLUTION INTRAMUSCULAR; INTRAVENOUS AS NEEDED
Status: DISCONTINUED | OUTPATIENT
Start: 2023-05-25 | End: 2023-05-25

## 2023-05-25 RX ORDER — DEXAMETHASONE SODIUM PHOSPHATE 10 MG/ML
INJECTION, SOLUTION INTRAMUSCULAR; INTRAVENOUS AS NEEDED
Status: DISCONTINUED | OUTPATIENT
Start: 2023-05-25 | End: 2023-05-25

## 2023-05-25 RX ORDER — CEPHALEXIN 500 MG/1
500 CAPSULE ORAL 3 TIMES DAILY
Qty: 9 CAPSULE | Refills: 0 | Status: SHIPPED | OUTPATIENT
Start: 2023-05-25 | End: 2023-05-28

## 2023-05-25 RX ORDER — HYDRALAZINE HYDROCHLORIDE 20 MG/ML
10 INJECTION INTRAMUSCULAR; INTRAVENOUS ONCE
Status: DISCONTINUED | OUTPATIENT
Start: 2023-05-25 | End: 2023-05-25

## 2023-05-25 RX ORDER — HYDROCODONE BITARTRATE AND ACETAMINOPHEN 5; 325 MG/1; MG/1
2 TABLET ORAL EVERY 4 HOURS PRN
Status: DISCONTINUED | OUTPATIENT
Start: 2023-05-25 | End: 2023-05-25 | Stop reason: HOSPADM

## 2023-05-25 RX ORDER — LABETALOL HYDROCHLORIDE 5 MG/ML
10 INJECTION, SOLUTION INTRAVENOUS ONCE
Status: COMPLETED | OUTPATIENT
Start: 2023-05-25 | End: 2023-05-25

## 2023-05-25 RX ORDER — ONDANSETRON 2 MG/ML
INJECTION INTRAMUSCULAR; INTRAVENOUS AS NEEDED
Status: DISCONTINUED | OUTPATIENT
Start: 2023-05-25 | End: 2023-05-25

## 2023-05-25 RX ORDER — HYDROMORPHONE HCL/PF 1 MG/ML
0.5 SYRINGE (ML) INJECTION
Status: DISCONTINUED | OUTPATIENT
Start: 2023-05-25 | End: 2023-05-25 | Stop reason: HOSPADM

## 2023-05-25 RX ORDER — ONDANSETRON 2 MG/ML
4 INJECTION INTRAMUSCULAR; INTRAVENOUS ONCE AS NEEDED
Status: DISCONTINUED | OUTPATIENT
Start: 2023-05-25 | End: 2023-05-25 | Stop reason: HOSPADM

## 2023-05-25 RX ORDER — SODIUM CHLORIDE, SODIUM LACTATE, POTASSIUM CHLORIDE, CALCIUM CHLORIDE 600; 310; 30; 20 MG/100ML; MG/100ML; MG/100ML; MG/100ML
INJECTION, SOLUTION INTRAVENOUS CONTINUOUS PRN
Status: DISCONTINUED | OUTPATIENT
Start: 2023-05-25 | End: 2023-05-25

## 2023-05-25 RX ORDER — MIDAZOLAM HYDROCHLORIDE 2 MG/2ML
INJECTION, SOLUTION INTRAMUSCULAR; INTRAVENOUS AS NEEDED
Status: DISCONTINUED | OUTPATIENT
Start: 2023-05-25 | End: 2023-05-25

## 2023-05-25 RX ORDER — PROPOFOL 10 MG/ML
INJECTION, EMULSION INTRAVENOUS AS NEEDED
Status: DISCONTINUED | OUTPATIENT
Start: 2023-05-25 | End: 2023-05-25

## 2023-05-25 RX ORDER — HYDROCODONE BITARTRATE AND ACETAMINOPHEN 5; 325 MG/1; MG/1
1 TABLET ORAL EVERY 6 HOURS PRN
Qty: 6 TABLET | Refills: 0 | Status: SHIPPED | OUTPATIENT
Start: 2023-05-25 | End: 2023-06-04

## 2023-05-25 RX ORDER — LIDOCAINE HYDROCHLORIDE 20 MG/ML
INJECTION, SOLUTION EPIDURAL; INFILTRATION; INTRACAUDAL; PERINEURAL AS NEEDED
Status: DISCONTINUED | OUTPATIENT
Start: 2023-05-25 | End: 2023-05-25

## 2023-05-25 RX ORDER — SODIUM CHLORIDE 9 MG/ML
125 INJECTION, SOLUTION INTRAVENOUS CONTINUOUS
Status: DISCONTINUED | OUTPATIENT
Start: 2023-05-25 | End: 2023-05-25 | Stop reason: HOSPADM

## 2023-05-25 RX ADMIN — FENTANYL CITRATE 25 MCG: 50 INJECTION, SOLUTION INTRAMUSCULAR; INTRAVENOUS at 07:51

## 2023-05-25 RX ADMIN — DEXAMETHASONE SODIUM PHOSPHATE 10 MG: 10 INJECTION, SOLUTION INTRAMUSCULAR; INTRAVENOUS at 07:49

## 2023-05-25 RX ADMIN — LIDOCAINE HYDROCHLORIDE 100 MG: 20 INJECTION, SOLUTION EPIDURAL; INFILTRATION; INTRACAUDAL; PERINEURAL at 07:34

## 2023-05-25 RX ADMIN — SODIUM CHLORIDE, SODIUM LACTATE, POTASSIUM CHLORIDE, AND CALCIUM CHLORIDE: .6; .31; .03; .02 INJECTION, SOLUTION INTRAVENOUS at 07:29

## 2023-05-25 RX ADMIN — HYDROCODONE BITARTRATE AND ACETAMINOPHEN 2 TABLET: 5; 325 TABLET ORAL at 10:09

## 2023-05-25 RX ADMIN — FENTANYL CITRATE 25 MCG: 50 INJECTION, SOLUTION INTRAMUSCULAR; INTRAVENOUS at 07:52

## 2023-05-25 RX ADMIN — LABETALOL HYDROCHLORIDE 10 MG: 5 INJECTION, SOLUTION INTRAVENOUS at 08:56

## 2023-05-25 RX ADMIN — CEFAZOLIN SODIUM 2000 MG: 2 SOLUTION INTRAVENOUS at 07:33

## 2023-05-25 RX ADMIN — ONDANSETRON 4 MG: 2 INJECTION INTRAMUSCULAR; INTRAVENOUS at 07:49

## 2023-05-25 RX ADMIN — MIDAZOLAM 2 MG: 1 INJECTION INTRAMUSCULAR; INTRAVENOUS at 07:30

## 2023-05-25 RX ADMIN — FENTANYL CITRATE 50 MCG: 50 INJECTION, SOLUTION INTRAMUSCULAR; INTRAVENOUS at 07:34

## 2023-05-25 RX ADMIN — LABETALOL HYDROCHLORIDE 10 MG: 5 INJECTION, SOLUTION INTRAVENOUS at 12:04

## 2023-05-25 RX ADMIN — PROPOFOL 200 MG: 10 INJECTION, EMULSION INTRAVENOUS at 07:34

## 2023-05-25 NOTE — ANESTHESIA POSTPROCEDURE EVALUATION
Post-Op Assessment Note    CV Status:  Stable  Pain Score: 0    Pain management: adequate     Mental Status:  Alert and awake   Hydration Status:  Euvolemic   PONV Controlled:  Controlled   Airway Patency:  Patent      Post Op Vitals Reviewed: Yes      Staff: CRNA         No notable events documented      BP   187/108   Temp  97 2   Pulse  83   Resp   12   SpO2   97

## 2023-05-25 NOTE — INTERVAL H&P NOTE
H&P reviewed  After examining the patient I find no changes in the patients condition since the H&P had been written  Vitals:    05/25/23 0604   BP: 153/98   Pulse: 76   Resp: 16   Temp: (!) 97 2 °F (36 2 °C)   SpO2: 96%       Large left proximal ureteral calculus with hydronephrosis  Plan for cystoscopy with left ureteroscopy with holmium laser lithotripsy, left retrograde pyelography and left ureteral stent insertion  Risk of the procedure including, but not limited to bleeding, infection, sepsis, ureteral injury, and need for more than 1 procedure to remove his entire stone burden based on the size and location  Informed consent obtained

## 2023-05-25 NOTE — OP NOTE
OPERATIVE REPORT  PATIENT NAME: Collette Fu    :  1970  MRN: [id number]  Pt Location:  CYSTO ROOM 01    SURGERY DATE: 2023    Surgeon(s) and Role:     Jeannie De Guzman MD - Primary    Preop Diagnosis:  Left ureteral calculus [N20 1]    Post-Op Diagnosis Codes:     * Left ureteral calculus [N20 1]    Procedure(s):  Left - CYSTOSCOPY URETEROSCOPY WITH LITHOTRIPSY HOLMIUM LASER  RETROGRADE PYELOGRAM AND INSERTION STENT URETERAL    Specimen(s):  ID Type Source Tests Collected by Time Destination   A : urine culture Urine Urine, Cystoscopic URINE CULTURE Jeannie De Guzman MD 2023 [id number]        Estimated Blood Loss:   Minimal    Drains:  Left 24-6 Somali double-J ureteral stent without string    Anesthesia Type:   General    Operative Indications:  Left ureteral calculus [N20 1]      Operative Findings:  Very large left proximal ureteral calculus and left lower pole renal calculi decimated with holmium laser lithotripsy  Numerous fragments created  Left 24-6 Somali double-J ureteral stent placed without a string  Complications:   None    Procedure and Technique:  Wade Tatum is a 57-year-old male referred by nephrology for a very large left proximal ureteral calculus  This was found incidentally on a work-up for chronic kidney disease where an ultrasound followed by noncontrast CT scan were obtained  CT scan revealed a greater than 2 cm stone in the left proximal ureter  He also has some stones in the lower pole of the kidney  Risk and benefits of left ureteroscopy with holmium laser lithotripsy were discussed and reviewed  Risks including, but not limited to, bleeding, infection, ureteral injury, sepsis, need for additional surgery were discussed and reviewed informed consent obtained  The patient was brought to the operating room on May 25, 2023  After the smooth induction of general LMA anesthesia, the patient was placed in dorsolithotomy position    His genitalia prepped and draped in sterile fashion  Intravenous antibiotics were administered  Timeout was performed with all members the operative team confirming the patient's identity, procedure to be performed, laterality of the case  A 22 Cambodian rigid cystoscope with 30 degree lens was inserted  The bladder was thoroughly inspected  There were no mucosal abnormalities, lesions, or calculi identified  Attention was focused on the left ureteral orifice  5 Western Sophie open-ended catheter was inserted   fluoroscopic images showed a large stone in the left proximal ureter  Contrast was injected and a wire was passed proximal into an upper pole calyx and secured  A second wire was placed with the assistance of a dual-lumen catheter followed by ureteral access sheath followed by an Olympus 8 5 Cambodian high-definition flexible ureteroscope based on the proximal nature of the stone  The stone was identified and engaged with a 365 µm holmium laser fiber using high-powered settings the stone was decimated into countless pieces until the fragments were pushed back into the kidney  I continue to lottie all of the fragments within the kidney including the lower pole stones and decimated them with holmium laser lithotripsy until all fragments appear to be small enough to be passable  Additional contrast was injected  The scope was then slowly withdrawn through the length of the ureter while the access sheath was removed  A few small fragments within the ureter were noted but none of significant size  The wire was backloaded through the cystoscope and a left 24-6 Western Sophie double-J ureteral stent was then placed in the standard fashion  The proximal coil was appreciated within the left renal pelvis and the distal coil was visualized within the bladder  There was no string left in place based on the numerous fragments within the kidney  The bladder was emptied and the cystoscope was removed      Overall the patient tolerated the procedure well and there were no complications  Patient was extubated in the operating room and transferred the PACU in stable condition at the conclusion of the case      Patient Disposition:  PACU stable and extubated        SIGNATURE: Sima Holman MD  DATE: May 25, 2023  TIME: 8:37 AM

## 2023-05-25 NOTE — ANESTHESIA PREPROCEDURE EVALUATION
Procedure:  CYSTOSCOPY URETEROSCOPY WITH LITHOTRIPSY HOLMIUM LASER, RETROGRADE PYELOGRAM AND INSERTION STENT URETERAL (Left: Bladder)    Relevant Problems   CARDIO   (+) HTN (hypertension)      GI/HEPATIC   (+) Gastroesophageal reflux disease with esophagitis      /RENAL   (+) Stage 3a chronic kidney disease (HCC)      NEURO/PSYCH   (+) Anxiety      PULMONARY   (+) Mild intermittent asthma without complication   (+) BOY (obstructive sleep apnea)      Genitourinary   (+) Left ureteral calculus      Other   (+) Class 2 severe obesity due to excess calories with serious comorbidity and body mass index (BMI) of 35 0 to 35 9 in adult [facility])        Patient had complete airway obstruction during colonoscopy under sedation  Needed aggressive ventilatory support and was intubated with procedure aborted  Patient had chipped tooth and spent thousands of dollars on repairs for his teeth  He expresses concerns about subsequent anesthesia  I reassured patient that we will secure his airway safely for the procedure under GA  All questions answered  Physical Exam    Airway  Comment: Large thick neck   Mallampati score: III    Neck ROM: full     Dental   No notable dental hx     Cardiovascular      Pulmonary      Other Findings        Anesthesia Plan  ASA Score- 2     Anesthesia Type- general with ASA Monitors  Additional Monitors:   Airway Plan: LMA  Plan Factors-Exercise tolerance (METS): >4 METS  Chart reviewed  Patient summary reviewed  Patient is not a current smoker  Obstructive sleep apnea risk education given perioperatively  Induction- intravenous  Postoperative Plan-     Informed Consent- Anesthetic plan and risks discussed with patient  I personally reviewed this patient with the CRNA  Discussed and agreed on the Anesthesia Plan with the CRNA  Bridgett Johnson

## 2023-05-25 NOTE — TELEPHONE ENCOUNTER
----- Message from Alyse Mayers MD sent at 5/25/2023  8:44 AM EDT -----  Dony Mneeses underwent left ureteroscopy  He has a stent without a string  He needs follow-up with an advanced practitioner in approximately 3 to 4 weeks with a noncontrast CT stone study of the abdomen and pelvis  We will then make a decision about stent removal in the office versus second look ureteroscopy in the operating room  Thank you    FT

## 2023-05-26 DIAGNOSIS — L40.9 PSORIASIS: ICD-10-CM

## 2023-05-26 DIAGNOSIS — J45.20 MILD INTERMITTENT ASTHMA WITHOUT COMPLICATION: ICD-10-CM

## 2023-05-26 RX ORDER — ALBUTEROL SULFATE 90 UG/1
2 AEROSOL, METERED RESPIRATORY (INHALATION) EVERY 6 HOURS PRN
Qty: 8 G | Refills: 3 | Status: SHIPPED | OUTPATIENT
Start: 2023-05-26 | End: 2024-05-25

## 2023-05-26 RX ORDER — TRIAMCINOLONE ACETONIDE 5 MG/G
CREAM TOPICAL 2 TIMES DAILY
Qty: 30 G | Refills: 0 | Status: SHIPPED | OUTPATIENT
Start: 2023-05-26

## 2023-05-26 NOTE — TELEPHONE ENCOUNTER
Post Op Note    Susan Love is a 45 y o  male s/p Cysto/stent performed 05/25/23  Susan Love is a patient of Dr Grady Epps and is seen at the [place] office  How would you rate your pain on a scale from 1 to 10, 10 being the worst pain ever? 0 just discomfort  Have you had a fever? No  Have your bowel movements been regular? No  Do you have any difficulty urinating? Yes yellow today - hydration with water recommended  Do you have any other questions or concerns that I can address at this time?  Gave him number for ct scan and scheduled him for follow up with D[facility] [email or web address]

## 2023-05-27 LAB — BACTERIA UR CULT: NORMAL

## 2023-06-05 ENCOUNTER — HOSPITAL ENCOUNTER (OUTPATIENT)
Dept: RADIOLOGY | Facility: HOSPITAL | Age: 53
Discharge: HOME | End: 2023-06-05
Attending: UROLOGY
Payer: MEDICARE

## 2023-06-05 DIAGNOSIS — N20.0 NEPHROLITHIASIS: ICD-10-CM

## 2023-06-05 PROCEDURE — G1004 CDSM NDSC: HCPCS

## 2023-06-05 PROCEDURE — 74176 CT ABD & PELVIS W/O CONTRAST: CPT

## 2023-06-07 ENCOUNTER — TELEPHONE (OUTPATIENT)
Dept: OTHER | Facility: OTHER | Age: 53
End: 2023-06-07

## 2023-06-08 NOTE — TELEPHONE ENCOUNTER
Spoke with Marcella Fraire and notified him results ar not back yet - He said he just has to be patient then

## 2023-06-12 ENCOUNTER — CONSULT (OUTPATIENT)
Dept: INTERNAL MEDICINE CLINIC | Facility: CLINIC | Age: 53
End: 2023-06-12

## 2023-06-12 VITALS
WEIGHT: 250 LBS | DIASTOLIC BLOOD PRESSURE: 88 MMHG | HEIGHT: 69 IN | BODY MASS INDEX: 37.03 KG/M2 | HEART RATE: 94 BPM | SYSTOLIC BLOOD PRESSURE: 131 MMHG

## 2023-06-12 DIAGNOSIS — J45.30 MILD PERSISTENT ASTHMA WITHOUT COMPLICATION: ICD-10-CM

## 2023-06-12 DIAGNOSIS — E66.01 CLASS 2 SEVERE OBESITY DUE TO EXCESS CALORIES WITH SERIOUS COMORBIDITY AND BODY MASS INDEX (BMI) OF 36.0 TO 36.9 IN ADULT (HCC): ICD-10-CM

## 2023-06-12 DIAGNOSIS — I10 ESSENTIAL HYPERTENSION: ICD-10-CM

## 2023-06-12 DIAGNOSIS — Z01.818 PREOPERATIVE CLEARANCE: Primary | ICD-10-CM

## 2023-06-12 DIAGNOSIS — N18.31 STAGE 3A CHRONIC KIDNEY DISEASE (HCC): ICD-10-CM

## 2023-06-12 DIAGNOSIS — E29.1 HYPOGONADISM IN MALE: ICD-10-CM

## 2023-06-12 PROCEDURE — 99214 OFFICE O/P EST MOD 30 MIN: CPT | Performed by: INTERNAL MEDICINE

## 2023-06-12 NOTE — PROGRESS NOTES
Name: Eric Leong      : 1970      MRN: [id number]  Encounter Provider: Elenita Eduardo DO  Encounter Date: 2023   Encounter department: [address]     1  Preoperative clearance  2  Class 2 severe obesity due to excess calories with serious comorbidity and body mass index (BMI) of 36 0 to 36 9 in adult [facility])  Eric Leong is seen for pre-operative evaluation for vertical sleeve gastrectomy surgery  Patient reports no sx of chest pain reported at rest or with exertion, dyspnea at rest or with exertion, PND, LE edema, claudication, or palpitations  No history of ischemic heart disease, CHF, cardiovascular disease, diabetes  No recent anticoagulant or antithrombotic use  Stress test in 2023 was negative for ischemia  TTE 2023 with EF 60%, G1DD  The patient is able to achieve >4 METs of activity  There is no need for further diagnostic testing prior to patient's scheduled procedure  Patient may proceed with surgery with understanding of perioperative risk in light of the benefits of possible surgery  Recommend holding chlorthalidone and losartan prior to surgery  Continue coreg perioperatively  Recommend fluids per nephrology recommendations perioperatively  3  Essential hypertension  Blood Pressure: 131/88   Stable  Has not started chlorthalidone yet as he still have Losartan-HCTZ combination pills at home  - continue losartan-HCTZ until complete and then start losartan and chlorthalidone separately    4  Hypogonadism in male  [facility] endocrinology outpatient  Continue testosterone supplement  5  Mild persistent asthma without complication  No active exacerbation  Continue albuterol prn      6  Stage 3a chronic kidney disease ([facility] [address] )  [facility] nephrology outpatient who has cleared patient for surgery  Recommend fluids per nephrology perioperatively          Subjective     Eric Leong is a 45 y o  male with past medical history of secondary hypogonadism on testosterone supplement, GERD, asthma, BOY, HTN, CKD, Anxiety who presents for preop clearance for vertical sleeve gastrectomy surgery  No chest pain  Patient reports he has been gaining significant weight over the past several years despite dietary control  He previously used to work out and stopped due to need of hip surgery  He has gained about 35-50 lbs over the last few years  Reports significant abdominal pain and discomfort with bending  Also reports hard abdomen wall  Patient has already been cleared by cardiology and nephrology  Review of Systems   Gastrointestinal: Positive for abdominal distention and abdominal pain  All other systems reviewed and are negative  Past Medical History:   Diagnosis Date   • Anxiety    • Asthma    • Benign essential HTN     last assessed 05/26/2017   • Degenerative joint disease    • Hiccoughs    • Hypertension    • Kidney stone    • Skin cancer    • Sleep apnea     no CPAP use   • Spinal stenosis      Past Surgical History:   Procedure Laterality Date   • COLONOSCOPY     • ESOPHAGOGASTRODUODENOSCOPY N/A 03/24/2016    Procedure: ESOPHAGOGASTRODUODENOSCOPY (EGD); Surgeon: Edgardo Perez MD;  [facility]: [facility];   Service:    • FL RETROGRADE PYELOGRAM  5/25/2023   • JOINT REPLACEMENT      right hip sx   • [place] CYSTO/URETERO W/LITHOTRIPSY &INDWELL STENT INSRT Left 5/25/2023    Procedure: CYSTOSCOPY URETEROSCOPY WITH LITHOTRIPSY HOLMIUM LASER, RETROGRADE PYELOGRAM AND INSERTION STENT URETERAL;  Surgeon: Roma Concepcion MD;  [facility];  Service: Urology     Family History   Problem Relation Age of Onset   • Cancer Mother    • Cancer Father      Social History     Socioeconomic History   • Marital status: /Civil Union     Spouse name: None   • Number of children: None   • Years of education: None   • Highest education level: None   Occupational History   • None   Tobacco Use   • Smoking status: Never   • Smokeless tobacco: "Never   Vaping Use   • Vaping Use: Never used   Substance and Sexual Activity   • Alcohol use: Yes     Comment: Social drinker   • Drug use: Yes     Types: Marijuana     Comment: medicinal card obtained   • Sexual activity: Yes     Partners: Female     Birth control/protection: None   Other Topics Concern   • None   Social History Narrative   • None     Social Determinants of Health     Financial Resource Strain: Low Risk  (6/12/2023)    Overall Financial Resource Strain (CARDIA)    • Difficulty of Paying Living Expenses: Not very hard   Food Insecurity: No Food Insecurity (6/5/2023)    Hunger Vital Sign    • Worried About Running Out of Food in the Last Year: Never true    • Ran Out of Food in the Last Year: Never true   Transportation Needs: No Transportation Needs (6/5/2023)    PRAPARE - Transportation    • Lack of Transportation (Medical): No    • Lack of Transportation (Non-Medical):  No   Physical Activity: Not on file   Stress: Not on file   Social Connections: Not on file   Intimate Partner Violence: Not on file   Housing Stability: Not on file     Current Outpatient Medications on File Prior to Visit   Medication Sig   • acetaminophen (TYLENOL) 650 mg CR tablet Take 1 tablet (650 mg total) by mouth every 8 (eight) hours as needed for mild pain   • albuterol (Ventolin HFA) 90 mcg/act inhaler Inhale 2 puffs every 6 (six) hours as needed for wheezing   • ALPRAZolam (XANAX) 1 mg tablet Take 1 tablet (1 mg total) by mouth 2 (two) times a day as needed for anxiety for up to 30 days (Patient taking differently: Take 1 mg by mouth as needed for anxiety)   • B-D 3CC LUER-ANTONELLA SYR 25GX1\" 25G X 1\" 3 ML MISC    • baclofen 10 mg tablet Take 1 tablet (10 mg total) by mouth 2 (two) times a day as needed for muscle spasms (Diaphragmatic spasm)   • carvedilol (COREG) 12 5 mg tablet Take 1 tablet (12 5 mg total) by mouth 2 (two) times a day with meals   • chlorthalidone 25 mg tablet Take 1 tablet (25 mg total) by mouth daily " "  • cholecalciferol (VITAMIN D3) 1,000 units tablet Take 1 tablet (1,000 Units total) by mouth daily Start by taking 3000 units daily for one month, then continue on 1000 units daily  (Patient not taking: Reported on 10/25/2022)   • fluticasone (Flovent HFA) 110 MCG/ACT inhaler Inhale 2 puffs 2 (two) times a day Rinse mouth after use  • losartan (COZAAR) 100 MG tablet Take 1 tablet (100 mg total) by mouth daily   • tacrolimus (PROTOPIC) 0 1 % ointment    • testosterone cypionate (DEPO-TESTOSTERONE) 200 mg/mL SOLN every 14 (fourteen) days   • Testosterone Enanthate 200 MG/ML SOLN    • triamcinolone (KENALOG) 0 5 % cream Apply topically 2 (two) times a day   • zolpidem (AMBIEN) 10 mg tablet TAKE ONE TABLET BY MOUTH DAILY AT BEDTIME AS NEEDED FOR SLEEP     No Known Allergies    There is no immunization history on file for this patient  Objective     /88   Pulse 94   Ht 5' 9\" (1 753 m)   Wt 113 kg (250 lb)   BMI 36 92 kg/m²     Physical Exam  Vitals reviewed  Constitutional:       General: He is not in acute distress  Appearance: Normal appearance  He is not ill-appearing  HENT:      Head: Normocephalic and atraumatic  Eyes:      General: No scleral icterus  Conjunctiva/sclera: Conjunctivae normal    Cardiovascular:      Rate and Rhythm: Normal rate and regular rhythm  Heart sounds: Normal heart sounds  No murmur heard  Pulmonary:      Effort: Pulmonary effort is normal  No respiratory distress  Breath sounds: Normal breath sounds  No wheezing or rales  Abdominal:      General: Bowel sounds are normal       Palpations: Abdomen is soft  Tenderness: There is no abdominal tenderness  Musculoskeletal:      Right lower leg: No edema  Left lower leg: No edema  Skin:     General: Skin is warm and dry  Neurological:      Mental Status: He is alert and oriented to person, place, and time  Mental status is at baseline     Psychiatric:         Mood and Affect: Mood normal    " Behavior: Behavior normal        Jonathan Null, DO

## 2023-06-15 DIAGNOSIS — G47.09 OTHER INSOMNIA: ICD-10-CM

## 2023-06-20 RX ORDER — ZOLPIDEM TARTRATE 10 MG/1
TABLET ORAL
Qty: 30 TABLET | Refills: 0 | Status: SHIPPED | OUTPATIENT
Start: 2023-06-20

## 2023-06-20 NOTE — TELEPHONE ENCOUNTER
Spoke to patient, he stated that he doesn't take this everyday  He confirmed on his bottle that the date was 3/28/23 that is was last filled by Dr Micaela Galvez  Can you send refill?

## 2023-06-21 ENCOUNTER — OFFICE VISIT (OUTPATIENT)
Dept: UROLOGY | Facility: AMBULATORY SURGERY CENTER | Age: 53
End: 2023-06-21
Payer: MEDICARE

## 2023-06-21 VITALS
DIASTOLIC BLOOD PRESSURE: 82 MMHG | WEIGHT: 248 LBS | SYSTOLIC BLOOD PRESSURE: 138 MMHG | HEART RATE: 87 BPM | BODY MASS INDEX: 36.62 KG/M2 | OXYGEN SATURATION: 97 %

## 2023-06-21 DIAGNOSIS — N20.0 NEPHROLITHIASIS: Primary | ICD-10-CM

## 2023-06-21 PROCEDURE — 87086 URINE CULTURE/COLONY COUNT: CPT | Performed by: NURSE PRACTITIONER

## 2023-06-21 RX ORDER — TESTOSTERONE 16.2 MG/G
GEL TRANSDERMAL
COMMUNITY
Start: 2023-06-21

## 2023-06-21 RX ORDER — OXYBUTYNIN CHLORIDE 5 MG/1
5 TABLET ORAL 3 TIMES DAILY PRN
Qty: 30 TABLET | Refills: 0 | Status: SHIPPED | OUTPATIENT
Start: 2023-06-21

## 2023-06-21 NOTE — PROGRESS NOTES
6/21/2023    Assessment and Plan    45 y o  male managed by Dr Domo Watson    1  Nephrolithiasis  · CT stone study performed 6/5/2023 reveals interval lithotripsy and stenting of the left UVJ with small chronic calculus process in mid left ureter  · OR case requested  · OR consent obtained/signed        History of Present Illness  Natalia Corrigan is a 45 y o  male here for follow up evaluation of nephrolithiasis  Patient is status post cystoscopy with left ureteroscopy and laser lithotripsy for a large proximal ureteral calculus with hydronephrosis  He presents to the office today for evaluation of symptoms, review of CT study  If CT reveals continued ureteral calculus patient may require another trip to the operating room for ureteroscopy and removal of remnant ureteral stone fragments  Patient denies fever and chills  He does report occasional mild flank discomfort  He denies complication secondary to anesthesia  Patient denies snoring and the useof his sepsis alcohol  He denies complication secondary to anesthesia  Review of Systems   Constitutional: Negative for chills and fever  Respiratory: Negative for cough and shortness of breath  Cardiovascular: Negative for chest pain  Gastrointestinal: Negative for abdominal distention, abdominal pain, blood in stool, nausea and vomiting  Genitourinary: Negative for difficulty urinating, dysuria, enuresis, flank pain, frequency, hematuria and urgency  Skin: Negative for rash             AUA SYMPTOM SCORE    Flowsheet Row Most Recent Value   AUA SYMPTOM SCORE    How often have you had a sensation of not emptying your bladder completely after you finished urinating? 3 (P)     How often have you had to urinate again less than two hours after you finished urinating? 3 (P)     How often have you found you stopped and started again several times when you urinate? 1 (P)     How often have you found it difficult to postpone urination? 4 (P)     How often have you had a weak urinary stream? 1 (P)     How often have you had to push or strain to begin urination? 0 (P)     How many times did you most typically get up to urinate from the time you went to bed at night until the time you got up in the morning? 3 (P)     Quality of Life: If you were to spend the rest of your life with your urinary condition just the way it is now, how would you feel about that? 5 (P)     AUA SYMPTOM SCORE 15 (P)              Past Medical History  Past Medical History:   Diagnosis Date   • Anxiety    • Asthma    • Benign essential HTN     last assessed 05/26/2017   • Degenerative joint disease    • Hiccoughs    • Hypertension    • Kidney stone    • Skin cancer    • Sleep apnea     no CPAP use   • Spinal stenosis        Past Social History  Past Surgical History:   Procedure Laterality Date   • COLONOSCOPY     • ESOPHAGOGASTRODUODENOSCOPY N/A 03/24/2016    Procedure: ESOPHAGOGASTRODUODENOSCOPY (EGD); Surgeon: Robert Chan MD;  [facility];   Service:    • FL RETROGRADE PYELOGRAM  5/25/2023   • JOINT REPLACEMENT      right hip sx   • [place] CYSTO/URETERO W/LITHOTRIPSY &INDWELL STENT INSRT Left 5/25/2023    Procedure: CYSTOSCOPY URETEROSCOPY WITH LITHOTRIPSY HOLMIUM LASER, RETROGRADE PYELOGRAM AND INSERTION STENT URETERAL;  Surgeon: Denice Short MD;  [facility];  Service: Urology     Social History     Tobacco Use   Smoking Status Never   Smokeless Tobacco Never       Past Family History  Family History   Problem Relation Age of Onset   • Cancer Mother    • Cancer Father        Past Social history  Social History     Socioeconomic History   • Marital status: /Civil Union     Spouse name: Not on file   • Number of children: Not on file   • Years of education: Not on file   • Highest education level: Not on file   Occupational History   • Not on file   Tobacco Use   • Smoking status: Never   • Smokeless tobacco: Never   Vaping Use   • Vaping Use: Never used   Substance "and Sexual Activity   • Alcohol use: Yes     Comment: Social drinker   • Drug use: Yes     Types: Marijuana     Comment: medicinal card obtained   • Sexual activity: Yes     Partners: Female     Birth control/protection: None   Other Topics Concern   • Not on file   Social History Narrative   • Not on file     Social Determinants of Health     Financial Resource Strain: Low Risk  (6/12/2023)    Overall Financial Resource Strain (CARDIA)    • Difficulty of Paying Living Expenses: Not very hard   Food Insecurity: No Food Insecurity (6/5/2023)    Hunger Vital Sign    • Worried About Running Out of Food in the Last Year: Never true    • Ran Out of Food in the Last Year: Never true   Transportation Needs: No Transportation Needs (6/5/2023)    PRAPARE - Transportation    • Lack of Transportation (Medical): No    • Lack of Transportation (Non-Medical): No   Physical Activity: Not on file   Stress: Not on file   Social Connections: Not on file   Intimate Partner Violence: Not on file   Housing Stability: Not on file       Current Medications  Current Outpatient Medications   Medication Sig Dispense Refill   • acetaminophen (TYLENOL) 650 mg CR tablet Take 1 tablet (650 mg total) by mouth every 8 (eight) hours as needed for mild pain 30 tablet 0   • albuterol (Ventolin HFA) 90 mcg/act inhaler Inhale 2 puffs every 6 (six) hours as needed for wheezing 8 g 3   • B-D 3CC LUER-ANTONELLA SYR 25GX1\" 25G X 1\" 3 ML MISC      • baclofen 10 mg tablet Take 1 tablet (10 mg total) by mouth 2 (two) times a day as needed for muscle spasms (Diaphragmatic spasm) 30 tablet 0   • carvedilol (COREG) 12 5 mg tablet Take 1 tablet (12 5 mg total) by mouth 2 (two) times a day with meals 60 tablet 1   • chlorthalidone 25 mg tablet Take 1 tablet (25 mg total) by mouth daily 90 tablet 3   • fluticasone (Flovent HFA) 110 MCG/ACT inhaler Inhale 2 puffs 2 (two) times a day Rinse mouth after use   36 g 2   • losartan (COZAAR) 100 MG tablet Take 1 tablet (100 mg " total) by mouth daily 90 tablet 3   • oxybutynin (DITROPAN) 5 mg tablet Take 1 tablet (5 mg total) by mouth 3 (three) times a day as needed (bladder spasms) 30 tablet 0   • tacrolimus (PROTOPIC) 0 1 % ointment      • testosterone (ANDROGEL) 1 62 % TD gel pump      • testosterone cypionate (DEPO-TESTOSTERONE) 200 mg/mL SOLN every 14 (fourteen) days     • Testosterone Enanthate 200 MG/ML SOLN      • triamcinolone (KENALOG) 0 5 % cream Apply topically 2 (two) times a day 30 g 0   • zolpidem (AMBIEN) 10 mg tablet TAKE ONE TABLET BY MOUTH DAILY AT BEDTIME AS NEEDED FOR SLEEP 30 tablet 0   • ALPRAZolam (XANAX) 1 mg tablet Take 1 tablet (1 mg total) by mouth 2 (two) times a day as needed for anxiety for up to 30 days (Patient taking differently: Take 1 mg by mouth as needed for anxiety) 60 tablet 0   • cholecalciferol (VITAMIN D3) 1,000 units tablet Take 1 tablet (1,000 Units total) by mouth daily Start by taking 3000 units daily for one month, then continue on 1000 units daily  (Patient not taking: Reported on 10/25/2022) 30 tablet 5     No current facility-administered medications for this visit  Allergies  No Known Allergies      The following portions of the patient's history were reviewed and updated as appropriate: allergies, current medications, past medical history, past social history, past surgical history and problem list       Vitals  Vitals:    06/21/23 1127   BP: 138/82   BP Location: Left arm   Patient Position: Sitting   Cuff Size: Large   Pulse: 87   SpO2: 97%   Weight: 112 kg (248 lb)           Physical Exam  Physical Exam  Vitals reviewed  Constitutional:       General: He is not in acute distress  Appearance: Normal appearance  He is normal weight  HENT:      Head: Normocephalic  Eyes:      Pupils: Pupils are equal, round, and reactive to light  Cardiovascular:      Rate and Rhythm: Normal rate  Pulmonary:      Effort: No respiratory distress        Breath sounds: Normal breath sounds  Abdominal:      Palpations: Abdomen is soft  Skin:     General: Skin is warm and dry  Neurological:      General: No focal deficit present  Mental Status: He is alert and oriented to person, place, and time     Psychiatric:         Mood and Affect: Mood normal          Behavior: Behavior normal            Results  No results found for this or any previous visit (from the past 1 hour(s)) ]  Lab Results   Component Value Date    PSA 2 01 05/23/2023    PSA 1 7 06/20/2022    PSA 1 6 06/01/2021     Lab Results   Component Value Date    GLUCOSE 97 03/22/2016    CALCIUM 9 7 05/23/2023    K 3 8 05/23/2023    CO2 30 05/23/2023     05/23/2023    BUN 15 05/23/2023    CREATININE 1 42 (H) 05/23/2023     Lab Results   Component Value Date    WBC 7 12 12/21/2022    HGB 16 2 05/23/2023    HCT 46 0 05/23/2023     (H) 12/21/2022     12/21/2022           Orders  Orders Placed This Encounter   Procedures   • Urine culture     Standing Status:   Future     Number of Occurrences:   1     Standing Expiration Date:   6/21/2024     Order Specific Question:   Release to patient through [address] [place] [address] 79     Answer:   Jorje Barton

## 2023-06-23 LAB — BACTERIA UR CULT: NORMAL

## 2023-06-26 ENCOUNTER — TELEPHONE (OUTPATIENT)
Dept: UROLOGY | Facility: CLINIC | Age: 53
End: 2023-06-26

## 2023-06-26 NOTE — TELEPHONE ENCOUNTER
Pt called in inform provider that over the last few days he has been passing stones and wanted to know if he need another CT

## 2023-06-26 NOTE — TELEPHONE ENCOUNTER
Called and spoke with patient  Advised on AP's note  He is not having any discomfort at this time and is understanding  He asked about a date for next procedure  Informed I will message surgery scheduler

## 2023-06-26 NOTE — TELEPHONE ENCOUNTER
It is common to be passing small calculi after recent procedure  Is patient experiencing acute renal colic? Otherwise, he does not need to repeat testing at this time

## 2023-06-28 NOTE — PROGRESS NOTES
"Bariatric Nutrition Follow-Up Note    Type of surgery    Preop no months required  Surgery Date: Tentative February-March 2023  Deadline month July 2023  Surgeon: Dr Shiva Harvey  45 y o   male   Wt with BMI of 25: 159 6lbs  Pre-Op Excess Wt: 93 1lbs     Ht 5' 9\" (1 753 m)   Wt 113 kg (248 lb 12 8 oz)   BMI 36 74 kg/m²    Pt has lost 4# since starting the program October of 2022    Wt Readings from Last 3 Encounters:   06/21/23 112 kg (248 lb)   06/12/23 113 kg (250 lb)   05/25/23 113 kg (250 lb)     Pre-op weight goals:  5% wt loss=12 635#=Day of surgery goal of 240 065#  34QHB=807 5lbs     [facility] [place] Equation:     Weight maintenance= 2343 kcal/day  Estimated calories for weight loss 9141-9123 kcal/day (1-2# per wk wt loss - sedentary)  Estimated protein needs 58-72 g/day ( 8-1 0 gms/kg IBW)CKD stage 3   Estimated fluid needs 7053-2479 ml/day (30-35 ml/kg IBW)    Weight History   Onset of Obesity: Childhood  Family history of obesity: No  Wt Loss Attempts: Exercise  Self Created Diets (Portion Control, Healthy Food Choices, etc )  Patient has tried the above for 6 months or more with insufficient weight loss or weight regain, which is why patient has requested to be evaluated for weight loss surgery today  Maximum Wt Lost: lost 90lbs as a kid in a residential asthma treatment program    Review of History and Medications   Past Medical History:   Diagnosis Date   • Anxiety    • Asthma    • Benign essential HTN     last assessed 05/26/2017   • Degenerative joint disease    • Hiccoughs    • Hypertension    • Kidney stone    • Skin cancer    • Sleep apnea     no CPAP use   • Spinal stenosis      Past Surgical History:   Procedure Laterality Date   • COLONOSCOPY     • ESOPHAGOGASTRODUODENOSCOPY N/A 03/24/2016    Procedure: ESOPHAGOGASTRODUODENOSCOPY (EGD); Surgeon: Sakina Varner MD;  Location: [facility];   Service:    • FL RETROGRADE PYELOGRAM  5/25/2023   • JOINT " REPLACEMENT      right hip sx   • AR CYSTO/URETERO W/LITHOTRIPSY &INDWELL STENT INSRT Left 5/25/2023    Procedure: CYSTOSCOPY URETEROSCOPY WITH LITHOTRIPSY HOLMIUM LASER, RETROGRADE PYELOGRAM AND INSERTION STENT URETERAL;  Surgeon: Eve Sanders MD;  Location: [facility];  Service: Urology     Social History     Socioeconomic History   • Marital status: /Civil Union     Spouse name: Not on file   • Number of children: Not on file   • Years of education: Not on file   • Highest education level: Not on file   Occupational History   • Not on file   Tobacco Use   • Smoking status: Never   • Smokeless tobacco: Never   Vaping Use   • Vaping Use: Never used   Substance and Sexual Activity   • Alcohol use: Yes     Comment: Social drinker   • Drug use: Yes     Types: Marijuana     Comment: medicinal card obtained   • Sexual activity: Yes     Partners: Female     Birth control/protection: None   Other Topics Concern   • Not on file   Social History Narrative   • Not on file     Social Determinants of Health     Financial Resource Strain: Low Risk  (6/12/2023)    Overall Financial Resource Strain (CARDIA)    • Difficulty of Paying Living Expenses: Not very hard   Food Insecurity: No Food Insecurity (6/5/2023)    Hunger Vital Sign    • Worried About Running Out of Food in the Last Year: Never true    • Ran Out of Food in the Last Year: Never true   Transportation Needs: No Transportation Needs (6/5/2023)    PRAPARE - Transportation    • Lack of Transportation (Medical): No    • Lack of Transportation (Non-Medical):  No   Physical Activity: Not on file   Stress: Not on file   Social Connections: Not on file   Intimate Partner Violence: Not on file   Housing Stability: Not on file       Current Outpatient Medications:   •  acetaminophen (TYLENOL) 650 mg CR tablet, Take 1 tablet (650 mg total) by mouth every 8 (eight) hours as needed for mild pain, Disp: 30 tablet, Rfl: 0  •  albuterol (Ventolin HFA) 90 mcg/act "inhaler, Inhale 2 puffs every 6 (six) hours as needed for wheezing, Disp: 8 g, Rfl: 3  •  ALPRAZolam (XANAX) 1 mg tablet, Take 1 tablet (1 mg total) by mouth 2 (two) times a day as needed for anxiety for up to 30 days (Patient taking differently: Take 1 mg by mouth as needed for anxiety), Disp: 60 tablet, Rfl: 0  •  B-D 3CC LUER-ANTONELLA SYR 25GX1\" 25G X 1\" 3 ML MISC, , Disp: , Rfl:   •  baclofen 10 mg tablet, Take 1 tablet (10 mg total) by mouth 2 (two) times a day as needed for muscle spasms (Diaphragmatic spasm), Disp: 30 tablet, Rfl: 0  •  carvedilol (COREG) 12 5 mg tablet, Take 1 tablet (12 5 mg total) by mouth 2 (two) times a day with meals, Disp: 60 tablet, Rfl: 1  •  chlorthalidone 25 mg tablet, Take 1 tablet (25 mg total) by mouth daily, Disp: 90 tablet, Rfl: 3  •  cholecalciferol (VITAMIN D3) 1,000 units tablet, Take 1 tablet (1,000 Units total) by mouth daily Start by taking 3000 units daily for one month, then continue on 1000 units daily   (Patient not taking: Reported on 10/25/2022), Disp: 30 tablet, Rfl: 5  •  fluticasone (Flovent HFA) 110 MCG/ACT inhaler, Inhale 2 puffs 2 (two) times a day Rinse mouth after use , Disp: 36 g, Rfl: 2  •  losartan (COZAAR) 100 MG tablet, Take 1 tablet (100 mg total) by mouth daily, Disp: 90 tablet, Rfl: 3  •  oxybutynin (DITROPAN) 5 mg tablet, Take 1 tablet (5 mg total) by mouth 3 (three) times a day as needed (bladder spasms), Disp: 30 tablet, Rfl: 0  •  tacrolimus (PROTOPIC) 0 1 % ointment, , Disp: , Rfl:   •  testosterone (ANDROGEL) 1 62 % TD gel pump, , Disp: , Rfl:   •  testosterone cypionate (DEPO-TESTOSTERONE) 200 mg/mL SOLN, every 14 (fourteen) days, Disp: , Rfl:   •  Testosterone Enanthate 200 MG/ML SOLN, , Disp: , Rfl:   •  triamcinolone (KENALOG) 0 5 % cream, Apply topically 2 (two) times a day, Disp: 30 g, Rfl: 0  •  zolpidem (AMBIEN) 10 mg tablet, TAKE ONE TABLET BY MOUTH DAILY AT BEDTIME AS NEEDED FOR SLEEP, Disp: 30 tablet, Rfl: 0     Food Intake and Lifestyle " Assessment   Food Intake Assessment completed via usual diet recall  Pt reports he has no regular meal schedule-varies from day to day  Pt reports that all he ate today was a protein shake and some grapes  Usual diet recall: 24 hour recall  Wakes at 9:30am  Breakfast: none hard boile egg and piece of toast  Snack: wheat thins, doritos   Lunch: none protein shake  Snack: hard boiled eggs, tuna, hamburger meat, tuna steaks, salmon steaks, boneless chicken breast  Dinner: none rice and beans  Snack: PB+J or PB+banana or glass of milk, M+Ms  Beverage intake: water, [place]  Protein supplement: none currently  Pt used to work for [employer]  Estimated protein intake per day: unable to estimate based on pt's current/variable food recall  Estimated protein intake today=30g  Estimated fluid intake per day: 64+oz  Meals eaten away from home: once a week [facility]  Typical meal pattern: 0 meals per day and multiple snacks per day    Eating Behaviors: Consumption of high calorie/ high fat foods, Large portion sizes, Frequent snacking/ grazing, Mindless eating, Emotional eating, Craves sweet foods and Craves salty foods  Pt reports he does not eat very much, often skips meals, busy during the day  Pt reports that any time he eats he feels stomach pains like overfull/bloating/distention feeling after several bites, and therefore dislikes eating a lot  ongoing  Pt denies eating any high sodium foods including soup, lunchmeats and cheeses, frozen entrees, canned vegetables, etc   Pt also states he does not salt his food  Food allergies or intolerances: No Known Allergies FA  Cultural or Nondenominational considerations: none    Physical Assessment  Physical Activity  Types of exercise: Pt used to be a  and   No exercise currently  Sedentary  Desk job  Current physical limitations: needs shoulder replacement- ball and socket  Previous R hip replacement    Pt does have some fluid "retention/swelling in one leg  Pt reports he gets intermittent intractable hiccups that can last for days and he is hoping the bariatric surgery will help resolve this as well  Psychosocial Assessment   Support systems: lives alone  Socioeconomic factors: works from home, talks on phone all day for work  Nutrition Diagnosis-continued  Diagnosis: Overweight / Obesity (NC-3 3), Excessive energy intake (NI-1 5) and Disordered eating pattern (NB-1 5)  Related to: Limited adherence to nutrition-related recommendations, Physical inactivity, Excessive energy intake and Inability or lack of desire to manage self-care  As Evidenced by: BMI >25, Excessive energy intake, Unintentional weight gain and Reports of disorded eating patterns     Nutrition Prescription: Recommend the following diet  Moderate protein ( 70 grams with CKD) 1800  Kcal per day 80 ounces or more  of calorie free beverages per day     Interventions and Teaching   Discussed pre-op and post-op nutrition guidelines  Patient educated and handouts provided  Surgical changes to stomach / GI  Capacity of post-surgery stomach  Adequate hydration  Sugar and fat restriction to decrease \"dumping syndrome\"  Suggestions for pre-op diet  Nutrition considerations after surgery  Protein supplements  Dietary and lifestyle changes  Possible problems with poor eating habits  Potential for food intolerance after surgery, and ways to deal with them including: lactose intolerance, nausea, reflux, vomiting, diarrhea, food intolerance, appetite changes, gas  Vitamin / Mineral supplementation of Multivitamin with minerals and Vitamin D  Pt currently takes a daily multivitamin  Discussed post-operative portion sizes progression, and discussed need for soft, moist meats post-operatively  Discussed needs for high potency supplements lifelong to prevent malnutrition  Discussed common contributors to weight regain      Education provided to: patient    Barriers to " learning: pt reports he already has good knowledge of healthy nutrition and what to eat from his time as a   Readiness to change: action    Prior research on procedure: internet and pre-op class    Comprehension: verbalizes understanding     Expected Compliance: good    Recommendations  Pt is an appropriate candidate for surgery  Yes    Evaluation / Monitoring  Dietitian to Monitor: Eating pattern as discussed Tolerance of nutrition prescription Body weight Lab values Physical activity Bowel pattern  Pt concerned as he has to have his stent replaced for his kidney stones prior to his bariatric surgery  He is on cancellation list to have his procedure moved up  Reports ongoing issues with bloating and discomfort Will skip meals when he feels uncomfortable  Discussed eating smaller meals more frequently or drinking a meal replacement for one meal    Reviewed importance of multiple meals after surgery due to restriction  Encouraged increased fluid intake with kidney stones      Goals  Food journal, Exercise 30 minutes 5 times per week, Complete lession plans 1-6, Eat 3 meals per day and Eliminate mindless snacking   Eat 3 to 4 small meals per day  Include one protein shake per day  Aim for 80 ounces or more of calorie free beverages per day   Get blood work done     Workflow: (Incomplete in bold):  • Psych and/or D+A Clearance: not needed  • PCP Letter:  done  • Support Group: done 12/19/22  • Surgeon Appt done 12/15/22  • EGD had one done two years ago 6/10/2021  • Cardiac Risk Assessment: Consult completed 1/5/23  stress test and echo completed 1/17/23  Cleared by cardiology 1/19/23  • Nephrology clearance: consult done 4/24/23  • Sleep Studies has mouth guard  • Blood work  updated labs ordered today  • Nicotine test nonsmoker  • 6 Month Pre-Operative Program: not needed  • Weight Loss 5% wt loss=12 635#=Day of surgery goal of 240 065#      Time Spent:   30 minutes

## 2023-06-29 ENCOUNTER — OFFICE VISIT (OUTPATIENT)
Dept: BARIATRICS | Facility: CLINIC | Age: 53
End: 2023-06-29

## 2023-06-29 VITALS — WEIGHT: 248.8 LBS | BODY MASS INDEX: 36.85 KG/M2 | HEIGHT: 69 IN

## 2023-06-29 DIAGNOSIS — E66.9 OBESITY, CLASS II, BMI 35-39.9: Primary | ICD-10-CM

## 2023-06-29 PROCEDURE — RECHECK: Performed by: DIETITIAN, REGISTERED

## 2023-06-30 ENCOUNTER — HOSPITAL ENCOUNTER (OUTPATIENT)
Facility: HOSPITAL | Age: 53
Setting detail: OUTPATIENT SURGERY
End: 2023-06-30
Attending: UROLOGY | Admitting: UROLOGY
Payer: MEDICARE

## 2023-07-11 ENCOUNTER — PREP FOR PROCEDURE (OUTPATIENT)
Dept: UROLOGY | Facility: AMBULATORY SURGERY CENTER | Age: 53
End: 2023-07-11

## 2023-07-11 DIAGNOSIS — R39.89 SUSPECTED UTI: ICD-10-CM

## 2023-07-11 DIAGNOSIS — N20.0 NEPHROLITHIASIS: Primary | ICD-10-CM

## 2023-07-11 DIAGNOSIS — Z01.818 PREOPERATIVE EXAMINATION, UNSPECIFIED: ICD-10-CM

## 2023-07-11 DIAGNOSIS — Z01.812 PRE-OPERATIVE LABORATORY EXAMINATION: ICD-10-CM

## 2023-07-17 DIAGNOSIS — G47.09 OTHER INSOMNIA: ICD-10-CM

## 2023-07-19 LAB — TESTOST SERPL-MSCNC: 204 NG/DL

## 2023-07-20 ENCOUNTER — APPOINTMENT (OUTPATIENT)
Dept: LAB | Facility: CLINIC | Age: 53
End: 2023-07-20
Payer: MEDICARE

## 2023-07-20 ENCOUNTER — TRANSCRIBE ORDERS (OUTPATIENT)
Dept: LAB | Facility: CLINIC | Age: 53
End: 2023-07-20

## 2023-07-20 ENCOUNTER — TELEPHONE (OUTPATIENT)
Dept: INTERNAL MEDICINE CLINIC | Facility: CLINIC | Age: 53
End: 2023-07-20

## 2023-07-20 DIAGNOSIS — Z01.812 BLOOD TESTS PRIOR TO TREATMENT OR PROCEDURE: ICD-10-CM

## 2023-07-20 DIAGNOSIS — E29.1 SECONDARY MALE HYPOGONADISM: Primary | ICD-10-CM

## 2023-07-20 DIAGNOSIS — G47.33 OSA (OBSTRUCTIVE SLEEP APNEA): ICD-10-CM

## 2023-07-20 DIAGNOSIS — N18.30 BENIGN HYPERTENSION WITH CKD (CHRONIC KIDNEY DISEASE) STAGE III (HCC): ICD-10-CM

## 2023-07-20 DIAGNOSIS — Z01.812 PRE-OPERATIVE LABORATORY EXAMINATION: ICD-10-CM

## 2023-07-20 DIAGNOSIS — E66.01 MORBID (SEVERE) OBESITY DUE TO EXCESS CALORIES (HCC): ICD-10-CM

## 2023-07-20 DIAGNOSIS — Z01.818 PREOPERATIVE EXAMINATION, UNSPECIFIED: ICD-10-CM

## 2023-07-20 DIAGNOSIS — I10 ESSENTIAL HYPERTENSION: ICD-10-CM

## 2023-07-20 DIAGNOSIS — E66.9 OBESITY, CLASS II, BMI 35-39.9: ICD-10-CM

## 2023-07-20 DIAGNOSIS — N18.31 STAGE 3A CHRONIC KIDNEY DISEASE (HCC): ICD-10-CM

## 2023-07-20 DIAGNOSIS — E29.1 SECONDARY MALE HYPOGONADISM: ICD-10-CM

## 2023-07-20 DIAGNOSIS — E66.01 CLASS 2 SEVERE OBESITY DUE TO EXCESS CALORIES WITH SERIOUS COMORBIDITY AND BODY MASS INDEX (BMI) OF 37.0 TO 37.9 IN ADULT (HCC): ICD-10-CM

## 2023-07-20 DIAGNOSIS — R39.89 SUSPECTED UTI: ICD-10-CM

## 2023-07-20 DIAGNOSIS — G47.09 OTHER INSOMNIA: ICD-10-CM

## 2023-07-20 DIAGNOSIS — N20.0 NEPHROLITHIASIS: ICD-10-CM

## 2023-07-20 DIAGNOSIS — I12.9 BENIGN HYPERTENSION WITH CKD (CHRONIC KIDNEY DISEASE) STAGE III (HCC): ICD-10-CM

## 2023-07-20 LAB
ALBUMIN SERPL BCP-MCNC: 3.9 G/DL (ref 3.5–5)
ALP SERPL-CCNC: 58 U/L (ref 46–116)
ALT SERPL W P-5'-P-CCNC: 75 U/L (ref 12–78)
ANION GAP SERPL CALCULATED.3IONS-SCNC: 5 MMOL/L
AST SERPL W P-5'-P-CCNC: 68 U/L (ref 5–45)
BASOPHILS # BLD AUTO: 0.04 THOUSANDS/ÂΜL (ref 0–0.1)
BASOPHILS NFR BLD AUTO: 1 % (ref 0–1)
BILIRUB SERPL-MCNC: 0.45 MG/DL (ref 0.2–1)
BUN SERPL-MCNC: 35 MG/DL (ref 5–25)
CALCIUM SERPL-MCNC: 9.8 MG/DL (ref 8.3–10.1)
CHLORIDE SERPL-SCNC: 108 MMOL/L (ref 96–108)
CHOLEST SERPL-MCNC: 231 MG/DL
CO2 SERPL-SCNC: 27 MMOL/L (ref 21–32)
CREAT SERPL-MCNC: 1.63 MG/DL (ref 0.6–1.3)
CREAT UR-MCNC: 255 MG/DL
CREAT UR-MCNC: 255 MG/DL
EOSINOPHIL # BLD AUTO: 0.33 THOUSAND/ÂΜL (ref 0–0.61)
EOSINOPHIL NFR BLD AUTO: 5 % (ref 0–6)
ERYTHROCYTE [DISTWIDTH] IN BLOOD BY AUTOMATED COUNT: 12.8 % (ref 11.6–15.1)
GFR SERPL CREATININE-BSD FRML MDRD: 47 ML/MIN/1.73SQ M
GLUCOSE P FAST SERPL-MCNC: 101 MG/DL (ref 65–99)
HCT VFR BLD AUTO: 46.6 % (ref 36.5–49.3)
HDLC SERPL-MCNC: 52 MG/DL
HGB BLD-MCNC: 16.6 G/DL (ref 12–17)
IMM GRANULOCYTES # BLD AUTO: 0.04 THOUSAND/UL (ref 0–0.2)
IMM GRANULOCYTES NFR BLD AUTO: 1 % (ref 0–2)
LDLC SERPL CALC-MCNC: 134 MG/DL (ref 0–100)
LYMPHOCYTES # BLD AUTO: 1.36 THOUSANDS/ÂΜL (ref 0.6–4.47)
LYMPHOCYTES NFR BLD AUTO: 19 % (ref 14–44)
MCH RBC QN AUTO: 33.7 PG (ref 26.8–34.3)
MCHC RBC AUTO-ENTMCNC: 35.6 G/DL (ref 31.4–37.4)
MCV RBC AUTO: 95 FL (ref 82–98)
MICROALBUMIN UR-MCNC: 1180 MG/L (ref 0–20)
MICROALBUMIN/CREAT 24H UR: 463 MG/G CREATININE (ref 0–30)
MONOCYTES # BLD AUTO: 0.57 THOUSAND/ÂΜL (ref 0.17–1.22)
MONOCYTES NFR BLD AUTO: 8 % (ref 4–12)
NEUTROPHILS # BLD AUTO: 4.83 THOUSANDS/ÂΜL (ref 1.85–7.62)
NEUTS SEG NFR BLD AUTO: 66 % (ref 43–75)
NRBC BLD AUTO-RTO: 0 /100 WBCS
PHOSPHATE SERPL-MCNC: 2.5 MG/DL (ref 2.7–4.5)
PLATELET # BLD AUTO: 292 THOUSANDS/UL (ref 149–390)
PMV BLD AUTO: 9 FL (ref 8.9–12.7)
POTASSIUM SERPL-SCNC: 4 MMOL/L (ref 3.5–5.3)
PROT SERPL-MCNC: 7.5 G/DL (ref 6.4–8.4)
PROT UR-MCNC: 196 MG/DL
PROT/CREAT UR: 0.77 MG/G{CREAT} (ref 0–0.1)
RBC # BLD AUTO: 4.93 MILLION/UL (ref 3.88–5.62)
SODIUM SERPL-SCNC: 140 MMOL/L (ref 135–147)
TESTOST SERPL-MSCNC: 466 NG/DL
TRIGL SERPL-MCNC: 227 MG/DL
WBC # BLD AUTO: 7.17 THOUSAND/UL (ref 4.31–10.16)

## 2023-07-20 PROCEDURE — 80053 COMPREHEN METABOLIC PANEL: CPT

## 2023-07-20 PROCEDURE — 80061 LIPID PANEL: CPT

## 2023-07-20 PROCEDURE — 84100 ASSAY OF PHOSPHORUS: CPT

## 2023-07-20 PROCEDURE — 87086 URINE CULTURE/COLONY COUNT: CPT

## 2023-07-20 PROCEDURE — 36415 COLL VENOUS BLD VENIPUNCTURE: CPT

## 2023-07-20 PROCEDURE — 84403 ASSAY OF TOTAL TESTOSTERONE: CPT

## 2023-07-20 PROCEDURE — 82043 UR ALBUMIN QUANTITATIVE: CPT

## 2023-07-20 PROCEDURE — 84156 ASSAY OF PROTEIN URINE: CPT

## 2023-07-20 PROCEDURE — 82570 ASSAY OF URINE CREATININE: CPT

## 2023-07-20 PROCEDURE — 85025 COMPLETE CBC W/AUTO DIFF WBC: CPT

## 2023-07-20 RX ORDER — ZOLPIDEM TARTRATE 10 MG/1
TABLET ORAL
Qty: 30 TABLET | Refills: 0 | OUTPATIENT
Start: 2023-07-20

## 2023-07-20 RX ORDER — ZOLPIDEM TARTRATE 10 MG/1
TABLET ORAL
Qty: 30 TABLET | Refills: 0 | Status: SHIPPED | OUTPATIENT
Start: 2023-07-20

## 2023-07-20 NOTE — TELEPHONE ENCOUNTER
Patient called too get the status on a refill request on the medication Ambien. Which we received a receipt confirmed by pharmacy on 7/20/23 at 9:50 am     I told patient too give it some time for pharmacy to go through there prescription refills.

## 2023-07-21 DIAGNOSIS — N18.31 STAGE 3A CHRONIC KIDNEY DISEASE (HCC): Primary | ICD-10-CM

## 2023-07-21 LAB — BACTERIA UR CULT: NORMAL

## 2023-07-21 NOTE — TELEPHONE ENCOUNTER
Patient called to state that the Ambien gets him sick  [employer]   --    patient is requesting another generic brand     Please call the patient to discuss this issue.     thanks

## 2023-07-25 NOTE — PROGRESS NOTES
Name           [place]        Starting weight 252.7  Last time weight 248.8  Today's weight 247.2  BMI:36.51    Surgery month:  Feb/March  Surgery deadline: June  Surgeon: Dr. Atilio Wolf Follow Up Note:      Mental health and wellness -   Patient presents to the office today for weight check and support visit. Mood stable. Reports excited about surgery because he wants to see a difference as he has been working on healthy  eating and activity behavior. Pt reports he is not losing weight but has a higher metabolism. Discussed with the pt to request a letter from urology after his surgery that he can proceed to have bariatric surgery. Also      Eating behaviors -5-6 meals a day. Chewing food. Practice 30/60 hydration-gallon a day. Activity -  One hour a day. Progress toward program requirements      Workflow: (Incomplete in bold):  • Psych and/or D+A Clearance: not needed  • PCP Letter:  done  • Support Group: done 12/19/22  • Surgeon Appt done 12/15/22  • EGD had one done two years ago 6/10/2021  • Cardiac Risk Assessment: Consult completed 1/5/23. stress test and echo completed 1/17/23. Cleared by cardiology 1/19/23. • Nephrology clearance: consult done 4/24/23. • Sleep Studies has mouth guard   • Bloodwork: pending  • Nicotine test nonsmoker  • 6 Month Pre-Operative Program: not needed  • Weight Loss 5% wt loss=12.635#=Day of surgery goal of 240.065#. Reviewed and discussed  Adequate hydration  Exercise  Meal planning and preparation  Lifestyle changes  Possible problems with poor eating habits  Techniques for self monitoring and keeping daily food journal    Goal: 1-continue to work on weight loss.    Next appointment:8/30/23 LANIE

## 2023-07-25 NOTE — TELEPHONE ENCOUNTER
Called and spoke to patient to advise him he would need to discuss with pharmacist the different types of manufacturers. Per patient he spoke to pharmacist already and they are recommending trying the lunesta medication for sleep. Per patient when taking the  he would wake up and feel like he was hung over. The  that would work for him is no longer supplying medication. Please review and advise.

## 2023-07-27 ENCOUNTER — OFFICE VISIT (OUTPATIENT)
Dept: BARIATRICS | Facility: CLINIC | Age: 53
End: 2023-07-27

## 2023-07-27 VITALS — WEIGHT: 247.2 LBS | BODY MASS INDEX: 36.51 KG/M2

## 2023-07-27 DIAGNOSIS — E66.9 OBESITY, CLASS II, BMI 35-39.9: Primary | ICD-10-CM

## 2023-07-27 PROCEDURE — RECHECK

## 2023-07-28 ENCOUNTER — APPOINTMENT (OUTPATIENT)
Dept: LAB | Facility: CLINIC | Age: 53
End: 2023-07-28
Payer: MEDICARE

## 2023-07-28 DIAGNOSIS — Z01.818 PREOPERATIVE CLEARANCE: ICD-10-CM

## 2023-07-28 DIAGNOSIS — E66.9 OBESITY, UNSPECIFIED: ICD-10-CM

## 2023-07-28 DIAGNOSIS — E66.9 OBESITY, UNSPECIFIED: Primary | ICD-10-CM

## 2023-07-28 LAB
ATRIAL RATE: 95 BPM
P AXIS: 41 DEGREES
PR INTERVAL: 138 MS
QRS AXIS: 24 DEGREES
QRSD INTERVAL: 80 MS
QT INTERVAL: 346 MS
QTC INTERVAL: 434 MS
T WAVE AXIS: 5 DEGREES
VENTRICULAR RATE: 95 BPM

## 2023-07-28 PROCEDURE — 93010 ELECTROCARDIOGRAM REPORT: CPT | Performed by: INTERNAL MEDICINE

## 2023-08-01 ENCOUNTER — APPOINTMENT (OUTPATIENT)
Dept: RADIOLOGY | Age: 53
End: 2023-08-01
Payer: MEDICARE

## 2023-08-01 ENCOUNTER — OFFICE VISIT (OUTPATIENT)
Dept: URGENT CARE | Age: 53
End: 2023-08-01
Payer: MEDICARE

## 2023-08-01 VITALS
SYSTOLIC BLOOD PRESSURE: 147 MMHG | TEMPERATURE: 97.1 F | DIASTOLIC BLOOD PRESSURE: 80 MMHG | HEART RATE: 107 BPM | OXYGEN SATURATION: 97 % | RESPIRATION RATE: 16 BRPM

## 2023-08-01 DIAGNOSIS — M25.551 HIP PAIN, ACUTE, RIGHT: Primary | ICD-10-CM

## 2023-08-01 DIAGNOSIS — M25.551 HIP PAIN, ACUTE, RIGHT: ICD-10-CM

## 2023-08-01 DIAGNOSIS — G47.09 OTHER INSOMNIA: Primary | ICD-10-CM

## 2023-08-01 PROCEDURE — 73502 X-RAY EXAM HIP UNI 2-3 VIEWS: CPT

## 2023-08-01 PROCEDURE — 99213 OFFICE O/P EST LOW 20 MIN: CPT | Performed by: NURSE PRACTITIONER

## 2023-08-01 PROCEDURE — G0463 HOSPITAL OUTPT CLINIC VISIT: HCPCS | Performed by: NURSE PRACTITIONER

## 2023-08-01 RX ORDER — ESZOPICLONE 1 MG/1
1 TABLET, FILM COATED ORAL
Qty: 30 TABLET | Refills: 0 | Status: SHIPPED | OUTPATIENT
Start: 2023-08-01 | End: 2023-08-11 | Stop reason: ALTCHOICE

## 2023-08-01 NOTE — TELEPHONE ENCOUNTER
Placed rx for lunesta; will trial lowest dose, and if needed, escalate dose until restful sleep achieved. Please remind patient of sleep hygeine practices and to STOP Ambien. Thanks!

## 2023-08-02 NOTE — PROGRESS NOTES
[facility]        NAME: Seth Grigsby is a 45 y.o. male  : 1970    MRN: [id number]  DATE: 2023  TIME: 8:53 PM    Assessment and Plan   Hip pain, acute, right [M25.551]  1. Hip pain, acute, right  XR hip/pelv 2-3 vws right if performed            Patient Instructions     X-ray results pending  OTC med for pain  F/u with ortho ASAP  Follow up with PCP in 3-5 days. Proceed to  ER if symptoms worsen. Chief Complaint     Chief Complaint   Patient presents with   • Hip Pain     Pt states was exercising about 1 week ago and started with pain in right hip, hx of total hip replacement 5 years ago. States feels like bone on bone         History of Present Illness       HPI   Presents to clinic with complaint of pain in the right hip. Duration 1 week. Started while exercising with squats. Had a hip replacement about 5 years ago. Pain is of moderate severity. Worse with weightbearing and walking. Review of Systems   Review of Systems   Musculoskeletal: Positive for arthralgias (Right hip) and gait problem (Because of hip pain). Negative for neck pain. Skin: Negative for color change and wound. Neurological: Negative for weakness and numbness.          Current Medications       Current Outpatient Medications:   •  acetaminophen (TYLENOL) 650 mg CR tablet, Take 1 tablet (650 mg total) by mouth every 8 (eight) hours as needed for mild pain, Disp: 30 tablet, Rfl: 0  •  albuterol (Ventolin HFA) 90 mcg/act inhaler, Inhale 2 puffs every 6 (six) hours as needed for wheezing, Disp: 8 g, Rfl: 3  •  ALPRAZolam (XANAX) 1 mg tablet, Take 1 tablet (1 mg total) by mouth 2 (two) times a day as needed for anxiety for up to 30 days (Patient taking differently: Take 1 mg by mouth as needed for anxiety), Disp: 60 tablet, Rfl: 0  •  B-D 3CC LUER-ANTONELLA SYR 25GX1" 25G X 1" 3 ML MISC, , Disp: , Rfl:   •  baclofen 10 mg tablet, Take 1 tablet (10 mg total) by mouth 2 (two) times a day as needed for muscle spasms (Diaphragmatic spasm), Disp: 30 tablet, Rfl: 0  •  carvedilol (COREG) 12.5 mg tablet, Take 1 tablet (12.5 mg total) by mouth 2 (two) times a day with meals, Disp: 60 tablet, Rfl: 1  •  chlorthalidone 25 mg tablet, Take 1 tablet (25 mg total) by mouth daily, Disp: 90 tablet, Rfl: 3  •  cholecalciferol (VITAMIN D3) 1,000 units tablet, Take 1 tablet (1,000 Units total) by mouth daily Start by taking 3000 units daily for one month, then continue on 1000 units daily.  (Patient not taking: Reported on 10/25/2022), Disp: 30 tablet, Rfl: 5  •  eszopiclone (LUNESTA) 1 mg tablet, Take 1 tablet (1 mg total) by mouth daily at bedtime as needed for sleep Take immediately before bedtime, Disp: 30 tablet, Rfl: 0  •  fluticasone (Flovent HFA) 110 MCG/ACT inhaler, Inhale 2 puffs 2 (two) times a day Rinse mouth after use., Disp: 36 g, Rfl: 2  •  losartan (COZAAR) 100 MG tablet, Take 1 tablet (100 mg total) by mouth daily, Disp: 90 tablet, Rfl: 3  •  oxybutynin (DITROPAN) 5 mg tablet, Take 1 tablet (5 mg total) by mouth 3 (three) times a day as needed (bladder spasms), Disp: 30 tablet, Rfl: 0  •  tacrolimus (PROTOPIC) 0.1 % ointment, , Disp: , Rfl:   •  testosterone (ANDROGEL) 1.62 % TD gel pump, , Disp: , Rfl:   •  testosterone cypionate (DEPO-TESTOSTERONE) 200 mg/mL SOLN, every 14 (fourteen) days, Disp: , Rfl:   •  Testosterone Enanthate 200 MG/ML SOLN, , Disp: , Rfl:   •  triamcinolone (KENALOG) 0.5 % cream, Apply topically 2 (two) times a day, Disp: 30 g, Rfl: 0    Current Allergies     Allergies as of 08/01/2023   • (No Known Allergies)            The following portions of the patient's history were reviewed and updated as appropriate: allergies, current medications, past family history, past medical history, past social history, past surgical history and problem list.     Past Medical History:   Diagnosis Date   • Anxiety    • Asthma    • Benign essential HTN     last assessed 05/26/2017   • Degenerative joint disease    • Hiccoughs    • Hypertension    • Kidney stone    • Skin cancer    • Sleep apnea     no CPAP use   • Spinal stenosis        Past Surgical History:   Procedure Laterality Date   • COLONOSCOPY     • ESOPHAGOGASTRODUODENOSCOPY N/A 03/24/2016    Procedure: ESOPHAGOGASTRODUODENOSCOPY (EGD); Surgeon: Grace Rollins MD;  Location: [facility]; Service:    • FL RETROGRADE PYELOGRAM  5/25/2023   • JOINT REPLACEMENT      right hip sx   • LA CYSTO/URETERO W/LITHOTRIPSY &INDWELL STENT INSRT Left 5/25/2023    Procedure: CYSTOSCOPY URETEROSCOPY WITH LITHOTRIPSY HOLMIUM LASER, RETROGRADE PYELOGRAM AND INSERTION STENT URETERAL;  Surgeon: Bridget Bernard MD;  Location[facility];  Service: Urology       Family History   Problem Relation Age of Onset   • Cancer Mother    • Cancer Father          Medications have been verified. Objective   /80   Pulse (!) 107   Temp (!) 97.1 °F (36.2 °C)   Resp 16   SpO2 97%   No LMP for male patient. Physical Exam     Physical Exam  Musculoskeletal:         General: Tenderness (With palpation of the right hip joint, severe) present. No swelling or deformity. Comments: No foot drop   Skin:     Findings: No bruising or erythema. Neurological:      Gait: Gait abnormal (Limping on the right leg).

## 2023-08-04 ENCOUNTER — TELEPHONE (OUTPATIENT)
Dept: INTERNAL MEDICINE CLINIC | Facility: CLINIC | Age: 53
End: 2023-08-04

## 2023-08-04 NOTE — TELEPHONE ENCOUNTER
Patient said he went to pharmacy and was informed that he needed a Prior Authorization for eszopiclone (LUNESTA) 1 mg tablet.

## 2023-08-07 NOTE — TELEPHONE ENCOUNTER
PA for Eszopiclone Kutr Wright) submitted through cover my meds. Will follow up in 2-3 days.      Key: [id number]

## 2023-08-08 ENCOUNTER — TELEPHONE (OUTPATIENT)
Dept: NEPHROLOGY | Facility: CLINIC | Age: 53
End: 2023-08-08

## 2023-08-08 NOTE — TELEPHONE ENCOUNTER
Dr Louis Sebastian removed his attestation, when you have time could you please addend your note to state whether patient can or can not tolerate CPAP  room air

## 2023-08-08 NOTE — TELEPHONE ENCOUNTER
Talked with pt. Advised to have repeat BMP done per Dr. Belkis Malone. He will have labs done this week sometime.    ----- Message from José Lewis MD sent at 8/7/2023  4:26 PM EDT -----  Regarding: RE: Clearance Letter  MA team, can you please advise Rigo Mitchell to repeat BMP now so that we can do clearance for his upcoming bariatric surgery. Rosita, this is an FYI for you. Is Urology ok with him proceeding? I see he has an upcoming procedure with them this month. I will send them a message and copy you on that message also. Thank you.  ----- Message -----  From: Mackenzie Arcos  Sent: 7/31/2023   7:51 AM EDT  To: José Lewis MD  Subject: RE: Clearance Letter                             Thank you.     ----- Message -----  From: José Lewis MD  Sent: 7/28/2023   3:51 PM EDT  To: Mackenzie Arcos  Subject: RE: Clearance Letter                             Thanks for your message. He is still undergoing procedures with urology and is scheduled for procedure next month. His creatinine was above baseline on last check and he is due to check next week. As long as his creatinine is stable and he is done with his urological procedures I have no contraindications for him to proceed. But I would let you talk to urology also to make sure that they are also okay with him proceeding. I will be away next week. But you can call my cell [phone] to discuss more. I will be happy to put a note in once I review his repeat labs. Thank you.    ----- Message -----  From: Mackenzie Arcos  Sent: 7/28/2023   9:08 AM EDT  To: José Lewis MD  Subject: Clearance Letter                                 Pt has completed requirements for bariatric surgery we would need a clearance from you stating ok to proceed with surgery. You did mention pt seeing urology and based on that outcome. Clearacned letter never generated for clearance if I can have you put in from a Nephrology stand point to proceed.  Currently booking for Sept/Oct surgery. If you have any questions please call me.      Thanks,     Jeferson Chew  [phone]

## 2023-08-08 NOTE — TELEPHONE ENCOUNTER
PA for Emmy Feldman was denied because patient has not tried at least 2 covered drugs that can treat your condition. Per insurance denial, patient has already tried zolpidem tartrate. Other covered drug(s) is/are: Belsomra 10mg, 15mg, 20mg tablet, Dayvigo 5mg, 10mg tablet, doxepin hcl 3mg, 6mg tablet.  Please send one of those alternative or should we wait to discuss with patient at next appt on 8/11/23

## 2023-08-11 ENCOUNTER — APPOINTMENT (OUTPATIENT)
Dept: LAB | Facility: CLINIC | Age: 53
End: 2023-08-11
Payer: MEDICARE

## 2023-08-11 ENCOUNTER — PREP FOR PROCEDURE (OUTPATIENT)
Dept: UROLOGY | Facility: AMBULATORY SURGERY CENTER | Age: 53
End: 2023-08-11

## 2023-08-11 ENCOUNTER — OFFICE VISIT (OUTPATIENT)
Dept: INTERNAL MEDICINE CLINIC | Facility: CLINIC | Age: 53
End: 2023-08-11

## 2023-08-11 VITALS
TEMPERATURE: 97.7 F | WEIGHT: 249 LBS | BODY MASS INDEX: 36.77 KG/M2 | DIASTOLIC BLOOD PRESSURE: 84 MMHG | HEART RATE: 76 BPM | SYSTOLIC BLOOD PRESSURE: 141 MMHG

## 2023-08-11 DIAGNOSIS — G47.09 OTHER INSOMNIA: Primary | ICD-10-CM

## 2023-08-11 DIAGNOSIS — Z01.818 PREOPERATIVE EXAMINATION, UNSPECIFIED: ICD-10-CM

## 2023-08-11 DIAGNOSIS — R39.89 SUSPECTED UTI: ICD-10-CM

## 2023-08-11 DIAGNOSIS — Z01.812 PRE-OPERATIVE LABORATORY EXAMINATION: ICD-10-CM

## 2023-08-11 DIAGNOSIS — N18.31 STAGE 3A CHRONIC KIDNEY DISEASE (HCC): ICD-10-CM

## 2023-08-11 DIAGNOSIS — N20.0 NEPHROLITHIASIS: ICD-10-CM

## 2023-08-11 DIAGNOSIS — N20.0 NEPHROLITHIASIS: Primary | ICD-10-CM

## 2023-08-11 DIAGNOSIS — M25.551 RIGHT HIP PAIN: ICD-10-CM

## 2023-08-11 PROBLEM — S81.802A OPEN WOUND OF LEFT LOWER LEG: Status: RESOLVED | Noted: 2022-12-21 | Resolved: 2023-08-11

## 2023-08-11 PROBLEM — Z12.11 COLON CANCER SCREENING: Status: RESOLVED | Noted: 2018-12-06 | Resolved: 2023-08-11

## 2023-08-11 LAB
ANION GAP SERPL CALCULATED.3IONS-SCNC: 4 MMOL/L
BUN SERPL-MCNC: 33 MG/DL (ref 5–25)
CALCIUM SERPL-MCNC: 9.5 MG/DL (ref 8.3–10.1)
CHLORIDE SERPL-SCNC: 110 MMOL/L (ref 96–108)
CO2 SERPL-SCNC: 28 MMOL/L (ref 21–32)
CREAT SERPL-MCNC: 1.63 MG/DL (ref 0.6–1.3)
GFR SERPL CREATININE-BSD FRML MDRD: 47 ML/MIN/1.73SQ M
GLUCOSE SERPL-MCNC: 104 MG/DL (ref 65–140)
POTASSIUM SERPL-SCNC: 4.1 MMOL/L (ref 3.5–5.3)
SODIUM SERPL-SCNC: 142 MMOL/L (ref 135–147)

## 2023-08-11 PROCEDURE — 87086 URINE CULTURE/COLONY COUNT: CPT

## 2023-08-11 PROCEDURE — 80048 BASIC METABOLIC PNL TOTAL CA: CPT

## 2023-08-11 PROCEDURE — 36415 COLL VENOUS BLD VENIPUNCTURE: CPT

## 2023-08-11 PROCEDURE — 99214 OFFICE O/P EST MOD 30 MIN: CPT | Performed by: PHYSICIAN ASSISTANT

## 2023-08-12 LAB — BACTERIA UR CULT: NORMAL

## 2023-08-14 ENCOUNTER — TELEPHONE (OUTPATIENT)
Dept: NEPHROLOGY | Facility: CLINIC | Age: 53
End: 2023-08-14

## 2023-08-14 ENCOUNTER — OFFICE VISIT (OUTPATIENT)
Dept: OBGYN CLINIC | Facility: HOSPITAL | Age: 53
End: 2023-08-14
Payer: MEDICARE

## 2023-08-14 ENCOUNTER — TELEPHONE (OUTPATIENT)
Age: 53
End: 2023-08-14

## 2023-08-14 ENCOUNTER — DOCUMENTATION (OUTPATIENT)
Dept: OTHER | Facility: HOSPITAL | Age: 53
End: 2023-08-14

## 2023-08-14 VITALS
DIASTOLIC BLOOD PRESSURE: 74 MMHG | WEIGHT: 249.6 LBS | SYSTOLIC BLOOD PRESSURE: 109 MMHG | HEIGHT: 69 IN | BODY MASS INDEX: 36.97 KG/M2 | HEART RATE: 78 BPM

## 2023-08-14 DIAGNOSIS — Z96.641 H/O TOTAL HIP ARTHROPLASTY, RIGHT: Primary | ICD-10-CM

## 2023-08-14 DIAGNOSIS — N18.31 STAGE 3A CHRONIC KIDNEY DISEASE (HCC): Primary | ICD-10-CM

## 2023-08-14 DIAGNOSIS — M25.551 PAIN IN RIGHT HIP: ICD-10-CM

## 2023-08-14 PROBLEM — G47.09 OTHER INSOMNIA: Status: ACTIVE | Noted: 2023-08-14

## 2023-08-14 PROCEDURE — 99203 OFFICE O/P NEW LOW 30 MIN: CPT | Performed by: ORTHOPAEDIC SURGERY

## 2023-08-14 RX ORDER — MELOXICAM 15 MG/1
15 TABLET ORAL DAILY
Qty: 30 TABLET | Refills: 1 | Status: SHIPPED | OUTPATIENT
Start: 2023-08-14

## 2023-08-14 NOTE — PROGRESS NOTES
Follow-up CKD. Most recent creatinine 1.63/EGFR 47. Cystatin C 0.95/eGFR 85. True GFR lies somewhere in between 47 and 85. He definitely has increased creatinine generation due to high muscle mass. I have personally discussed the risks and benefits of the surgery from a renal standpoint with the patient in depth, and advised the patient about the risk of VALENTINE. • From a renal standpoint the patient is renally optimized for surgery with the following recommendations:  • He has an upcoming surgical appointment with urology and once okay from their perspective, no contraindication from nephrology perspective to proceed with scheduled bariatric surgery  • Would recommend checking BMP 1 week after urology procedure to make sure kidney function is stable  • Fluids to administer: Isolyte 500 cc total over 2 hours   • Medication Recommendations:  • Minimize any IV contrast use (If IV contrast is used, please check BMP POD # 1)  • Hold NSAIDs for at least 10 days prior to surgery  • Hold losartan and chlorthalidone on day of surgery  • Hemodynamic Recommendations:  • Ideally, target MAPs greater than 65 mmHg in the perioperative period, and minimize operative time with MAPs < 65 mmHg. • Avoid intraop hemodynamic instability to decrease risk for VALENTINE occurrence.   • Other Recommendations:  • Please consult the Nephrology team when the patient is admitted    Lab Results   Component Value Date    SODIUM 142 08/11/2023    K 4.1 08/11/2023     (H) 08/11/2023    CO2 28 08/11/2023    BUN 33 (H) 08/11/2023    CREATININE 1.63 (H) 08/11/2023    GLUC 104 08/11/2023    CALCIUM 9.5 08/11/2023

## 2023-08-14 NOTE — PROGRESS NOTES
Assessment:   Diagnosis ICD-10-CM Associated Orders   1. H/O total hip arthroplasty, right  I98.200 CT lower extremity wo contrast right     meloxicam (Mobic) 15 mg tablet      2. Pain in right hip  M25.551 CT lower extremity wo contrast right     meloxicam (Mobic) 15 mg tablet          Plan:  · We will obtain a CT scan to check for abnormalities of the patient's hardware and to closely examine the areas which are causing his significant pain. · We will see the patient back after his CT scan and make the plan based on these results. To do next visit:  Follow-up after the CT scan. The above stated was discussed in layman's terms and the patient expressed understanding. All questions were answered to the patient's satisfaction. Scribe Attestation    I,:  Omid Agarwal PA-C am acting as a scribe while in the presence of the attending physician.:       I,:  Ihsan Childers MD personally performed the services described in this documentation    as scribed in my presence.:           Subjective:   Frieda Shaffer is a 45 y.o. male who presents to the office today as a new patient for evaluation of right hip pain. The patient has a PMH significant for a right-sided DON 5-6 years ago with [facility]. The patient reports that he was doing squats 2 weeks ago and he has had lateral hip pain ever since then. While this flare started 2 weeks ago, he has had intermittent pain in the lateral aspect of his hip ever since his surgery. He has been taking consistent Motrin, and he is hoping for a stronger NSAID medication. He denies any pain in his groin or buttocks. He denies any pain which radiates down his leg. He does have a remote history of a hip pinning as a child due to what sounds like slipped capital femoral epiphysis.   The patient also has severe left should pain and reported arthritis, but he states he was told by Chung Keithsburglizy he should not undergo a TSA because he will likely need a revision surgery relatively soon after this. Review of systems negative unless otherwise specified in HPI    Past Medical History:   Diagnosis Date   • Anxiety    • Asthma    • Benign essential HTN     last assessed 05/26/2017   • Degenerative joint disease    • Hiccoughs    • Hypertension    • Kidney stone    • Skin cancer    • Sleep apnea     no CPAP use   • Spinal stenosis        Past Surgical History:   Procedure Laterality Date   • COLONOSCOPY     • ESOPHAGOGASTRODUODENOSCOPY N/A 03/24/2016    Procedure: ESOPHAGOGASTRODUODENOSCOPY (EGD); Surgeon: Grace Rollins MD;  Location: [facility];   Service:    • FL RETROGRADE PYELOGRAM  5/25/2023   • JOINT REPLACEMENT      right hip sx   • OH CYSTO/URETERO W/LITHOTRIPSY &INDWELL STENT INSRT Left 5/25/2023    Procedure: CYSTOSCOPY URETEROSCOPY WITH LITHOTRIPSY HOLMIUM LASER, RETROGRADE PYELOGRAM AND INSERTION STENT URETERAL;  Surgeon: Bridget Bernard MD;  Location: [facility];  Service: Urology       Family History   Problem Relation Age of Onset   • Cancer Mother    • Cancer Father        Social History     Occupational History   • Not on file   Tobacco Use   • Smoking status: Never   • Smokeless tobacco: Never   Vaping Use   • Vaping Use: Never used   Substance and Sexual Activity   • Alcohol use: Yes     Comment: Social drinker   • Drug use: Yes     Types: Marijuana     Comment: medicinal card obtained   • Sexual activity: Yes     Partners: Female     Birth control/protection: None         Current Outpatient Medications:   •  acetaminophen (TYLENOL) 650 mg CR tablet, Take 1 tablet (650 mg total) by mouth every 8 (eight) hours as needed for mild pain, Disp: 30 tablet, Rfl: 0  •  albuterol (Ventolin HFA) 90 mcg/act inhaler, Inhale 2 puffs every 6 (six) hours as needed for wheezing, Disp: 8 g, Rfl: 3  •  B-D 3CC LUER-ANTONELLA SYR 25GX1" 25G X 1" 3 ML MISC, , Disp: , Rfl:   •  baclofen 10 mg tablet, Take 1 tablet (10 mg total) by mouth 2 (two) times a day as needed for muscle spasms (Diaphragmatic spasm), Disp: 30 tablet, Rfl: 0  •  carvedilol (COREG) 12.5 mg tablet, Take 1 tablet (12.5 mg total) by mouth 2 (two) times a day with meals, Disp: 60 tablet, Rfl: 1  •  fluticasone (Flovent HFA) 110 MCG/ACT inhaler, Inhale 2 puffs 2 (two) times a day Rinse mouth after use., Disp: 36 g, Rfl: 2  •  meloxicam (Mobic) 15 mg tablet, Take 1 tablet (15 mg total) by mouth daily with food, Disp: 30 tablet, Rfl: 1  •  oxybutynin (DITROPAN) 5 mg tablet, Take 1 tablet (5 mg total) by mouth 3 (three) times a day as needed (bladder spasms), Disp: 30 tablet, Rfl: 0  •  Suvorexant 10 MG TABS, Take 1-2 tablets as needed for sleep, Disp: 30 tablet, Rfl: 0  •  testosterone (ANDROGEL) 1.62 % TD gel pump, , Disp: , Rfl:   •  triamcinolone (KENALOG) 0.5 % cream, Apply topically 2 (two) times a day, Disp: 30 g, Rfl: 0  •  ALPRAZolam (XANAX) 1 mg tablet, Take 1 tablet (1 mg total) by mouth 2 (two) times a day as needed for anxiety for up to 30 days (Patient taking differently: Take 1 mg by mouth as needed for anxiety), Disp: 60 tablet, Rfl: 0  •  chlorthalidone 25 mg tablet, Take 1 tablet (25 mg total) by mouth daily, Disp: 90 tablet, Rfl: 3  •  losartan (COZAAR) 100 MG tablet, Take 1 tablet (100 mg total) by mouth daily, Disp: 90 tablet, Rfl: 3    No Known Allergies         Vitals:    08/14/23 1315   BP: 109/74   Pulse: 78       Objective:    General:  Patient is WDWN, alert and oriented, appears stated age, and is in no acute distress. Musculoskeletal:    Right Hip:    Inspection:  No visible abnormality. Range of Motion:  He does not have pain with passive hip flexion or internal rotation. Palpation:  He is very tender over the ASIS. He is also tender over the location of his bone spur. Diagnostics, reviewed and taken today if performed as documented:     The attending physician has personally reviewed the pertinent films in PACS and interpretation is as follows:  Right Hip X-rays:  The patient's implants are held in stable alignment. There is no evidence of hardware loosening or failure. He has a bone spur extending distally from the subtrochanteric region. Procedures, if performed today:    None performed      Portions of the record may have been created with voice recognition software. Occasional wrong word or "sound a like" substitutions may have occurred due to the inherent limitations of voice recognition software. Read the chart carefully and recognize, using context, where substitutions have occurred.

## 2023-08-14 NOTE — PROGRESS NOTES
Name: Maninder Rick      : 1970      MRN: [id number]  Encounter Provider: David Zaidi PA-C  Encounter Date: 2023   Encounter department: [address] [place] [address]    Assessment & Plan     1. Other insomnia  Assessment & Plan:  Medications covered by insurance: Ambien; Belsomra 10mg, 15mg, 20mg tablet; Dayvigo 5mg, 10mg tablet; doxepin hcl 3mg, 6mg tablet. Patient agreeable to try Belsomra. Start 10 mg daily before bedtime. After several nights, increase to 20 mg if needed. Discussed treatment course and possible side effects. PDMP unremarkable. Reviewed sleep hygiene. Follow up in one month, sooner if needed. Orders:  -     Suvorexant 10 MG TABS; Take 1-2 tablets as needed for sleep    2. Right hip pain  Assessment & Plan:  Right hip x-ray on 23: There is no acute fracture or dislocation. Total arthroplasty intact hardware and bony alignment, cerclage wire, no hardware complication. Trochanter exostosis. Mild degenerative left hip joint. No lytic or blastic osseous lesion. Distal left ureteral stent. Degenerative lower lumbar spine. Advised against taking over the recommended amount of medications. He is taking too much ibuprofen. Declines trying any other medications at this time. Recommend warm/cool compresses. Stretch throughout the day. Has a follow up with ortho on . Subjective      Patient is a 45year old male presenting to follow up on right hip pain. He states it has been occurring for a long time. Pain is constant. Denies radiation. Denies numbness, tingling, and weakness. Admits to stiffness. Denies redness, swelling, and warmth. Denies any injury or trauma. He did go to an urgent care on 23 for this. He does have a history of a hip replacement. Has an upcoming appointment with ortho on . He states he takes up to 20 OTC ibuprofen's a day.  States he has a high tolerance and whenever he takes any medication he needs a larger amount or it doesn't work. He is also here to follow up on insomnia. He was previously on Ambien for 10 years and it worked well. States his generic  for the medication is no longer making the medication and it was switched to a new one. He states the new one does not work. He has been taking two and still has difficulty sleeping. Dr. Veronika Julian tried to prescribe Celina Muller, but it was not covered by insurance. Review of Systems   Constitutional: Negative for chills, diaphoresis, fatigue, fever and unexpected weight change. Respiratory: Negative for cough, chest tightness, shortness of breath and wheezing. Cardiovascular: Negative for chest pain, palpitations and leg swelling. Gastrointestinal: Negative for abdominal pain, diarrhea, nausea and vomiting. Musculoskeletal: Positive for arthralgias and gait problem (sometimes due to pain). Negative for joint swelling and myalgias. Skin: Negative for rash. Neurological: Negative for dizziness, weakness, light-headedness, numbness and headaches. Hematological: Negative for adenopathy. Psychiatric/Behavioral: Positive for sleep disturbance. Negative for dysphoric mood, self-injury and suicidal ideas. The patient is not nervous/anxious.         Current Outpatient Medications on File Prior to Visit   Medication Sig   • acetaminophen (TYLENOL) 650 mg CR tablet Take 1 tablet (650 mg total) by mouth every 8 (eight) hours as needed for mild pain   • albuterol (Ventolin HFA) 90 mcg/act inhaler Inhale 2 puffs every 6 (six) hours as needed for wheezing   • ALPRAZolam (XANAX) 1 mg tablet Take 1 tablet (1 mg total) by mouth 2 (two) times a day as needed for anxiety for up to 30 days (Patient taking differently: Take 1 mg by mouth as needed for anxiety)   • B-D 3CC LUER-ANTONELLA SYR 25GX1" 25G X 1" 3 ML MISC    • baclofen 10 mg tablet Take 1 tablet (10 mg total) by mouth 2 (two) times a day as needed for muscle spasms (Diaphragmatic spasm)   • carvedilol (COREG) 12.5 mg tablet Take 1 tablet (12.5 mg total) by mouth 2 (two) times a day with meals   • chlorthalidone 25 mg tablet Take 1 tablet (25 mg total) by mouth daily   • fluticasone (Flovent HFA) 110 MCG/ACT inhaler Inhale 2 puffs 2 (two) times a day Rinse mouth after use. • losartan (COZAAR) 100 MG tablet Take 1 tablet (100 mg total) by mouth daily   • oxybutynin (DITROPAN) 5 mg tablet Take 1 tablet (5 mg total) by mouth 3 (three) times a day as needed (bladder spasms)   • testosterone (ANDROGEL) 1.62 % TD gel pump    • triamcinolone (KENALOG) 0.5 % cream Apply topically 2 (two) times a day       Objective     /84 (BP Location: Right arm, Patient Position: Sitting, Cuff Size: Large)   Pulse 76   Temp 97.7 °F (36.5 °C) (Temporal)   Wt 113 kg (249 lb)   BMI 36.77 kg/m²     Physical Exam  Vitals and nursing note reviewed. Constitutional:       General: He is awake. He is not in acute distress. Appearance: Normal appearance. He is well-developed and well-groomed. He is obese. He is not ill-appearing. HENT:      Head: Normocephalic and atraumatic. Eyes:      General: No scleral icterus. Conjunctiva/sclera: Conjunctivae normal.   Cardiovascular:      Rate and Rhythm: Normal rate and regular rhythm. Pulses: Normal pulses. Heart sounds: Normal heart sounds. No murmur heard. Pulmonary:      Effort: Pulmonary effort is normal. No respiratory distress. Breath sounds: Normal breath sounds and air entry. No decreased air movement. No decreased breath sounds, wheezing, rhonchi or rales. Abdominal:      General: Abdomen is flat. Bowel sounds are normal. There is no distension. Palpations: Abdomen is soft. There is no mass. Tenderness: There is no abdominal tenderness. There is no right CVA tenderness, left CVA tenderness, guarding or rebound. Hernia: No hernia is present. Musculoskeletal:         General: Normal range of motion. Cervical back: Neck supple.       Right lower leg: No edema. Left lower leg: No edema. Lymphadenopathy:      Cervical: No cervical adenopathy. Skin:     General: Skin is warm. Coloration: Skin is not jaundiced. Findings: No rash. Neurological:      General: No focal deficit present. Mental Status: He is alert and oriented to person, place, and time. Mental status is at baseline. Motor: Motor function is intact. Coordination: Coordination is intact. Gait: Gait is intact. Psychiatric:         Attention and Perception: Attention normal.         Mood and Affect: Mood and affect normal.         Speech: Speech normal.         Behavior: Behavior normal. Behavior is cooperative.          Cognition and Memory: Cognition normal.       Odalys Gutierrez PA-C

## 2023-08-14 NOTE — TELEPHONE ENCOUNTER
----- Message from Julia Figueroa MD sent at 8/14/2023  3:28 PM EDT -----  Kidney function stable. Please have patient repeat BMP 1 week after upcoming urology procedure. Thank you.

## 2023-08-14 NOTE — TELEPHONE ENCOUNTER
Caller: Patient  Doctor: Jaqui Evans    Reason for call: patient asking for office to call [facility] and spk to [facility] so they can order the Flector patch 2x daily.         Call back#: [phone]

## 2023-08-14 NOTE — ASSESSMENT & PLAN NOTE
Right hip x-ray on 8/1/23: There is no acute fracture or dislocation. Total arthroplasty intact hardware and bony alignment, cerclage wire, no hardware complication. Trochanter exostosis. Mild degenerative left hip joint. No lytic or blastic osseous lesion. Distal left ureteral stent. Degenerative lower lumbar spine. Advised against taking over the recommended amount of medications. He is taking too much ibuprofen. Declines trying any other medications at this time. Recommend warm/cool compresses. Stretch throughout the day. Has a follow up with ortho on 8/14.

## 2023-08-14 NOTE — ASSESSMENT & PLAN NOTE
Medications covered by insurance: Ambien; Belsomra 10mg, 15mg, 20mg tablet; Dayvigo 5mg, 10mg tablet; doxepin hcl 3mg, 6mg tablet. Patient agreeable to try Belsomra. Start 10 mg daily before bedtime. After several nights, increase to 20 mg if needed. Discussed treatment course and possible side effects. PDMP unremarkable. Reviewed sleep hygiene. Follow up in one month, sooner if needed.

## 2023-08-15 ENCOUNTER — TELEPHONE (OUTPATIENT)
Age: 53
End: 2023-08-15

## 2023-08-15 NOTE — TELEPHONE ENCOUNTER
Caller: patient    Doctor/Office: Aydee Dawson    Medication: Flector patches    Pharmacy Benefit ID:     Pharmacy Phone #: [employer]

## 2023-08-16 DIAGNOSIS — J45.20 MILD INTERMITTENT ASTHMA WITHOUT COMPLICATION: ICD-10-CM

## 2023-08-16 DIAGNOSIS — I12.9 BENIGN HYPERTENSION WITH CKD (CHRONIC KIDNEY DISEASE) STAGE III (HCC): ICD-10-CM

## 2023-08-16 DIAGNOSIS — N18.31 STAGE 3A CHRONIC KIDNEY DISEASE (HCC): ICD-10-CM

## 2023-08-16 DIAGNOSIS — N18.30 BENIGN HYPERTENSION WITH CKD (CHRONIC KIDNEY DISEASE) STAGE III (HCC): ICD-10-CM

## 2023-08-16 DIAGNOSIS — N20.0 NEPHROLITHIASIS: ICD-10-CM

## 2023-08-17 ENCOUNTER — HOSPITAL ENCOUNTER (OUTPATIENT)
Dept: RADIOLOGY | Facility: HOSPITAL | Age: 53
Discharge: HOME | End: 2023-08-17
Payer: MEDICARE

## 2023-08-17 ENCOUNTER — TELEPHONE (OUTPATIENT)
Dept: INTERNAL MEDICINE CLINIC | Facility: CLINIC | Age: 53
End: 2023-08-17

## 2023-08-17 ENCOUNTER — TELEPHONE (OUTPATIENT)
Dept: NEPHROLOGY | Facility: CLINIC | Age: 53
End: 2023-08-17

## 2023-08-17 DIAGNOSIS — Z96.641 H/O TOTAL HIP ARTHROPLASTY, RIGHT: ICD-10-CM

## 2023-08-17 DIAGNOSIS — M25.551 PAIN IN RIGHT HIP: ICD-10-CM

## 2023-08-17 DIAGNOSIS — N20.0 NEPHROLITHIASIS: ICD-10-CM

## 2023-08-17 DIAGNOSIS — G47.09 OTHER INSOMNIA: Primary | ICD-10-CM

## 2023-08-17 PROCEDURE — 73700 CT LOWER EXTREMITY W/O DYE: CPT

## 2023-08-17 PROCEDURE — G1004 CDSM NDSC: HCPCS

## 2023-08-17 PROCEDURE — 74176 CT ABD & PELVIS W/O CONTRAST: CPT

## 2023-08-17 RX ORDER — ESZOPICLONE 1 MG/1
1 TABLET, FILM COATED ORAL
Qty: 30 TABLET | Refills: 0 | Status: SHIPPED | OUTPATIENT
Start: 2023-08-17

## 2023-08-17 RX ORDER — CHLORTHALIDONE 25 MG/1
25 TABLET ORAL DAILY
Qty: 90 TABLET | Refills: 3 | Status: SHIPPED | OUTPATIENT
Start: 2023-08-17

## 2023-08-17 RX ORDER — OXYBUTYNIN CHLORIDE 5 MG/1
5 TABLET ORAL 3 TIMES DAILY PRN
Qty: 30 TABLET | Refills: 0 | Status: SHIPPED | OUTPATIENT
Start: 2023-08-17

## 2023-08-17 RX ORDER — ALBUTEROL SULFATE 90 UG/1
2 AEROSOL, METERED RESPIRATORY (INHALATION) EVERY 6 HOURS PRN
Qty: 8 G | Refills: 0 | Status: SHIPPED | OUTPATIENT
Start: 2023-08-17 | End: 2024-08-16

## 2023-08-17 RX ORDER — LOSARTAN POTASSIUM 100 MG/1
100 TABLET ORAL DAILY
Qty: 90 TABLET | Refills: 3 | Status: SHIPPED | OUTPATIENT
Start: 2023-08-17

## 2023-08-17 NOTE — TELEPHONE ENCOUNTER
Called and spoke to the patient to relay the message above. Patient expressed understanding of how he was supposed to be taking these medications. He states that he has only been taking 25mg chlorthalidone daily and carvedilol 12.5mg daily for the past month, and has not been taking losartan. Patient is concerned with dropping his blood pressure too low restarting the losartan. States that his blood pressure has been around 117/73 with only the chlorthalidone and carvedilol. Please advise. Nona Penny MD  You 1 hour ago (12:51 PM)     [address]  Can you please clarify with him what medications is he taking right now if he can check his pill bottles. Per my last note, he should be taking chlorthalidone 25 mg daily, losartan 100 mg daily and carvedilol 12.5 mg twice daily. He should hold losartan and chlorthalidone on day of surgery.

## 2023-08-17 NOTE — TELEPHONE ENCOUNTER
Patient would like to try for Lunesta again. Per his first prior auth denial, he had to fail two preferred medications. He has failed zolpidem and suvorexant. I will send a new prescription for the Lunesta which will likely need a new PA.  Thank you

## 2023-08-17 NOTE — TELEPHONE ENCOUNTER
Caller: Patient    Doctor: Harjeet Braxton    Reason for call: checking on status of medication auth. .    Medication was ordered verbally through pharmacy on 8/14/23, I sent separate task to PA requesting written order so auth can be obtained.     Call [phone]

## 2023-08-17 NOTE — TELEPHONE ENCOUNTER
Pt called and stated he is confused on which medications he should be taking. He has Losartan 100mg, Chlorthaldone 25mg, and Carvedilol 12.5mg. He wants to know if he should be taking all three or just two. He thinks he's only suppose to take two out of the three. Please advise.

## 2023-08-17 NOTE — TELEPHONE ENCOUNTER
Called and spoke to the patient to relay Dr. Marilu Caldera message to hold off on losartan for now. Patient expressed understanding and had no further questions or concerns at this time.

## 2023-08-17 NOTE — TELEPHONE ENCOUNTER
Suvorexant 10 MG TABS       Prescribed 8/11/23 by Patrice Castellanos        Patient called to state that the above medication is not working.   Gives him headaches and cannot sleep    Patient is requesting that new medication be prescribed for insomnia and call the patient to advise new medication was sent to the pharmacy    thanks

## 2023-08-18 ENCOUNTER — TELEPHONE (OUTPATIENT)
Age: 53
End: 2023-08-18

## 2023-08-18 DIAGNOSIS — Z96.641 H/O TOTAL HIP ARTHROPLASTY, RIGHT: Primary | ICD-10-CM

## 2023-08-18 DIAGNOSIS — M25.551 PAIN IN RIGHT HIP: ICD-10-CM

## 2023-08-18 RX ORDER — ONDANSETRON 4 MG/1
1 TABLET, ORALLY DISINTEGRATING ORAL 2 TIMES DAILY PRN
Qty: 14 PATCH | Refills: 0 | Status: SHIPPED | OUTPATIENT
Start: 2023-08-18 | End: 2023-08-18

## 2023-08-18 RX ORDER — ONDANSETRON 4 MG/1
1 TABLET, ORALLY DISINTEGRATING ORAL 2 TIMES DAILY PRN
Qty: 20 PATCH | Refills: 0 | Status: SHIPPED | OUTPATIENT
Start: 2023-08-18 | End: 2023-08-18

## 2023-08-18 NOTE — TELEPHONE ENCOUNTER
Caller: [place]-Medication appeals dept    Doctor:     Reason for call: Confirmed DX codes associated with Flector patch order  Provided: H/O total hip arthroplasty, right [F88.474];  Pain in right hip [M25.551]    Call back#: [phone]

## 2023-08-18 NOTE — TELEPHONE ENCOUNTER
PA FOR FLECTOR PATCHES HAS BEEN SUBMITTED TO PLAN FOR DETERMINATION    CMM GODOY GENOVEVA    WILL AWAIT PLAN RESPONSE

## 2023-08-21 VITALS — BODY MASS INDEX: 36.88 KG/M2 | WEIGHT: 249 LBS | HEIGHT: 69 IN

## 2023-08-21 NOTE — PRE-PROCEDURE INSTRUCTIONS
Pre-Surgery Instructions:   Medication Instructions   • acetaminophen (TYLENOL) 650 mg CR tablet Instructed to take as needed including DOS with sips water   • albuterol (Ventolin HFA) 90 mcg/act inhaler Instructed to take as needed including DOS- instructed to bring to hosp DOS   • ALPRAZolam (XANAX) 1 mg tablet Instructed to take as needed including DOS with sips water   • baclofen 10 mg tablet Take per normal schedule PRN   • carvedilol (COREG) 12.5 mg tablet Instructed to take per normal schedule including DOS with sips water   • chlorthalidone 25 mg tablet Instructed to take per normal schedule except DOS   • fluticasone (Flovent HFA) 110 MCG/ACT inhaler Take per normal schedule    • losartan (COZAAR) 100 MG tablet Instructed to take per normal schedule except DOS   • meloxicam (Mobic) 15 mg tablet Instructed to avoid NSAIDs 3 days prior to surgery   • oxybutynin (DITROPAN) 5 mg tablet Instructed to take per normal schedule except DOS   • testosterone (ANDROGEL) 1.62 % TD gel pump Instructed to take per normal schedule except DOS    Medication instructions for day surgery reviewed. Please use only a sip of water to take your instructed medications. Avoid all over the counter vitamins, supplements and NSAIDS for one week prior to surgery per anesthesia guidelines. Tylenol is ok to take as needed. You will receive a call one business day prior to surgery with an arrival time and hospital directions. If your surgery is scheduled on a Monday, the hospital will be calling you on the Friday prior to your surgery. If you have not heard from anyone by 8pm, please call the hospital supervisor through the hospital  at [phone]. [place] [phone]). Do not eat or drink anything after midnight the night before your surgery, including candy, mints, lifesavers, or chewing gum. Do not drink alcohol 24hrs before your surgery. Try not to smoke at least 24hrs before your surgery.        Follow the pre surgery showering instructions as listed in the [facility] Surgical Experience Booklet” or otherwise provided by your surgeon's office. Do not shave the surgical area 24 hours before surgery. Do not apply any lotions, creams, including makeup, cologne, deodorant, or perfumes after showering on the day of your surgery. No contact lenses, eye make-up, or artificial eyelashes. Remove nail polish, including gel polish, and any artificial, gel, or acrylic nails if possible. Remove all jewelry including rings and body piercing jewelry. Wear causal clothing that is easy to take on and off. Consider your type of surgery. Keep any valuables, jewelry, piercings at home. Please bring any specially ordered equipment (sling, braces) if indicated. Arrange for a responsible person to drive you to and from the hospital on the day of your surgery. Visitor Guidelines discussed. Call the surgeon's office with any new illnesses, exposures, or additional questions prior to surgery. Please reference your [facility] Surgical Experience Booklet” for additional information to prepare for your upcoming surgery.

## 2023-08-24 ENCOUNTER — TELEPHONE (OUTPATIENT)
Dept: OTHER | Facility: OTHER | Age: 53
End: 2023-08-24

## 2023-08-25 NOTE — TELEPHONE ENCOUNTER
Pt never received his call from scheduling for what time to report to his procedure tomorrow. Schedule marked as cancelled in epic under status. On call provider also reviewed and it was cancelled on 8/21/23.

## 2023-09-01 ENCOUNTER — TELEPHONE (OUTPATIENT)
Dept: CARDIOLOGY CLINIC | Facility: CLINIC | Age: 53
End: 2023-09-01

## 2023-09-01 ENCOUNTER — OFFICE VISIT (OUTPATIENT)
Dept: BARIATRICS | Facility: CLINIC | Age: 53
End: 2023-09-01

## 2023-09-01 VITALS — WEIGHT: 253.3 LBS | BODY MASS INDEX: 37.41 KG/M2

## 2023-09-01 DIAGNOSIS — E66.9 OBESITY, CLASS II, BMI 35-39.9: Primary | ICD-10-CM

## 2023-09-01 PROCEDURE — RECHECK: Performed by: DIETITIAN, REGISTERED

## 2023-09-01 NOTE — PROGRESS NOTES
Bariatric Nutrition Follow-Up Note    Type of surgery    Preop no months required  Surgery Date: Tentative February-March 2023  Deadline month July 2023  Surgeon: Dr. Denisse Henry  45 y.o.  male   Wt with BMI of 25: 159.6lbs  Pre-Op Excess Wt: 93.1lbs     Wt 115 kg (253 lb 4.8 oz)   BMI 37.41 kg/m²    Pt has lost 4# since starting the program October of 2022    Wt Readings from Last 3 Encounters:   08/14/23 113 kg (249 lb 9.6 oz)   08/11/23 113 kg (249 lb)   07/27/23 112 kg (247 lb 3.2 oz)     Pre-op weight goals:  5% wt loss=12.635#=Day of surgery goal of 240.065#. 23LPA=698.5lbs     Migue Lala Equation:     Weight maintenance= 2343 kcal/day  Estimated calories for weight loss 5413-8983 kcal/day (1-2# per wk wt loss - sedentary)  Estimated protein needs 58-72 g/day (.8-1.0 gms/kg IBW)CKD stage 3   Estimated fluid needs 7236-3860 ml/day (30-35 ml/kg IBW)    Weight History   Onset of Obesity: Childhood  Family history of obesity: No  Wt Loss Attempts: Exercise  Self Created Diets (Portion Control, Healthy Food Choices, etc.)  Patient has tried the above for 6 months or more with insufficient weight loss or weight regain, which is why patient has requested to be evaluated for weight loss surgery today  Maximum Wt Lost: lost 90lbs as a kid in a residential asthma treatment program    Review of History and Medications   Past Medical History:   Diagnosis Date   • Anesthesia complication     Pt states he stopped breathing during EGD and Colonoscopy   • Anxiety    • Asthma    • Benign essential HTN     last assessed 05/26/2017   • Degenerative joint disease    • Hiccoughs    • Hypertension    • Kidney stone    • Skin cancer    • Sleep apnea     no CPAP use   • Spinal stenosis      Past Surgical History:   Procedure Laterality Date   • COLONOSCOPY     • ESOPHAGOGASTRODUODENOSCOPY N/A 03/24/2016    Procedure: ESOPHAGOGASTRODUODENOSCOPY (EGD);   Surgeon: Katiana Brink MD; Location: [facility]; Service:    • FL RETROGRADE PYELOGRAM  5/25/2023   • JOINT REPLACEMENT      right hip sx   • AL CYSTO/URETERO W/LITHOTRIPSY &INDWELL STENT INSRT Left 5/25/2023    Procedure: CYSTOSCOPY URETEROSCOPY WITH LITHOTRIPSY HOLMIUM LASER, RETROGRADE PYELOGRAM AND INSERTION STENT URETERAL;  Surgeon: Wilfrid Kennedy MD;  Location[facility];  Service: Urology     Social History     Socioeconomic History   • Marital status: /Civil Union     Spouse name: Not on file   • Number of children: Not on file   • Years of education: Not on file   • Highest education level: Not on file   Occupational History   • Not on file   Tobacco Use   • Smoking status: Never   • Smokeless tobacco: Never   Vaping Use   • Vaping Use: Never used   Substance and Sexual Activity   • Alcohol use: Yes     Comment: Social drinker   • Drug use: Yes     Types: Marijuana     Comment: medicinal card obtained   • Sexual activity: Yes     Partners: Female     Birth control/protection: None   Other Topics Concern   • Not on file   Social History Narrative   • Not on file     Social Determinants of Health     Financial Resource Strain: Low Risk  (6/12/2023)    Overall Financial Resource Strain (CARDIA)    • Difficulty of Paying Living Expenses: Not very hard   Food Insecurity: No Food Insecurity (6/5/2023)    Hunger Vital Sign    • Worried About Running Out of Food in the Last Year: Never true    • Ran Out of Food in the Last Year: Never true   Transportation Needs: No Transportation Needs (6/5/2023)    PRAPARE - Transportation    • Lack of Transportation (Medical): No    • Lack of Transportation (Non-Medical):  No   Physical Activity: Not on file   Stress: Not on file   Social Connections: Not on file   Intimate Partner Violence: Not on file   Housing Stability: Not on file       Current Outpatient Medications:   •  acetaminophen (TYLENOL) 650 mg CR tablet, Take 1 tablet (650 mg total) by mouth every 8 (eight) hours as needed for mild pain, Disp: 30 tablet, Rfl: 0  •  albuterol (Ventolin HFA) 90 mcg/act inhaler, Inhale 2 puffs every 6 (six) hours as needed for wheezing, Disp: 8 g, Rfl: 0  •  ALPRAZolam (XANAX) 1 mg tablet, Take 1 tablet (1 mg total) by mouth 2 (two) times a day as needed for anxiety for up to 30 days (Patient taking differently: Take 1 mg by mouth as needed for anxiety), Disp: 60 tablet, Rfl: 0  •  B-D 3CC LUER-ANTONELLA SYR 25GX1" 25G X 1" 3 ML MISC, , Disp: , Rfl:   •  baclofen 10 mg tablet, Take 1 tablet (10 mg total) by mouth 2 (two) times a day as needed for muscle spasms (Diaphragmatic spasm), Disp: 30 tablet, Rfl: 0  •  carvedilol (COREG) 12.5 mg tablet, Take 1 tablet (12.5 mg total) by mouth 2 (two) times a day with meals, Disp: 60 tablet, Rfl: 1  •  chlorthalidone 25 mg tablet, Take 1 tablet (25 mg total) by mouth daily, Disp: 90 tablet, Rfl: 3  •  eszopiclone (LUNESTA) 1 mg tablet, Take 1 tablet (1 mg total) by mouth daily at bedtime as needed for sleep Take immediately before bedtime, Disp: 30 tablet, Rfl: 0  •  Flector 1.3 % PTCH, Apply 1 patch topically 2 (two) times a day as needed (Apply topically 2 times daily as needed for pain) Patch may be worn up to 12 hours and then removed. Do not wear more than 1 patch at a time. Do not placed over any incision or open wound. , Disp: 20 patch, Rfl: 0  •  fluticasone (Flovent HFA) 110 MCG/ACT inhaler, Inhale 2 puffs 2 (two) times a day Rinse mouth after use., Disp: 36 g, Rfl: 2  •  losartan (COZAAR) 100 MG tablet, Take 1 tablet (100 mg total) by mouth daily, Disp: 90 tablet, Rfl: 3  •  meloxicam (Mobic) 15 mg tablet, Take 1 tablet (15 mg total) by mouth daily with food, Disp: 30 tablet, Rfl: 1  •  oxybutynin (DITROPAN) 5 mg tablet, Take 1 tablet (5 mg total) by mouth 3 (three) times a day as needed (bladder spasms), Disp: 30 tablet, Rfl: 0  •  testosterone (ANDROGEL) 1.62 % TD gel pump, , Disp: , Rfl:   •  triamcinolone (KENALOG) 0.5 % cream, Apply topically 2 (two) times Previous R hip replacement. Pt does have some fluid retention/swelling in one leg. Pt reports he gets intermittent intractable hiccups that can last for days and he is hoping the bariatric surgery will help resolve this as well. Psychosocial Assessment   Support systems: lives alone. Socioeconomic factors: works from home, talks on phone all day for work. Nutrition Diagnosis-continued  Diagnosis: Overweight / Obesity (NC-3.3), Excessive energy intake (NI-1.5) and Disordered eating pattern (NB-1.5)  Related to: Limited adherence to nutrition-related recommendations, Physical inactivity, Excessive energy intake and Inability or lack of desire to manage self-care  As Evidenced by: BMI >25, Excessive energy intake, Unintentional weight gain and Reports of disorded eating patterns     Nutrition Prescription: Recommend the following diet  Moderate protein ( 70 grams with CKD) 1800  Kcal per day 80 ounces or more  of calorie free beverages per day     Interventions and Teaching   Discussed pre-op and post-op nutrition guidelines. Patient educated and handouts provided. Surgical changes to stomach / GI  Capacity of post-surgery stomach  Adequate hydration  Sugar and fat restriction to decrease "dumping syndrome"  Suggestions for pre-op diet  Nutrition considerations after surgery  Protein supplements  Dietary and lifestyle changes  Possible problems with poor eating habits  Potential for food intolerance after surgery, and ways to deal with them including: lactose intolerance, nausea, reflux, vomiting, diarrhea, food intolerance, appetite changes, gas  Vitamin / Mineral supplementation of Multivitamin with minerals and Vitamin D  Pt currently takes a daily multivitamin. Discussed post-operative portion sizes progression, and discussed need for soft, moist meats post-operatively. Discussed needs for high potency supplements lifelong to prevent malnutrition.   Discussed common contributors to weight regain. Reviewed importance of limited protein intake with CKD stge 3- patient was a previous  and aims for 150-200 grams protein per day     Education provided to: patient    Barriers to learning: pt reports he already has good knowledge of healthy nutrition and what to eat from his time as a . Readiness to change: action    Prior research on procedure: internet and pre-op class    Comprehension: verbalizes understanding     Expected Compliance: good    Recommendations  Pt is an appropriate candidate for surgery. Yes    Evaluation / Monitoring  Dietitian to Monitor: Eating pattern as discussed Tolerance of nutrition prescription Body weight Lab values Physical activity Bowel pattern  Pt concerned as he had to have his kidney stent removal rescheduled until October. He is also eating a lot of protein and lifting weight. Reviewed importance of limiting protein with CKD. Pt stated that his kidney doctor did not tell him that. Will get confirmation from his nephrologist. Encouraged increased fluid intake with history of kidney stones      Goals  Food journal, Exercise 30 minutes 5 times per week, Complete lession plans 1-6, Eat 3 meals per day and Eliminate mindless snacking   Eat 3 to 4 small meals per day  Include one protein shake per day-   Aim for 80 ounces or more of calorie free beverages per day   Limit protein per nephrologist recommendation ( will contact via epic )      Workflow: (Incomplete in bold):  • Psych and/or D+A Clearance: not needed  • PCP Letter:  done  • Support Group: done 12/19/22  • Surgeon Appt done 12/15/22  • EGD had one done two years ago 6/10/2021  • Cardiac Risk Assessment: Consult completed 1/5/23. stress test and echo completed 1/17/23. Cleared by cardiology 1/19/23.  o Needs updated   • Nephrology clearance: consult done 4/24/23 need clarification of protein intake   • Urology clearance - needs stent removed .   • Sleep Studies has mouth guard  • Blood work  done  • Nicotine test nonsmoker  • 6 Month Pre-Operative Program: not needed  • Weight Loss 5% wt loss=12.635#=Day of surgery goal of 240.065#.     Time Spent:   30 minutes

## 2023-09-01 NOTE — TELEPHONE ENCOUNTER
P/C had OV with Dr Anitra Hinson, ov note cosigned. Pt is looking to have surgery   ----- Message -----   From: Elizabet Carcamo   Sent: 9/1/2023   3:17 PM EDT   To: [facility]   Subject: Bariatric surgery clearance                       Hello, looking to see if we can have the doctor read his updated EKG 7/28/2023 and write an updated clearance letter for bariatric surgery. Seen for in 1/5/2023 by Dr Latanya Hammonds for preop clearance. If you have questions I can be reached at [phone]     Thank you   Elizabet Carcamo     Are you able to review or does pt need to be seen?     Please advise

## 2023-09-05 ENCOUNTER — ANESTHESIA EVENT (OUTPATIENT)
Dept: PERIOP | Facility: HOSPITAL | Age: 53
End: 2023-09-05
Payer: MEDICARE

## 2023-09-07 ENCOUNTER — ANESTHESIA (OUTPATIENT)
Dept: PERIOP | Facility: HOSPITAL | Age: 53
End: 2023-09-07
Payer: MEDICARE

## 2023-09-07 ENCOUNTER — HOSPITAL ENCOUNTER (OUTPATIENT)
Facility: HOSPITAL | Age: 53
Setting detail: OUTPATIENT SURGERY
Discharge: HOME | End: 2023-09-07
Attending: UROLOGY | Admitting: UROLOGY
Payer: MEDICARE

## 2023-09-07 ENCOUNTER — TELEPHONE (OUTPATIENT)
Dept: UROLOGY | Facility: AMBULATORY SURGERY CENTER | Age: 53
End: 2023-09-07

## 2023-09-07 VITALS
OXYGEN SATURATION: 96 % | RESPIRATION RATE: 16 BRPM | WEIGHT: 251.99 LBS | HEIGHT: 69 IN | HEART RATE: 75 BPM | TEMPERATURE: 97.9 F | SYSTOLIC BLOOD PRESSURE: 155 MMHG | BODY MASS INDEX: 37.32 KG/M2 | DIASTOLIC BLOOD PRESSURE: 98 MMHG

## 2023-09-07 DIAGNOSIS — Z96.0 RETAINED URETERAL STENT: ICD-10-CM

## 2023-09-07 DIAGNOSIS — N20.1 LEFT URETERAL CALCULUS: Primary | ICD-10-CM

## 2023-09-07 PROBLEM — E66.9 CLASS 2 OBESITY IN ADULT: Status: ACTIVE | Noted: 2021-07-15

## 2023-09-07 PROCEDURE — NC001 PR NO CHARGE: Performed by: UROLOGY

## 2023-09-07 PROCEDURE — 99024 POSTOP FOLLOW-UP VISIT: CPT | Performed by: UROLOGY

## 2023-09-07 PROCEDURE — C1769 GUIDE WIRE: HCPCS | Performed by: UROLOGY

## 2023-09-07 PROCEDURE — 82360 CALCULUS ASSAY QUANT: CPT | Performed by: UROLOGY

## 2023-09-07 PROCEDURE — 52310 CYSTOSCOPY AND TREATMENT: CPT | Performed by: UROLOGY

## 2023-09-07 RX ORDER — PROPOFOL 10 MG/ML
INJECTION, EMULSION INTRAVENOUS AS NEEDED
Status: DISCONTINUED | OUTPATIENT
Start: 2023-09-07 | End: 2023-09-07

## 2023-09-07 RX ORDER — PHENYLEPHRINE HCL IN 0.9% NACL 1 MG/10 ML
SYRINGE (ML) INTRAVENOUS AS NEEDED
Status: DISCONTINUED | OUTPATIENT
Start: 2023-09-07 | End: 2023-09-07

## 2023-09-07 RX ORDER — FENTANYL CITRATE 50 UG/ML
INJECTION, SOLUTION INTRAMUSCULAR; INTRAVENOUS AS NEEDED
Status: DISCONTINUED | OUTPATIENT
Start: 2023-09-07 | End: 2023-09-07

## 2023-09-07 RX ORDER — MAGNESIUM HYDROXIDE 1200 MG/15ML
LIQUID ORAL AS NEEDED
Status: DISCONTINUED | OUTPATIENT
Start: 2023-09-07 | End: 2023-09-07 | Stop reason: HOSPADM

## 2023-09-07 RX ORDER — FENTANYL CITRATE/PF 50 MCG/ML
25 SYRINGE (ML) INJECTION
Status: DISCONTINUED | OUTPATIENT
Start: 2023-09-07 | End: 2023-09-07 | Stop reason: HOSPADM

## 2023-09-07 RX ORDER — MEPERIDINE HYDROCHLORIDE 25 MG/ML
12.5 INJECTION INTRAMUSCULAR; INTRAVENOUS; SUBCUTANEOUS
Status: DISCONTINUED | OUTPATIENT
Start: 2023-09-07 | End: 2023-09-07 | Stop reason: HOSPADM

## 2023-09-07 RX ORDER — METOPROLOL TARTRATE 5 MG/5ML
2.5 INJECTION INTRAVENOUS ONCE
Status: COMPLETED | OUTPATIENT
Start: 2023-09-07 | End: 2023-09-07

## 2023-09-07 RX ORDER — SODIUM CHLORIDE, SODIUM LACTATE, POTASSIUM CHLORIDE, CALCIUM CHLORIDE 600; 310; 30; 20 MG/100ML; MG/100ML; MG/100ML; MG/100ML
125 INJECTION, SOLUTION INTRAVENOUS CONTINUOUS
Status: DISCONTINUED | OUTPATIENT
Start: 2023-09-07 | End: 2023-09-07 | Stop reason: HOSPADM

## 2023-09-07 RX ORDER — MIDAZOLAM HYDROCHLORIDE 2 MG/2ML
INJECTION, SOLUTION INTRAMUSCULAR; INTRAVENOUS AS NEEDED
Status: DISCONTINUED | OUTPATIENT
Start: 2023-09-07 | End: 2023-09-07

## 2023-09-07 RX ORDER — ONDANSETRON 2 MG/ML
4 INJECTION INTRAMUSCULAR; INTRAVENOUS ONCE AS NEEDED
Status: DISCONTINUED | OUTPATIENT
Start: 2023-09-07 | End: 2023-09-07 | Stop reason: HOSPADM

## 2023-09-07 RX ORDER — ONDANSETRON 2 MG/ML
INJECTION INTRAMUSCULAR; INTRAVENOUS AS NEEDED
Status: DISCONTINUED | OUTPATIENT
Start: 2023-09-07 | End: 2023-09-07

## 2023-09-07 RX ORDER — LIDOCAINE HYDROCHLORIDE 20 MG/ML
INJECTION, SOLUTION EPIDURAL; INFILTRATION; INTRACAUDAL; PERINEURAL AS NEEDED
Status: DISCONTINUED | OUTPATIENT
Start: 2023-09-07 | End: 2023-09-07

## 2023-09-07 RX ORDER — CEFAZOLIN SODIUM 2 G/50ML
2000 SOLUTION INTRAVENOUS ONCE
Status: COMPLETED | OUTPATIENT
Start: 2023-09-07 | End: 2023-09-07

## 2023-09-07 RX ORDER — DEXAMETHASONE SODIUM PHOSPHATE 10 MG/ML
INJECTION, SOLUTION INTRAMUSCULAR; INTRAVENOUS AS NEEDED
Status: DISCONTINUED | OUTPATIENT
Start: 2023-09-07 | End: 2023-09-07

## 2023-09-07 RX ADMIN — FENTANYL CITRATE 50 MCG: 50 INJECTION, SOLUTION INTRAMUSCULAR; INTRAVENOUS at 11:20

## 2023-09-07 RX ADMIN — Medication 100 MCG: at 11:28

## 2023-09-07 RX ADMIN — METOROPROLOL TARTRATE 2.5 MG: 5 INJECTION, SOLUTION INTRAVENOUS at 10:18

## 2023-09-07 RX ADMIN — CEFAZOLIN SODIUM 2000 MG: 2 SOLUTION INTRAVENOUS at 11:23

## 2023-09-07 RX ADMIN — PROPOFOL 200 MG: 10 INJECTION, EMULSION INTRAVENOUS at 11:20

## 2023-09-07 RX ADMIN — MIDAZOLAM 2 MG: 1 INJECTION INTRAMUSCULAR; INTRAVENOUS at 11:15

## 2023-09-07 RX ADMIN — DEXAMETHASONE SODIUM PHOSPHATE 10 MG: 10 INJECTION INTRAMUSCULAR; INTRAVENOUS at 11:20

## 2023-09-07 RX ADMIN — LIDOCAINE HYDROCHLORIDE 100 MG: 20 INJECTION, SOLUTION EPIDURAL; INFILTRATION; INTRACAUDAL at 11:20

## 2023-09-07 RX ADMIN — ONDANSETRON 4 MG: 2 INJECTION INTRAMUSCULAR; INTRAVENOUS at 11:40

## 2023-09-07 RX ADMIN — SODIUM CHLORIDE, SODIUM LACTATE, POTASSIUM CHLORIDE, AND CALCIUM CHLORIDE 125 ML/HR: .6; .31; .03; .02 INJECTION, SOLUTION INTRAVENOUS at 10:08

## 2023-09-07 RX ADMIN — Medication 100 MCG: at 11:26

## 2023-09-07 NOTE — TELEPHONE ENCOUNTER
----- Message from Maddie Babin RN sent at 9/7/2023 11:56 AM EDT -----    ----- Message -----  From: Joe Taylor MD  Sent: 9/7/2023  11:47 AM EDT  To: [facility]     Cystoscopy stent removal in the OR today. If patient does well, does not need any visits anytime soon. We are having him do a 24 urine test sometime in the next couple weeks or so. Phone results.   Next appointment will be 1 year from now, he can have that at the [facility]

## 2023-09-07 NOTE — DISCHARGE INSTR - AVS FIRST PAGE
ALL YOUR  PREVIOUS MEDS ARE THE SAME. NEW MEDS: No new meds    We will do test call 24-hour urine stone risk profile, to help find out why you make stones. 1.  Go by any [facility] lab and  a special container container to do the test in.  2.  Pick a day where for 24 hours, every drop of urine goes in a container. People sometimes do a Sunday morning to late Monday morning. 3.  Take the container back to a [facility] lab and drop it off.  4.  The results take 2 weeks and sometimes we will call you. EXPECT SOME BLOOD IN URINE, BURNING, FREQUENT URINATION. ACTIVITY:  RESUME FULL ACTIVITY tomorrow. If no  other urinary problems in the near term, your next appointment will be with us in about 1 year, you can have that at the [facility] office.

## 2023-09-07 NOTE — H&P
HISTORY AND PHYSICAL  ? ? Patient Name: Lorena Blue  Patient MRN: [id number]  Attending Provider: Carla Galvez MD  Service: Urology  Chief Complaint    Left ureteral stent    HPI   Lorena Blue is a 45 y.o. male with procedure in May 2023 for laser of left proximal ureter stone, however very large stone at the start. We thought perhaps he might need a second procedure, but recent CT shows the stones have all passed    Today we will remove his left stent. Potential risks and complications discussed, and informed consent was given by the patient. Medications  Meds/Allergies   lactated ringers, 125 mL/hr, Last Rate: 125 mL/hr (09/07/23 1008)        Prior to Admission Medications   Prescriptions Last Dose Informant Patient Reported? Taking? ALPRAZolam (XANAX) 1 mg tablet 9/6/2023 at 2300 Self No Yes   Sig: Take 1 tablet (1 mg total) by mouth 2 (two) times a day as needed for anxiety for up to 30 days   Patient taking differently: Take 1 mg by mouth as needed for anxiety   B-D 3CC LUER-ANTONELLA SYR 25GX1" 25G X 1" 3 ML MISC Unknown Self Yes No   Flector 1.3 % PTCH More than a month  No No   Sig: Apply 1 patch topically 2 (two) times a day as needed (Apply topically 2 times daily as needed for pain) Patch may be worn up to 12 hours and then removed. Do not wear more than 1 patch at a time. Do not placed over any incision or open wound.    acetaminophen (TYLENOL) 650 mg CR tablet 9/6/2023 at 1400 Self No Yes   Sig: Take 1 tablet (650 mg total) by mouth every 8 (eight) hours as needed for mild pain   albuterol (Ventolin HFA) 90 mcg/act inhaler 9/7/2023 at 0800  No Yes   Sig: Inhale 2 puffs every 6 (six) hours as needed for wheezing   baclofen 10 mg tablet More than a month Self No No   Sig: Take 1 tablet (10 mg total) by mouth 2 (two) times a day as needed for muscle spasms (Diaphragmatic spasm)   carvedilol (COREG) 12.5 mg tablet 9/6/2023 at 0800 Self No Yes   Sig: Take 1 tablet (12.5 mg total) by mouth 2 (two) times a day with meals   chlorthalidone 25 mg tablet 9/6/2023 at 0800  No Yes   Sig: Take 1 tablet (25 mg total) by mouth daily   eszopiclone (LUNESTA) 1 mg tablet Past Week  No Yes   Sig: Take 1 tablet (1 mg total) by mouth daily at bedtime as needed for sleep Take immediately before bedtime   fluticasone (Flovent HFA) 110 MCG/ACT inhaler 9/7/2023 at 0800 Self No Yes   Sig: Inhale 2 puffs 2 (two) times a day Rinse mouth after use. losartan (COZAAR) 100 MG tablet 9/6/2023 at 0800  No Yes   Sig: Take 1 tablet (100 mg total) by mouth daily   meloxicam (Mobic) 15 mg tablet Past Week  No Yes   Sig: Take 1 tablet (15 mg total) by mouth daily with food   oxybutynin (DITROPAN) 5 mg tablet Past Week  No Yes   Sig: Take 1 tablet (5 mg total) by mouth 3 (three) times a day as needed (bladder spasms)   testosterone (ANDROGEL) 1.62 % TD gel pump 9/7/2023 Self Yes Yes   triamcinolone (KENALOG) 0.5 % cream More than a month Self No No   Sig: Apply topically 2 (two) times a day      Facility-Administered Medications: None       Current Facility-Administered Medications:   •  lactated ringers infusion, 125 mL/hr, Intravenous, Continuous, Lindsey Amado DO, Last Rate: 125 mL/hr at 09/07/23 1008, 125 mL/hr at 09/07/23 1008  Review of Systems  10 point review of systems negative except as noted in HPI  Allergies  No Known Allergies  PMH  Past Medical History:   Diagnosis Date   • Anesthesia complication     Pt states he stopped breathing during EGD and Colonoscopy   • Anxiety    • Benign essential HTN     last assessed 05/26/2017   • Degenerative joint disease    • Hiccoughs    • Hypertension    • Kidney stone    • Skin cancer    • Sleep apnea     no CPAP use   • Spinal stenosis      Past surgical history  Past Surgical History:   Procedure Laterality Date   • COLONOSCOPY     • ESOPHAGOGASTRODUODENOSCOPY N/A 03/24/2016    Procedure: ESOPHAGOGASTRODUODENOSCOPY (EGD); Surgeon: Vasyl Vogel MD;  Location: [facility];   Service:    • FL RETROGRADE PYELOGRAM  5/25/2023   • JOINT REPLACEMENT      right hip sx   • [place] CYSTO/URETERO W/LITHOTRIPSY &INDWELL STENT INSRT Left 5/25/2023    Procedure: CYSTOSCOPY URETEROSCOPY WITH LITHOTRIPSY HOLMIUM LASER, RETROGRADE PYELOGRAM AND INSERTION STENT URETERAL;  Surgeon: Kamaljit Mo MD;  Location[facility];  Service: Urology     Social history  Social History     Tobacco Use   • Smoking status: Never   • Smokeless tobacco: Never   Vaping Use   • Vaping Use: Never used   Substance Use Topics   • Alcohol use: Yes     Comment: Social drinker; last drink yesterday   • Drug use: Yes     Types: Marijuana     Comment: medicinal card obtained; last used a couple days ago     ?   Physical Exam    .vs  General appearance: alert and oriented, in no acute distress  Head: Normocephalic, without obvious abnormality, atraumatic  Throat: lips, mucosa, and tongue normal; teeth and gums normal  Neck: no adenopathy, no carotid bruit, no JVD, supple, symmetrical, trachea midline and thyroid not enlarged, symmetric, no tenderness/mass/nodules  Lungs: clear to auscultation bilaterally  Heart: regular rate and rhythm, S1, S2 normal, no murmur, click, rub or gallop  Abdomen: soft, non-tender; bowel sounds normal; no masses,  no organomegaly  Extremities: extremities normal, warm and well-perfused; no cyanosis, clubbing, or edema  Ivy Crandall MD

## 2023-09-07 NOTE — ANESTHESIA POSTPROCEDURE EVALUATION
Post-Op Assessment Note    CV Status:  Stable  Pain Score: 2    Pain management: adequate     Mental Status:  Alert and awake   Hydration Status:  Euvolemic and stable   PONV Controlled:  Controlled   Airway Patency:  Patent      Post Op Vitals Reviewed: Yes      Staff: Anesthesiologist         No notable events documented.     BP      Temp      Pulse     Resp      SpO2      cardiovascular  /98   Pulse 75   Temp 97.9 °F (36.6 °C) (Temporal)   Resp 16   Ht 5' 9" (1.753 m)   Wt 114 kg (251 lb 15.8 oz)   SpO2 96%   BMI 37.21 kg/m²

## 2023-09-07 NOTE — DISCHARGE SUMMARY
Discharge Summary - Nadia Brown 45 y.o. male MRN: [id number]    Admission Date: 9/7/2023     Admitting Diagnosis: Retained ureteral stent [Z96.0]    Procedures Performed: Cystoscopy, left stent removal    Patient underwent planned outpt surgery and recovered without complication. Discharged in good condition. Medications were prescribed. Pt knows to have office follow-up at the appropriate time.

## 2023-09-07 NOTE — ANESTHESIA PREPROCEDURE EVALUATION
Procedure:  CYSTOSCOPY,REMOVAL STENT URETERAL (Left: Ureter)    Relevant Problems   CARDIO   (+) HTN (hypertension)      GI/HEPATIC   (+) Gastroesophageal reflux disease with esophagitis      /RENAL   (+) Stage 3a chronic kidney disease (HCC)      NEURO/PSYCH   (+) Anxiety      PULMONARY   (+) Mild intermittent asthma without complication   (+) BOY (obstructive sleep apnea)      Other   (+) Anesthesia complication   (+) Class 2 obesity in adult        Physical Exam    Airway    Mallampati score: III  TM Distance: >3 FB  Neck ROM: full     Dental       Cardiovascular  Rhythm: regular, Rate: normal, Cardiovascular exam normal    Pulmonary  Pulmonary exam normal Breath sounds clear to auscultation,     Other Findings        Anesthesia Plan  ASA Score- 3     Anesthesia Type- general with ASA Monitors. Additional Monitors:   Airway Plan: LMA. Comment: Metoprolol 2.5 mg IV ordered--TBA in SDS pre-operatively--for beta blocker coverage. Says he "coded" during E/C at . "Tooth knocked out" with intubation, had to "get 4 crowns" (noted in AW eval.)  Case D/W Dr. RAMOS prior to OR time. .       Plan Factors-    Chart reviewed. Existing labs reviewed. Patient summary reviewed. Patient is not a current smoker. Patient not instructed to abstain from smoking on day of procedure. Patient did not smoke on day of surgery. There is medical exclusion for perioperative obstructive sleep apnea risk education. Induction- intravenous. Postoperative Plan-     Informed Consent- Anesthetic plan and risks discussed with patient (SO).

## 2023-09-07 NOTE — OP NOTE
OPERATIVE REPORT  PATIENT NAME: Tamera Hodge    :  1970  MRN: [id number]  Pt Location: [facility] OR ROOM 05    SURGERY DATE: 2023    Surgeon(s) and Role:     Joe Taylor MD - Primary    Preop Diagnosis:  Retained ureteral stent [Z96.0]    Post-Op Diagnosis Codes:     * Retained ureteral stent [Z96.0]    Procedure(s):  Left - CYSTOSCOPY. REMOVAL STENT URETERAL    Specimen(s):  ID Type Source Tests Collected by Time Destination   A :  Calculus Kidney, Left STONE ANALYSIS Joe Taylor MD 2023 11:43 AM        Estimated Blood Loss:   Minimal    Drains:  * No LDAs found *    Anesthesia Type:   Choice    Operative Indications:  Retained ureteral stent [Z96.0]      Operative Findings:  Normal cystoscopy except for mild bladder inflammation. Mildly encrusted stent. Stone particle from the stent is sent off for stone analysis    Complications:   None    Procedure and Technique:  After the patient was for placed under general anesthesia, he was prepped and draped using chlorhexidine solution in lithotomy position. A 24 scope was passed without difficulty in the urethra which had no strictures. The prostate had minimal hyperplasia. The bladder was normal except for mild inflammation from the stent. The stent was grasped and withdrawn without difficulty. Repeat cystoscopy showed no trauma to the urethra or bladder. Patient taken to recovery in good condition   I was present for the entire procedure.     Patient Disposition:  PACU         SIGNATURE: Joe Taylor MD  DATE: 2023  TIME: 11:47 AM

## 2023-09-08 NOTE — TELEPHONE ENCOUNTER
Post Op Note    Zaria Mckoy is a 45 y.o. male s/p Cysto stent removal performed 09/07/2023. Zaria Mckoy is a patient of Dr. Dr. Jazlyn Obando and is seen at the [facility]. How would you rate your pain on a scale from 1 to 10, 10 being the worst pain ever? 0  Have you had a fever? No  Have your bowel movements been regular? Yes  Do you have any difficulty urinating? No    Do you have any other questions or concerns that I can address at this time? Review stone analysis - 24 jug We will call with results and then can follow up in a year .  He understood

## 2023-09-12 ENCOUNTER — TELEMEDICINE (OUTPATIENT)
Dept: NEPHROLOGY | Facility: CLINIC | Age: 53
End: 2023-09-12

## 2023-09-12 ENCOUNTER — TELEPHONE (OUTPATIENT)
Dept: NEPHROLOGY | Facility: CLINIC | Age: 53
End: 2023-09-12

## 2023-09-12 VITALS — BODY MASS INDEX: 37.03 KG/M2 | HEIGHT: 69 IN | WEIGHT: 250 LBS

## 2023-09-12 DIAGNOSIS — N18.2 STAGE 2 CHRONIC KIDNEY DISEASE: Primary | ICD-10-CM

## 2023-09-12 DIAGNOSIS — E66.01 CLASS 2 SEVERE OBESITY DUE TO EXCESS CALORIES WITH SERIOUS COMORBIDITY AND BODY MASS INDEX (BMI) OF 37.0 TO 37.9 IN ADULT: ICD-10-CM

## 2023-09-12 DIAGNOSIS — N18.2 BENIGN HYPERTENSION WITH CKD (CHRONIC KIDNEY DISEASE), STAGE II: ICD-10-CM

## 2023-09-12 DIAGNOSIS — N20.0 NEPHROLITHIASIS: ICD-10-CM

## 2023-09-12 DIAGNOSIS — N13.30 HYDRONEPHROSIS OF LEFT KIDNEY: ICD-10-CM

## 2023-09-12 DIAGNOSIS — R94.4 DECREASED CALCULATED GLOMERULAR FILTRATION RATE (GFR): ICD-10-CM

## 2023-09-12 DIAGNOSIS — I12.9 BENIGN HYPERTENSION WITH CKD (CHRONIC KIDNEY DISEASE), STAGE II: ICD-10-CM

## 2023-09-12 NOTE — TELEPHONE ENCOUNTER
Called pt to check him out from his virtual appt with Dr. Jayson Garnett and to schedule January follow up. LVM for pt to return call.

## 2023-09-12 NOTE — PATIENT INSTRUCTIONS
Kidney Stone Prevention    Fluid Intake    A high fluid intake is one of the most important cornerstones of kidney stone dietary prevention. A sufficiently dilute urine will prevent the individual chemical components of stones from becoming concentrated enough to precipitate out of solution, keeping them instead in their dissolved state. A high urine output also may reduce stone from forming through "flushing" out of stone components and prevention of urine stagnation. In addition to stone benefits, increased water intake has been shown to have a multitude of other benefits, including improved alertness, better skin appearance, enhanced physical performance, reduced constipation and enhanced weight loss. The average daily urine output for normal healthy adults is 1.2 liters a day, ranging from 1 to 2 liters in most individuals and varying with body weight and gender. In stone formers, however, a higher daily urine output is required for stone prevention and achieving a daily volume of at least 2.0 to 2.5 liters a day can significantly reduce the recurrence of future stones. In a randomized study of stone formers who were given specific instructions to increase their fluid intake compared to stone formers told to not change their diet, those given specific fluid instructions achieved a high urine output of 2.6 liters a day versus 1.0 liters a day in those not given dietary instructions. Over a period of five years, the high fluid intake group was half as likely to form new stones as compared to the normal fluid intake group (Charlie et al, J Urol 1996). We recommend that most stone formers increase their daily fluid intake by one liter (an additional two 16 oz water bottles or two tall glasses a day) in order to achieve a urine output of 2.5 liters a day. (One liter = 4.2 8-oz glasses or 34 oz).  Alternatively, a 24 hour urine collection can be performed to guide fluid intake to achieve 2.5 liters of urine output in a specific patient. Type of Fluid Intake    The type of fluid intake is generally less important than the total intake. While drinking water is our preferred recommendation because it is inexpensive and contains no calories, for stone patients who do not enjoy drinking water, any beverage will be beneficial in reducing stone risk. Contrary to popular belief, studies have found that an increased intake of tea, coffee, and alcoholic beverage actually reduces the risk of stones, possibly because of an associated increase in urine output (Jacy et al, Am J of Epidemiology, 1996). While tea contains high levels of oxalate, this does not appear to result in increased stone formation for the reasons discussed below in discussion on oxalate. Soda intake (including rufino) and milk intake also do not appear to increase the risk of stones. Citrus Fruit Juices    Citrus juices, including lemon juice and orange juice, contain citrate, which acts as a stone inhibitor for calcium based stones. Citrate seems to do this by binding calcium, making it unavailable to combine with oxalate or phosphate: a necessary first step in the formation of stones. Citrate also seems to make it more difficult for stones to grow once they've formed. Drinking citrus juice in the form of concentrated lemon juice mixed with water has been shown to effectively increase urinary citrate levels and reduce urinary calcium levels, both of which will reduce stone risk. Orange juice will similarly increase urinary citrate levels. However, orange juice appears to also increase urinary oxalate levels ( a stone promoter). Other sources of citrate, including grapefruit juice, have had less research completed confirming their beneficial effects on urinary citrate levels. Therefore, lemon juice is typically favored over the other citrus juices as a natural method to increase urinary citrate levels.  Many patients find drinking citrus juices to be an attractive alternative to pharmaceutical treatment with potassium citrate. We recommend that stone formers consider supplementing their daily fluid intake with a mixture of 60 ml of concentrated lemon juice in one liter of water to increase their urinary citrate levels. Salt    A high sodium intake increases the risk of stone formation by increasing calcium levels and decreasing citrate (a stone inhibitor) levels in urine. Additionally, high sodium intake will impair the ability of medications such as hydrochlorothiazide to effectively reduce calcium levels in urine. A study of stone formers who were kept on a strict diet with a maximum daily sodium intake of 50 mmol (1200 mg) in addition to a reduced protein diet demonstrated that the low sodium diet was effective in reducing stone recurrence by 50% as compared to the low calcium diet. We recommend that stone formers aim to follow the FDA's guideline of limiting salt intake to 2300 mg of sodium a day in the general population and 1500 mg of sodium a day in those with hypertension,  Americans, or middle aged and older adults. 2300 mg is equivalent to about 1 teaspoon of table salt. The best way to determine the salt content of your food is to read the nutrition label. Processed foods tend to contain higher amounts of salt. Choose low sodium options whenever possible. 1 cup of canned chicken noodle soup contains 870 mg of sodium. A fried chicken drumstick contains 310 mg of sodium. A serving of shrimp contains 240 mg of sodium. 2 slices of white bread contains 200 mg of sodium. 15 potato chips contain 180 mg of sodium. 1 container of strawberry yogurt contains 85 mg of sodium. 1 tomato contains 20 mg of sodium. 1 apple contains 0 mg of sodium.     In addition to lowering the risk of stones, a low sodium intake helps to control or prevent high blood pressure, which can lead to heart disease, stroke, heart failure, and kidney disease. Animal Protein    Animal protein in meat products increases the risk of stone by increasing calcium, oxalate, and uric acid levels in urine. All three of these changes increase the risk of stones. In studies comparing high meat eaters versus low meat eaters, high meat eaters were found to be at increased risk of forming stones. A randomized study of stone formers restricted to a low meat intake of 52 grams a day (equivalent to 8 oz of beef) in combination with sodium restriction found that the combination reduced stone recurrence by 50% compared to calcium restriction alone (Charlie et al, Nika Morrow 2002). We recommend that most stone formers try to reduce their meat intake to 6 oz a day. This includes all types of meat: beef, pork, poultry, and seafood. The USDA has recommended a daily allowance of 5-6 oz of protein intake among adults. They also recommend choosing non-meat protein foods such as nuts and beans instead of meat sources. Protein from non-meat sources does not appear to increase the risk of stones. A small steak contains about 3-4 oz of protein. A quarter pound hamburger with cheese contains 4 oz of protein. A chicken breast contains about 5 oz of protein, a chicken thigh about 2.5 oz, a chicken drumstick about 1.5 oz. One 5 oz can of tuna contains 5 oz of protein. 1 medium egg contains 1 oz of protein. Lowering your animal protein intake and eating more fruits and vegetables also benefits your overall health by limiting the amount of saturated fats and cholesterol in your diet. This helps to reduce your risk of cardiovascular disease. Oxalate    While oxalate plays an important role in the development of calcium oxalate stones, dietary restriction does not appear to be effective in reducing the risk of stones in the majority of patients. About 40% of urinary oxalate comes from dietary sources while the remainder is naturally made within the liver.  Therefore, reducing oxalate dietary intake does not always have a significant impact on total urinary oxalate levels. Oxalate is found in many vegetable and fruits, including many healthy dietary choices often making it difficult to achieve a low oxalate diet. Because of these issues, oxalate avoidance is beneficial primarily in those individuals with specific abnormalities that lead to high oxalate urinary levels. We recommend that most stone formers should maintain a normal oxalate intake without the need for oxalate restriction. High oxalate intake should be avoided in individuals found to have high urinary oxalate levels on metabolic evaluation. Oxalate restriction may be beneficial in certain individuals with high urinary oxalate levels. Oxalate Rich Foods    Tea (black)  Spinach  Mustard Greens  Chard  Beets  Rhubarb  Okra  Berries  Chocolate  Nuts  Sweet Potatoes        Calcium    Kidney Stone formers often question whether to stop or reduce their calcium intake. Despite the fact that calcium is a major component of 75% of stones, excessive calcium intake is vary rarely the cause of stone formation. In fact, several studies have shown that restricting calcium intake in most stone formers actually increases the number of stones they develop. This appears to happen because when less calcium is ingested, it becomes easier for oxalate (which normally binds with calcium in the gut) to be absorbed. Higher levels of oxalate in the urine then lead to an increase in stone risk. Calcium obtained from dietary sources appears to be better than calcium from supplements in regards to lowering stone risk because supplements can actually increase your risk of stones slightly (by 17%) while dietary calcium intake is instead associated with lower stone risk. If you need to take supplements, taking them during meals appear to be better in terms of stone risk.     We recommend that stone formers maintain a normal calcium intake, preferably from dietary sources. Female stone formers taking calcium supplementation to prevent osteoporosis experience a slightly increased risk of stones (17%) which needs to be balanced against their risk of osteoporosis.

## 2023-09-12 NOTE — PROGRESS NOTES
NEPHROLOGY PROGRESS NOTE  Sapphire Villalpando 45 y.o. male MRN: [id number]  9/12/2023    Telemedicine consent    Patient: Sapphire Villalpando  Provider: Shanon Zaman MD  Provider located at [address] [place] [id number]    The patient was identified by name and date of birth. Sapphire Villalpando was informed that this is a telemedicine visit and that the visit is being conducted through the [employer]. He agrees to proceed. .  My office door was closed. No one else was in the room. He acknowledged consent and understanding of privacy and security of the video platform. The patient has agreed to participate and understands they can discontinue the visit at any time. Patient is aware this is a billable service. Reason for Visit: Chronic kidney disease    ASSESSMENT and PLAN:  It was a pleasure evaluating your patient in the office today. Thank you for allowing our team to participate in the care of Mr. Sapphire Villalpando. Please do not hesitate to contact our team if further issues/questions shall arise in the interim.      # Chronic Kidney Disease Stage II  - Etiology/risk factors: Longstanding history of hypertension, longstanding history of NSAID use  - Suspect that serum creatinine underestimates kidney function and he may have increased creatinine generation due to high muscle mass  - Baseline Cr: 1.33-1.63, most recently 1.63 08/2023  - Cystatin C 0.95/eGFR 85  - Combined Cystatin C creatinine based GFR around 75 mL/min  - Urinalysis: Trace protein by dipstick analysis in 06/2022 in setting of pyuria, check urinalysis with microscopy  - Proteinuria: UACR 463 mg/g 07/2023 increased from 33 mg/g in 05/2023, UPCR 770 mg 07/2023 increased from 170 mg 05/2023  - Worsening of UACR/UPCR may be related to ongoing nephrolithiasis +/- obstructive uropathy  - Repeat UACR/UPCR once in steady state  - Imaging: Right kidney 12 cm, left kidney 11 cm, normal echogenicity and contour  - Discussed risk factor reduction to slow progression of chronic kidney disease  • Intensive blood pressure control  • Weight loss  • Avoidance of NSAIDs      I have personally discussed the risks and benefits of the surgery from a renal standpoint with the patient in depth, and advised the patient about the risk of VALENTINE and the course of VALENTINE.     • Fluids to administer: Isolyte 500 cc total over 2 hours   • Medication Recommendations:  • Minimize any IV contrast use (If IV contrast is used, please check BMP POD # 1)  • Hold NSAIDs for at least 10 days prior to surgery  • He was advised to hold losartan and thiazide diuretic on day of surgery  • Hemodynamic Recommendations:  • Ideally, target MAPs greater than 65 mmHg in the perioperative period, and minimize operative time with MAPs < 65 mmHg. • Avoid intraop hemodynamic instability to decrease risk for VALENTINE occurrence.   • Other Recommendations:  • Please consult the Nephrology team when the patient is admitted     # Left-sided hydronephrosis in setting of nephrolithiasis  - This was in setting of Large left proximal ureteral calculus and left lower pole renal calculus  - Status post lithotripsy and placement of ureteral stent 05/2023  - Most recent CT imaging with no hydronephrosis 08/2023  - Most recent cystoscopy and ureteroscopy showed normal cystoscopy except for mild bladder inflammation and mildly encrusted stent status post removal of stent 09/2023  - Stone analysis is currently in process  - Stone risk profile has been ordered by urology  - He is currently on chlorthalidone which should decrease urinary calcium if his stones are composed of calcium  - No hypercalcemia to suggest primary hyperparathyroidism  - No metabolic acidosis or hypokalemia to suggest distal RTA  - Discussed increased fluid intake to increase urine output to at least 2 L (this is the backbone of therapy)  - Discussed low-salt intake as high sodium diet increase his urinary calcium excretion  - Discussed low animal protein intake as it leads to higher excretion of calcium and uric acid and lower excretion of citrate  - Discussed increasing fruit and vegetable intake (while avoiding those that are very high in oxalate such as spinach, rhubarb, and potatoes).    - Discussed low oxalate intake as it can bind calcium in urine to form stones    # Hypertension/Volume  - Several risk factors for hypertension including longstanding excessive use of NSAIDs, untreated sleep apnea and obesity  - Plasma metanephrine and normetanephrine within normal limits  - Testosterone use can also lead to elevated blood pressure  - Goal BP <120/80 per KDIGO guidelines   - Volume status: Euvolemic  - Status: Per patient, blood pressure control has been improved, systolic blood pressure mostly in 120s  - Current antihypertensive regimen: Losartan 100 mg daily, chlorthalidone 25 mg daily  - Changes: No changes to antihypertensive regimen at this time as blood pressure is better controlled  - Discussed low-sodium diet in addition to low-salt diet and educational material was provided     # Screening for Anemia   - Target Hb: More than 10 g/dL  - Most recent hemoglobin: 16.4 g/dL     # Electrolytes/Acid Base status   - Electrolytes and acid base status within normal limits     # CKD Mineral and Bone parameters   - Goal Ca 8.5-10 mg/dL, goal Phos 2.7-4.6 mg/dL, goal iPTH 30-70 pg/mL  - PTH was previously elevated at 92.6 but serum calcium was within normal limits  - Low suspicion for primary hyperparathyroidism  - He can have secondary hyperparathyroidism in setting of chronic kidney disease or vitamin D insufficiency  - We will repeat PTH and check 25-hydroxy vitamin D level  - No indication for activated vitamin D at this stage    # Class II obesity  - Patient is currently being evaluated for bariatric surgery  - Weight loss will definitely help with regarding the progression of chronic kidney disease  - No contraindication from nephrology perspective to proceed to bariatric surgery    HPI:  Since last visit: Patient had lithotripsy and stent placement for left ureteral stone. He is now status post removal of stent. He is doing well. He is currently struggling with symptoms of cold. He denies dyspnea. He denies leg swelling. He denies trouble with urination. Reagan Almeida is a 45 y.o. male who has history of hypertension for more than 30 years. He is currently on Hyzaar and recently started on Coreg. He has history of taking several doses of ibuprofen on a daily basis for several years. He has history of sleep apnea and cannot tolerate CPAP due to nasal septal issues. He has history of secondary hypogonadism and is currently on testosterone. He used to be a  and used to lift heavy weights and was previously using creatine supplements.       >> Major risk factors for CKD  - Diabetes: No   - Hypertension: Yes more than 30 years    - Age ? 54 years: No  - Family history of kidney disease: No   - Obesity or metabolic syndrome: Yes      >> Medical history evaluation   - Prior kidney disease or dialysis: No   - Incidental hematuria in the past: Yes   - Urinary symptoms: Stream is good but has nocturia x 5 at night   - History of foamy or frothy urine: No   - History of nephrolithiasis: No  - Diseases that share risk factors with CKD: HTN  - Systemic diseases that might affect kidney: No  - History of use of medications that might affect renal function: Ibuprofen 10 tablets a day for most of his life     PATIENT INSTRUCTIONS:    Patient Instructions   Kidney Stone Prevention    Fluid Intake    A high fluid intake is one of the most important cornerstones of kidney stone dietary prevention. A sufficiently dilute urine will prevent the individual chemical components of stones from becoming concentrated enough to precipitate out of solution, keeping them instead in their dissolved state. A high urine output also may reduce stone from forming through "flushing" out of stone components and prevention of urine stagnation. In addition to stone benefits, increased water intake has been shown to have a multitude of other benefits, including improved alertness, better skin appearance, enhanced physical performance, reduced constipation and enhanced weight loss. The average daily urine output for normal healthy adults is 1.2 liters a day, ranging from 1 to 2 liters in most individuals and varying with body weight and gender. In stone formers, however, a higher daily urine output is required for stone prevention and achieving a daily volume of at least 2.0 to 2.5 liters a day can significantly reduce the recurrence of future stones. In a randomized study of stone formers who were given specific instructions to increase their fluid intake compared to stone formers told to not change their diet, those given specific fluid instructions achieved a high urine output of 2.6 liters a day versus 1.0 liters a day in those not given dietary instructions. Over a period of five years, the high fluid intake group was half as likely to form new stones as compared to the normal fluid intake group (Charlie et al, J Urol 1996). We recommend that most stone formers increase their daily fluid intake by one liter (an additional two 16 oz water bottles or two tall glasses a day) in order to achieve a urine output of 2.5 liters a day. (One liter = 4.2 8-oz glasses or 34 oz). Alternatively, a 24 hour urine collection can be performed to guide fluid intake to achieve 2.5 liters of urine output in a specific patient. Type of Fluid Intake    The type of fluid intake is generally less important than the total intake.  While drinking water is our preferred recommendation because it is inexpensive and contains no calories, for stone patients who do not enjoy drinking water, any beverage will be beneficial in reducing stone risk.    Contrary to popular belief, studies have found that an increased intake of tea, coffee, and alcoholic beverage actually reduces the risk of stones, possibly because of an associated increase in urine output (Jacy et al, Am J of Epidemiology, 1996). While tea contains high levels of oxalate, this does not appear to result in increased stone formation for the reasons discussed below in discussion on oxalate. Soda intake (including rufino) and milk intake also do not appear to increase the risk of stones. Citrus Fruit Juices    Citrus juices, including lemon juice and orange juice, contain citrate, which acts as a stone inhibitor for calcium based stones. Citrate seems to do this by binding calcium, making it unavailable to combine with oxalate or phosphate: a necessary first step in the formation of stones. Citrate also seems to make it more difficult for stones to grow once they've formed. Drinking citrus juice in the form of concentrated lemon juice mixed with water has been shown to effectively increase urinary citrate levels and reduce urinary calcium levels, both of which will reduce stone risk. Orange juice will similarly increase urinary citrate levels. However, orange juice appears to also increase urinary oxalate levels ( a stone promoter). Other sources of citrate, including grapefruit juice, have had less research completed confirming their beneficial effects on urinary citrate levels. Therefore, lemon juice is typically favored over the other citrus juices as a natural method to increase urinary citrate levels. Many patients find drinking citrus juices to be an attractive alternative to pharmaceutical treatment with potassium citrate. We recommend that stone formers consider supplementing their daily fluid intake with a mixture of 60 ml of concentrated lemon juice in one liter of water to increase their urinary citrate levels.     Salt    A high sodium intake increases the risk of stone formation by increasing calcium levels and decreasing citrate (a stone inhibitor) levels in urine. Additionally, high sodium intake will impair the ability of medications such as hydrochlorothiazide to effectively reduce calcium levels in urine. A study of stone formers who were kept on a strict diet with a maximum daily sodium intake of 50 mmol (1200 mg) in addition to a reduced protein diet demonstrated that the low sodium diet was effective in reducing stone recurrence by 50% as compared to the low calcium diet. We recommend that stone formers aim to follow the FDA's guideline of limiting salt intake to 2300 mg of sodium a day in the general population and 1500 mg of sodium a day in those with hypertension,  Americans, or middle aged and older adults. 2300 mg is equivalent to about 1 teaspoon of table salt. The best way to determine the salt content of your food is to read the nutrition label. Processed foods tend to contain higher amounts of salt. Choose low sodium options whenever possible. · 1 cup of canned chicken noodle soup contains 870 mg of sodium. · A fried chicken drumstick contains 310 mg of sodium. · A serving of shrimp contains 240 mg of sodium. · 2 slices of white bread contains 200 mg of sodium. · 15 potato chips contain 180 mg of sodium. · 1 container of strawberry yogurt contains 85 mg of sodium. · 1 tomato contains 20 mg of sodium. · 1 apple contains 0 mg of sodium. In addition to lowering the risk of stones, a low sodium intake helps to control or prevent high blood pressure, which can lead to heart disease, stroke, heart failure, and kidney disease. Animal Protein    Animal protein in meat products increases the risk of stone by increasing calcium, oxalate, and uric acid levels in urine. All three of these changes increase the risk of stones.  In studies comparing high meat eaters versus low meat eaters, high meat eaters were found to be at increased risk of forming stones. A randomized study of stone formers restricted to a low meat intake of 52 grams a day (equivalent to 8 oz of beef) in combination with sodium restriction found that the combination reduced stone recurrence by 50% compared to calcium restriction alone (Charlie et al, Nika Blackri 2002). We recommend that most stone formers try to reduce their meat intake to 6 oz a day. This includes all types of meat: beef, pork, poultry, and seafood. The USDA has recommended a daily allowance of 5-6 oz of protein intake among adults. They also recommend choosing non-meat protein foods such as nuts and beans instead of meat sources. Protein from non-meat sources does not appear to increase the risk of stones. · A small steak contains about 3-4 oz of protein. · A quarter pound hamburger with cheese contains 4 oz of protein. · A chicken breast contains about 5 oz of protein, a chicken thigh about 2.5 oz, a chicken drumstick about 1.5 oz. · One 5 oz can of tuna contains 5 oz of protein. · 1 medium egg contains 1 oz of protein. Lowering your animal protein intake and eating more fruits and vegetables also benefits your overall health by limiting the amount of saturated fats and cholesterol in your diet. This helps to reduce your risk of cardiovascular disease. Oxalate    While oxalate plays an important role in the development of calcium oxalate stones, dietary restriction does not appear to be effective in reducing the risk of stones in the majority of patients. About 40% of urinary oxalate comes from dietary sources while the remainder is naturally made within the liver. Therefore, reducing oxalate dietary intake does not always have a significant impact on total urinary oxalate levels. Oxalate is found in many vegetable and fruits, including many healthy dietary choices often making it difficult to achieve a low oxalate diet.  Because of these issues, oxalate avoidance is beneficial primarily in those individuals with specific abnormalities that lead to high oxalate urinary levels. We recommend that most stone formers should maintain a normal oxalate intake without the need for oxalate restriction. High oxalate intake should be avoided in individuals found to have high urinary oxalate levels on metabolic evaluation. Oxalate restriction may be beneficial in certain individuals with high urinary oxalate levels. Oxalate Rich Foods    Tea (black)  Spinach  Mustard Greens  Chard  Beets  Rhubarb  Okra  Berries  Chocolate  Nuts  Sweet Potatoes        Calcium    Kidney Stone formers often question whether to stop or reduce their calcium intake. Despite the fact that calcium is a major component of 75% of stones, excessive calcium intake is vary rarely the cause of stone formation. In fact, several studies have shown that restricting calcium intake in most stone formers actually increases the number of stones they develop. This appears to happen because when less calcium is ingested, it becomes easier for oxalate (which normally binds with calcium in the gut) to be absorbed. Higher levels of oxalate in the urine then lead to an increase in stone risk. Calcium obtained from dietary sources appears to be better than calcium from supplements in regards to lowering stone risk because supplements can actually increase your risk of stones slightly (by 17%) while dietary calcium intake is instead associated with lower stone risk. If you need to take supplements, taking them during meals appear to be better in terms of stone risk. We recommend that stone formers maintain a normal calcium intake, preferably from dietary sources. Female stone formers taking calcium supplementation to prevent osteoporosis experience a slightly increased risk of stones (17%) which needs to be balanced against their risk of osteoporosis.           OBJECTIVE:  Current Weight: Weight - Scale: 113 kg (250 lb)  Vitals:    09/12/23 1320   Weight: 113 kg (250 lb)   Height: 5' 9" (1.753 m)    Body mass index is 36.92 kg/m². REVIEW OF SYSTEMS:    Review of Systems   Constitutional: Negative for chills and fever. HENT: Negative for ear pain and sore throat. Eyes: Negative for pain and visual disturbance. Respiratory: Negative for cough and shortness of breath. Cardiovascular: Negative for chest pain and palpitations. Gastrointestinal: Negative for abdominal pain and vomiting. Genitourinary: Negative for dysuria and hematuria. Musculoskeletal: Negative for arthralgias and back pain. Skin: Negative for color change and rash. Neurological: Negative for seizures and syncope. All other systems reviewed and are negative. Past Medical History:   Diagnosis Date   • Anesthesia complication     Pt states he stopped breathing during EGD and Colonoscopy   • Anxiety    • Benign essential HTN     last assessed 05/26/2017   • Degenerative joint disease    • Hiccoughs    • Hypertension    • Kidney stone    • Skin cancer    • Sleep apnea     no CPAP use   • Spinal stenosis        Past Surgical History:   Procedure Laterality Date   • COLONOSCOPY     • ESOPHAGOGASTRODUODENOSCOPY N/A 03/24/2016    Procedure: ESOPHAGOGASTRODUODENOSCOPY (EGD); Surgeon: Katiana Brink MD;  Location: [facility];   Service:    • FL RETROGRADE PYELOGRAM  5/25/2023   • JOINT REPLACEMENT      right hip sx   • IL CYSTO/URETERO W/LITHOTRIPSY &INDWELL STENT INSRT Left 5/25/2023    Procedure: CYSTOSCOPY URETEROSCOPY WITH LITHOTRIPSY HOLMIUM LASER, RETROGRADE PYELOGRAM AND INSERTION STENT URETERAL;  Surgeon: Naldo Barahona MD;  Location: [facility];  Service: Urology   • IL REMOVE INT DWELL URETERAL STENT TRANSURETHRAL Left 9/7/2023    Procedure: Juan Combs;  Surgeon: Mariana Sanchez MD;  Location: [facility];  Service: Urology       Family History   Problem Relation Age of Onset   • Cancer Mother    • Cancer Father         Social History     Substance and Sexual Activity   Alcohol Use Yes    Comment: Social drinker; last drink yesterday     Social History     Substance and Sexual Activity   Drug Use Yes   • Types: Marijuana    Comment: medicinal card obtained; last used a couple days ago     Social History     Tobacco Use   Smoking Status Never   Smokeless Tobacco Never       PHYSICAL EXAM:    Not performed due to virtual visit    Medications:    Current Outpatient Medications:   •  acetaminophen (TYLENOL) 650 mg CR tablet, Take 1 tablet (650 mg total) by mouth every 8 (eight) hours as needed for mild pain, Disp: 30 tablet, Rfl: 0  •  albuterol (Ventolin HFA) 90 mcg/act inhaler, Inhale 2 puffs every 6 (six) hours as needed for wheezing, Disp: 8 g, Rfl: 0  •  ALPRAZolam (XANAX) 1 mg tablet, Take 1 tablet (1 mg total) by mouth 2 (two) times a day as needed for anxiety for up to 30 days (Patient taking differently: Take 1 mg by mouth as needed for anxiety), Disp: 60 tablet, Rfl: 0  •  B-D 3CC LUER-ANTONELLA SYR 25GX1" 25G X 1" 3 ML MISC, , Disp: , Rfl:   •  baclofen 10 mg tablet, Take 1 tablet (10 mg total) by mouth 2 (two) times a day as needed for muscle spasms (Diaphragmatic spasm), Disp: 30 tablet, Rfl: 0  •  chlorthalidone 25 mg tablet, Take 1 tablet (25 mg total) by mouth daily, Disp: 90 tablet, Rfl: 3  •  eszopiclone (LUNESTA) 1 mg tablet, Take 1 tablet (1 mg total) by mouth daily at bedtime as needed for sleep Take immediately before bedtime, Disp: 30 tablet, Rfl: 0  •  Flector 1.3 % PTCH, Apply 1 patch topically 2 (two) times a day as needed (Apply topically 2 times daily as needed for pain) Patch may be worn up to 12 hours and then removed. Do not wear more than 1 patch at a time. Do not placed over any incision or open wound. , Disp: 20 patch, Rfl: 0  •  fluticasone (Flovent HFA) 110 MCG/ACT inhaler, Inhale 2 puffs 2 (two) times a day Rinse mouth after use., Disp: 36 g, Rfl: 2  •  losartan (COZAAR) 100 MG tablet, Take 1 tablet (100 mg total) by mouth daily, Disp: 90 tablet, Rfl: 3  •  meloxicam (Mobic) 15 mg tablet, Take 1 tablet (15 mg total) by mouth daily with food, Disp: 30 tablet, Rfl: 1  •  oxybutynin (DITROPAN) 5 mg tablet, Take 1 tablet (5 mg total) by mouth 3 (three) times a day as needed (bladder spasms), Disp: 30 tablet, Rfl: 0  •  testosterone (ANDROGEL) 1.62 % TD gel pump, , Disp: , Rfl:   •  triamcinolone (KENALOG) 0.5 % cream, Apply topically 2 (two) times a day, Disp: 30 g, Rfl: 0  •  carvedilol (COREG) 12.5 mg tablet, Take 1 tablet (12.5 mg total) by mouth 2 (two) times a day with meals (Patient not taking: Reported on 9/12/2023), Disp: 60 tablet, Rfl: 1    Laboratory Results:        Invalid input(s): "ALBUMIN"    Results for orders placed or performed in visit on 08/11/23   Urine culture    Specimen: Urine, Clean Catch   Result Value Ref Range    Urine Culture No Growth <1000 cfu/mL

## 2023-09-14 ENCOUNTER — TELEPHONE (OUTPATIENT)
Dept: BARIATRICS | Facility: CLINIC | Age: 53
End: 2023-09-14

## 2023-09-14 LAB
COLOR STONE: NORMAL
COM MFR STONE: 10 %
COMMENT-STONE3: NORMAL
COMPOSITION: NORMAL
LABORATORY COMMENT REPORT: NORMAL
PHOTO: NORMAL
SIZE STONE: NORMAL MM
SPEC SOURCE SUBJ: NORMAL
STONE ANALYSIS-IMP: NORMAL
URATE MFR STONE: 90 %
WT STONE: 9 MG

## 2023-09-22 ENCOUNTER — TELEPHONE (OUTPATIENT)
Dept: BARIATRICS | Facility: CLINIC | Age: 53
End: 2023-09-22

## 2023-09-22 NOTE — TELEPHONE ENCOUNTER
Spoke to Simone, this morning who stated he will have his Mandibuler Device before his scheduled surgery. Scheduled 10/31/2023. Mandibular advancement device must be with you day of surgery.

## 2023-10-02 ENCOUNTER — TELEPHONE (OUTPATIENT)
Dept: INTERNAL MEDICINE CLINIC | Facility: CLINIC | Age: 53
End: 2023-10-02

## 2023-10-02 ENCOUNTER — OFFICE VISIT (OUTPATIENT)
Dept: PODIATRY | Facility: CLINIC | Age: 53
End: 2023-10-02
Payer: MEDICARE

## 2023-10-02 ENCOUNTER — APPOINTMENT (OUTPATIENT)
Dept: RADIOLOGY | Age: 53
End: 2023-10-02
Payer: MEDICARE

## 2023-10-02 VITALS
WEIGHT: 253 LBS | HEART RATE: 98 BPM | SYSTOLIC BLOOD PRESSURE: 123 MMHG | DIASTOLIC BLOOD PRESSURE: 83 MMHG | BODY MASS INDEX: 37.47 KG/M2 | HEIGHT: 69 IN

## 2023-10-02 DIAGNOSIS — G47.09 OTHER INSOMNIA: Primary | ICD-10-CM

## 2023-10-02 DIAGNOSIS — M21.612 BUNION OF GREAT TOE OF LEFT FOOT: Primary | ICD-10-CM

## 2023-10-02 DIAGNOSIS — M21.612 BUNION OF GREAT TOE OF LEFT FOOT: ICD-10-CM

## 2023-10-02 PROCEDURE — 99214 OFFICE O/P EST MOD 30 MIN: CPT | Performed by: PODIATRIST

## 2023-10-02 PROCEDURE — 73630 X-RAY EXAM OF FOOT: CPT

## 2023-10-02 RX ORDER — ZOLPIDEM TARTRATE 10 MG/1
10 TABLET ORAL
Qty: 30 TABLET | Refills: 0 | Status: SHIPPED | OUTPATIENT
Start: 2023-10-02 | End: 2023-10-05 | Stop reason: ALTCHOICE

## 2023-10-02 NOTE — TELEPHONE ENCOUNTER
I will send Ambien 10 mg - brand necessary to his pharmacy. If needed we can complete a prior authorization for it.  Thank you

## 2023-10-02 NOTE — TELEPHONE ENCOUNTER
Patient left VM:    "Yes, Kyree Ruvalcaba, date of birth [birth year], phone number [phone]. The last doctor I saw the past couple times, I think it was [place]. I'm not sure, but if you check my file, it was the last woman I saw the past two times, but she's been helping me. My sleep medication. I tried the [email or web address]. Everything's making me sick and hungover and miserable feeling I want. She told me to call in and it didn't work. I wanted to see if they can prescribe the Ambien 10 milligrams, which was what I normally took, but they gave me the generic zolpidem. But I want to see if they can have insurance, like override it and give me the brand name to try to see if I don't get sick from that. I'm having a miserable time sleeping here. The generic Zolpidem that used to work for me for 10 years, they don't manufacture anymore and that's the discussion I had with her. So all the other brand names that I tried didn't work, made me feel sick and nauseous and don't sleep well. So maybe the brand name Ambien would work. And she said I did a month or so ago when the bosama didn't work, You see that I called in and then she immediately prescribed the. Jada, that doesn't work either. So I want to see, like I said, to have the brand name Ambien to see if that works. I'm having a miserable time here trying to get a night sleep, feel like a walking zombie. So if they can put in a, I don't know how it works on formulary request or whatever that insurance thing is letting them know and see if they're authorized. The brand name please. Thank you so much. I'm sorry for the long message, just trying to give you all the information. Have a great day. Look forward to hearing from you.  Bye."

## 2023-10-03 ENCOUNTER — ANESTHESIA EVENT (OUTPATIENT)
Dept: PERIOP | Facility: HOSPITAL | Age: 53
DRG: 621 | End: 2023-10-03
Payer: MEDICARE

## 2023-10-03 NOTE — TELEPHONE ENCOUNTER
"[place] [id number] [phone] if I can have [place] call me back in regards to my prior authorization, they denied it. There's a few special things that need to be written in the appeal I'd like to go over with her or somebody there. They said I needed to try at least three other drugs first, which I tried many more. I tried two of the Zopa dams that made me sick. The MUHOS, The Lunesta. It's got to be the brand name Ambien that I want to try this time. So that's what needs to be put on the appeal so that the they'll make it go through.  Please call me back."

## 2023-10-03 NOTE — TELEPHONE ENCOUNTER
Prior auth started and faxed thru covermymeds , will check status in 2 business days.     Tom: Tamanna Schaeffer

## 2023-10-03 NOTE — TELEPHONE ENCOUNTER
Patient called -- patient states that he tried all the generics and make him sick. Prior authorization needs to be done for the brand name for the ambien not generics.

## 2023-10-05 DIAGNOSIS — G47.09 OTHER INSOMNIA: Primary | ICD-10-CM

## 2023-10-05 PROBLEM — M21.612 BUNION OF GREAT TOE OF LEFT FOOT: Status: ACTIVE | Noted: 2023-10-05

## 2023-10-05 NOTE — TELEPHONE ENCOUNTER
Patient needs to try and fail atleast 2 of their covered meds. Patient already tried belsomra. Patient would like to try Dayvigo 10mg as this is a covered med and see how he does on this medication. Please send over if appropriate.

## 2023-10-05 NOTE — PROGRESS NOTES
This patient was seen on 10/2/23. My role is Foot , Ankle, and Wound Specialist    SUBJECTIVE    Chief Complaint:  Foot deformity     Patient ID: Contreras Myles is a 45 y.o. male. Jordyn Chun is here, known to me from treating a prior wound, now for discussion of his residual failed bunionectomy procedure. This was performed in the past by another physician. He relates he has a recurrent bunion as well as pain due to impingement of the bunion against his 2nd and 4th toes. He also relates he has an upcoming gastric bypass in October. The following portions of the patient's history were reviewed and updated as appropriate: allergies, current medications, past family history, past medical history, past social history, past surgical history and problem list.    Review of Systems   Constitutional: Negative. Respiratory: Negative. Musculoskeletal: Positive for arthralgias, gait problem and joint swelling. OBJECTIVE      /83   Pulse 98   Ht 5' 9" (1.753 m)   Wt 115 kg (253 lb)   BMI 37.36 kg/m²     Foot/Ankle Musculoskeletal Exam    General    Neurological: alert  General additional comments: I note a large bunion deformity Left with hallux abductovalgus. The 2nd through 4th toes are "windswepth" laterally by the hallux. ROM of the 1st MTPJ is limited and painful. I note the 3rd and 4th toes are short. Physical Exam  Vitals and nursing note reviewed. Constitutional:       General: He is not in acute distress. Appearance: He is obese. He is not ill-appearing, toxic-appearing or diaphoretic. HENT:      Head: Normocephalic. Pulmonary:      Effort: Pulmonary effort is normal.      Breath sounds: Normal breath sounds. Musculoskeletal:      Comments: I note a large bunion deformity Left with hallux abductovalgus. The 2nd through 4th toes are "windswepth" laterally by the hallux. ROM of the 1st MTPJ is limited and painful. I note the 3rd and 4th toes are short.     Neurological: Mental Status: He is alert. 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 This is a complicated case to revise/repair. I feel the recurrent and painful nature of the HAV and severity in addition to the lack of natural buttressing laterally due to the short toes makes a fusion of the 1st MTPJ the best choice. I explained this to him. As far as the lesser toes, I feel capsulotomy and tenotomy with pinning of the toes without attempted lengthening, toe fusions etc. Would suffice. The big issue is getting the hallux off of them. He needs to complete his gastric bypass first and recover from that. I told him to return here once he's recovered and ready to proceed.

## 2023-10-09 DIAGNOSIS — J45.20 MILD INTERMITTENT ASTHMA WITHOUT COMPLICATION: ICD-10-CM

## 2023-10-09 DIAGNOSIS — F41.9 ANXIETY: ICD-10-CM

## 2023-10-09 RX ORDER — ALBUTEROL SULFATE 90 UG/1
2 AEROSOL, METERED RESPIRATORY (INHALATION) EVERY 6 HOURS PRN
Qty: 8 G | Refills: 0 | Status: SHIPPED | OUTPATIENT
Start: 2023-10-09 | End: 2024-10-08

## 2023-10-09 RX ORDER — FLUTICASONE PROPIONATE 110 UG/1
2 AEROSOL, METERED RESPIRATORY (INHALATION) 2 TIMES DAILY
Qty: 36 G | Refills: 2 | Status: SHIPPED | OUTPATIENT
Start: 2023-10-09

## 2023-10-10 DIAGNOSIS — G47.09 OTHER INSOMNIA: ICD-10-CM

## 2023-10-10 NOTE — PRE-PROCEDURE INSTRUCTIONS
Pre-Surgery Instructions:   Medication Instructions    acetaminophen (TYLENOL) 650 mg CR tablet Uses PRN- OK to take day of surgery    albuterol (Ventolin HFA) 90 mcg/act inhaler Uses PRN- OK to take day of surgery    ALPRAZolam (XANAX) 1 mg tablet Uses PRN- OK to take day of surgery    baclofen 10 mg tablet Uses PRN- OK to take day of surgery    chlorthalidone 25 mg tablet Hold day of surgery. fluticasone (Flovent HFA) 110 MCG/ACT inhaler Uses PRN- OK to take day of surgery    losartan (COZAAR) 100 MG tablet Hold day of surgery. testosterone (ANDROGEL) 1.62 % TD gel pump Hold day of surgery. Pt verbalizes understanding of the following:    - avoid marijuana within 12hrs  - avoid alcohol within 24hrs    - Bathing instructions, will chg 10/12, neck down, no genitals  - No lotions, powders, sprays, deodorant, jewelry, body piercings  - No shaving within 24hrs    - DO NOT EAT OR DRINK ANYTHING after midnight on the evening before your procedure including coffee, tea, gum or hard candy.    - ONLY SIPS OF WATER with your medications are allowed on the morning of your procedure. - Avoid OTC non-directed Vit/ Suppl/ Herbals 7 days prior to surgery to ensure no drug interactions with perioperative surgical/ anesthetic meds  - Avoid NSAIDs 3 days prior  - Avoid ASA containing products 5 days prior  - Bring a list of meds you take at home with your last dose noted  - Bring INHALER you take for breathing problems     - Arrange for someone to drive you home after the procedure & stay with you until the next morning  - Bring insurance cards & photo id    - Leave all valuables such as credit cards, money & jewelry at home    - Notify surgeon if you develop any cold symptoms, change in your health history or develop a rash/ open wounds     - Did the surgeon's office give you any other special instructions? ERAS (appt 10/12)  - Did surgeon require any clearances?  CC

## 2023-10-10 NOTE — TELEPHONE ENCOUNTER
Patient called to state that the Mary Folk was never received by the pharmacy. Please check into this and send the script.   Patient is presently at the [address]

## 2023-10-12 ENCOUNTER — TELEPHONE (OUTPATIENT)
Dept: INTERNAL MEDICINE CLINIC | Facility: CLINIC | Age: 53
End: 2023-10-12

## 2023-10-12 ENCOUNTER — OFFICE VISIT (OUTPATIENT)
Dept: BARIATRICS | Facility: CLINIC | Age: 53
End: 2023-10-12
Payer: MEDICARE

## 2023-10-12 ENCOUNTER — CLINICAL SUPPORT (OUTPATIENT)
Dept: BARIATRICS | Facility: CLINIC | Age: 53
End: 2023-10-12

## 2023-10-12 VITALS
OXYGEN SATURATION: 96 % | HEIGHT: 67 IN | WEIGHT: 255.5 LBS | BODY MASS INDEX: 40.1 KG/M2 | SYSTOLIC BLOOD PRESSURE: 140 MMHG | TEMPERATURE: 96.4 F | DIASTOLIC BLOOD PRESSURE: 90 MMHG | HEART RATE: 86 BPM

## 2023-10-12 DIAGNOSIS — E66.9 OBESITY, CLASS II, BMI 35-39.9: Primary | ICD-10-CM

## 2023-10-12 PROCEDURE — 99213 OFFICE O/P EST LOW 20 MIN: CPT | Performed by: SURGERY

## 2023-10-12 PROCEDURE — RECHECK: Performed by: DIETITIAN, REGISTERED

## 2023-10-12 RX ORDER — ENOXAPARIN SODIUM 100 MG/ML
40 INJECTION SUBCUTANEOUS ONCE
OUTPATIENT
Start: 2023-10-12 | End: 2023-10-12

## 2023-10-12 RX ORDER — ACETAMINOPHEN 10 MG/ML
1000 INJECTION, SOLUTION INTRAVENOUS
OUTPATIENT
Start: 2023-10-12 | End: 2023-10-13

## 2023-10-12 RX ORDER — CEFAZOLIN SODIUM 2 G/50ML
2000 SOLUTION INTRAVENOUS ONCE
OUTPATIENT
Start: 2023-10-12 | End: 2023-10-12

## 2023-10-12 NOTE — PROGRESS NOTES
BARIATRIC HISTORY AND PHYSICAL - BARIATRIC SURGERY  Ashley Toney 45 y.o. male MRN: [id number]  Unit/Bed#:  Encounter: [id number]      HPI:  Ashley Toney is a 45 y.o. male who presents to review their preoperative workup and see if they are a good candidate to undergo a bariatric procedure. He is scheduled to [facility] a robotic sleeve gastrectomy    Review of Systems   All other systems reviewed and are negative. Historical Information   Past Medical History:   Diagnosis Date    Anesthesia complication     Pt states he stopped breathing during EGD and Colonoscopy    Anxiety     Arthritis     Asthma     Benign essential HTN     last assessed 05/26/2017    COVID 2021    Degenerative joint disease     GERD (gastroesophageal reflux disease)     Hiccoughs     Hypertension     Kidney stone     Shortness of breath     MOORE    Skin cancer     Sleep apnea     no CPAP use    Spinal stenosis      Past Surgical History:   Procedure Laterality Date    COLONOSCOPY      ESOPHAGOGASTRODUODENOSCOPY N/A 03/24/2016    Procedure: ESOPHAGOGASTRODUODENOSCOPY (EGD); Surgeon: Kelley Gonzalez MD;  Location:  GI LAB;   Service:     [facility] RETROGRADE PYELOGRAM  5/25/2023    JOINT REPLACEMENT      right hip sx    VT CYSTO/URETERO W/LITHOTRIPSY &INDWELL STENT INSRT Left 5/25/2023    Procedure: CYSTOSCOPY URETEROSCOPY WITH LITHOTRIPSY HOLMIUM LASER, RETROGRADE PYELOGRAM AND INSERTION STENT URETERAL;  Surgeon: Annamarie Richards MD;  Location:  MAIN OR;  Service: Urology    VT REMOVE INT DWELL URETERAL STENT TRANSURETHRAL Left 9/7/2023    Procedure: Delmer Pantoja;  Surgeon: Ruth Guillory MD;  Location: [facility];  Service: Urology     Social History   Social History     Substance and Sexual Activity   Alcohol Use Yes    Comment: Social drinker; last drink yesterday     Social History     Substance and Sexual Activity   Drug Use Yes    Types: Marijuana    Comment: medicinal card obtained; last used a couple days ago Social History     Tobacco Use   Smoking Status Never   Smokeless Tobacco Never     Family History: non-contributory    Meds/Allergies   all medications and allergies reviewed  No Known Allergies    Objective     Current Vitals:      bowel movement  [unfilled]    Invasive Devices       None                   Physical Exam  HENT:      Head: Normocephalic and atraumatic. Cardiovascular:      Rate and Rhythm: Normal rate and regular rhythm. Pulses: Normal pulses. Heart sounds: Normal heart sounds. Pulmonary:      Effort: Pulmonary effort is normal.      Breath sounds: Normal breath sounds. Abdominal:      Palpations: Abdomen is soft. Comments: Uncomplicated umbilical hernia   Skin:     General: Skin is warm. Neurological:      General: No focal deficit present. Mental Status: He is alert and oriented to person, place, and time. Lab Results: I have personally reviewed pertinent lab results. Imaging: I have personally reviewed pertinent reports. EKG, Pathology, and Other Studies: I have personally reviewed pertinent reports. Code Status: [unfilled]  Advance Directive and Living Will:      Power of :    POLST:      Endoscopist: Alana Fields MD 6/10/2021    Procedure: Esophagogastroduodenoscopy (EGD)    Indications: Bloating; epigastric pain    Sedation: Per anesthesia records MAC    Procedure Details     Informed consent was obtained for the procedure, including conscious sedation. Risks of pancreatitis, infection, perforation, hemorrhage, adverse drug reaction and aspiration were discussed. The patient was placed in the left lateral decubitus position. Based on the pre-procedure assessment, including review of the patient's medical history, medications, allergies, and review of systems, the patient had been deemed to be an appropriate candidate for sedation.  The patient was monitored continuously with ECG tracing, pulse oximetry, blood pressure monitoring, and direct observation. The Olympus endoscope was inserted into the mouth and advanced under direct visualization to distal duodenum. A careful inspection was made as the gastroscope was withdrawn, including a retroflexed view of the proximal stomach. Appropriate photo documentation was obtained. Findings:  Esophagus: The diaphragmatic pinch was located at 41 cm. The top of the rugal folds was located at 40 cm. Normal esophageal mucosa. The Z-line was regular. Stomach: There was pale mucosa at the antrum, favored to represent healed prior ulcerations. There was scattered erythema. Biopsies were taken with a cold forceps from the antrum, the incisura, and the gastric body to rule out H. Pylori. Duodenum: Normal first and second parts of the duodenum. Biopsies taken to rule out celiac. Condition: stable    Estimated Blood Loss: None    Specimens collected: Stomach, duodenum    Impression/Post-operative Diagnosis:   1) Normal esophagus. 2) Pale antral mucosa and scattered erythema, likely healed prior ulceration. HP biopsies taken. 3) Normal duodenum, biopsied. Recommendations:  1) Followup pathology. Assessment/Plan:    The patient presented to review the preoperative workup and see if bariatric surgery is appropriate and indicated following the extensive preoperative workup and the enrollment in our weight loss program.  Preoperative workup was complete. Results were reviewed with the patient including the blood work results and the endoscopy findings and the biopsy results. We also reviewed the cardiology evaluation. The patient was determined to be a good candidate for Robotic sleeve gastrectomy.   ----------------------------------------------------------------------  Smoking: Yes Marijuana (no cigarettes)  Blood thinner use: No  Home pain medication use and who manages it: No  CPAP use: Supposed to have CPAP but not using it, uses mouth guard (If yes, sleep study obtained and reminded pt to bring CPAP to hospital)  History of blood clots: No  Previous abdominal surgeries: No  Cardiac clearance obtained: Completed    EKG performed: Completed  EGD prior to surgery: Completed 6/10/2021  Screening Labs obtained (CBC, CMP): Completed  H. Pylori status: Negative  Medication allergies: None  SLIM consult Post-Op: No  Reminded patient about 2 week pre-op liquid diet: Yes  10% body weight loss pre-operatively met/discussed: Yes  Consent signed: Yes  Pre-Op ERAS Orders placed: Yes  -----------------------------------------------------------------------  Risks and benefits explained one more time to the patient. Alternatives to surgery and alternative forms of surgery were also explained. Post-surgical commitment and after care programs were explained. Consent was signed. Questions were answered and concerns were addressed. Patient will need to start the 2 week liquid diet prior to surgery. Patient wishes to proceed. As per [address] [place] [address] guidelines, I had a discussion with the patient regarding their CODE STATUS in the perioperative period and the patient is level 1 or FULL CODE STATUS.     Sree Garcia MD  Bariatric Surgery   10/12/2023  11:30 AM

## 2023-10-12 NOTE — H&P (VIEW-ONLY)
BARIATRIC HISTORY AND PHYSICAL - BARIATRIC SURGERY  Traci Laughlin 45 y.o. male MRN: [id number]  Unit/Bed#:  Encounter: [id number]      HPI:  Traci Laughlin is a 45 y.o. male who presents to review their preoperative workup and see if they are a good candidate to undergo a bariatric procedure. He is scheduled to [facility] a robotic sleeve gastrectomy    Review of Systems   All other systems reviewed and are negative. Historical Information   Past Medical History:   Diagnosis Date    Anesthesia complication     Pt states he stopped breathing during EGD and Colonoscopy    Anxiety     Arthritis     Asthma     Benign essential HTN     last assessed 05/26/2017    COVID 2021    Degenerative joint disease     GERD (gastroesophageal reflux disease)     Hiccoughs     Hypertension     Kidney stone     Shortness of breath     MOORE    Skin cancer     Sleep apnea     no CPAP use    Spinal stenosis      Past Surgical History:   Procedure Laterality Date    COLONOSCOPY      ESOPHAGOGASTRODUODENOSCOPY N/A 03/24/2016    Procedure: ESOPHAGOGASTRODUODENOSCOPY (EGD); Surgeon: Jimbo Gaffney MD;  Location:  GI LAB;   Service:     [facility] RETROGRADE PYELOGRAM  5/25/2023    JOINT REPLACEMENT      right hip sx    CT CYSTO/URETERO W/LITHOTRIPSY &INDWELL STENT INSRT Left 5/25/2023    Procedure: CYSTOSCOPY URETEROSCOPY WITH LITHOTRIPSY HOLMIUM LASER, RETROGRADE PYELOGRAM AND INSERTION STENT URETERAL;  Surgeon: Duane Nunes MD;  Location:  MAIN OR;  Service: Urology    CT REMOVE INT DWELL URETERAL STENT TRANSURETHRAL Left 9/7/2023    Procedure: Uzair eCrda;  Surgeon: Anjali Giang MD;  Location: [facility];  Service: Urology     Social History   Social History     Substance and Sexual Activity   Alcohol Use Yes    Comment: Social drinker; last drink yesterday     Social History     Substance and Sexual Activity   Drug Use Yes    Types: Marijuana    Comment: medicinal card obtained; last used a couple days ago Social History     Tobacco Use   Smoking Status Never   Smokeless Tobacco Never     Family History: non-contributory    Meds/Allergies   all medications and allergies reviewed  No Known Allergies    Objective     Current Vitals:      bowel movement  [unfilled]    Invasive Devices       None                   Physical Exam  HENT:      Head: Normocephalic and atraumatic. Cardiovascular:      Rate and Rhythm: Normal rate and regular rhythm. Pulses: Normal pulses. Heart sounds: Normal heart sounds. Pulmonary:      Effort: Pulmonary effort is normal.      Breath sounds: Normal breath sounds. Abdominal:      Palpations: Abdomen is soft. Comments: Uncomplicated umbilical hernia   Skin:     General: Skin is warm. Neurological:      General: No focal deficit present. Mental Status: He is alert and oriented to person, place, and time. Lab Results: I have personally reviewed pertinent lab results. Imaging: I have personally reviewed pertinent reports. EKG, Pathology, and Other Studies: I have personally reviewed pertinent reports. Code Status: [unfilled]  Advance Directive and Living Will:      Power of :    POLST:      Endoscopist: Carissa Mccartney. Gerda Christine MD 6/10/2021    Procedure: Esophagogastroduodenoscopy (EGD)    Indications: Bloating; epigastric pain    Sedation: Per anesthesia records MAC    Procedure Details     Informed consent was obtained for the procedure, including conscious sedation. Risks of pancreatitis, infection, perforation, hemorrhage, adverse drug reaction and aspiration were discussed. The patient was placed in the left lateral decubitus position. Based on the pre-procedure assessment, including review of the patient's medical history, medications, allergies, and review of systems, the patient had been deemed to be an appropriate candidate for sedation.  The patient was monitored continuously with ECG tracing, pulse oximetry, blood pressure monitoring, and direct observation. The Olympus endoscope was inserted into the mouth and advanced under direct visualization to distal duodenum. A careful inspection was made as the gastroscope was withdrawn, including a retroflexed view of the proximal stomach. Appropriate photo documentation was obtained. Findings:  Esophagus: The diaphragmatic pinch was located at 41 cm. The top of the rugal folds was located at 40 cm. Normal esophageal mucosa. The Z-line was regular. Stomach: There was pale mucosa at the antrum, favored to represent healed prior ulcerations. There was scattered erythema. Biopsies were taken with a cold forceps from the antrum, the incisura, and the gastric body to rule out H. Pylori. Duodenum: Normal first and second parts of the duodenum. Biopsies taken to rule out celiac. Condition: stable    Estimated Blood Loss: None    Specimens collected: Stomach, duodenum    Impression/Post-operative Diagnosis:   1) Normal esophagus. 2) Pale antral mucosa and scattered erythema, likely healed prior ulceration. HP biopsies taken. 3) Normal duodenum, biopsied. Recommendations:  1) Followup pathology. Assessment/Plan:    The patient presented to review the preoperative workup and see if bariatric surgery is appropriate and indicated following the extensive preoperative workup and the enrollment in our weight loss program.  Preoperative workup was complete. Results were reviewed with the patient including the blood work results and the endoscopy findings and the biopsy results. We also reviewed the cardiology evaluation. The patient was determined to be a good candidate for Robotic sleeve gastrectomy.   ----------------------------------------------------------------------  Smoking: Yes Marijuana (no cigarettes)  Blood thinner use: No  Home pain medication use and who manages it: No  CPAP use: Supposed to have CPAP but not using it, uses mouth guard (If yes, sleep study obtained and reminded pt to bring CPAP to hospital)  History of blood clots: No  Previous abdominal surgeries: No  Cardiac clearance obtained: Completed    EKG performed: Completed  EGD prior to surgery: Completed 6/10/2021  Screening Labs obtained (CBC, CMP): Completed  H. Pylori status: Negative  Medication allergies: None  SLIM consult Post-Op: No  Reminded patient about 2 week pre-op liquid diet: Yes  10% body weight loss pre-operatively met/discussed: Yes  Consent signed: Yes  Pre-Op ERAS Orders placed: Yes  -----------------------------------------------------------------------  Risks and benefits explained one more time to the patient. Alternatives to surgery and alternative forms of surgery were also explained. Post-surgical commitment and after care programs were explained. Consent was signed. Questions were answered and concerns were addressed. Patient will need to start the 2 week liquid diet prior to surgery. Patient wishes to proceed. As per [facility] guidelines, I had a discussion with the patient regarding their CODE STATUS in the perioperative period and the patient is level 1 or FULL CODE STATUS.     Maurene Bosworth MD  Bariatric Surgery   10/12/2023  11:30 AM

## 2023-10-12 NOTE — TELEPHONE ENCOUNTER
Patient called in to request a prescription for (AMBIEN) 10 mg tablet. Patient said that he was prescribed Lemborexant 10 MG TABS due to having a high copay for the Ambien. Patient said that Vijay Choi is making him feel sick and nauseas.  He would like for it to be discontinued

## 2023-10-13 DIAGNOSIS — E66.9 OBESITY, CLASS II, BMI 35-39.9: Primary | ICD-10-CM

## 2023-10-13 NOTE — TELEPHONE ENCOUNTER
OR [facility] for  Laparoscopic  Sleeve  Gastrectomy with  Robotics and  Intraoperative  EGD  with Dr Alicia Bray on 10/31 2023

## 2023-10-16 ENCOUNTER — TELEPHONE (OUTPATIENT)
Age: 53
End: 2023-10-16

## 2023-10-16 DIAGNOSIS — G47.09 OTHER INSOMNIA: Primary | ICD-10-CM

## 2023-10-16 RX ORDER — ZOLPIDEM TARTRATE 10 MG/1
10 TABLET ORAL
Qty: 30 TABLET | Refills: 0 | Status: SHIPPED | OUTPATIENT
Start: 2023-10-16

## 2023-10-16 NOTE — TELEPHONE ENCOUNTER
Patient is going for gastric sleeve bypass and they want him to start Calcium supplements for the rest of his life, however he has a history of kidney stones. Will this be an issue or create a bigger problem down the road for him? Please advise.

## 2023-10-16 NOTE — TELEPHONE ENCOUNTER
Patient called for the status  - please check into this and call the patient when Ambien is prescribed    thanks

## 2023-10-16 NOTE — TELEPHONE ENCOUNTER
I do believe that calcium citrate is the preferred formula for post-bariatric hyperoxaluria for stone prevention  will double check with dr Ramón Cisse the formulation

## 2023-10-18 ENCOUNTER — TELEPHONE (OUTPATIENT)
Dept: INTERNAL MEDICINE CLINIC | Facility: CLINIC | Age: 53
End: 2023-10-18

## 2023-10-18 NOTE — PROGRESS NOTES
Pt called office stating the insurance is still requiring a prior auth. Pt explained he already tried all three off brands and they all made him sick.  Insurance require him try 3 off brand medications before they'd cover name brand zolpidem (AMBIEN) 10 mg tablet , please review

## 2023-10-18 NOTE — TELEPHONE ENCOUNTER
Pt called office stating the insurance is still requiring a prior auth for zolpidem (AMBIEN) 10 mg tablet. Pt explained he already tried all three off brands and they all made him sick.  Insurance require him try 3 off brand medications before they'd cover name brand zolpidem (AMBIEN) 10 mg tablet , please review

## 2023-10-20 NOTE — TELEPHONE ENCOUNTER
Call placed to patient. He did not answer. LMOM advising of AP recommendations. Office number provided for call back if needed.

## 2023-10-23 RX ORDER — OXYCODONE HYDROCHLORIDE 5 MG/1
5 TABLET ORAL EVERY 4 HOURS PRN
Qty: 10 TABLET | Refills: 0 | Status: SHIPPED | OUTPATIENT
Start: 2023-11-01 | End: 2023-11-02

## 2023-10-23 RX ORDER — OMEPRAZOLE 20 MG/1
20 CAPSULE, DELAYED RELEASE ORAL DAILY
Qty: 30 CAPSULE | Refills: 3 | Status: SHIPPED | OUTPATIENT
Start: 2023-10-23

## 2023-10-23 RX ORDER — ENOXAPARIN SODIUM 100 MG/ML
40 INJECTION SUBCUTANEOUS DAILY
Qty: 5.2 ML | Refills: 0 | Status: SHIPPED | OUTPATIENT
Start: 2023-11-01 | End: 2023-11-14

## 2023-10-24 NOTE — TELEPHONE ENCOUNTER
Unable to process new prior auth on covermymeds due to previous denial. Been trying to contact pharmacy for 2 days to speak to a representative to discuss, have been unsuccessful. Sent a paper form prior auth as expedited, will check status in 2 days.

## 2023-10-25 ENCOUNTER — TELEPHONE (OUTPATIENT)
Dept: BARIATRICS | Facility: CLINIC | Age: 53
End: 2023-10-25

## 2023-10-26 ENCOUNTER — TELEPHONE (OUTPATIENT)
Dept: OTHER | Facility: OTHER | Age: 53
End: 2023-10-26

## 2023-10-26 NOTE — TELEPHONE ENCOUNTER
Pt calling. States he tried to fill prescription again and not approved by insurance. He is asking for a callback about this tomorrow.

## 2023-10-27 DIAGNOSIS — J45.20 MILD INTERMITTENT ASTHMA WITHOUT COMPLICATION: ICD-10-CM

## 2023-10-27 RX ORDER — ALBUTEROL SULFATE 90 UG/1
2 AEROSOL, METERED RESPIRATORY (INHALATION) EVERY 6 HOURS PRN
Qty: 8 G | Refills: 0 | OUTPATIENT
Start: 2023-10-27 | End: 2024-10-26

## 2023-10-28 DIAGNOSIS — E29.1 HYPOGONADISM IN MALE: Primary | ICD-10-CM

## 2023-10-28 DIAGNOSIS — J45.20 MILD INTERMITTENT ASTHMA WITHOUT COMPLICATION: ICD-10-CM

## 2023-10-29 DIAGNOSIS — J45.20 MILD INTERMITTENT ASTHMA WITHOUT COMPLICATION: ICD-10-CM

## 2023-10-31 ENCOUNTER — ANESTHESIA (OUTPATIENT)
Dept: PERIOP | Facility: HOSPITAL | Age: 53
DRG: 621 | End: 2023-10-31
Payer: MEDICARE

## 2023-10-31 ENCOUNTER — HOSPITAL ENCOUNTER (INPATIENT)
Facility: HOSPITAL | Age: 53
LOS: 2 days | Discharge: HOME | DRG: 621 | End: 2023-11-02
Attending: SURGERY | Admitting: SURGERY
Payer: MEDICARE

## 2023-10-31 DIAGNOSIS — G47.33 OBSTRUCTIVE SLEEP APNEA (ADULT) (PEDIATRIC): ICD-10-CM

## 2023-10-31 DIAGNOSIS — J45.909 ASTHMA: ICD-10-CM

## 2023-10-31 DIAGNOSIS — E66.9 OBESITY, UNSPECIFIED: ICD-10-CM

## 2023-10-31 DIAGNOSIS — N18.31 STAGE 3A CHRONIC KIDNEY DISEASE (HCC): ICD-10-CM

## 2023-10-31 DIAGNOSIS — Z98.84 BARIATRIC SURGERY STATUS: Primary | ICD-10-CM

## 2023-10-31 DIAGNOSIS — I10 HYPERTENSION: ICD-10-CM

## 2023-10-31 PROCEDURE — 43775 LAP SLEEVE GASTRECTOMY: CPT | Performed by: STUDENT IN AN ORGANIZED HEALTH CARE EDUCATION/TRAINING PROGRAM

## 2023-10-31 PROCEDURE — 43775 LAP SLEEVE GASTRECTOMY: CPT | Performed by: SURGERY

## 2023-10-31 PROCEDURE — 8E0W4CZ ROBOTIC ASSISTED PROCEDURE OF TRUNK REGION, PERCUTANEOUS ENDOSCOPIC APPROACH: ICD-10-PCS | Performed by: SURGERY

## 2023-10-31 PROCEDURE — 0DB64Z3 EXCISION OF STOMACH, PERCUTANEOUS ENDOSCOPIC APPROACH, VERTICAL: ICD-10-PCS | Performed by: SURGERY

## 2023-10-31 PROCEDURE — S2900 ROBOTIC SURGICAL SYSTEM: HCPCS | Performed by: SURGERY

## 2023-10-31 PROCEDURE — 88307 TISSUE EXAM BY PATHOLOGIST: CPT | Performed by: STUDENT IN AN ORGANIZED HEALTH CARE EDUCATION/TRAINING PROGRAM

## 2023-10-31 PROCEDURE — 99222 1ST HOSP IP/OBS MODERATE 55: CPT | Performed by: INTERNAL MEDICINE

## 2023-10-31 PROCEDURE — 0DJ08ZZ INSPECTION OF UPPER INTESTINAL TRACT, VIA NATURAL OR ARTIFICIAL OPENING ENDOSCOPIC: ICD-10-PCS | Performed by: SURGERY

## 2023-10-31 PROCEDURE — C9290 INJ, BUPIVACAINE LIPOSOME: HCPCS | Performed by: STUDENT IN AN ORGANIZED HEALTH CARE EDUCATION/TRAINING PROGRAM

## 2023-10-31 RX ORDER — OXYCODONE HCL 5 MG/5 ML
10 SOLUTION, ORAL ORAL EVERY 4 HOURS PRN
Status: DISCONTINUED | OUTPATIENT
Start: 2023-10-31 | End: 2023-11-02 | Stop reason: HOSPADM

## 2023-10-31 RX ORDER — ROCURONIUM BROMIDE 10 MG/ML
INJECTION, SOLUTION INTRAVENOUS AS NEEDED
Status: DISCONTINUED | OUTPATIENT
Start: 2023-10-31 | End: 2023-10-31

## 2023-10-31 RX ORDER — ONDANSETRON 2 MG/ML
4 INJECTION INTRAMUSCULAR; INTRAVENOUS ONCE AS NEEDED
Status: COMPLETED | OUTPATIENT
Start: 2023-10-31 | End: 2023-10-31

## 2023-10-31 RX ORDER — ZOLPIDEM TARTRATE 5 MG/1
10 TABLET ORAL
Status: DISCONTINUED | OUTPATIENT
Start: 2023-10-31 | End: 2023-11-02 | Stop reason: HOSPADM

## 2023-10-31 RX ORDER — FAMOTIDINE 10 MG/ML
20 INJECTION, SOLUTION INTRAVENOUS 2 TIMES DAILY
Status: DISCONTINUED | OUTPATIENT
Start: 2023-10-31 | End: 2023-11-02 | Stop reason: HOSPADM

## 2023-10-31 RX ORDER — PROMETHAZINE HYDROCHLORIDE 25 MG/ML
25 INJECTION, SOLUTION INTRAMUSCULAR; INTRAVENOUS EVERY 6 HOURS PRN
Status: DISCONTINUED | OUTPATIENT
Start: 2023-10-31 | End: 2023-11-02 | Stop reason: HOSPADM

## 2023-10-31 RX ORDER — ONDANSETRON 2 MG/ML
INJECTION INTRAMUSCULAR; INTRAVENOUS AS NEEDED
Status: DISCONTINUED | OUTPATIENT
Start: 2023-10-31 | End: 2023-10-31

## 2023-10-31 RX ORDER — FENTANYL CITRATE/PF 50 MCG/ML
50 SYRINGE (ML) INJECTION
Status: DISCONTINUED | OUTPATIENT
Start: 2023-10-31 | End: 2023-10-31 | Stop reason: HOSPADM

## 2023-10-31 RX ORDER — ALBUTEROL SULFATE 90 UG/1
2 AEROSOL, METERED RESPIRATORY (INHALATION) EVERY 6 HOURS PRN
Qty: 8 G | Refills: 0 | Status: SHIPPED | OUTPATIENT
Start: 2023-10-31 | End: 2024-10-30

## 2023-10-31 RX ORDER — HYDRALAZINE HYDROCHLORIDE 20 MG/ML
10 INJECTION INTRAMUSCULAR; INTRAVENOUS
Status: DISCONTINUED | OUTPATIENT
Start: 2023-10-31 | End: 2023-10-31 | Stop reason: HOSPADM

## 2023-10-31 RX ORDER — ENALAPRILAT 1.25 MG/ML
0.62 INJECTION INTRAVENOUS
Status: DISCONTINUED | OUTPATIENT
Start: 2023-10-31 | End: 2023-10-31 | Stop reason: HOSPADM

## 2023-10-31 RX ORDER — TESTOSTERONE 16.2 MG/G
40.5 GEL TRANSDERMAL EVERY MORNING
Qty: 76 G | Refills: 0 | Status: SHIPPED | OUTPATIENT
Start: 2023-10-31 | End: 2023-11-02

## 2023-10-31 RX ORDER — MORPHINE SULFATE 4 MG/ML
4 INJECTION, SOLUTION INTRAMUSCULAR; INTRAVENOUS EVERY 4 HOURS PRN
Status: DISCONTINUED | OUTPATIENT
Start: 2023-10-31 | End: 2023-11-02 | Stop reason: HOSPADM

## 2023-10-31 RX ORDER — HYDROMORPHONE HCL/PF 1 MG/ML
0.5 SYRINGE (ML) INJECTION
Status: DISCONTINUED | OUTPATIENT
Start: 2023-10-31 | End: 2023-10-31 | Stop reason: HOSPADM

## 2023-10-31 RX ORDER — LIDOCAINE HYDROCHLORIDE 20 MG/ML
INJECTION, SOLUTION EPIDURAL; INFILTRATION; INTRACAUDAL; PERINEURAL AS NEEDED
Status: DISCONTINUED | OUTPATIENT
Start: 2023-10-31 | End: 2023-10-31

## 2023-10-31 RX ORDER — CEFAZOLIN SODIUM 2 G/50ML
2000 SOLUTION INTRAVENOUS ONCE
Status: COMPLETED | OUTPATIENT
Start: 2023-10-31 | End: 2023-10-31

## 2023-10-31 RX ORDER — OXYCODONE HCL 5 MG/5 ML
5 SOLUTION, ORAL ORAL EVERY 4 HOURS PRN
Status: DISCONTINUED | OUTPATIENT
Start: 2023-10-31 | End: 2023-11-02 | Stop reason: HOSPADM

## 2023-10-31 RX ORDER — ALBUTEROL SULFATE 90 UG/1
2 AEROSOL, METERED RESPIRATORY (INHALATION) EVERY 6 HOURS PRN
Status: DISCONTINUED | OUTPATIENT
Start: 2023-10-31 | End: 2023-11-02 | Stop reason: HOSPADM

## 2023-10-31 RX ORDER — ALBUTEROL SULFATE 90 UG/1
2 AEROSOL, METERED RESPIRATORY (INHALATION) EVERY 6 HOURS PRN
Qty: 18 G | Refills: 3 | Status: SHIPPED | OUTPATIENT
Start: 2023-10-31 | End: 2023-10-31 | Stop reason: SDUPTHER

## 2023-10-31 RX ORDER — LABETALOL HYDROCHLORIDE 5 MG/ML
10 INJECTION, SOLUTION INTRAVENOUS
Status: DISCONTINUED | OUTPATIENT
Start: 2023-10-31 | End: 2023-10-31 | Stop reason: HOSPADM

## 2023-10-31 RX ORDER — EPHEDRINE SULFATE 50 MG/ML
INJECTION INTRAVENOUS AS NEEDED
Status: DISCONTINUED | OUTPATIENT
Start: 2023-10-31 | End: 2023-10-31

## 2023-10-31 RX ORDER — BUPIVACAINE HYDROCHLORIDE 5 MG/ML
INJECTION, SOLUTION EPIDURAL; INTRACAUDAL AS NEEDED
Status: DISCONTINUED | OUTPATIENT
Start: 2023-10-31 | End: 2023-10-31 | Stop reason: HOSPADM

## 2023-10-31 RX ORDER — FENTANYL CITRATE 50 UG/ML
INJECTION, SOLUTION INTRAMUSCULAR; INTRAVENOUS AS NEEDED
Status: DISCONTINUED | OUTPATIENT
Start: 2023-10-31 | End: 2023-10-31

## 2023-10-31 RX ORDER — ACETAMINOPHEN 10 MG/ML
1000 INJECTION, SOLUTION INTRAVENOUS EVERY 6 HOURS SCHEDULED
Status: DISCONTINUED | OUTPATIENT
Start: 2023-10-31 | End: 2023-11-02 | Stop reason: HOSPADM

## 2023-10-31 RX ORDER — LABETALOL HYDROCHLORIDE 5 MG/ML
20 INJECTION, SOLUTION INTRAVENOUS ONCE
Status: COMPLETED | OUTPATIENT
Start: 2023-10-31 | End: 2023-10-31

## 2023-10-31 RX ORDER — ALPRAZOLAM 0.5 MG/1
1 TABLET ORAL 2 TIMES DAILY PRN
Status: DISCONTINUED | OUTPATIENT
Start: 2023-10-31 | End: 2023-11-02 | Stop reason: HOSPADM

## 2023-10-31 RX ORDER — SODIUM CHLORIDE 9 MG/ML
125 INJECTION, SOLUTION INTRAVENOUS CONTINUOUS
Status: DISCONTINUED | OUTPATIENT
Start: 2023-10-31 | End: 2023-11-02 | Stop reason: HOSPADM

## 2023-10-31 RX ORDER — SODIUM CHLORIDE 9 MG/ML
INJECTION INTRAVENOUS AS NEEDED
Status: DISCONTINUED | OUTPATIENT
Start: 2023-10-31 | End: 2023-10-31 | Stop reason: HOSPADM

## 2023-10-31 RX ORDER — ENOXAPARIN SODIUM 100 MG/ML
40 INJECTION SUBCUTANEOUS ONCE
Status: COMPLETED | OUTPATIENT
Start: 2023-10-31 | End: 2023-10-31

## 2023-10-31 RX ORDER — SODIUM CHLORIDE, SODIUM LACTATE, POTASSIUM CHLORIDE, CALCIUM CHLORIDE 600; 310; 30; 20 MG/100ML; MG/100ML; MG/100ML; MG/100ML
100 INJECTION, SOLUTION INTRAVENOUS CONTINUOUS
Status: DISCONTINUED | OUTPATIENT
Start: 2023-10-31 | End: 2023-11-02 | Stop reason: HOSPADM

## 2023-10-31 RX ORDER — DIPHENHYDRAMINE HCL 25 MG
25 TABLET ORAL EVERY 8 HOURS PRN
Status: DISCONTINUED | OUTPATIENT
Start: 2023-10-31 | End: 2023-11-02 | Stop reason: HOSPADM

## 2023-10-31 RX ORDER — MIDAZOLAM HYDROCHLORIDE 2 MG/2ML
INJECTION, SOLUTION INTRAMUSCULAR; INTRAVENOUS AS NEEDED
Status: DISCONTINUED | OUTPATIENT
Start: 2023-10-31 | End: 2023-10-31

## 2023-10-31 RX ORDER — BACLOFEN 10 MG/1
10 TABLET ORAL 2 TIMES DAILY PRN
Status: DISCONTINUED | OUTPATIENT
Start: 2023-10-31 | End: 2023-11-02 | Stop reason: HOSPADM

## 2023-10-31 RX ORDER — PROMETHAZINE HYDROCHLORIDE 25 MG/ML
12.5 INJECTION, SOLUTION INTRAMUSCULAR; INTRAVENOUS ONCE AS NEEDED
Status: COMPLETED | OUTPATIENT
Start: 2023-10-31 | End: 2023-10-31

## 2023-10-31 RX ORDER — PROPOFOL 10 MG/ML
INJECTION, EMULSION INTRAVENOUS AS NEEDED
Status: DISCONTINUED | OUTPATIENT
Start: 2023-10-31 | End: 2023-10-31

## 2023-10-31 RX ORDER — ONDANSETRON 2 MG/ML
4 INJECTION INTRAMUSCULAR; INTRAVENOUS EVERY 6 HOURS PRN
Status: DISCONTINUED | OUTPATIENT
Start: 2023-10-31 | End: 2023-11-02 | Stop reason: HOSPADM

## 2023-10-31 RX ORDER — ENOXAPARIN SODIUM 100 MG/ML
40 INJECTION SUBCUTANEOUS EVERY 24 HOURS
Status: DISCONTINUED | OUTPATIENT
Start: 2023-11-01 | End: 2023-11-02 | Stop reason: HOSPADM

## 2023-10-31 RX ORDER — ACETAMINOPHEN 10 MG/ML
1000 INJECTION, SOLUTION INTRAVENOUS
Status: COMPLETED | OUTPATIENT
Start: 2023-10-31 | End: 2023-10-31

## 2023-10-31 RX ORDER — SIMETHICONE 80 MG
80 TABLET,CHEWABLE ORAL 4 TIMES DAILY PRN
Status: DISCONTINUED | OUTPATIENT
Start: 2023-10-31 | End: 2023-11-02 | Stop reason: HOSPADM

## 2023-10-31 RX ORDER — METOCLOPRAMIDE HYDROCHLORIDE 5 MG/ML
10 INJECTION INTRAMUSCULAR; INTRAVENOUS EVERY 6 HOURS PRN
Status: DISCONTINUED | OUTPATIENT
Start: 2023-10-31 | End: 2023-11-02 | Stop reason: HOSPADM

## 2023-10-31 RX ORDER — SODIUM CHLORIDE, SODIUM LACTATE, POTASSIUM CHLORIDE, CALCIUM CHLORIDE 600; 310; 30; 20 MG/100ML; MG/100ML; MG/100ML; MG/100ML
INJECTION, SOLUTION INTRAVENOUS CONTINUOUS PRN
Status: DISCONTINUED | OUTPATIENT
Start: 2023-10-31 | End: 2023-10-31

## 2023-10-31 RX ADMIN — PROPOFOL 100 MG: 10 INJECTION, EMULSION INTRAVENOUS at 09:18

## 2023-10-31 RX ADMIN — ROCURONIUM BROMIDE 10 MG: 10 INJECTION, SOLUTION INTRAVENOUS at 08:41

## 2023-10-31 RX ADMIN — FENTANYL CITRATE 100 MCG: 50 INJECTION, SOLUTION INTRAMUSCULAR; INTRAVENOUS at 07:39

## 2023-10-31 RX ADMIN — ONDANSETRON 4 MG: 2 INJECTION INTRAMUSCULAR; INTRAVENOUS at 09:22

## 2023-10-31 RX ADMIN — FENTANYL CITRATE 50 MCG: 50 INJECTION, SOLUTION INTRAMUSCULAR; INTRAVENOUS at 09:05

## 2023-10-31 RX ADMIN — HYDRALAZINE HYDROCHLORIDE 10 MG: 20 INJECTION, SOLUTION INTRAMUSCULAR; INTRAVENOUS at 10:51

## 2023-10-31 RX ADMIN — METOCLOPRAMIDE 10 MG: 5 INJECTION, SOLUTION INTRAMUSCULAR; INTRAVENOUS at 14:24

## 2023-10-31 RX ADMIN — ACETAMINOPHEN 1000 MG: 10 INJECTION INTRAVENOUS at 13:05

## 2023-10-31 RX ADMIN — SUGAMMADEX 400 MG: 100 INJECTION, SOLUTION INTRAVENOUS at 09:29

## 2023-10-31 RX ADMIN — FAMOTIDINE 20 MG: 10 INJECTION INTRAVENOUS at 20:03

## 2023-10-31 RX ADMIN — ENOXAPARIN SODIUM 40 MG: 40 INJECTION SUBCUTANEOUS at 05:59

## 2023-10-31 RX ADMIN — MIDAZOLAM 2 MG: 1 INJECTION INTRAMUSCULAR; INTRAVENOUS at 07:27

## 2023-10-31 RX ADMIN — MORPHINE SULFATE 4 MG: 4 INJECTION INTRAVENOUS at 14:24

## 2023-10-31 RX ADMIN — OXYCODONE HYDROCHLORIDE 10 MG: 5 SOLUTION ORAL at 20:03

## 2023-10-31 RX ADMIN — FENTANYL CITRATE 50 MCG: 50 INJECTION, SOLUTION INTRAMUSCULAR; INTRAVENOUS at 08:19

## 2023-10-31 RX ADMIN — PROPOFOL 50 MG: 10 INJECTION, EMULSION INTRAVENOUS at 09:22

## 2023-10-31 RX ADMIN — ONDANSETRON 4 MG: 2 INJECTION INTRAMUSCULAR; INTRAVENOUS at 18:00

## 2023-10-31 RX ADMIN — HYDROMORPHONE HYDROCHLORIDE 0.5 MG: 1 INJECTION, SOLUTION INTRAMUSCULAR; INTRAVENOUS; SUBCUTANEOUS at 11:34

## 2023-10-31 RX ADMIN — FENTANYL CITRATE 50 MCG: 50 INJECTION INTRAMUSCULAR; INTRAVENOUS at 10:07

## 2023-10-31 RX ADMIN — CEFAZOLIN SODIUM 2000 MG: 2 SOLUTION INTRAVENOUS at 07:26

## 2023-10-31 RX ADMIN — LABETALOL HYDROCHLORIDE 20 MG: 5 INJECTION, SOLUTION INTRAVENOUS at 10:30

## 2023-10-31 RX ADMIN — ROCURONIUM BROMIDE 10 MG: 10 INJECTION, SOLUTION INTRAVENOUS at 09:11

## 2023-10-31 RX ADMIN — EPHEDRINE SULFATE 15 MG: 50 INJECTION, SOLUTION INTRAVENOUS at 07:45

## 2023-10-31 RX ADMIN — ROCURONIUM BROMIDE 50 MG: 10 INJECTION, SOLUTION INTRAVENOUS at 07:39

## 2023-10-31 RX ADMIN — SODIUM CHLORIDE, SODIUM LACTATE, POTASSIUM CHLORIDE, AND CALCIUM CHLORIDE 100 ML/HR: .6; .31; .03; .02 INJECTION, SOLUTION INTRAVENOUS at 13:05

## 2023-10-31 RX ADMIN — SODIUM CHLORIDE: 0.9 INJECTION, SOLUTION INTRAVENOUS at 08:22

## 2023-10-31 RX ADMIN — ENALAPRILAT 0.62 MG: 1.25 INJECTION INTRAVENOUS at 11:13

## 2023-10-31 RX ADMIN — EPHEDRINE SULFATE 10 MG: 50 INJECTION, SOLUTION INTRAVENOUS at 07:43

## 2023-10-31 RX ADMIN — EPHEDRINE SULFATE 10 MG: 50 INJECTION, SOLUTION INTRAVENOUS at 08:12

## 2023-10-31 RX ADMIN — ROCURONIUM BROMIDE 20 MG: 10 INJECTION, SOLUTION INTRAVENOUS at 08:06

## 2023-10-31 RX ADMIN — ACETAMINOPHEN 1000 MG: 10 INJECTION INTRAVENOUS at 07:18

## 2023-10-31 RX ADMIN — ACETAMINOPHEN 1000 MG: 10 INJECTION INTRAVENOUS at 18:00

## 2023-10-31 RX ADMIN — ENALAPRILAT 0.62 MG: 1.25 INJECTION INTRAVENOUS at 11:36

## 2023-10-31 RX ADMIN — FENTANYL CITRATE 50 MCG: 50 INJECTION INTRAMUSCULAR; INTRAVENOUS at 09:47

## 2023-10-31 RX ADMIN — LIDOCAINE HYDROCHLORIDE 100 MG: 20 INJECTION, SOLUTION EPIDURAL; INFILTRATION; INTRACAUDAL at 07:39

## 2023-10-31 RX ADMIN — PROMETHAZINE HYDROCHLORIDE 12.5 MG: 25 INJECTION INTRAMUSCULAR; INTRAVENOUS at 10:45

## 2023-10-31 RX ADMIN — FOSAPREPITANT 150 MG: 150 INJECTION, POWDER, LYOPHILIZED, FOR SOLUTION INTRAVENOUS at 05:56

## 2023-10-31 RX ADMIN — METOCLOPRAMIDE 10 MG: 5 INJECTION, SOLUTION INTRAMUSCULAR; INTRAVENOUS at 20:24

## 2023-10-31 RX ADMIN — ACETAMINOPHEN 1000 MG: 10 INJECTION INTRAVENOUS at 23:05

## 2023-10-31 RX ADMIN — PROPOFOL 300 MG: 10 INJECTION, EMULSION INTRAVENOUS at 07:39

## 2023-10-31 RX ADMIN — SODIUM CHLORIDE, SODIUM LACTATE, POTASSIUM CHLORIDE, AND CALCIUM CHLORIDE: .6; .31; .03; .02 INJECTION, SOLUTION INTRAVENOUS at 09:29

## 2023-10-31 RX ADMIN — SODIUM CHLORIDE 125 ML/HR: 0.9 INJECTION, SOLUTION INTRAVENOUS at 05:55

## 2023-10-31 RX ADMIN — SODIUM CHLORIDE, SODIUM LACTATE, POTASSIUM CHLORIDE, AND CALCIUM CHLORIDE 100 ML/HR: .6; .31; .03; .02 INJECTION, SOLUTION INTRAVENOUS at 22:45

## 2023-10-31 RX ADMIN — ONDANSETRON 4 MG: 2 INJECTION INTRAMUSCULAR; INTRAVENOUS at 09:59

## 2023-10-31 NOTE — CONSULTS
Consultation - Nephrology   Tamera Hodge 45 y.o. male MRN: [id number]  Unit/Bed#: E5 -01 Encounter: [id number]    ASSESSMENT & PLAN    Stage II to stage III chronic kidney disease  Patient's creatinine is around 1.3-1.6. He does have a lower cyst at and see however as an outpatient. Nevertheless his renal function has remained stable  His losartan and chlorthalidone are appropriately held for surgery  Postoperative blood pressures are acceptable did require a dose of labetalol  Remains on intravenous fluids  Avoiding NSAIDs for pain control  No Haywood catheter  Blood work scheduled for tomorrow  Monitor hemodynamics for significantly elevated blood pressure but this has improved now map is maintained above 65    Nephrolithiasis with a history of hydronephrosis  Most recent scans do not show hydronephrosis  Continue outpatient risk reduction measures    Hypertension  On losartan and chlorthalidone as an outpatient  Current blood pressure is acceptable, did have some blood pressures as high as 214/118  Was given labetalol  Continue to monitor    Obesity status post gastrectomy sleeve  Continue postoperative care      HISTORY OF PRESENT ILLNESS:  Requesting Physician: Sejal Ngo MD  Reason for Consult: CKD      Tamera Hodge is a 45y.o. year old male who was admitted for elective bariatric surgery. Overall tolerated the surgery well. He has a history of nephrolithiasis and had hydronephrosis with a stent placement. Recently had a stent removed. His significant other is at his bedside, states he gets a lot of abdominal distention. No difficulties with urinating. Had a gastrectomy sleeve. His blood pressures did go up into the 200s was given labetalol and decreased to 113/84. His baseline creatinine has been around 1.3-1.6. He saw Dr. Donte Pruitt to get clearance for the surgery.   He is maintained on IV fluids he is lethargic but arousable and answers questions appropriately postoperatively    PAST MEDICAL HISTORY:  Past Medical History:   Diagnosis Date    Anesthesia complication     Pt states he stopped breathing during EGD and Colonoscopy    Anxiety     Arthritis     Asthma     Benign essential HTN     last assessed 05/26/2017    COVID 2021    Degenerative joint disease     GERD (gastroesophageal reflux disease)     Hiccoughs     Hypertension     Kidney stone     Shortness of breath     MOORE    Skin cancer     Sleep apnea     no CPAP use    Spinal stenosis        PAST SURGICAL HISTORY:  Past Surgical History:   Procedure Laterality Date    COLONOSCOPY      ESOPHAGOGASTRODUODENOSCOPY N/A 03/24/2016    Procedure: ESOPHAGOGASTRODUODENOSCOPY (EGD); Surgeon: James Aleman MD;  Location:  GI LAB;   Service:     [facility] RETROGRADE PYELOGRAM  5/25/2023    JOINT REPLACEMENT      right hip sx    DC CYSTO/URETERO W/LITHOTRIPSY &INDWELL STENT INSRT Left 5/25/2023    Procedure: CYSTOSCOPY URETEROSCOPY WITH LITHOTRIPSY HOLMIUM LASER, RETROGRADE PYELOGRAM AND INSERTION STENT URETERAL;  Surgeon: Gracia Jcaobsen MD;  Location:  MAIN OR;  Service: Urology    DC REMOVE INT DWELL URETERAL STENT TRANSURETHRAL Left 9/7/2023    Procedure: Crystal Alba;  Surgeon: Ness Villa MD;  Location: [facility];  Service: Urology       ALLERGIES:  No Known Allergies    SOCIAL HISTORY:  Social History     Substance and Sexual Activity   Alcohol Use Yes    Comment: Socially- 2 - 3 times /weekly mixed drinks     Social History     Substance and Sexual Activity   Drug Use Yes    Types: Marijuana    Comment: medicinal card obtained; last used a couple days ago     Social History     Tobacco Use   Smoking Status Never   Smokeless Tobacco Never       FAMILY HISTORY:  Family History   Problem Relation Age of Onset    Cancer Mother     Cancer Father        MEDICATIONS:    Current Facility-Administered Medications:     acetaminophen (Ofirmev) injection 1,000 mg, 1,000 mg, Intravenous, Q6H [facility], Carol Mariscal PA-C, Last Rate: 400 mL/hr at 10/31/23 1305, 1,000 mg at 10/31/23 1305    albuterol (PROVENTIL HFA,VENTOLIN HFA) inhaler 2 puff, 2 puff, Inhalation, Q6H PRN, CarolENOC Benoit-C    ALPRAZolam (XANAX) tablet 1 mg, 1 mg, Oral, BID PRN, Carol PAUL Mariscal PA-C    baclofen tablet 10 mg, 10 mg, Oral, BID PRN, Carol N Loida, PA-C    diphenhydrAMINE (BENADRYL) tablet 25 mg, 25 mg, Oral, Q8H PRN, CarolENOC Benoit-C    [START ON 11/1/2023] enoxaparin (LOVENOX) subcutaneous injection 40 mg, 40 mg, Subcutaneous, Q24H, Carol PAUL Mariscal PA-C    Famotidine (PF) (PEPCID) injection 20 mg, 20 mg, Intravenous, BID, Carol PAUL Mariscal PA-C    lactated ringers infusion, 100 mL/hr, Intravenous, Continuous, ENOC Fiore-C, Last Rate: 100 mL/hr at 10/31/23 1305, 100 mL/hr at 10/31/23 1305    metoclopramide (REGLAN) injection 10 mg, 10 mg, Intravenous, Q6H PRN, Carol PAUL Mariscal PA-C, 10 mg at 10/31/23 1424    morphine injection 4 mg, 4 mg, Intravenous, Q4H PRN, Carol PAUL Mariscal PA-C, 4 mg at 10/31/23 1424    ondansetron (ZOFRAN) injection 4 mg, 4 mg, Intravenous, Q6H PRN, Carol N Loida PA-C    oxyCODONE (ROXICODONE) oral solution 10 mg, 10 mg, Oral, Q4H PRN, Carol PAUL Mariscal PA-C    oxyCODONE (ROXICODONE) oral solution 5 mg, 5 mg, Oral, Q4H PRN, Carol N Loida PA-C    phenol (CHLORASEPTIC) 1.4 % mucosal liquid 2 spray, 2 spray, Mouth/Throat, Q2H PRN, Carol N Bhargavill, PA-C    promethazine (PHENERGAN) injection 25 mg, 25 mg, Intravenous, Q6H PRN, Carol Mariscal PA-C    simethicone (MYLICON) chewable tablet 80 mg, 80 mg, Oral, 4x Daily PRN, Carol Mariscal PA-C    sodium chloride 0.9 % infusion, 125 mL/hr, Intravenous, Continuous, Keisha Juanita, DO, Stopped at 10/31/23 0929    zolpidem (AMBIEN) tablet 10 mg, 10 mg, Oral, HS PRN, Carol Mariscal PA-C    REVIEW OF SYSTEMS:    A 12 point review of systems was performed and was negative besides what is mentioned in HPI    OBJECTIVE:    Vitals:    10/31/23 1143 10/31/23 1145 10/31/23 1221 10/31/23 1252   BP: 161/88 142/74 129/78 113/84   Pulse: 70 70 72 68   Resp: 15 15 17    Temp:       TempSrc:       SpO2: 96% 96% 94% 93%   Weight:       Height:            Temp:  [97.2 °F (36.2 °C)-98.1 °F (36.7 °C)] 98.1 °F (36.7 °C)  HR:  [62-82] 68  Resp:  [14-20] 17  BP: (113-214)/() 113/84  SpO2:  [93 %-97 %] 93 %   Body mass index is 37.37 kg/m². No intake/output data recorded. I/O this shift:  In: 2050 [I.V.:2000; IV Piggyback:50]  Out: 850 [Urine:850]    Weight (last 2 days)       Date/Time Weight    10/31/23 0536 115 (253.09)             Physical exam:    General: no acute distress, cooperative  Eyes: conjunctivae pink, anicteric sclerae  ENT: lips and mucous membranes moist, no exudates, normal external ears  Neck: ROM intact, no JVD  Chest: No respiratory distress, no accessory muscle use  CVS: normal rate, non pericardial friction rub  Abdomen: soft, non-tender, non-distended, normoactive bowel sounds  Extremities: no edema of both legs  Skin: no rash  Neuro: awake, alert, oriented, grossly intact  Psych:  Pleasant affect      Invasive Devices:      Lab Results:     Results for orders placed or performed during the hospital encounter of 09/07/23   Stone analysis   Result Value Ref Range    COLOR Tan     Size 2x5 mm    Stone Weight 9 mg    Composition Comment     Ca Oxalate,Monohydr. 10 %    URIC ACID 90 %    STONE COMMENT Comment     Please note Comment     Disclaimer Comment     Photo Comment     Stone Source Comment          Portions of the record may have been created with voice recognition software. Occasional wrong word or "sound a like" substitutions may have occurred due to the inherent limitations of voice recognition software. Read the chart carefully and recognize, using context, where substitutions have occurred. If you have any questions, please contact the dictating provider.

## 2023-10-31 NOTE — OP NOTE
OPERATIVE REPORT  PATIENT NAME: Ryan Flores    :  1970  MRN: [id number]  Pt Location: [facility] OR ROOM 08    SURGERY DATE: 10/31/2023    Surgeon(s) and Role:     * Faustino Carpio MD - Primary     * Carla Douglas MD - Assisting    Preop Diagnosis:  Obesity, unspecified [E66.9]  Obstructive sleep apnea (adult) (pediatric) [G47.33]  Hypertension [I10]  Asthma [J45.909]    Post-Op Diagnosis Codes:     * Obesity, unspecified [E66.9]     * Obstructive sleep apnea (adult) (pediatric) [G47.33]     * Hypertension [I10]     * Asthma [J45.909]    Procedure(s):  GASTRECTOMY  SLEEVE W/ ROBOT&  INTRAOPERATIVE EGD    Specimen(s):  ID Type Source Tests Collected by Time Destination   1 : PORTION OF STOMACH Tissue Stomach TISSUE EXAM Faustino Carpio MD 10/31/2023 2271        Estimated Blood Loss:   20 ml    Drains:  * No LDAs found *    Anesthesia Type:   General    Operative Indications:  Obesity, unspecified [E66.9]  Obstructive sleep apnea (adult) (pediatric) [G47.33]  Hypertension [I10]  Asthma [J45.909]      Operative Findings:  Normal Findings    Complications:   None    Procedure and Technique:  Robotic sleeve gastrectomy   I was present for the entire procedure. Patient Disposition:  hemodynamically stable    No qualified resident was available  Assistant was necessary for instrument exchange and counter traction and assistance with stapling     Indication:   Patient suffers from morbid obesity and associated co-morbidities  and failed to achieve any meaningful or sustainable weight loss and was therefore consented to undergo a laparoscopic sleeve gastrectomy. Risks and benefits were explained to the patient, patient consented for the procedure. Procedure: The patient was brought to the operating room and placed in a supine position. The patient received a dose of IV antibiotics and a dose of subcutaneous Lovenox prior to the procedure. The patient was induced under general endotracheal anesthesia.  The abdominal wall was prepped and draped under sterile conditions in the usual fashion. The procedure was started by obtaining access to the abdominal cavity using a Veress needle to the left side of the midline around 6 inches from the xiphoid the abdominal cavity was insufflated with CO2 to a pressure of 15 mmHg. After that, the abdomen was entered with an 8 mm trocar using an Optiview trocar under direct visualization. A TAP block was performed using Exparel mixed with saline and 0.5 % Marcaine for a total of 100 ml. At that point, two 8 and 12 mm robotic trocars were placed on the right side of the abdominal wall, under direct visualization, and another 8 mm robotic trocar and 12 mm assistant port were placed on the left side of the abdominal wall, also under direct visualization. Patient was then placed in a reverse Trendelenburg position. A Karli retractor was placed through a small stab incision below the xiphoid and was used to retract the left lobe of the liver in a medial fashion. The robot was then brought into the surgical field and the arms docked. At that point, we turned our attention to the pylorus and using the vessel sealerl, the gastrocolic ligament was taken down starting 4 cm proximal to the pylorus, all the way up to the level of the short gastric vessels which were taken down with the vessel sealer  as well. The angle of His was also dissected and all the posterior attachments of the fundus were taken down with the vessel sealer, the spleen was kept out of harm’s way and the left ken was exposed and the stomach was completely mobilized off the left ken and rotated medially. At that point, the anesthesiologist was asked to insert a 36-Malian Bougie.   The Bougie was secured in place by the anesthesiologist.     We started transecting the stomach using a 60 mm SureForm stapler, the compression feedback of the stapler was used to guide the choice of cartridges, the 36-Malian Bougie was kept in place throughout the performance of the sleeve gastrectomy. We made particular attention during the transaction of the greater curvature to retract the stomach laterally at the site of the transected vessels  to prevent corkscrewing of the gastric sleeve. We also avoided getting too close to the incisura to prevent  narrowing at the level of the incisura. The staple line was hemostatic and well formed and there was no evidence of narrowing at the incisura. The staple line was then imbricated using 2-0 V LOC suture in a running fashion while the bougie is in place. At the end, the anesthesiologist was asked to remove the Veronicaport. The surgical field was inspected one more time and appeared hemostatic. We then removed the [facility] liver retractor. The 12 mm port was extended, and then it was dilated. After that, the stomach specimen was retrieved and sent to Pathology. Sponge count was completed and was correct. Given how attenuated abdominal wall was all the ports were then closed using #1 Vicryl in a simple fashion. All the trocars were removed under direct visualization, and the skin edges were approximated using 4-0 Monocryl in an interrupted, inverted fashion. Histoacryl was then applied to the skin incisions.       The patient was extubated and transferred to the PACU in stable condition    SIGNATURE: Romero Fernandez MD  DATE: October 31, 2023  TIME: 9:22 AM

## 2023-10-31 NOTE — ANESTHESIA POSTPROCEDURE EVALUATION
Post-Op Assessment Note    CV Status:  Stable    Pain management: adequate     Mental Status:  Awake   Hydration Status:  Stable   PONV Controlled:  Controlled   Airway Patency:  Patent      Post Op Vitals Reviewed: Yes      Staff: Anesthesiologist         No notable events documented. BP      Temp      Pulse     Resp      SpO2      /74   Pulse 70   Temp 98.1 °F (36.7 °C) (Temporal)   Resp 15   Ht 5' 9" (1.753 m)   Wt 115 kg (253 lb 1.4 oz)   SpO2 96%   BMI 37.37 kg/m²     Refractory HTN in PACU resolved after labetalol, hydralazine and vasotec.

## 2023-10-31 NOTE — ANESTHESIA PREPROCEDURE EVALUATION
Procedure:  GASTRECTOMY  SLEEVE W/ ROBOT&  INTRAOPERATIVE EGD (Abdomen)    Relevant Problems   CARDIO   (+) HTN (hypertension)      GI/HEPATIC   (+) Gastroesophageal reflux disease with esophagitis      /RENAL   (+) chronic kidney disease (HCC)                PULMONARY   (+) Mild intermittent asthma without complication   (+) BOY (obstructive sleep apnea)     (+) Coded during endoscopy and colonoscopy 2/2 "sleep apnea" per pt    Physical Exam    Airway    Mallampati score: III  TM Distance: >3 FB  Neck ROM: full     Dental       Cardiovascular  Rhythm: regular    Pulmonary   Breath sounds clear to auscultation    Other Findings        Anesthesia Plan  ASA Score- 3     Anesthesia Type- general with ASA Monitors. Additional Monitors:     Airway Plan: ETT. Plan Factors-Exercise tolerance (METS): >4 METS. Chart reviewed. Existing labs reviewed. Patient is not a current smoker. Induction- intravenous. Postoperative Plan- Plan for postoperative opioid use. Planned trial extubation    Informed Consent- Anesthetic plan and risks discussed with patient.

## 2023-10-31 NOTE — INTERVAL H&P NOTE
H&P reviewed. After examining the patient I find no changes in the patients condition since the H&P had been written.     Vitals:    10/31/23 0536   BP: 144/84   Pulse: 81   Resp: 16   Temp: (!) 97.2 °F (36.2 °C)   SpO2: 96%

## 2023-10-31 NOTE — PLAN OF CARE
Problem: PAIN - ADULT  Goal: Verbalizes/displays adequate comfort level or baseline comfort level  Description: Interventions:  - Encourage patient to monitor pain and request assistance  - Assess pain using appropriate pain scale  - Administer analgesics based on type and severity of pain and evaluate response  - Implement non-pharmacological measures as appropriate and evaluate response  - Consider cultural and social influences on pain and pain management  - Notify physician/advanced practitioner if interventions unsuccessful or patient reports new pain  Outcome: Progressing     Problem: INFECTION - ADULT  Goal: Absence or prevention of progression during hospitalization  Description: INTERVENTIONS:  - Assess and monitor for signs and symptoms of infection  - Monitor lab/diagnostic results  - Monitor all insertion sites, i.e. indwelling lines, tubes, and drains  - Monitor endotracheal if appropriate and nasal secretions for changes in amount and color  - [place] appropriate cooling/warming therapies per order  - Administer medications as ordered  - Instruct and encourage patient and family to use good hand hygiene technique  - Identify and instruct in appropriate isolation precautions for identified infection/condition  Outcome: Progressing     Problem: SAFETY ADULT  Goal: Patient will remain free of falls  Description: INTERVENTIONS:  - Educate patient/family on patient safety including physical limitations  - Instruct patient to call for assistance with activity   - Consult OT/PT to assist with strengthening/mobility   - Keep Call bell within reach  - Keep bed low and locked with side rails adjusted as appropriate  - Keep care items and personal belongings within reach  - Initiate and maintain comfort rounds  - Make Fall Risk Sign visible to staff  - Apply yellow socks and bracelet for high fall risk patients  - Consider moving patient to room near nurses station  Outcome: Progressing  Goal: Maintain or return to baseline ADL function  Description: INTERVENTIONS:  -  Assess patient's ability to carry out ADLs; assess patient's baseline for ADL function and identify physical deficits which impact ability to perform ADLs (bathing, care of mouth/teeth, toileting, grooming, dressing, etc.)  - Assess/evaluate cause of self-care deficits   - Assess range of motion  - Assess patient's mobility; develop plan if impaired  - Assess patient's need for assistive devices and provide as appropriate  - Encourage maximum independence but intervene and supervise when necessary  - Involve family in performance of ADLs  - Assess for home care needs following discharge   - Consider OT consult to assist with ADL evaluation and planning for discharge  - Provide patient education as appropriate  Outcome: Progressing  Goal: Maintains/Returns to pre admission functional level  Description: INTERVENTIONS:  - Perform BMAT or MOVE assessment daily.   - Set and communicate daily mobility goal to care team and patient/family/caregiver. - Collaborate with rehabilitation services on mobility goals if consulted  - Perform Range of Motion 3 times a day. - Reposition patient every 3 hours.   - Dangle patient 3 times a day  - Stand patient 3 times a day  - Ambulate patient 3 times a day  - Out of bed to chair 3 times a day   - Out of bed for meals 3 times a day  - Out of bed for toileting  - Record patient progress and toleration of activity level   Outcome: Progressing     Problem: DISCHARGE PLANNING  Goal: Discharge to home or other facility with appropriate resources  Description: INTERVENTIONS:  - Identify barriers to discharge w/patient and caregiver  - Arrange for needed discharge resources and transportation as appropriate  - Identify discharge learning needs (meds, wound care, etc.)  - Arrange for interpretive services to assist at discharge as needed  - Refer to Case Management Department for coordinating discharge planning if the patient needs post-hospital services based on physician/advanced practitioner order or complex needs related to functional status, cognitive ability, or social support system  Outcome: Progressing     Problem: Knowledge Deficit  Goal: Patient/family/caregiver demonstrates understanding of disease process, treatment plan, medications, and discharge instructions  Description: Complete learning assessment and assess knowledge base.   Interventions:  - Provide teaching at level of understanding  - Provide teaching via preferred learning methods  Outcome: Progressing     Problem: GASTROINTESTINAL - ADULT  Goal: Minimal or absence of nausea and/or vomiting  Description: INTERVENTIONS:  - Administer IV fluids if ordered to ensure adequate hydration  - Maintain NPO status until nausea and vomiting are resolved  - Nasogastric tube if ordered  - Administer ordered antiemetic medications as needed  - Provide nonpharmacologic comfort measures as appropriate  - Advance diet as tolerated, if ordered  - Consider nutrition services referral to assist patient with adequate nutrition and appropriate food choices  Outcome: Progressing  Goal: Maintains or returns to baseline bowel function  Description: INTERVENTIONS:  - Assess bowel function  - Encourage oral fluids to ensure adequate hydration  - Administer IV fluids if ordered to ensure adequate hydration  - Administer ordered medications as needed  - Encourage mobilization and activity  - Consider nutritional services referral to assist patient with adequate nutrition and appropriate food choices  Outcome: Progressing  Goal: Maintains adequate nutritional intake  Description: INTERVENTIONS:  - Monitor percentage of each meal consumed  - Identify factors contributing to decreased intake, treat as appropriate  - Assist with meals as needed  - Monitor I&O, weight, and lab values if indicated  - Obtain nutrition services referral as needed  Outcome: Progressing     Problem: GENITOURINARY - ADULT  Goal: Maintains or returns to baseline urinary function  Description: INTERVENTIONS:  - Assess urinary function  - Encourage oral fluids to ensure adequate hydration if ordered  - Administer IV fluids as ordered to ensure adequate hydration  - Administer ordered medications as needed  - Offer frequent toileting  - Follow urinary retention protocol if ordered  Outcome: Progressing

## 2023-11-01 LAB
ANION GAP SERPL CALCULATED.3IONS-SCNC: 9 MMOL/L
BUN SERPL-MCNC: 12 MG/DL (ref 5–25)
CALCIUM SERPL-MCNC: 8.9 MG/DL (ref 8.4–10.2)
CHLORIDE SERPL-SCNC: 102 MMOL/L (ref 96–108)
CO2 SERPL-SCNC: 25 MMOL/L (ref 21–32)
CREAT SERPL-MCNC: 1.06 MG/DL (ref 0.6–1.3)
ERYTHROCYTE [DISTWIDTH] IN BLOOD BY AUTOMATED COUNT: 12.6 % (ref 11.6–15.1)
GFR SERPL CREATININE-BSD FRML MDRD: 79 ML/MIN/1.73SQ M
GLUCOSE SERPL-MCNC: 95 MG/DL (ref 65–140)
HCT VFR BLD AUTO: 44 % (ref 36.5–49.3)
HGB BLD-MCNC: 15.5 G/DL (ref 12–17)
MCH RBC QN AUTO: 33.5 PG (ref 26.8–34.3)
MCHC RBC AUTO-ENTMCNC: 35.2 G/DL (ref 31.4–37.4)
MCV RBC AUTO: 95 FL (ref 82–98)
PLATELET # BLD AUTO: 220 THOUSANDS/UL (ref 149–390)
PMV BLD AUTO: 8.5 FL (ref 8.9–12.7)
POTASSIUM SERPL-SCNC: 3.7 MMOL/L (ref 3.5–5.3)
RBC # BLD AUTO: 4.63 MILLION/UL (ref 3.88–5.62)
SODIUM SERPL-SCNC: 136 MMOL/L (ref 135–147)
WBC # BLD AUTO: 8.39 THOUSAND/UL (ref 4.31–10.16)

## 2023-11-01 PROCEDURE — 85027 COMPLETE CBC AUTOMATED: CPT | Performed by: PHYSICIAN ASSISTANT

## 2023-11-01 PROCEDURE — 80048 BASIC METABOLIC PNL TOTAL CA: CPT | Performed by: PHYSICIAN ASSISTANT

## 2023-11-01 PROCEDURE — 99024 POSTOP FOLLOW-UP VISIT: CPT | Performed by: STUDENT IN AN ORGANIZED HEALTH CARE EDUCATION/TRAINING PROGRAM

## 2023-11-01 PROCEDURE — 99232 SBSQ HOSP IP/OBS MODERATE 35: CPT | Performed by: INTERNAL MEDICINE

## 2023-11-01 RX ORDER — LABETALOL HYDROCHLORIDE 5 MG/ML
10 INJECTION, SOLUTION INTRAVENOUS EVERY 6 HOURS PRN
Status: DISCONTINUED | OUTPATIENT
Start: 2023-11-01 | End: 2023-11-02 | Stop reason: HOSPADM

## 2023-11-01 RX ORDER — ACETAMINOPHEN 160 MG/5ML
975 SUSPENSION ORAL EVERY 8 HOURS
Qty: 638.4 ML | Refills: 0 | Status: SHIPPED | OUTPATIENT
Start: 2023-11-01 | End: 2023-11-08

## 2023-11-01 RX ADMIN — ACETAMINOPHEN 1000 MG: 10 INJECTION INTRAVENOUS at 18:22

## 2023-11-01 RX ADMIN — ACETAMINOPHEN 1000 MG: 10 INJECTION INTRAVENOUS at 06:21

## 2023-11-01 RX ADMIN — FAMOTIDINE 20 MG: 10 INJECTION INTRAVENOUS at 20:31

## 2023-11-01 RX ADMIN — SIMETHICONE 80 MG: 80 TABLET, CHEWABLE ORAL at 21:52

## 2023-11-01 RX ADMIN — OXYCODONE HYDROCHLORIDE 5 MG: 5 SOLUTION ORAL at 21:42

## 2023-11-01 RX ADMIN — ACETAMINOPHEN 1000 MG: 10 INJECTION INTRAVENOUS at 12:24

## 2023-11-01 RX ADMIN — ENOXAPARIN SODIUM 40 MG: 40 INJECTION SUBCUTANEOUS at 09:00

## 2023-11-01 RX ADMIN — LABETALOL HYDROCHLORIDE 10 MG: 5 INJECTION, SOLUTION INTRAVENOUS at 23:57

## 2023-11-01 RX ADMIN — SODIUM CHLORIDE, SODIUM LACTATE, POTASSIUM CHLORIDE, AND CALCIUM CHLORIDE 100 ML/HR: .6; .31; .03; .02 INJECTION, SOLUTION INTRAVENOUS at 09:16

## 2023-11-01 RX ADMIN — SODIUM CHLORIDE, SODIUM LACTATE, POTASSIUM CHLORIDE, AND CALCIUM CHLORIDE 100 ML/HR: .6; .31; .03; .02 INJECTION, SOLUTION INTRAVENOUS at 20:37

## 2023-11-01 RX ADMIN — OXYCODONE HYDROCHLORIDE 5 MG: 5 SOLUTION ORAL at 09:02

## 2023-11-01 RX ADMIN — ACETAMINOPHEN 1000 MG: 10 INJECTION INTRAVENOUS at 23:31

## 2023-11-01 RX ADMIN — OXYCODONE HYDROCHLORIDE 5 MG: 5 SOLUTION ORAL at 14:13

## 2023-11-01 RX ADMIN — FAMOTIDINE 20 MG: 10 INJECTION INTRAVENOUS at 08:56

## 2023-11-01 NOTE — DISCHARGE INSTRUCTIONS
your medications from [address] [facility] in [address] [facility] or cut your pills and open capsules, mix with liquid to drink. Take Tylenol every 8 hours around the clock, unless instructed otherwise  Take your omeprazole daily  It is important to stay hydrated and follow your discharge diet progression   Mild nausea is ok as long as you can drink fluids, sip very slowly and get up and walk during any periods of nausea  You may shower normally after 48 hours, but do not scrub incision sites, blot gently with clean towel to dry incisions  Take home medications as usual unless instructed otherwise while in hospital  Follow up with Dr. Dayna Birch and your PCP within the next week  Sleeve gastrectomy patients ONLY: Complete full course of lovenox injections!

## 2023-11-01 NOTE — RESTORATIVE TECHNICIAN NOTE
Restorative Technician Note      Patient Name: Sae Willis     Restorative Tech Visit Date: 11/01/23  Note Type: Mobility  Patient Position Upon Consult: Standing  Activity Performed: Ambulated  Assistive Device: Other (Comment) (Knee walker)  Patient Position at End of Consult: Standing;  All needs within reach

## 2023-11-01 NOTE — PROGRESS NOTES
Progress Note - Bariatric Surgery   Frank Sims 45 y.o. male MRN: [id number]  Unit/Bed#: E5 -01 Encounter: [id number]    Assessment:  Frank Sims is a 45 y.o. male s/p robotic assisted laparoscopic sleeve gastrectomy, doing well. Plan:  Bariatric clear liquid diet  Pain control with PO meds and IV Toradol  Ambulate and out of bed frequently  Encourage incentive spirometry use  GI ppx with pepcid  Labs and vitals reviewed. 2000 [facility] for Lovenox for DVT-ppx  Nephrology consulted  Likely discharge later today    Subjective/Objective   Chief Complaint: No complaints    Subjective: No acute overnight events, reports mild nausea that is improving and hiccups. Pain is well controlled. Tolerating diet. Ambulating. Voiding without issue. Objective:     Vitals: Blood pressure 156/93, pulse 95, temperature 98.4 °F (36.9 °C), resp. rate 17, height 5' 9" (1.753 m), weight 115 kg (253 lb 1.4 oz), SpO2 92 %. ,Body mass index is 37.37 kg/m². Intake/Output Summary (Last 24 hours) at 11/1/2023 0843  Last data filed at 10/31/2023 2239  Gross per 24 hour   Intake 1000 ml   Output 1350 ml   Net -350 ml       Invasive Devices       Peripheral Intravenous Line  Duration             Peripheral IV 10/31/23 Left Hand 1 day                    Physical Exam: /93 Comment: 140/90 manual  Pulse 95   Temp 98.4 °F (36.9 °C)   Resp 17   Ht 5' 9" (1.753 m)   Wt 115 kg (253 lb 1.4 oz)   SpO2 92%   BMI 37.37 kg/m²   General appearance: alert and oriented, in no acute distress  Head: Normocephalic, without obvious abnormality, atraumatic  Lungs:  no apparent respiratory distress on RA  Abdomen:  soft, nontender, nondistended, laparoscopic port sites clean dry and intact  Extremities: extremities normal, warm and well-perfused; no cyanosis, clubbing, or edema    Lab, Imaging and other studies: I have personally reviewed pertinent labs.   CBC:   Lab Results   Component Value Date    WBC 8.39 11/01/2023    HGB 15.5 11/01/2023    HCT 44.0 11/01/2023    MCV 95 11/01/2023     11/01/2023    RBC 4.63 11/01/2023    MCH 33.5 11/01/2023    MCHC 35.2 11/01/2023    RDW 12.6 11/01/2023    MPV 8.5 (L) 11/01/2023     CMP:   Lab Results   Component Value Date    SODIUM 136 11/01/2023     11/01/2023    CO2 25 11/01/2023    BUN 12 11/01/2023    CREATININE 1.06 11/01/2023    CALCIUM 8.9 11/01/2023    EGFR 79 11/01/2023     VTE Pharmacologic Prophylaxis: Enoxaparin (Lovenox)  VTE Mechanical Prophylaxis: sequential compression device    Thuy Barnes MD  Bariatric Surgery

## 2023-11-01 NOTE — PLAN OF CARE
Problem: PAIN - ADULT  Goal: Verbalizes/displays adequate comfort level or baseline comfort level  Description: Interventions:  - Encourage patient to monitor pain and request assistance  - Assess pain using appropriate pain scale  - Administer analgesics based on type and severity of pain and evaluate response  - Implement non-pharmacological measures as appropriate and evaluate response  - Consider cultural and social influences on pain and pain management  - Notify physician/advanced practitioner if interventions unsuccessful or patient reports new pain  Outcome: Progressing     Problem: INFECTION - ADULT  Goal: Absence or prevention of progression during hospitalization  Description: INTERVENTIONS:  - Assess and monitor for signs and symptoms of infection  - Monitor lab/diagnostic results  - Monitor all insertion sites, i.e. indwelling lines, tubes, and drains  - Monitor endotracheal if appropriate and nasal secretions for changes in amount and color  - [place] appropriate cooling/warming therapies per order  - Administer medications as ordered  - Instruct and encourage patient and family to use good hand hygiene technique  - Identify and instruct in appropriate isolation precautions for identified infection/condition  Outcome: Progressing     Problem: SAFETY ADULT  Goal: Patient will remain free of falls  Description: INTERVENTIONS:  - Educate patient/family on patient safety including physical limitations  - Instruct patient to call for assistance with activity   - Consult OT/PT to assist with strengthening/mobility   - Keep Call bell within reach  - Keep bed low and locked with side rails adjusted as appropriate  - Keep care items and personal belongings within reach  - Initiate and maintain comfort rounds  - Make Fall Risk Sign visible to staff  - Apply yellow socks and bracelet for high fall risk patients  - Consider moving patient to room near nurses station  Outcome: Progressing  Goal: Maintain or return to baseline ADL function  Description: INTERVENTIONS:  -  Assess patient's ability to carry out ADLs; assess patient's baseline for ADL function and identify physical deficits which impact ability to perform ADLs (bathing, care of mouth/teeth, toileting, grooming, dressing, etc.)  - Assess/evaluate cause of self-care deficits   - Assess range of motion  - Assess patient's mobility; develop plan if impaired  - Assess patient's need for assistive devices and provide as appropriate  - Encourage maximum independence but intervene and supervise when necessary  - Involve family in performance of ADLs  - Assess for home care needs following discharge   - Consider OT consult to assist with ADL evaluation and planning for discharge  - Provide patient education as appropriate  Outcome: Progressing  Goal: Maintains/Returns to pre admission functional level  Description: INTERVENTIONS:  - Perform BMAT or MOVE assessment daily.   - Set and communicate daily mobility goal to care team and patient/family/caregiver. - Collaborate with rehabilitation services on mobility goals if consulted  - Perform Range of Motion 3 times a day. - Reposition patient every 3 hours.   - Dangle patient 3 times a day  - Stand patient 3 times a day  - Ambulate patient 3 times a day  - Out of bed to chair 3 times a day   - Out of bed for meals 3 times a day  - Out of bed for toileting  - Record patient progress and toleration of activity level   Outcome: Progressing     Problem: DISCHARGE PLANNING  Goal: Discharge to home or other facility with appropriate resources  Description: INTERVENTIONS:  - Identify barriers to discharge w/patient and caregiver  - Arrange for needed discharge resources and transportation as appropriate  - Identify discharge learning needs (meds, wound care, etc.)  - Arrange for interpretive services to assist at discharge as needed  - Refer to Case Management Department for coordinating discharge planning if the patient needs post-hospital services based on physician/advanced practitioner order or complex needs related to functional status, cognitive ability, or social support system  Outcome: Progressing     Problem: Knowledge Deficit  Goal: Patient/family/caregiver demonstrates understanding of disease process, treatment plan, medications, and discharge instructions  Description: Complete learning assessment and assess knowledge base.   Interventions:  - Provide teaching at level of understanding  - Provide teaching via preferred learning methods  Outcome: Progressing     Problem: GASTROINTESTINAL - ADULT  Goal: Minimal or absence of nausea and/or vomiting  Description: INTERVENTIONS:  - Administer IV fluids if ordered to ensure adequate hydration  - Maintain NPO status until nausea and vomiting are resolved  - Nasogastric tube if ordered  - Administer ordered antiemetic medications as needed  - Provide nonpharmacologic comfort measures as appropriate  - Advance diet as tolerated, if ordered  - Consider nutrition services referral to assist patient with adequate nutrition and appropriate food choices  Outcome: Progressing  Goal: Maintains or returns to baseline bowel function  Description: INTERVENTIONS:  - Assess bowel function  - Encourage oral fluids to ensure adequate hydration  - Administer IV fluids if ordered to ensure adequate hydration  - Administer ordered medications as needed  - Encourage mobilization and activity  - Consider nutritional services referral to assist patient with adequate nutrition and appropriate food choices  Outcome: Progressing  Goal: Maintains adequate nutritional intake  Description: INTERVENTIONS:  - Monitor percentage of each meal consumed  - Identify factors contributing to decreased intake, treat as appropriate  - Assist with meals as needed  - Monitor I&O, weight, and lab values if indicated  - Obtain nutrition services referral as needed  Outcome: Progressing     Problem: GENITOURINARY - ADULT  Goal: Maintains or returns to baseline urinary function  Description: INTERVENTIONS:  - Assess urinary function  - Encourage oral fluids to ensure adequate hydration if ordered  - Administer IV fluids as ordered to ensure adequate hydration  - Administer ordered medications as needed  - Offer frequent toileting  - Follow urinary retention protocol if ordered  Outcome: Progressing     Problem: SKIN/TISSUE INTEGRITY - ADULT  Goal: Incision(s), wounds(s) or drain site(s) healing without S/S of infection  Description: INTERVENTIONS  - Assess and document dressing, incision, wound bed, drain sites and surrounding tissue  - Provide patient and family education  - Perform skin care/dressing changes   Outcome: Progressing

## 2023-11-01 NOTE — DISCHARGE INSTR - AVS FIRST PAGE
Bariatric/Weight Loss Surgery  Hospital Discharge Instructions  ACTIVITY:  Progress as feels comfortable - a good rule is:  if you are doing something and it begins to hurt, stop doing the activity. Walk every hour while at home. You may walk stairs if you do so slowly  You may shower 48 hours after surgery. Do not scrub incision sites. Blot gently with clean towel to dry incisions. (see #4 below)   Use your incentive spirometer 10 times per hour while awake for 1 week after surgery. Do NOT drive for 48 hours after surgery. No driving 24 hours after taking certain prescription pain medications. Examples of such medication are Percocet, Darvocet, Oxycodone, Tylenol #3, and Tylenol with Codeine. DIET  Stay on a liquid diet for 7 days after your surgery date, sipping slowly. Refer to your manual for examples of choices. Remember to keep your liquids sugar free or low calorie. You may have protein drinks. Make sure to drink 48 to 64 ounces per day of fluids. You may advance to a pureed diet one week after surgery as instructed by your diet progression pamphlet. Once you get approval from your surgeon at your first post operative visit, you may advance to the soft diet and remain on soft diet for 8 weeks unless otherwise instructed. MEDICATIONS:  The abdominal nerve block will wear off during the first 1-2 days that you are home, and you may become sore (especially over incision site/sites where abdominal wall is sutured). This may create a pulling sensation, especially while moving around, and will fade over time. Continue to take your Tylenol and your pain medication as instructed. Start vitamins and minerals one week after surgery or when you start stage 3/puree diet. Anti-acid Medication as per prescription. Other medications as indicated on the Physician Patient Discharge Instructions form given to you at the time of discharge.   Make sure that you are splitting your pill or tablet medications in halves or fourths or even crushing them before you take them. Capsules should be opened and mixed with water or jello. You need to do this for at least 4 weeks after surgery. Eventually you will be able to take your medications the regular way as they were prescribed. You will need to consult with your Family Doctor in regards to all your prescribed medication, particularly those for blood pressure and diabetes. As you lose weight, medical conditions may change, requiring an alteration or elimination of the drug dose. Monitor blood pressure closely and call PCP with any concerns. Sleeve Gastrectomy patients ONLY:  Complete full course of lovenox injections! DO NOT TAKE BIRTH CONTROL(BC) MEDICATIONS, INSERT BC VAGINAL RINGS, OR PLACE IUD OR ANY OTHER BC METHODS UNTIL 31 DAYS FROM DAY OF DISCHARGE FROM HOSPITAL. THIS PLACES YOU AT HIGH RISK FOR A POTENTIALLY LIFE THREATENING BLOOD CLOT. Remember to always use barrier methods for birth control and speak to your GYN about using two forms of birth control to start 31 days after surgery. It is very important to avoid pregnancy until at least 18-24 months after surgery. INCISION CARE  You may shower and get incisions wet 2 days after surgery. No soaking tub baths or swimming for 30 days after surgery. Keep abdominal area and incisions clean. Use soap and water to create a good lather and rinse off. Do not scrub incisions. If you have a drain, empty the drain as the nurses instructed. FOLLOW-UP APPOINTMENT should be made for one week after discharge. Call surgeon’s office at [phone] to schedule an appointment.     CALL YOUR DOCTOR FOR:  pain not controlled by pain medications, a temperature greater than 101.5° F, any increase or change in drainage or redness from any incision, any vomiting or inability to keep liquids down, shortness of breath, shoulder pain, or bleeding

## 2023-11-01 NOTE — PROGRESS NOTES
NEPHROLOGY PROGRESS NOTE   Emily Hernandez 45 y.o. male MRN: [id number]  Unit/Bed#: E5 -01 Encounter: [id number]      ASSESSMENT & PLAN    Stage II to stage III chronic kidney disease  Patient's creatinine is around 1.3-1.6. And his creatinine is stable at 1.1  His losartan and chlorthalidone are appropriately held for surgery and we will continue to hold  Postoperative blood pressures are acceptable did require a dose of labetalol  Remains on intravenous fluids  Avoiding NSAIDs for pain control  No Haywood catheter  Blood work scheduled for tomorrow  As needed BP medications and pain control, avoid hypotension  Remains on lactated Ringer's per surgical team     Nephrolithiasis with a history of hydronephrosis  Most recent scans do not show hydronephrosis  Continue outpatient risk reduction measures  We will continue to monitor     Hypertension  On losartan and chlorthalidone as an outpatient  Current blood pressure is acceptable, did have some blood pressures as high as 214/118  As needed IV medications continue to monitor but avoid hypotension     Obesity status post gastrectomy sleeve  Continue postoperative care         SUBJECTIVE:    Patient was seen today. He is sitting up in his chair. He is having a difficult time transitioning and is staying for another day to help with recovery. He is urinating well. He is tolerating liquids. Still having pain but getting better    12 point review of systems was otherwise negative besides what is mentioned above.     Medications:    Current Facility-Administered Medications:     acetaminophen (Ofirmev) injection 1,000 mg, 1,000 mg, Intravenous, Q6H SANDRA, Carol Mariscal PA-C, Last Rate: 400 mL/hr at 11/01/23 1224, 1,000 mg at 11/01/23 1224    albuterol (PROVENTIL HFA,VENTOLIN HFA) inhaler 2 puff, 2 puff, Inhalation, Q6H PRN, Carol Mariscal PA-C    ALPRAZolam (XANAX) tablet 1 mg, 1 mg, Oral, BID PRN, Carol Mariscal PA-C    baclofen tablet 10 mg, 10 mg, Oral, BID PRN, Carol PAUL Mariscal, PA-C    diphenhydrAMINE (BENADRYL) tablet 25 mg, 25 mg, Oral, Q8H PRN, Carol N Loida, PA-C    enoxaparin (LOVENOX) subcutaneous injection 40 mg, 40 mg, Subcutaneous, Q24H, Carol N Rupevaishali, PA-C, 40 mg at 11/01/23 0900    Famotidine (PF) (PEPCID) injection 20 mg, 20 mg, Intravenous, BID, Carol N Urielpevaishali, PA-C, 20 mg at 11/01/23 0856    lactated ringers infusion, 100 mL/hr, Intravenous, Continuous, Carol N Loida, PA-C, Last Rate: 100 mL/hr at 11/01/23 0916, 100 mL/hr at 11/01/23 0916    metoclopramide (REGLAN) injection 10 mg, 10 mg, Intravenous, Q6H PRN, Carol N Loida, PA-C, 10 mg at 10/31/23 2024    morphine injection 4 mg, 4 mg, Intravenous, Q4H PRN, Carol N Rupevaishali, PA-C, 4 mg at 10/31/23 1424    ondansetron (ZOFRAN) injection 4 mg, 4 mg, Intravenous, Q6H PRN, Carol N Urielpevaishali, PA-C, 4 mg at 10/31/23 1800    oxyCODONE (ROXICODONE) oral solution 10 mg, 10 mg, Oral, Q4H PRN, Carol N Rupevaishali, PA-C, 10 mg at 10/31/23 2003    oxyCODONE (ROXICODONE) oral solution 5 mg, 5 mg, Oral, Q4H PRN, Carol N Rupevaishali, PA-C, 5 mg at 11/01/23 0902    phenol (CHLORASEPTIC) 1.4 % mucosal liquid 2 spray, 2 spray, Mouth/Throat, Q2H PRN, Carol PAUL Mariscal, PA-C    promethazine (PHENERGAN) injection 25 mg, 25 mg, Intravenous, Q6H PRN, Carol N Rupevaishali, PA-C    simethicone (MYLICON) chewable tablet 80 mg, 80 mg, Oral, 4x Daily PRN, Carol N Loida, PA-C    sodium chloride 0.9 % infusion, 125 mL/hr, Intravenous, Continuous, Rhianna Xie DO, Stopped at 10/31/23 0929    zolpidem (AMBIEN) tablet 10 mg, 10 mg, Oral, HS PRN, Carol Mariscal PA-C    OBJECTIVE:    Vitals:    10/31/23 2334 10/31/23 2338 11/01/23 0317 11/01/23 0819   BP:  145/95 140/77 156/93   BP Location:  Right arm     Pulse: 88  91 95   Resp:       Temp: 99 °F (37.2 °C)  98.8 °F (37.1 °C) 98.4 °F (36.9 °C)   TempSrc:   Oral    SpO2: 95%  93% 92%   Weight:       Height:            Temp:  [97.8 °F (36.6 °C)-99 °F (37.2 °C)] 98.4 °F (36.9 °C)  HR:  [60-95] 95  BP: (137-184)/() 156/93  SpO2:  [92 %-96 %] 92 %     Body mass index is 37.37 kg/m². Weight (last 2 days)       Date/Time Weight    10/31/23 0536 115 (253.09)            I/O last 3 completed shifts: In: 2050 [I.V.:2000; IV Piggyback:50]  Out: 0415 [Urine:1350]    No intake/output data recorded. Physical exam:    General: no acute distress, cooperative  Eyes: conjunctivae pink, anicteric sclerae  ENT: lips and mucous membranes moist, no exudates, normal external ears  Neck: ROM intact, no JVD  Chest: No respiratory distress, no accessory muscle use  CVS: normal rate, non pericardial friction rub  Abdomen: Distended abdomen  Extremities: no edema of both legs  Skin: no rash  Neuro: awake, alert, oriented, grossly intact  Psych:  Pleasant affect    Invasive Devices:      Lab Results:   Results from last 7 days   Lab Units 11/01/23  0618   WBC Thousand/uL 8.39   HEMOGLOBIN g/dL 15.5   HEMATOCRIT % 44.0   PLATELETS Thousands/uL 220   POTASSIUM mmol/L 3.7   CHLORIDE mmol/L 102   CO2 mmol/L 25   BUN mg/dL 12   CREATININE mg/dL 1.06   CALCIUM mg/dL 8.9         Portions of the record may have been created with voice recognition software. Occasional wrong word or "sound a like" substitutions may have occurred due to the inherent limitations of voice recognition software. Read the chart carefully and recognize, using context, where substitutions have occurred. If you have any questions, please contact the dictating provider.

## 2023-11-01 NOTE — PLAN OF CARE
Problem: PAIN - ADULT  Goal: Verbalizes/displays adequate comfort level or baseline comfort level  Description: Interventions:  - Encourage patient to monitor pain and request assistance  - Assess pain using appropriate pain scale  - Administer analgesics based on type and severity of pain and evaluate response  - Implement non-pharmacological measures as appropriate and evaluate response  - Consider cultural and social influences on pain and pain management  - Notify physician/advanced practitioner if interventions unsuccessful or patient reports new pain  Outcome: Progressing     Problem: INFECTION - ADULT  Goal: Absence or prevention of progression during hospitalization  Description: INTERVENTIONS:  - Assess and monitor for signs and symptoms of infection  - Monitor lab/diagnostic results  - Monitor all insertion sites, i.e. indwelling lines, tubes, and drains  - Monitor endotracheal if appropriate and nasal secretions for changes in amount and color  - [place] appropriate cooling/warming therapies per order  - Administer medications as ordered  - Instruct and encourage patient and family to use good hand hygiene technique  - Identify and instruct in appropriate isolation precautions for identified infection/condition  Outcome: Progressing     Problem: SAFETY ADULT  Goal: Patient will remain free of falls  Description: INTERVENTIONS:  - Educate patient/family on patient safety including physical limitations  - Instruct patient to call for assistance with activity   - Consult OT/PT to assist with strengthening/mobility   - Keep Call bell within reach  - Keep bed low and locked with side rails adjusted as appropriate  - Keep care items and personal belongings within reach  - Initiate and maintain comfort rounds  - Make Fall Risk Sign visible to staff  - Apply yellow socks and bracelet for high fall risk patients  - Consider moving patient to room near nurses station  Outcome: Progressing  Goal: Maintain or return to baseline ADL function  Description: INTERVENTIONS:  -  Assess patient's ability to carry out ADLs; assess patient's baseline for ADL function and identify physical deficits which impact ability to perform ADLs (bathing, care of mouth/teeth, toileting, grooming, dressing, etc.)  - Assess/evaluate cause of self-care deficits   - Assess range of motion  - Assess patient's mobility; develop plan if impaired  - Assess patient's need for assistive devices and provide as appropriate  - Encourage maximum independence but intervene and supervise when necessary  - Involve family in performance of ADLs  - Assess for home care needs following discharge   - Consider OT consult to assist with ADL evaluation and planning for discharge  - Provide patient education as appropriate  Outcome: Progressing  Goal: Maintains/Returns to pre admission functional level  Description: INTERVENTIONS:  - Perform BMAT or MOVE assessment daily.   - Set and communicate daily mobility goal to care team and patient/family/caregiver. - Collaborate with rehabilitation services on mobility goals if consulted  - Perform Range of Motion 3 times a day. - Reposition patient every 3 hours.   - Dangle patient 3 times a day  - Stand patient 3 times a day  - Ambulate patient 3 times a day  - Out of bed to chair 3 times a day   - Out of bed for meals 3 times a day  - Out of bed for toileting  - Record patient progress and toleration of activity level   Outcome: Progressing     Problem: DISCHARGE PLANNING  Goal: Discharge to home or other facility with appropriate resources  Description: INTERVENTIONS:  - Identify barriers to discharge w/patient and caregiver  - Arrange for needed discharge resources and transportation as appropriate  - Identify discharge learning needs (meds, wound care, etc.)  - Arrange for interpretive services to assist at discharge as needed  - Refer to Case Management Department for coordinating discharge planning if the patient needs post-hospital services based on physician/advanced practitioner order or complex needs related to functional status, cognitive ability, or social support system  Outcome: Progressing     Problem: Knowledge Deficit  Goal: Patient/family/caregiver demonstrates understanding of disease process, treatment plan, medications, and discharge instructions  Description: Complete learning assessment and assess knowledge base.   Interventions:  - Provide teaching at level of understanding  - Provide teaching via preferred learning methods  Outcome: Progressing     Problem: GASTROINTESTINAL - ADULT  Goal: Minimal or absence of nausea and/or vomiting  Description: INTERVENTIONS:  - Administer IV fluids if ordered to ensure adequate hydration  - Maintain NPO status until nausea and vomiting are resolved  - Nasogastric tube if ordered  - Administer ordered antiemetic medications as needed  - Provide nonpharmacologic comfort measures as appropriate  - Advance diet as tolerated, if ordered  - Consider nutrition services referral to assist patient with adequate nutrition and appropriate food choices  Outcome: Progressing  Goal: Maintains or returns to baseline bowel function  Description: INTERVENTIONS:  - Assess bowel function  - Encourage oral fluids to ensure adequate hydration  - Administer IV fluids if ordered to ensure adequate hydration  - Administer ordered medications as needed  - Encourage mobilization and activity  - Consider nutritional services referral to assist patient with adequate nutrition and appropriate food choices  Outcome: Progressing  Goal: Maintains adequate nutritional intake  Description: INTERVENTIONS:  - Monitor percentage of each meal consumed  - Identify factors contributing to decreased intake, treat as appropriate  - Assist with meals as needed  - Monitor I&O, weight, and lab values if indicated  - Obtain nutrition services referral as needed  Outcome: Progressing     Problem: GENITOURINARY - ADULT  Goal: Maintains or returns to baseline urinary function  Description: INTERVENTIONS:  - Assess urinary function  - Encourage oral fluids to ensure adequate hydration if ordered  - Administer IV fluids as ordered to ensure adequate hydration  - Administer ordered medications as needed  - Offer frequent toileting  - Follow urinary retention protocol if ordered  Outcome: Progressing     Problem: SKIN/TISSUE INTEGRITY - ADULT  Goal: Incision(s), wounds(s) or drain site(s) healing without S/S of infection  Description: INTERVENTIONS  - Assess and document dressing, incision, wound bed, drain sites and surrounding tissue  - Provide patient and family education  - Perform skin care/dressing changes   Outcome: Progressing

## 2023-11-02 VITALS
OXYGEN SATURATION: 94 % | HEIGHT: 69 IN | BODY MASS INDEX: 37.49 KG/M2 | SYSTOLIC BLOOD PRESSURE: 154 MMHG | DIASTOLIC BLOOD PRESSURE: 96 MMHG | WEIGHT: 253.09 LBS | RESPIRATION RATE: 20 BRPM | TEMPERATURE: 99.3 F | HEART RATE: 78 BPM

## 2023-11-02 PROCEDURE — NC001 PR NO CHARGE: Performed by: STUDENT IN AN ORGANIZED HEALTH CARE EDUCATION/TRAINING PROGRAM

## 2023-11-02 PROCEDURE — 99024 POSTOP FOLLOW-UP VISIT: CPT | Performed by: STUDENT IN AN ORGANIZED HEALTH CARE EDUCATION/TRAINING PROGRAM

## 2023-11-02 RX ORDER — OXYCODONE HYDROCHLORIDE 5 MG/1
5 TABLET ORAL EVERY 4 HOURS PRN
Qty: 10 TABLET | Refills: 0 | Status: SHIPPED | OUTPATIENT
Start: 2023-11-02 | End: 2023-11-12

## 2023-11-02 RX ORDER — LANOLIN ALCOHOL/MO/W.PET/CERES
3 CREAM (GRAM) TOPICAL
Status: DISCONTINUED | OUTPATIENT
Start: 2023-11-02 | End: 2023-11-02 | Stop reason: HOSPADM

## 2023-11-02 RX ORDER — LANOLIN ALCOHOL/MO/W.PET/CERES
3 CREAM (GRAM) TOPICAL
Status: DISCONTINUED | OUTPATIENT
Start: 2023-11-02 | End: 2023-11-02

## 2023-11-02 RX ADMIN — ACETAMINOPHEN 1000 MG: 10 INJECTION INTRAVENOUS at 05:18

## 2023-11-02 RX ADMIN — Medication 3 MG: at 03:10

## 2023-11-02 RX ADMIN — OXYCODONE HYDROCHLORIDE 5 MG: 5 SOLUTION ORAL at 03:10

## 2023-11-02 RX ADMIN — ENOXAPARIN SODIUM 40 MG: 40 INJECTION SUBCUTANEOUS at 09:16

## 2023-11-02 NOTE — RESTORATIVE TECHNICIAN NOTE
Restorative Technician Note      Patient Name: Lorena Blue     Restorative Tech Visit Date: 11/02/23  Note Type: Mobility  Patient Position Upon Consult: Supine  Activity Performed: Ambulated  Patient Position at End of Consult: Supine;  All needs within reach

## 2023-11-02 NOTE — DISCHARGE SUMMARY
Discharge Summary - Frank Sims 45 y.o. male MRN: [id number]    Unit/Bed#: E5 -01 Encounter: [id number]      Pre-Operative Diagnosis: Pre-Op Diagnosis Codes:     * Obesity, unspecified [E66.9]     * Obstructive sleep apnea (adult) (pediatric) [G47.33]     * Hypertension [I10]     * Asthma [J45.909]    Post-Operative Diagnosis: Post-Op Diagnosis Codes:     * Obesity, unspecified [E66.9]     * Obstructive sleep apnea (adult) (pediatric) [G47.33]     * Hypertension [I10]     * Asthma [J45.909]    Procedures Performed:  Procedure(s):  GASTRECTOMY  SLEEVE W/ ROBOT&  INTRAOPERATIVE EGD    Surgeon: Romero Fernandez MD    See H & P for full details of admission and Operative Note for full details of operations performed. Patient tolerated surgery well without complications. In the morning postoperative Day 1, the patient had mild nausea and abdominal pain. Tolerated a clear liquid diet without vomiting. Able to ambulate and voiding independently. The patient requesting to stay one more night due to pain when sitting up and was afraid he won't be able to get around his house yet. On postop day 2 he was doing much better. Patient was deemed ready for discharge home day 2. Patient was seen and examined prior to discharge. Provisions for Follow-Up Care:  See After Visit Summary/Discharge Instructions for information related to follow-up care and home orders. Disposition: Home, in stable condition. Planned Readmission: No    Discharge Medications:  See After Visit Summary/Discharge Instructions for reconciled discharge medications provided to patient and family. Post Operative instructions: Reviewed with patient and/or family. Signature:    Alicia Rondon MD  Date: 11/2/2023 Time: 12:49 PM

## 2023-11-02 NOTE — NURSING NOTE
Discharge instructions reviewed with pt. He is aware of medication changes, prescriptions to be picked up, and follow-up appointments. Pt has no questions or concerns, family is providing ride home.

## 2023-11-02 NOTE — PROGRESS NOTES
Progress Note - Bariatric Surgery   Evy Castillo 45 y.o. male MRN: [id number]  Unit/Bed#: E5 -01 Encounter: [id number]    Assessment:  Evy Castillo is a 45 y.o. male s/p robotic assisted laparoscopic sleeve gastrectomy, doing well. Plan:  Bariatric clear liquid diet  Pain control with PO meds and IV Toradol  Ambulate and out of bed frequently  Encourage incentive spirometry use  GI ppx with pepcid  Labs and vitals reviewed. Continue Lovenox for DVT-ppx  Nephrology consulted  Discharge today in AM    Subjective/Objective   Chief Complaint: No complaints    Subjective: The patient is feeling much better and requesting discharge today. Objective:     Vitals: Blood pressure 154/96, pulse 78, temperature 99.3 °F (37.4 °C), temperature source Oral, resp. rate 20, height 5' 9" (1.753 m), weight 115 kg (253 lb 1.4 oz), SpO2 94 %. ,Body mass index is 37.37 kg/m². No intake or output data in the 24 hours ending 11/02/23 0946      Invasive Devices       Peripheral Intravenous Line  Duration             Peripheral IV 10/31/23 Left Hand 2 days                    Physical Exam: /96 (BP Location: Right arm)   Pulse 78   Temp 99.3 °F (37.4 °C) (Oral)   Resp 20   Ht 5' 9" (1.753 m)   Wt 115 kg (253 lb 1.4 oz)   SpO2 94%   BMI 37.37 kg/m²   General appearance: alert and oriented, in no acute distress  Head: Normocephalic, without obvious abnormality, atraumatic  Lungs:  no apparent respiratory distress on RA  Abdomen:  soft, nontender, nondistended, laparoscopic port sites clean dry and intact  Extremities: extremities normal, warm and well-perfused; no cyanosis, clubbing, or edema    Lab, Imaging and other studies: I have personally reviewed pertinent labs.   CBC:   No results found for: "WBC", "HGB", "HCT", "MCV", "PLT", "ADJUSTEDWBC", "RBC", "MCH", "MCHC", "RDW", "MPV", "NRBC"    CMP:   No results found for: "SODIUM", "CL", "CO2", "ANIONGAP", "BUN", "CREATININE", "GLUCOSE", "CALCIUM", "AST", "ALT", "ALKPHOS", "PROT", "BILITOT", "EGFR"    VTE Pharmacologic Prophylaxis: Enoxaparin (Lovenox)  VTE Mechanical Prophylaxis: sequential compression device    Lovely Jacob MD  Bariatric Surgery

## 2023-11-02 NOTE — PLAN OF CARE
Problem: PAIN - ADULT  Goal: Verbalizes/displays adequate comfort level or baseline comfort level  Description: Interventions:  - Encourage patient to monitor pain and request assistance  - Assess pain using appropriate pain scale  - Administer analgesics based on type and severity of pain and evaluate response  - Implement non-pharmacological measures as appropriate and evaluate response  - Consider cultural and social influences on pain and pain management  - Notify physician/advanced practitioner if interventions unsuccessful or patient reports new pain  Outcome: Progressing     Problem: INFECTION - ADULT  Goal: Absence or prevention of progression during hospitalization  Description: INTERVENTIONS:  - Assess and monitor for signs and symptoms of infection  - Monitor lab/diagnostic results  - Monitor all insertion sites, i.e. indwelling lines, tubes, and drains  - Monitor endotracheal if appropriate and nasal secretions for changes in amount and color  - [place] appropriate cooling/warming therapies per order  - Administer medications as ordered  - Instruct and encourage patient and family to use good hand hygiene technique  - Identify and instruct in appropriate isolation precautions for identified infection/condition  Outcome: Progressing     Problem: SAFETY ADULT  Goal: Patient will remain free of falls  Description: INTERVENTIONS:  - Educate patient/family on patient safety including physical limitations  - Instruct patient to call for assistance with activity   - Consult OT/PT to assist with strengthening/mobility   - Keep Call bell within reach  - Keep bed low and locked with side rails adjusted as appropriate  - Keep care items and personal belongings within reach  - Initiate and maintain comfort rounds  - Make Fall Risk Sign visible to staff  - Apply yellow socks and bracelet for high fall risk patients  - Consider moving patient to room near nurses station  Outcome: Progressing  Goal: Maintain or return to baseline ADL function  Description: INTERVENTIONS:  -  Assess patient's ability to carry out ADLs; assess patient's baseline for ADL function and identify physical deficits which impact ability to perform ADLs (bathing, care of mouth/teeth, toileting, grooming, dressing, etc.)  - Assess/evaluate cause of self-care deficits   - Assess range of motion  - Assess patient's mobility; develop plan if impaired  - Assess patient's need for assistive devices and provide as appropriate  - Encourage maximum independence but intervene and supervise when necessary  - Involve family in performance of ADLs  - Assess for home care needs following discharge   - Consider OT consult to assist with ADL evaluation and planning for discharge  - Provide patient education as appropriate  Outcome: Progressing  Goal: Maintains/Returns to pre admission functional level  Description: INTERVENTIONS:  - Perform BMAT or MOVE assessment daily.   - Set and communicate daily mobility goal to care team and patient/family/caregiver. - Collaborate with rehabilitation services on mobility goals if consulted  - Perform Range of Motion 3 times a day. - Reposition patient every 3 hours.   - Dangle patient 3 times a day  - Stand patient 3 times a day  - Ambulate patient 3 times a day  - Out of bed to chair 3 times a day   - Out of bed for meals 3 times a day  - Out of bed for toileting  - Record patient progress and toleration of activity level   Outcome: Progressing     Problem: DISCHARGE PLANNING  Goal: Discharge to home or other facility with appropriate resources  Description: INTERVENTIONS:  - Identify barriers to discharge w/patient and caregiver  - Arrange for needed discharge resources and transportation as appropriate  - Identify discharge learning needs (meds, wound care, etc.)  - Arrange for interpretive services to assist at discharge as needed  - Refer to Case Management Department for coordinating discharge planning if the patient needs post-hospital services based on physician/advanced practitioner order or complex needs related to functional status, cognitive ability, or social support system  Outcome: Progressing     Problem: Knowledge Deficit  Goal: Patient/family/caregiver demonstrates understanding of disease process, treatment plan, medications, and discharge instructions  Description: Complete learning assessment and assess knowledge base.   Interventions:  - Provide teaching at level of understanding  - Provide teaching via preferred learning methods  Outcome: Progressing     Problem: GASTROINTESTINAL - ADULT  Goal: Minimal or absence of nausea and/or vomiting  Description: INTERVENTIONS:  - Administer IV fluids if ordered to ensure adequate hydration  - Maintain NPO status until nausea and vomiting are resolved  - Nasogastric tube if ordered  - Administer ordered antiemetic medications as needed  - Provide nonpharmacologic comfort measures as appropriate  - Advance diet as tolerated, if ordered  - Consider nutrition services referral to assist patient with adequate nutrition and appropriate food choices  Outcome: Progressing  Goal: Maintains or returns to baseline bowel function  Description: INTERVENTIONS:  - Assess bowel function  - Encourage oral fluids to ensure adequate hydration  - Administer IV fluids if ordered to ensure adequate hydration  - Administer ordered medications as needed  - Encourage mobilization and activity  - Consider nutritional services referral to assist patient with adequate nutrition and appropriate food choices  Outcome: Progressing  Goal: Maintains adequate nutritional intake  Description: INTERVENTIONS:  - Monitor percentage of each meal consumed  - Identify factors contributing to decreased intake, treat as appropriate  - Assist with meals as needed  - Monitor I&O, weight, and lab values if indicated  - Obtain nutrition services referral as needed  Outcome: Progressing     Problem: GENITOURINARY - ADULT  Goal: Maintains or returns to baseline urinary function  Description: INTERVENTIONS:  - Assess urinary function  - Encourage oral fluids to ensure adequate hydration if ordered  - Administer IV fluids as ordered to ensure adequate hydration  - Administer ordered medications as needed  - Offer frequent toileting  - Follow urinary retention protocol if ordered  Outcome: Progressing     Problem: SKIN/TISSUE INTEGRITY - ADULT  Goal: Incision(s), wounds(s) or drain site(s) healing without S/S of infection  Description: INTERVENTIONS  - Assess and document dressing, incision, wound bed, drain sites and surrounding tissue  - Provide patient and family education  - Perform skin care/dressing changes   Outcome: Progressing

## 2023-11-03 ENCOUNTER — TELEPHONE (OUTPATIENT)
Dept: INTERNAL MEDICINE CLINIC | Facility: CLINIC | Age: 53
End: 2023-11-03

## 2023-11-03 ENCOUNTER — TELEPHONE (OUTPATIENT)
Dept: MEDSURG UNIT | Facility: HOSPITAL | Age: 53
End: 2023-11-03

## 2023-11-03 NOTE — TELEPHONE ENCOUNTER
Patient called to schedule a post operative appointment following his 10/31/23 surgery. He was instructed to schedule this appointment prior to his follow up with the surgeon's office on 11/9/23. Patient was advised that there are no available appointments at [facility] between today and 11/9/23, and that the first PCP appointment available was 11/16. Please advise if there is anything further that can be done/scheduled.

## 2023-11-03 NOTE — TELEPHONE ENCOUNTER
Post op follow up phone call completed. Pt is sipping liquids and consuming an average around 48 oz per day since being home. Hasn't been using IS as instructed, reinforced importance of using IS to help prevent pneumonia. Ambulating about home without difficulty. Pain controlled with analgesia. Passing gas, started miralax, no bm thus far. Reaffirmed examples of liquid diet over the next week. Pt stated understanding about discharge instructions and medication adjustments. Tolerating self administration of Lovenox injections without difficulty. Follow up appt with surgeon scheduled for next week. Instructed to call with any additional questions or concerns.

## 2023-11-05 ENCOUNTER — TELEPHONE (OUTPATIENT)
Dept: OTHER | Facility: OTHER | Age: 53
End: 2023-11-05

## 2023-11-06 ENCOUNTER — TRANSITIONAL CARE MANAGEMENT (OUTPATIENT)
Dept: INTERNAL MEDICINE CLINIC | Facility: CLINIC | Age: 53
End: 2023-11-06

## 2023-11-06 PROCEDURE — 88307 TISSUE EXAM BY PATHOLOGIST: CPT | Performed by: STUDENT IN AN ORGANIZED HEALTH CARE EDUCATION/TRAINING PROGRAM

## 2023-11-06 NOTE — TELEPHONE ENCOUNTER
Please ask patient to schedule with me as 11am tomorrow for 11/7; if he cannot do this, then he will have to take a same-day spot of a resident

## 2023-11-06 NOTE — TELEPHONE ENCOUNTER
PT SCHEDULED 12/1 @ 1PM. DID COMPLETE INTAKE WITH PT HE STATES HIS LAST TIME SEEING PAIN MANAGEMENT WAS 8 YEARS AGO. ALSO DID ADVISE [facility] OFFER MORE INTERVENTIONAL OPTIONS FOR TREATMENT. PT WAS OKAY WITH THAT.

## 2023-11-06 NOTE — TELEPHONE ENCOUNTER
LMOM to c/b to see if he can come in for 11:00 on 11/7/23 with Dr. Maggie Fuentes.  (I will build her a slot)

## 2023-11-07 ENCOUNTER — TELEPHONE (OUTPATIENT)
Dept: OBGYN CLINIC | Facility: CLINIC | Age: 53
End: 2023-11-07

## 2023-11-07 DIAGNOSIS — G47.09 OTHER INSOMNIA: ICD-10-CM

## 2023-11-07 DIAGNOSIS — R06.6 INTRACTABLE HICCUPS: ICD-10-CM

## 2023-11-07 RX ORDER — BACLOFEN 10 MG/1
10 TABLET ORAL 2 TIMES DAILY PRN
Qty: 30 TABLET | Refills: 0 | Status: SHIPPED | OUTPATIENT
Start: 2023-11-07

## 2023-11-07 NOTE — TELEPHONE ENCOUNTER
CALLED PATIENT TO SEE IF HE WOULD LIKE TO RESCHEDULE HIS APPT WITH DR KELLY, HE STATED HE JUST HAD SURGERY AND WILL CALL BACK WHEN HE IS FEELING BETTER TO RESCHEDULE.

## 2023-11-08 RX ORDER — ZOLPIDEM TARTRATE 10 MG/1
10 TABLET ORAL
Qty: 30 TABLET | Refills: 0 | Status: SHIPPED | OUTPATIENT
Start: 2023-11-08

## 2023-11-09 ENCOUNTER — TELEPHONE (OUTPATIENT)
Dept: INTERNAL MEDICINE CLINIC | Facility: CLINIC | Age: 53
End: 2023-11-09

## 2023-11-09 NOTE — PROGRESS NOTES
POST OP UP VISIT - BARIATRIC SURGERY  Edward Lynn 45 y.o. male MRN: [id number]  Unit/Bed#:  Encounter: [id number]      HPI:  Edward Lynn is a 45 y.o. male status post robotic assisted sleeve gastrectomy performed by Dr. Landon Valdes on 10/31/2023 returning to office for first post op visit since surgery. Tolerating diet. Denies nausea and vomiting. Taking multivitamins and PPI daily. Administering lovenox injections. Review of Systems   Constitutional:  Negative for chills and fever. HENT:  Negative for ear pain and sore throat. Eyes:  Negative for pain and visual disturbance. Respiratory:  Negative for cough and shortness of breath. Cardiovascular:  Negative for chest pain and palpitations. Gastrointestinal:  Negative for abdominal pain and vomiting. Genitourinary:  Negative for dysuria and hematuria. Musculoskeletal:  Negative for arthralgias and back pain. Skin:  Negative for color change and rash. Neurological:  Negative for seizures and syncope. All other systems reviewed and are negative. Historical Information   Past Medical History:   Diagnosis Date    Anesthesia complication     Pt states he stopped breathing during EGD and Colonoscopy    Anxiety     Arthritis     Asthma     Benign essential HTN     last assessed 05/26/2017    COVID 2021    Degenerative joint disease     GERD (gastroesophageal reflux disease)     Hiccoughs     Hypertension     Kidney stone     Shortness of breath     MOORE    Skin cancer     Sleep apnea     no CPAP use    Spinal stenosis      Past Surgical History:   Procedure Laterality Date    COLONOSCOPY      ESOPHAGOGASTRODUODENOSCOPY N/A 03/24/2016    Procedure: ESOPHAGOGASTRODUODENOSCOPY (EGD); Surgeon: Tammy Mars MD;  Location:  GI LAB;   Service:     [facility] RETROGRADE PYELOGRAM  5/25/2023    JOINT REPLACEMENT      right hip sx    LA CYSTO/URETERO W/LITHOTRIPSY &INDWELL STENT INSRT Left 5/25/2023    Procedure: CYSTOSCOPY URETEROSCOPY WITH LITHOTRIPSY HOLMIUM LASER, RETROGRADE PYELOGRAM AND INSERTION STENT URETERAL;  Surgeon: Marlene Salmeron MD;  [facility]: [facility];  Service: Urology    CT LAPS [address] [place] [address] LONGITUDINAL GASTRECTOMY N/A 10/31/2023    Procedure: GASTRECTOMY  SLEEVE W/ ROBOT&  INTRAOPERATIVE EGD;  Surgeon: Mary Beach MD;  [facility];  Service: Bariatrics    CT REMOVE INT DWELL URETERAL STENT TRANSURETHRAL Left 9/7/2023    Procedure: Victor M Flanagan;  Surgeon: Carla Galvez MD;  [facility];  Service: Urology     Social History   Social History     Substance and Sexual Activity   Alcohol Use Yes    Comment: Socially- 2 - 3 times /weekly mixed drinks     Social History     Substance and Sexual Activity   Drug Use Yes    Types: Marijuana    Comment: medicinal card obtained; last used a couple days ago     Social History     Tobacco Use   Smoking Status Never   Smokeless Tobacco Never     Family History: non-contributory    Meds/Allergies   all medications and allergies reviewed  No Known Allergies    Objective       Current Vitals:   Blood Pressure: 108/70 (11/10/23 1401)  Pulse: 95 (11/10/23 1401)  Temperature: 99.2 °F (37.3 °C) (11/10/23 1401)  Temp Source: Tympanic (11/10/23 1401)  Height: 5' 7" (170.2 cm) (11/10/23 1401)  Weight - Scale: 107 kg (235 lb 8 oz) (Wt with shoes .) (11/10/23 1401)      Invasive Devices       None                   Physical Exam  Constitutional:       General: He is not in acute distress. Appearance: He is not ill-appearing. HENT:      Head: Normocephalic and atraumatic. Nose: Nose normal.      Mouth/Throat:      Mouth: Mucous membranes are dry. Eyes:      General: No scleral icterus. Cardiovascular:      Rate and Rhythm: Normal rate and regular rhythm. Pulses: Normal pulses. Pulmonary:      Effort: Pulmonary effort is normal. No respiratory distress. Abdominal:      General: There is no distension. Palpations: Abdomen is soft. Tenderness: There is no abdominal tenderness. Hernia: No hernia is present. Comments: Laparoscopic port sites clean dry and intact. Musculoskeletal:         General: Normal range of motion. Cervical back: Normal range of motion. Skin:     General: Skin is warm. Capillary Refill: Capillary refill takes less than 2 seconds. Neurological:      General: No focal deficit present. Mental Status: He is alert. Mental status is at baseline. Psychiatric:         Mood and Affect: Mood normal.         Assessment/PLAN:    45 y.o. male status post robotic assisted sleeve gastrectomy done on 10/31/2023 by Dr. Wendy Bravo, doing well post op. No major issues and healing well. The pathology report was reviewed with the patient and the results were within normal limits. Pathology, and Other Studies: I have personally reviewed pertinent reports. Lab Results   Component Value Date    FINALDX  10/31/2023     A. Portion of stomach, sleeve gastrectomy:  - Segment of full-thickness stomach with mild chronic inactive gastritis. - No Helicobacter pylori is identified on H&E stained slide.   - Negative for intestinal metaplasia, dysplasia or carcinoma. Increase physical activity slowly as tolerated and instructed. Advance diet as instructed by our dietitians today and as indicated in the binder. Continue Lovenox prophylaxis until completed. Continue PPI    Follow up in one month as scheduled.         Liam Graham MD  Bariatric Surgery   11/10/2023  2:21 PM      .

## 2023-11-09 NOTE — TELEPHONE ENCOUNTER
Patient left message on voicemail for refills. I called him back but he said he made an error and was supposed to contact his Dermatologist. He said he has gotten it taken care of and doesn't need anything from our office at this time.

## 2023-11-10 ENCOUNTER — OFFICE VISIT (OUTPATIENT)
Dept: BARIATRICS | Facility: CLINIC | Age: 53
End: 2023-11-10

## 2023-11-10 ENCOUNTER — OFFICE VISIT (OUTPATIENT)
Dept: BARIATRICS | Facility: CLINIC | Age: 53
End: 2023-11-10
Payer: MEDICARE

## 2023-11-10 VITALS
DIASTOLIC BLOOD PRESSURE: 70 MMHG | HEIGHT: 67 IN | TEMPERATURE: 99.2 F | BODY MASS INDEX: 36.96 KG/M2 | HEART RATE: 95 BPM | WEIGHT: 235.5 LBS | SYSTOLIC BLOOD PRESSURE: 108 MMHG

## 2023-11-10 DIAGNOSIS — E66.9 OBESITY, CLASS II, BMI 35-39.9: Primary | ICD-10-CM

## 2023-11-10 DIAGNOSIS — Z98.84 BARIATRIC SURGERY STATUS: ICD-10-CM

## 2023-11-10 DIAGNOSIS — Z98.84 BARIATRIC SURGERY STATUS: Primary | ICD-10-CM

## 2023-11-10 PROCEDURE — 99024 POSTOP FOLLOW-UP VISIT: CPT | Performed by: SURGERY

## 2023-11-10 PROCEDURE — RECHECK: Performed by: DIETITIAN, REGISTERED

## 2023-11-10 RX ORDER — ACETAMINOPHEN 160 MG/5ML
LIQUID ORAL
COMMUNITY
Start: 2023-11-01

## 2023-11-10 NOTE — PROGRESS NOTES
Weight Management Nutrition Class     Diagnosis: Obesity    Bariatric Surgeon: Dr. Venita Sosa    Surgery: Vertical Sleeve Gastrectomy    Class: first post op note    Topics discussed today include:     fluid goals post op, protein goals post op, constipation, chew food well, exercise, avoidance of alcohol, PPI use, diet progression, hypoglycemia, dumping syndrome, protein supplems, vitamin/mineral supplements, calcium supplements, and iron supplements    Patient was able to verbalize basic diet (protein, fluid, vitamin and mineral) recommendations and possible nutrition-related complications.  Yes

## 2023-11-26 DIAGNOSIS — E66.9 OBESITY, CLASS II, BMI 35-39.9: ICD-10-CM

## 2023-11-26 DIAGNOSIS — G47.09 OTHER INSOMNIA: ICD-10-CM

## 2023-11-26 DIAGNOSIS — J45.20 MILD INTERMITTENT ASTHMA WITHOUT COMPLICATION: ICD-10-CM

## 2023-11-27 RX ORDER — ALBUTEROL SULFATE 90 UG/1
2 AEROSOL, METERED RESPIRATORY (INHALATION) EVERY 6 HOURS PRN
Qty: 8 G | Refills: 0 | Status: SHIPPED | OUTPATIENT
Start: 2023-11-27 | End: 2024-11-26

## 2023-11-27 RX ORDER — FLUTICASONE PROPIONATE 110 UG/1
2 AEROSOL, METERED RESPIRATORY (INHALATION) 2 TIMES DAILY
Qty: 36 G | Refills: 0 | Status: SHIPPED | OUTPATIENT
Start: 2023-11-27

## 2023-11-27 RX ORDER — ZOLPIDEM TARTRATE 10 MG/1
10 TABLET ORAL
Qty: 30 TABLET | Refills: 0 | OUTPATIENT
Start: 2023-11-27

## 2023-11-27 RX ORDER — OMEPRAZOLE 20 MG/1
20 CAPSULE, DELAYED RELEASE ORAL DAILY
Qty: 30 CAPSULE | Refills: 3 | Status: SHIPPED | OUTPATIENT
Start: 2023-11-27

## 2023-11-27 NOTE — TELEPHONE ENCOUNTER
Can you refuse this medication, a script was sent in on 11/8/23 and per [address] patient has not picked that up.

## 2023-12-13 ENCOUNTER — CLINICAL SUPPORT (OUTPATIENT)
Dept: BARIATRICS | Facility: CLINIC | Age: 53
End: 2023-12-13

## 2023-12-13 DIAGNOSIS — E66.9 CLASS 2 OBESITY IN ADULT: Primary | ICD-10-CM

## 2023-12-13 DIAGNOSIS — Z98.84 BARIATRIC SURGERY STATUS: ICD-10-CM

## 2023-12-13 PROCEDURE — RECHECK: Performed by: DIETITIAN, REGISTERED

## 2023-12-13 NOTE — PROGRESS NOTES
Weight Management Nutrition Class     Diagnosis: Morbid Obesity    Bariatric Surgeon: Dr. Romero Fernandez    Surgery: Vertical Sleeve Gastrectomy    Class: 5 week post op     Topics discussed today include:     fluid goals post op, protein goals post op, constipation, chew food well, exercise, avoidance of alcohol, PPI use, diet progression, hypoglycemia, dumping syndrome, protein supplems, vitamin/mineral supplements, calcium supplements, and iron supplements    Patient was able to verbalize basic diet (protein, fluid, vitamin and mineral) recommendations and possible nutrition-related complications.  Yes

## 2023-12-20 DIAGNOSIS — G47.09 OTHER INSOMNIA: ICD-10-CM

## 2023-12-20 DIAGNOSIS — R06.6 INTRACTABLE HICCUPS: ICD-10-CM

## 2023-12-20 DIAGNOSIS — J45.20 MILD INTERMITTENT ASTHMA WITHOUT COMPLICATION: ICD-10-CM

## 2023-12-22 RX ORDER — ALBUTEROL SULFATE 90 UG/1
2 AEROSOL, METERED RESPIRATORY (INHALATION) EVERY 6 HOURS PRN
Qty: 8 G | Refills: 3 | Status: SHIPPED | OUTPATIENT
Start: 2023-12-22 | End: 2024-12-21

## 2023-12-22 RX ORDER — FLUTICASONE PROPIONATE 110 UG/1
2 AEROSOL, METERED RESPIRATORY (INHALATION) 2 TIMES DAILY
Qty: 36 G | Refills: 3 | Status: SHIPPED | OUTPATIENT
Start: 2023-12-22

## 2023-12-22 RX ORDER — ZOLPIDEM TARTRATE 10 MG/1
10 TABLET ORAL
Qty: 30 TABLET | Refills: 0 | Status: SHIPPED | OUTPATIENT
Start: 2023-12-22

## 2023-12-22 RX ORDER — BACLOFEN 10 MG/1
10 TABLET ORAL 2 TIMES DAILY PRN
Qty: 30 TABLET | Refills: 3 | Status: SHIPPED | OUTPATIENT
Start: 2023-12-22

## 2024-01-02 DIAGNOSIS — G47.09 OTHER INSOMNIA: Primary | ICD-10-CM

## 2024-01-02 RX ORDER — ZOLPIDEM TARTRATE 10 MG/1
10 TABLET ORAL
Qty: 60 TABLET | Refills: 1 | Status: SHIPPED | OUTPATIENT
Start: 2024-01-02

## 2024-01-10 ENCOUNTER — TELEPHONE (OUTPATIENT)
Dept: INTERNAL MEDICINE CLINIC | Facility: CLINIC | Age: 54
End: 2024-01-10

## 2024-01-10 NOTE — TELEPHONE ENCOUNTER
Patient left a voicemail on clinical line asking if he can start a new medication for migraines. He said he saw it on TV and thinks it starts with the letter U but not sure. She said he had gastric sleeve surgery and not sure if he can take it. He said his migraines have become more frequent recently. Do you want to see him in office?

## 2024-01-18 ENCOUNTER — RA CDI HCC (OUTPATIENT)
Dept: OTHER | Facility: HOSPITAL | Age: 54
End: 2024-01-18

## 2024-02-01 ENCOUNTER — OFFICE VISIT (OUTPATIENT)
Dept: INTERNAL MEDICINE CLINIC | Facility: CLINIC | Age: 54
End: 2024-02-01

## 2024-02-01 VITALS
WEIGHT: 225 LBS | HEART RATE: 88 BPM | BODY MASS INDEX: 35.24 KG/M2 | DIASTOLIC BLOOD PRESSURE: 87 MMHG | TEMPERATURE: 97.9 F | SYSTOLIC BLOOD PRESSURE: 123 MMHG

## 2024-02-01 DIAGNOSIS — N40.0 BENIGN PROSTATIC HYPERPLASIA WITHOUT LOWER URINARY TRACT SYMPTOMS: ICD-10-CM

## 2024-02-01 DIAGNOSIS — G47.33 OSA (OBSTRUCTIVE SLEEP APNEA): Primary | ICD-10-CM

## 2024-02-01 DIAGNOSIS — F41.9 ANXIETY: ICD-10-CM

## 2024-02-01 DIAGNOSIS — G43.109 MIGRAINE WITH AURA AND WITHOUT STATUS MIGRAINOSUS, NOT INTRACTABLE: ICD-10-CM

## 2024-02-01 DIAGNOSIS — M25.551 RIGHT HIP PAIN: ICD-10-CM

## 2024-02-01 PROBLEM — R06.6 INTRACTABLE HICCUPS: Status: RESOLVED | Noted: 2018-12-06 | Resolved: 2024-02-01

## 2024-02-01 PROBLEM — I10 HTN (HYPERTENSION): Status: RESOLVED | Noted: 2021-07-15 | Resolved: 2024-02-01

## 2024-02-01 PROCEDURE — 99214 OFFICE O/P EST MOD 30 MIN: CPT | Performed by: STUDENT IN AN ORGANIZED HEALTH CARE EDUCATION/TRAINING PROGRAM

## 2024-02-01 RX ORDER — ONDANSETRON 4 MG/1
1 TABLET, ORALLY DISINTEGRATING ORAL 2 TIMES DAILY
Qty: 30 PATCH | Refills: 3 | Status: SHIPPED | OUTPATIENT
Start: 2024-02-01

## 2024-02-01 RX ORDER — SUMATRIPTAN 50 MG/1
50 TABLET, FILM COATED ORAL ONCE AS NEEDED
Qty: 9 TABLET | Refills: 1 | Status: SHIPPED | OUTPATIENT
Start: 2024-02-01

## 2024-02-01 RX ORDER — TAMSULOSIN HYDROCHLORIDE 0.4 MG/1
0.4 CAPSULE ORAL
Qty: 10 CAPSULE | Refills: 0 | Status: SHIPPED | OUTPATIENT
Start: 2024-02-01

## 2024-02-01 RX ORDER — ALPRAZOLAM 1 MG/1
1 TABLET ORAL 2 TIMES DAILY PRN
Qty: 60 TABLET | Refills: 0 | Status: SHIPPED | OUTPATIENT
Start: 2024-02-01 | End: 2024-03-02

## 2024-02-01 NOTE — ASSESSMENT & PLAN NOTE
Previous sleep study indicative of severe BOY  S/p gastric sleeve and weight loss 35 lbs  Does still snore and not compliant with mouth guard nor machine  Encourage compliance with nightly mouthguard since BOY and migraines may be tied/connected  Encouraged continued diet and lifestyle modifications amenable to weightloss

## 2024-02-01 NOTE — PATIENT INSTRUCTIONS
Magnesium twice daily  Migraine journal: write symptoms, length of migraine, stress before, during, or after, diet for day, and if took sumatriptan or other medication  USE MOUTHPIECE EVERY NIGHT
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
## 2024-02-03 ENCOUNTER — TELEPHONE (OUTPATIENT)
Dept: OTHER | Facility: OTHER | Age: 54
End: 2024-02-03

## 2024-02-03 NOTE — TELEPHONE ENCOUNTER
Patient would like to schedule a new patient appointment for cortisone shots in his right shoulder

## 2024-02-05 ENCOUNTER — TELEPHONE (OUTPATIENT)
Dept: OTHER | Facility: OTHER | Age: 54
End: 2024-02-05

## 2024-02-07 ENCOUNTER — TELEPHONE (OUTPATIENT)
Dept: INTERNAL MEDICINE CLINIC | Facility: CLINIC | Age: 54
End: 2024-02-07

## 2024-02-07 NOTE — TELEPHONE ENCOUNTER
Patient contacted the office requesting a prior authorization for the flector patch 1.3, said him ans Dr Antoine already spoke about it, please advise

## 2024-02-08 RX ORDER — TRIAMCINOLONE ACETONIDE 5 MG/G
CREAM TOPICAL
COMMUNITY
Start: 2023-12-22

## 2024-02-08 RX ORDER — TESTOSTERONE 16.2 MG/G
GEL TRANSDERMAL DAILY
COMMUNITY
Start: 2023-11-07

## 2024-02-09 ENCOUNTER — OFFICE VISIT (OUTPATIENT)
Dept: BARIATRICS | Facility: CLINIC | Age: 54
End: 2024-02-09
Payer: MEDICARE

## 2024-02-09 VITALS
OXYGEN SATURATION: 98 % | SYSTOLIC BLOOD PRESSURE: 120 MMHG | HEART RATE: 94 BPM | DIASTOLIC BLOOD PRESSURE: 80 MMHG | WEIGHT: 220 LBS | BODY MASS INDEX: 34.53 KG/M2 | TEMPERATURE: 97.3 F | HEIGHT: 67 IN

## 2024-02-09 DIAGNOSIS — K91.2 POSTSURGICAL MALABSORPTION: ICD-10-CM

## 2024-02-09 DIAGNOSIS — E66.9 OBESITY, CLASS I, BMI 30-34.9: ICD-10-CM

## 2024-02-09 DIAGNOSIS — G47.33 OSA (OBSTRUCTIVE SLEEP APNEA): ICD-10-CM

## 2024-02-09 DIAGNOSIS — Z98.84 BARIATRIC SURGERY STATUS: ICD-10-CM

## 2024-02-09 DIAGNOSIS — Z48.815 ENCOUNTER FOR SURGICAL AFTERCARE FOLLOWING SURGERY OF DIGESTIVE SYSTEM: Primary | ICD-10-CM

## 2024-02-09 PROCEDURE — 99214 OFFICE O/P EST MOD 30 MIN: CPT | Performed by: NURSE PRACTITIONER

## 2024-02-09 NOTE — PROGRESS NOTES
Assessment/Plan:     Patient ID: Hussain Hooper is a 53 y.o. male.     Bariatric Surgery Status    -s/p Vertical Sleeve Gastrectomy with Dr. Payam Villar on 10/31/2023. Presents to the office today for 2nd post op visit. Overall doing fair, tolerating a regular diet. Denies having any abdominal pain, N/V/D/C, regurgitation, reflux, or dysphagia. Taking his MVI daily.     Of note, states he is able to eat anything he wishes, can drink alcohol without issues. He has not had an issues after surgery. Drinks and eats at the same time due to hx of hiccups.     PLAN:     - healthy habits encouraged. Recommended to focus on protein intake. Follow 30/60 minute rule to prevent weight regain.   - Routine follow up in 3 months for 3rd post op visit.   - Continue with healthy lifestyle, adequate protein intake of 60 gm, fluid intake of at least 64 oz.   - Continue with MVI daily.   - Activity as tolerated.   - Labs ordered and will adjust accordingly if any deficiency.   - Follow up with RD and SW as needed.       BOY - has cpap machine but is not currently using this. Feels he currently does not need this. Follow up with sleep medicine as scheduled.     Continued/Maintain healthy weight loss with good nutrition intakes.  Adequate hydration with at least 64oz. fluid intake.  Follow diet as discussed.  Follow vitamin and mineral recommendations as reviewed with you.  Exercise as tolerated.    Colonoscopy referral made: UTD [facility]     Follow-up in 3 months for 3rd post op visit. We kindly ask that your arrive 15 minutes before your scheduled appointment time with your provider to allow our staff to room you, get your vital signs and update your chart.    Get lab work done prior to visit. Please call the office if you need a script.  It is recommended to check with your insurance BEFORE getting labs done to make sure they are covered by your policy.      Call our office if you have any problems with abdominal pain  especially associated with fever, chills, nausea, vomiting or any other concerns.    All  Post-bariatric surgery patients should be aware that very small quantities of any alcohol can cause impairment and it is very possible not to feel the effect. The effect can be in the system for several hours.  It is also a stomach irritant.     It is advised to AVOID alcohol, Nonsteroidal antiinflammatory drugs (NSAIDS) and nicotine of all forms . Any of these can cause stomach irritation/pain.    Discussed the effects of alcohol on a bariatric patient and the increased impairment risk.     Keep up the good work!     Postsurgical Malabsorption   -At risk for malabsorption of vitamins/minerals secondary to malabsorption and restriction of intake from bariatric surgery  -Currently taking adequate postop bariatric surgery vitamin supplementation  -Next set of bariatric labs ordered for approximately 2 weeks  -Patient received education about the importance of adhering to a lifelong supplementation regimen to avoid vitamin/mineral deficiencies      Diagnoses and all orders for this visit:    Encounter for surgical aftercare following surgery of digestive system  -     CBC; Future  -     Comprehensive metabolic panel; Future  -     Folate; Future  -     Iron Panel (Includes Ferritin, Iron Sat%, Iron, and TIBC); Future  -     PTH, intact; Future  -     Vitamin A; Future  -     Vitamin B1, whole blood; Future  -     Vitamin B12; Future  -     Vitamin D 25 hydroxy; Future  -     Zinc; Future    Bariatric surgery status  -     CBC; Future  -     Comprehensive metabolic panel; Future  -     Folate; Future  -     Iron Panel (Includes Ferritin, Iron Sat%, Iron, and TIBC); Future  -     PTH, intact; Future  -     Vitamin A; Future  -     Vitamin B1, whole blood; Future  -     Vitamin B12; Future  -     Vitamin D 25 hydroxy; Future  -     Zinc; Future    Postsurgical malabsorption  -     CBC; Future  -     Comprehensive metabolic panel;  Future  -     Folate; Future  -     Iron Panel (Includes Ferritin, Iron Sat%, Iron, and TIBC); Future  -     PTH, intact; Future  -     Vitamin A; Future  -     Vitamin B1, whole blood; Future  -     Vitamin B12; Future  -     Vitamin D 25 hydroxy; Future  -     Zinc; Future    Obesity, Class I, BMI 30-34.9  -     CBC; Future  -     Comprehensive metabolic panel; Future  -     Folate; Future  -     Iron Panel (Includes Ferritin, Iron Sat%, Iron, and TIBC); Future  -     PTH, intact; Future  -     Vitamin A; Future  -     Vitamin B1, whole blood; Future  -     Vitamin B12; Future  -     Vitamin D 25 hydroxy; Future  -     Zinc; Future    BOY (obstructive sleep apnea)         Subjective:      Patient ID: Hussain Hooper is a 53 y.o. male.    -s/p Vertical Sleeve Gastrectomy with Dr. Payam Villar on 10/31/2023. Presents to the office today for 2nd post op visit. Overall doing fair, tolerating a regular diet. Denies having any abdominal pain, N/V/D/C, regurgitation, reflux, or dysphagia. Taking his MVI daily.     Of note, states he is able to eat anything he wishes, can drink alcohol without issues. He has not had an issues after surgery. Drinks and eats at the same time due to hx of hiccups.     Initial: 253   Current: 220 LBS   EWL: (Weight loss is ahead of schedule at this post surgical period.)  Asael: 217 lbs   Current BMI is Body mass index is 34.46 kg/m².    Tolerating a regular diet-yes  Eating at least 60 grams of protein per day-yes  Following 30/60 minute rule with liquids-no - educated on the importance of this.   Drinking at least 64 ounces of fluid per day-yes  Drinking carbonated beverages-no  Sufficient exercise-yes - but currently limited now with shoulder pain.   Using NSAIDs regularly-yes - took this for shoulder pain   Using nicotine-no  Using alcohol-yes  Supplements: [employer]it[employer]    EWL is 35%, which places the patient ahead of schedule for expected post surgical weight loss at this time.     The  "following portions of the patient's history were reviewed and updated as appropriate: allergies, current medications, past family history, past medical history, past social history, past surgical history and problem list.    Review of Systems   Constitutional: Negative.    Respiratory: Negative.     Gastrointestinal: Negative.    Musculoskeletal:  Positive for arthralgias.   Neurological: Negative.    Psychiatric/Behavioral: Negative.           Objective:    /80 (BP Location: Left arm, Patient Position: Sitting, Cuff Size: Large)   Pulse 94   Temp (!) 97.3 °F (36.3 °C) (Tympanic)   Ht 5' 7\" (1.702 m)   Wt 99.8 kg (220 lb)   SpO2 98%   BMI 34.46 kg/m²      Physical Exam  Vitals and nursing note reviewed.   Constitutional:       Appearance: Normal appearance. He is obese.   Cardiovascular:      Rate and Rhythm: Normal rate and regular rhythm.      Pulses: Normal pulses.      Heart sounds: Normal heart sounds.   Pulmonary:      Effort: Pulmonary effort is normal.      Breath sounds: Normal breath sounds.   Abdominal:      General: Bowel sounds are normal.      Palpations: Abdomen is soft.      Tenderness: There is no abdominal tenderness.   Musculoskeletal:         General: Normal range of motion.   Skin:     General: Skin is warm and dry.   Neurological:      General: No focal deficit present.      Mental Status: He is alert and oriented to person, place, and time. Mental status is at baseline.   Psychiatric:         Mood and Affect: Mood normal.         Behavior: Behavior normal.           "

## 2024-02-09 NOTE — TELEPHONE ENCOUNTER
Received a VM from [facility] stating they would like a  update on PA.     Has this been started or completed?

## 2024-02-12 ENCOUNTER — OFFICE VISIT (OUTPATIENT)
Dept: OBGYN CLINIC | Facility: OTHER | Age: 54
End: 2024-02-12
Payer: MEDICARE

## 2024-02-12 ENCOUNTER — APPOINTMENT (OUTPATIENT)
Dept: RADIOLOGY | Facility: OTHER | Age: 54
End: 2024-02-12
Payer: MEDICARE

## 2024-02-12 VITALS
DIASTOLIC BLOOD PRESSURE: 96 MMHG | WEIGHT: 222 LBS | BODY MASS INDEX: 34.84 KG/M2 | SYSTOLIC BLOOD PRESSURE: 148 MMHG | HEIGHT: 67 IN | HEART RATE: 76 BPM

## 2024-02-12 DIAGNOSIS — G89.29 CHRONIC LEFT SHOULDER PAIN: ICD-10-CM

## 2024-02-12 DIAGNOSIS — M19.012 GLENOHUMERAL ARTHRITIS, LEFT: ICD-10-CM

## 2024-02-12 DIAGNOSIS — G89.29 CHRONIC RIGHT SHOULDER PAIN: ICD-10-CM

## 2024-02-12 DIAGNOSIS — M25.511 CHRONIC RIGHT SHOULDER PAIN: ICD-10-CM

## 2024-02-12 DIAGNOSIS — R29.898 SHOULDER WEAKNESS: ICD-10-CM

## 2024-02-12 DIAGNOSIS — M25.512 CHRONIC LEFT SHOULDER PAIN: ICD-10-CM

## 2024-02-12 DIAGNOSIS — M75.101 ROTATOR CUFF SYNDROME OF RIGHT SHOULDER: Primary | ICD-10-CM

## 2024-02-12 PROCEDURE — 73030 X-RAY EXAM OF SHOULDER: CPT

## 2024-02-12 PROCEDURE — 20610 DRAIN/INJ JOINT/BURSA W/O US: CPT | Performed by: ORTHOPAEDIC SURGERY

## 2024-02-12 PROCEDURE — 99214 OFFICE O/P EST MOD 30 MIN: CPT | Performed by: ORTHOPAEDIC SURGERY

## 2024-02-12 RX ORDER — BUPIVACAINE HYDROCHLORIDE 2.5 MG/ML
5 INJECTION, SOLUTION INFILTRATION; PERINEURAL
Status: COMPLETED | OUTPATIENT
Start: 2024-02-12 | End: 2024-02-12

## 2024-02-12 RX ADMIN — BUPIVACAINE HYDROCHLORIDE 5 ML: 2.5 INJECTION, SOLUTION INFILTRATION; PERINEURAL at 14:15

## 2024-02-12 NOTE — PROGRESS NOTES
Chief Complaint: right shoulder pain    HPI:    Hussain Hooper is a 53 year old Male who presents today for evaluation of right shoulder pain.  He also reports having chronic left shoulder pain      Description of symptoms: Patient has a longstanding history of left shoulder pain for which she has previously been seen at an external facility.  He has noted to have very advanced left shoulder joint osteoarthritis and per the patient was recommended to have a left glenohumeral arthroplasty which he deferred.  In the past, he reports having multiple left glenohumeral cortisone injections which only provided him temporary relief.  He reports being significantly limited in his left shoulder function and movement.  Today, he reports significant pain of his right shoulder which has been worse over the last few weeks.  Does not relate any specific trauma.  Pain is described as sharp and stabbing and made worse with arm abduction or reaching behind his back.  It is also associated with weakness of his right shoulder.  Denies any associated significant neck pain or right upper extremity neurological symptoms.      I have personally reviewed pertinent films in PACS and my interpretation is as noted below:.  Radiograph of the right shoulder performed today reveals moderate acromioclavicular osteoarthritis with some spurring.  No acute osseous injury noted  Radiograph of the left shoulder performed today reveals advanced glenohumeral osteoarthritic change and moderate acromioclavicular osteoarthritic change.  No acute osseous injury noted.      Patient Active Problem List   Diagnosis    Mild intermittent asthma without complication    Psoriasis    Hypogonadism in male    Anxiety    Microscopic hematuria    Gastroesophageal reflux disease with esophagitis    Class 2 obesity in adult    BOY (obstructive sleep apnea)    Stage 3a chronic kidney disease (HCC)    Periumbilical hernia    Left ureteral calculus    Other insomnia    Right  "hip pain    Anesthesia complication    Bunion of great toe of left foot    Migraine with aura and without status migrainosus, not intractable        Current Outpatient Medications on File Prior to Visit   Medication Sig Dispense Refill    albuterol (Ventolin HFA) 90 mcg/act inhaler Inhale 2 puffs every 6 (six) hours as needed for wheezing 8 g 3    ALPRAZolam (XANAX) 1 mg tablet Take 1 tablet (1 mg total) by mouth 2 (two) times a day as needed for anxiety 60 tablet 0    B-D 3CC LUER-ANTONELLA SYR 25GX1\" 25G X 1\" 3 ML MISC       baclofen 10 mg tablet Take 1 tablet (10 mg total) by mouth 2 (two) times a day as needed for muscle spasms (Diaphragmatic spasm) 30 tablet 3    Diclofenac Epolamine (Flector) 1.3 % PTCH Apply 1 patch topically 2 (two) times a day 30 patch 3    fluticasone (Flovent HFA) 110 MCG/ACT inhaler Inhale 2 puffs 2 (two) times a day Rinse mouth after use. 36 g 3    SUMAtriptan (Imitrex) 50 mg tablet Take 1 tablet (50 mg total) by mouth once as needed for migraine for up to 18 doses 9 tablet 1    tamsulosin (FLOMAX) 0.4 mg Take 1 capsule (0.4 mg total) by mouth daily with dinner 10 capsule 0    testosterone (ANDROGEL) 1.62 % TD gel pump Place on the skin daily      triamcinolone (KENALOG) 0.5 % cream       zolpidem (AMBIEN) 10 mg tablet Take 1 tablet (10 mg total) by mouth daily at bedtime as needed for sleep 60 tablet 1    magnesium aspartate (MAGINEX) 615 MG tablet Take 1 tablet (615 mg total) by mouth 2 (two) times a day (Patient not taking: Reported on 2/9/2024) 60 tablet 1     No current facility-administered medications on file prior to visit.        No Known Allergies     Tobacco Use: Low Risk  (2/9/2024)    Patient History     Smoking Tobacco Use: Never     Smokeless Tobacco Use: Never     Passive Exposure: Not on file        Social Determinants of Health     Tobacco Use: Low Risk  (2/9/2024)    Patient History     Smoking Tobacco Use: Never     Smokeless Tobacco Use: Never     Passive Exposure: Not on " file   Alcohol Use: Not At Risk (6/10/2021)    AUDIT-C     Frequency of Alcohol Consumption: Monthly or less     Average Number of Drinks: 1 or 2     Frequency of Binge Drinking: Never   Financial Resource Strain: Low Risk  (2/1/2024)    Overall Financial Resource Strain (CARDIA)     Difficulty of Paying Living Expenses: Not hard at all   Food Insecurity: No Food Insecurity (2/1/2024)    Hunger Vital Sign     Worried About Running Out of Food in the Last Year: Never true     Ran Out of Food in the Last Year: Never true   Transportation Needs: No Transportation Needs (2/1/2024)    PRAPARE - Transportation     Lack of Transportation (Medical): No     Lack of Transportation (Non-Medical): No   Physical Activity: Not on file   Stress: Not on file   Social Connections: Not on file   Intimate Partner Violence: Not on file   Depression: Not at risk (2/1/2024)    PHQ-2     PHQ-2 Score: 0   Housing Stability: Not on file   Utilities: Not on file   Health Literacy: Not on file               Review of Systems     Body mass index is 34.77 kg/m².     Physical Exam  Vitals and nursing note reviewed.   HENT:      Head: Atraumatic.   Eyes:      Conjunctiva/sclera: Conjunctivae normal.   Cardiovascular:      Rate and Rhythm: Normal rate.      Pulses: Normal pulses.   Pulmonary:      Effort: Pulmonary effort is normal. No respiratory distress.   Neurological:      Mental Status: He is alert and oriented to person, place, and time.   Psychiatric:         Mood and Affect: Mood normal.         Behavior: Behavior normal.          Ortho Exam:    Body part: right shoulder    Inspection: No visible deformity or asymmetry    Palpation: Tender to palpation in the subacromial bursa and supraspinatus.  Also has some mild discomfort over the long head of the biceps tendon    Range of motion: Forward flexion is 150 degrees, abduction is 130 degrees with pain, internal rotation is at the lumbar level, external rotation in adduction is 30 degrees,  normal cross adduction    Special Tests: Positive empty can test, negative belly press test, no anterior apprehension, no pain with acromioclavicular Gordy's compression test, positive Shahid and positive Neer's.  Rotator cuff strength is subscapularis 5/5, supraspinatus 4+/5, infraspinatus 4/5 and teres minor 5/5    Distal Neurovascular Status: Intact, Yes    Body part: left shoulder    Inspection: No visible deformity    Palpation: Diffusely tender to palpation    Range of motion: Grossly limited left shoulder range of motion in all directions    Special Tests: Deferred    Distal Neurovascular Status: Intact, Yes    Large joint arthrocentesis: R subacromial bursa  Universal Protocol:  Consent: Verbal consent obtained.  Risks and benefits: risks, benefits and alternatives were discussed  Consent given by: patient  Patient understanding: patient states understanding of the procedure being performed  Site marked: the operative site was marked  Radiology Images displayed and confirmed. If images not available, report reviewed: imaging studies available  Required items: required blood products, implants, devices, and special equipment available  Supporting Documentation  Indications: pain   Procedure Details  Location: shoulder - R subacromial bursa  Preparation: Patient was prepped and draped in the usual sterile fashion  Needle size: 22 G  Ultrasound guidance: no  Approach: lateral  Medications administered: 5 mL bupivacaine 0.25 %    Patient tolerance: patient tolerated the procedure well with no immediate complications  Dressing:  Sterile dressing applied             Assessment:     Diagnosis ICD-10-CM Associated Orders   1. Rotator cuff syndrome of right shoulder  M75.101 MRI shoulder right wo contrast      2. Chronic right shoulder pain  M25.511 XR shoulder 2+ vw right    G89.29 MRI shoulder right wo contrast      3. Chronic left shoulder pain  M25.512 XR shoulder 2+ vw left    G89.29       4. Shoulder weakness   "R29.898 MRI shoulder right wo contrast      5. Glenohumeral arthritis, left  M19.012            Plan:    I explained my current clinical findings and reviewed the radiological findings with the patient today.  #1.  Right shoulder pain -I do have suspicion for underlying rotator cuff disorder/tear given his weakness with supraspinatus and infraspinatus testing.  In this regard I will request an MRI of the right shoulder for further evaluation.  Patient is not a good candidate for nonsteroidal anti-inflammatory medications due to his known history of chronic kidney disease.  We deferred doing a corticosteroid injection of the right subacromial bursa pending his MRI evaluation.  However, the patient requested some immediate pain relief for which a subacromial bursa bupivacaine injection was provided.  Will follow-up after the right shoulder MRI.    #2.  Left shoulder pain -patient has very advanced glenohumeral osteoarthritis.  I did explain to him that the definitive management in this regard would be a total joint replacement arthroplasty.  Patient wishes to defer any intervention to his left shoulder at this time.            Portions of the record may have been created with voice recognition software. Occasional wrong word or \"sound alike\" substitutions may have occurred due to the inherent limitations of voice recognition software. Please review the chart carefully and recognize, using context, where substitutions/typographical errors may have occurred.           "

## 2024-02-14 ENCOUNTER — TELEPHONE (OUTPATIENT)
Dept: INTERNAL MEDICINE CLINIC | Facility: CLINIC | Age: 54
End: 2024-02-14

## 2024-02-14 NOTE — TELEPHONE ENCOUNTER
Prior auth denied, denial letter under media tab. Please review and send alternative if appropriate.

## 2024-02-14 NOTE — TELEPHONE ENCOUNTER
I explained to patient that his prior auth for patches was denied. He is requesting w resubmit it, claims that he has had the medication for 5 years and it wasn't a problem before. He is in intense pain and really needs medication

## 2024-02-14 NOTE — TELEPHONE ENCOUNTER
Folder Color-Blue    Name of Form- [facility] Confidential Communication    Form to be filled out by-Elina    Form to be Faxed to [phone]    Patient made aware of 10 business day policy.

## 2024-02-15 DIAGNOSIS — M25.551 RIGHT HIP PAIN: Primary | ICD-10-CM

## 2024-02-16 ENCOUNTER — TELEPHONE (OUTPATIENT)
Dept: OBGYN CLINIC | Facility: HOSPITAL | Age: 54
End: 2024-02-16

## 2024-02-16 NOTE — TELEPHONE ENCOUNTER
[facility] and [facility] from [facility] and per Olimpia the medication Diclofenac sod lidocaine patch can not be order as it is not sold buy any consumers they are associated with. Per Olimpia this medication can be ordered separately or another alternative.

## 2024-02-16 NOTE — TELEPHONE ENCOUNTER
Caller: Patient    Doctor: Vincent    Reason for call: Patient calling asking ortho to do prior auth.  Informed patient that PCP already submitted and are waiting to hear back.    Call back#: [phone]

## 2024-02-19 RX ORDER — ONDANSETRON 4 MG/1
1 TABLET, ORALLY DISINTEGRATING ORAL EVERY 12 HOURS PRN
Qty: 30 PATCH | Refills: 1 | Status: SHIPPED | OUTPATIENT
Start: 2024-02-19

## 2024-02-20 DIAGNOSIS — M25.551 RIGHT HIP PAIN: ICD-10-CM

## 2024-02-20 DIAGNOSIS — M25.551 RIGHT HIP PAIN: Primary | ICD-10-CM

## 2024-02-20 NOTE — TELEPHONE ENCOUNTER
I have placed at least 3 alternatives for this and the pharmacy has said each alternative cannot be manufactured; appeal also sent into insurance (see under media tab); pt previously on flector patch and only thing that works. Would you or the team be able to call the insurance company to double check a prior auth or expedite the appeal? Thank you!

## 2024-02-21 NOTE — TELEPHONE ENCOUNTER
Myra ESPINOSA MA completed a form with Dr. Thomas and now we are waiting for a response from insurance. Please see media for form

## 2024-02-23 ENCOUNTER — TELEPHONE (OUTPATIENT)
Age: 54
End: 2024-02-23

## 2024-02-23 NOTE — TELEPHONE ENCOUNTER
Caller: patient    Doctor: blessing      Reason for call: insurance denied patch needs diagnosis code & is it for acute pain? Faxed to [phone]  Phone number [phone]    Call back#: [phone]

## 2024-02-23 NOTE — TELEPHONE ENCOUNTER
"\"insurance denied patch needs diagnosis code & is it for acute pain?\"     Fax to [phone]  Phone number [phone]  "

## 2024-02-29 ENCOUNTER — HOSPITAL ENCOUNTER (OUTPATIENT)
Dept: RADIOLOGY | Age: 54
Discharge: HOME | End: 2024-02-29
Payer: MEDICARE

## 2024-02-29 DIAGNOSIS — M25.511 CHRONIC RIGHT SHOULDER PAIN: ICD-10-CM

## 2024-02-29 DIAGNOSIS — G89.29 CHRONIC RIGHT SHOULDER PAIN: ICD-10-CM

## 2024-02-29 DIAGNOSIS — M75.101 ROTATOR CUFF SYNDROME OF RIGHT SHOULDER: ICD-10-CM

## 2024-02-29 DIAGNOSIS — R29.898 SHOULDER WEAKNESS: ICD-10-CM

## 2024-02-29 PROCEDURE — 73221 MRI JOINT UPR EXTREM W/O DYE: CPT

## 2024-02-29 PROCEDURE — G1004 CDSM NDSC: HCPCS

## 2024-02-29 NOTE — TELEPHONE ENCOUNTER
Caller: Self    Doctor: Vincent    Reason for call: Patient received a call from [employer] stating it was approved but when he called pharmacy to check, they said it was not approved. He is not sure what is going on and wants to know if someone from the clinical can call to check status with [employer]     Also, is there something else the provider can order for patient in the meantime. [facility] and let patient know either way    Call back#: [phone]

## 2024-02-29 NOTE — TELEPHONE ENCOUNTER
Called [facility] to check on status of this, was not able to get through anyone after several prompts.     Then called [facility]. Per pharmacist, it was approved and should be filled and ready for  for patient by tomorrow afternoon. Called patient and inform him of this.     He will be using the patches for night time use only, when his pain is the worst. Patient would like to know if there is anything that can be prescribed to help with pain during the day time.     Please advise.

## 2024-03-05 DIAGNOSIS — F41.9 ANXIETY: ICD-10-CM

## 2024-03-05 DIAGNOSIS — G47.09 OTHER INSOMNIA: ICD-10-CM

## 2024-03-05 NOTE — TELEPHONE ENCOUNTER
I've ordered 4 different patches for this patient an all are rejected, any help for pre-auth on flexor despite denial letter?

## 2024-03-05 NOTE — TELEPHONE ENCOUNTER
OUTPATIENT PROGRESS NOTE  TRANSITIONAL CARE MANAGEMENT - HOSPITAL DISCHARGE FOLLOW-UP      CHIEF COMPLAINT  Transitional Care Management and Transitional Care Management (Hospital Discharge Follow-Up)      Mr. Mccormack is unaccompanied today.    SUBJECTIVE   The patient was discharged from the hospital on 9/26/2017. The Discharge Summary was reviewed. It documents that the patient was hospitalized for subarachnoid hemorrhage.  He developed a rather severe headache that progressively got worse along with complaints of dizziness and confusion while he was at a picnic. He states that he was listening to people talk but he couldn't decipher who was talking, and new something is wrong. Therefore went to the burns from.  He stayed in the hospitals for several days and had a very complete workup.  He was getting \"stir crazy\" in the hospital and needs to get out. He left the hospital and actually stopped all the medications because he felt as if they were causing some of his symptoms.  He was having significant buttock and upper leg muscle cramps and spasms. Also rectal pain that was quite severe. He associates the legs with his rectal pain.  He has had a hard time having bowel movements and they have been painful.    He also is running significantly high blood pressures during his stay. Had significant side effect to hydralazine, but tolerated lisinopril.  Electrolytes and creatinine were stable after starting lisinopril.    Pertinent un-finalized hospital performed diagnostic tests - were reviewed..    Pertinent un-finalized hospital lab tests - not applicable.  Lab results and x-ray reports reviewed in detail    Patient has 70% obstruction of his left ICA.   No aneurysm noted on CTA of brain    Advanced Directives:  · Patient has an Advance Directives document, which is on file    Durable Medical Equipment/Assistive devices prescribed: None     MEDICATIONS  The discharge medication list was reviewed. Outpatient  Review note below   medications were updated today. He is currently only taking omeprazole.  Did not continue his lisinopril as an outpatient.    HISTORIES  I have personally reviewed and updated the following electronic medical record sections: Allergies, Problem List, Past Medical History, Past Surgical History and Social History.  No family history of strokes or aneurysms.    REVIEW OF SYSTEMS  Denies headache, fevers or chills. No chest pain or shortness of breath. No palpitations or edema.  No dysuria or hematuria.  Has limited number of stools recently and continues have rectal pain at the anus. He states it feels like it tightens up on him.  He also is getting muscle spasms of his thighs/hamstrings and buttock. No bruising or edema no muscle masses noted  Denies weakness or numbness of extremities. No trouble with speech or balance.  No swollen glands, cough or abdominal pains.    PHYSICAL EXAM  Visit Vitals  /89   Pulse 100   Wt 77.1 kg   BMI 27.03 kg/m²     Alert and oriented mildly uncomfortable due to leg and low back pain.  Lungs are clear to auscultation  CV: RRR  Neurological exam is grossly nonfocal.    Procedure: Diagnostic Anoscopy  After verbal consent external rectal exam reveals no fissure or significant external hemorrhoid.  Digital exam revealed no mass but it was tender.  Anoscopy: Anoscope was inserted and 2 small thrombosed hemorrhoids were appreciated. No active bleeding.  No other abnormality seen.  He tolerated procedure well.      ASSESSMENT  1. Subarachnoid hemorrhage (CMS/HCC)    2. Pure hypercholesterolemia    3. Elevated blood-pressure reading without diagnosis of hypertension    4. Left-sided carotid artery disease (CMS/HCC)    5. Rectal or anal pain    6. Hemorrhoids, internal, thrombosed        PLAN  Avoid aspirin and NSAIDs. Use Tylenol for pain.  Importance of monitoring his blood pressure closely over the next month was reinforced. If his readings are greater than 140/90 he is to return  sooner than one month.  Goal blood pressure be less than 130/85. Information regarding low salt diet was given, work hard on lifestyle changes to lower blood pressure.  Significant left-sided carotid artery disease with hyperlipidemia. Start Lipitor, side effects meds discussed. We'll recheck his cholesterol in 3 months.  Rectal pain due to internal thrombosed hemorrhoid. He would like to try Preparation H suppositories and give it some more time since it seems to be improving. Otherwise we'll need to refer to general surgery. He will let me know if he changes his mind.  Buttock and leg spasms: Stay hydrated and we discussed stretching exercises. Not interested muscle relaxers which I agree with.  Follow-up for blood pressure check with me in one month, sooner if there is problems.  Referral to vascular surgery at that time for carotid artery disease.  Plan on repeating lipid panel in 3 months.    Patient adherence to his treatment plan was assessed. He is not taking his lisinopril or muscle relaxer.  He thinks medicines have made him feel worse, he was very stressed and anxious in the hospital.  He agrees with starting Lipitor, will hold off on lisinopril since his blood pressure is not in the high risk range.   Patient was seen for a comprehensive history, comprehensive examination, medical decision making of high complexity

## 2024-03-06 ENCOUNTER — TELEPHONE (OUTPATIENT)
Age: 54
End: 2024-03-06

## 2024-03-06 ENCOUNTER — TELEPHONE (OUTPATIENT)
Dept: INTERNAL MEDICINE CLINIC | Facility: CLINIC | Age: 54
End: 2024-03-06

## 2024-03-06 NOTE — TELEPHONE ENCOUNTER
"Patient called in to request a new script for ALPRAZolam (XANAX) 1 mg tablet  that says \"early refill authorized\"    Patient said when he picked up refill he was given an alternative med because they did not have the pill that they usually carry. He said that he tried the pill but it does not make him feel ok and he's experiencing trouble with sleeping. He also said the pill is round and he is not able to take half the dose like he does with the other when he does not feel the need to take a full dose.    Patient said that he spoke with pharmacy and they told him that they can not take the pills back but pcp can order a new prescription.     Please advise.   "

## 2024-03-06 NOTE — TELEPHONE ENCOUNTER
New script sent. We cannot authorize early dispense. He would have to speak to his insurance or pay out of pocket.

## 2024-03-07 RX ORDER — ALPRAZOLAM 1 MG/1
1 TABLET ORAL 2 TIMES DAILY PRN
Qty: 60 TABLET | Refills: 0 | Status: SHIPPED | OUTPATIENT
Start: 2024-03-07 | End: 2024-04-06

## 2024-03-07 RX ORDER — ZOLPIDEM TARTRATE 10 MG/1
10 TABLET ORAL
Qty: 60 TABLET | Refills: 0 | Status: SHIPPED | OUTPATIENT
Start: 2024-03-07

## 2024-03-08 ENCOUNTER — OFFICE VISIT (OUTPATIENT)
Dept: OBGYN CLINIC | Facility: OTHER | Age: 54
End: 2024-03-08

## 2024-03-08 VITALS
WEIGHT: 222.66 LBS | HEIGHT: 67 IN | HEART RATE: 84 BPM | BODY MASS INDEX: 34.95 KG/M2 | SYSTOLIC BLOOD PRESSURE: 141 MMHG | DIASTOLIC BLOOD PRESSURE: 94 MMHG

## 2024-03-08 DIAGNOSIS — M19.012 GLENOHUMERAL ARTHRITIS, LEFT: ICD-10-CM

## 2024-03-08 DIAGNOSIS — G89.29 CHRONIC LEFT SHOULDER PAIN: ICD-10-CM

## 2024-03-08 DIAGNOSIS — R29.898 SHOULDER WEAKNESS: ICD-10-CM

## 2024-03-08 DIAGNOSIS — M25.512 CHRONIC LEFT SHOULDER PAIN: ICD-10-CM

## 2024-03-08 DIAGNOSIS — M25.511 CHRONIC RIGHT SHOULDER PAIN: Primary | ICD-10-CM

## 2024-03-08 DIAGNOSIS — M75.101 ROTATOR CUFF SYNDROME OF RIGHT SHOULDER: ICD-10-CM

## 2024-03-08 DIAGNOSIS — G89.29 CHRONIC RIGHT SHOULDER PAIN: Primary | ICD-10-CM

## 2024-03-08 RX ORDER — BUPIVACAINE HYDROCHLORIDE 2.5 MG/ML
4 INJECTION, SOLUTION INFILTRATION; PERINEURAL
Status: COMPLETED | OUTPATIENT
Start: 2024-03-08 | End: 2024-03-08

## 2024-03-08 RX ORDER — TRIAMCINOLONE ACETONIDE 40 MG/ML
40 INJECTION, SUSPENSION INTRA-ARTICULAR; INTRAMUSCULAR
Status: COMPLETED | OUTPATIENT
Start: 2024-03-08 | End: 2024-03-08

## 2024-03-08 RX ADMIN — BUPIVACAINE HYDROCHLORIDE 4 ML: 2.5 INJECTION, SOLUTION INFILTRATION; PERINEURAL at 11:15

## 2024-03-08 RX ADMIN — TRIAMCINOLONE ACETONIDE 40 MG: 40 INJECTION, SUSPENSION INTRA-ARTICULAR; INTRAMUSCULAR at 11:15

## 2024-03-08 NOTE — PATIENT INSTRUCTIONS
Ultrasound-guided corticosteroid injection to right shoulder joint and right subacromial bursa administered today.    Red flags and risks of injection include but are not limited to infection <0.072% as referenced in some sources, nerve or artery penetration, and if steroids are used-skin dimpling <1%, hypo-pigmentation <1%.  Keep the injection area clean and dry for the next 24 hours. Avoid submerging underwater in a tub, pool, ocean, lake, jacuzzi, hot tub, or any other body of water for 1 week until needle wound closes due to risk of infection. May take showers. Clean needle site with soap and water and keep covered at all times with sterile bandage such as a band-aid until fully healed.  If any symptoms including fevers, chills, swelling, or worsening symptoms occur then to call office or go to hospital for immediate care if physician unavailable due to possible infection or other complication which is a serious medical problem.  May resume regular activities as tolerated and use local ice application or over-the-counter pain medications if there is any discomfort.

## 2024-03-08 NOTE — PROGRESS NOTES
1. Chronic right shoulder pain    2. Shoulder weakness    3. Rotator cuff syndrome of right shoulder    4. Chronic left shoulder pain    5. Glenohumeral arthritis, left      Orders Placed This Encounter   Procedures   • Large joint arthrocentesis: R glenohumeral   • Large joint arthrocentesis: R subacromial bursa     IMAGING STUDIES: (I personally reviewed images in PACS and report):     PAST REPORTS:    MRI right shoulder 2/29/2024  Os acromiale with subscapularis, supraspinatus and infraspinatus insertional tendinosis as well as moderate subacromial subdeltoid bursitis but no evidence of full-thickness component rotator cuff tear.  Mild biceps tendinosis with slight medial subluxation but an intact supraglenoid tubercle origin as described above.  Moderate glenohumeral degenerative change with glenoid hypoplasia and extensive SLAP tear as detailed above.  ROTATOR CUFF: Supraspinatus and infraspinatus insertional tendinosis without evidence of full-thickness component rotator cuff tear. Subscapularis insertional tendinosis with longitudinal interstitial tearing.  SUBACROMIAL/SUBDELTOID BURSA: There is a moderate subacromial/subdeltoid bursitis.  LONG HEAD OF BICEPS TENDON: There is moderate tendinosis without tear. Slight medial subluxation of the tendon lying perched at the lesser tuberosity indicating transverse ligament tear.  GLENOID LABRUM: Posterior glenoid labral tear with tiny paralabral cyst formation on series 3 image 15 measuring up to 4 mm the tear extends inferiorly. Overall extent approximately 12:00 through 5:00 positions in counterclockwise fashion.  GLENOHUMERAL JOINT: Moderate degenerative change with near full-thickness cartilage loss at the glenoid including mild glenoid hypoplasia.  ACROMIOCLAVICULAR JOINT: Moderate degenerative changes noted. Os acromiale identified.    XR right shoulder 2/12/2024  Glenohumeral space is preserved without significant osteophyte.   Acromioclavicular joint  space is widened but aligned with small osteophytes of the distal clavicle and some heterotopic calcification of the acromial process.   No fracture or dislocation     XR left shoulder 2/12/2024  There is severe degenerative narrowing and obliteration of the glenohumeral joint space. There are osteophytes of the inferior glenoid margin and large osteophyte at the inferior margin of the humeral head. Alignment is maintained. Mild subchondral sclerosis of the humeral head. It appears slightly flattened. Acromioclavicular joint is aligned with small osteophyte of the distal clavicle.    ASSESSMENT AND PLAN:    Chronic right shoulder pain  Rotator cuff syndrome right shoulder    We have reviewed clinical exam findings and relevant study findings with patient in office today as well as reviewed pathoanatomy and natural history, discussed management options, and engaged in shared decision-making.    USG CSI RIGHT subacromial bursa and RIGHT glenohumeral joint administered this visit.  At this time I recommend patient to continue with home exercises at the follow-up every 3 months as needed for repeat injection.    Patient expresses understanding and is agreeable to my assessment and plan.  Patient without further questions or concerns this time.  EMR were reviewed including previous pertinent ED notes, procedure notes, office notes and radiology reports.    Follow-Up: Return for follow-up every 3 months as needed for injection.    Patient Instructions   Patient Instructions   Ultrasound-guided corticosteroid injection to right shoulder joint and right subacromial bursa administered today.    Red flags and risks of injection include but are not limited to infection <0.072% as referenced in some sources, nerve or artery penetration, and if steroids are used-skin dimpling <1%, hypo-pigmentation <1%.  Keep the injection area clean and dry for the next 24 hours. Avoid submerging underwater in a tub, pool, ocean, lake, jacuzzi,  hot tub, or any other body of water for 1 week until needle wound closes due to risk of infection. May take showers. Clean needle site with soap and water and keep covered at all times with sterile bandage such as a band-aid until fully healed.  If any symptoms including fevers, chills, swelling, or worsening symptoms occur then to call office or go to hospital for immediate care if physician unavailable due to possible infection or other complication which is a serious medical problem.  May resume regular activities as tolerated and use local ice application or over-the-counter pain medications if there is any discomfort.       __________________________________________________________________________    Chief complaint: Follow-up of the Right Shoulder      HPI:    53 y.o. male presents for 1 month follow-up of chronic bilateral shoulder pain, suspected secondary to LEFT glenohumeral OA and RIGHT chronic rotator cuff syndrome. Last visit on 2/12/2024 patient with noted + weakness with right supraspinatus/infraspinatus strength testing concerning for underlying rotator cuff disorder/tear, for which he was ordered MRI right shoulder for further evaluation.  Per patient request for immediate pain relief, patient was also provided LMG CSI right subacromial bursa at that time.  Patient was also explained risk/benefit of left shoulder TSA for definitive management of his advanced glenohumeral OA which patient wishes to defer any intervention to his left shoulder at this time.    2/12/2024 LMG bupivacaine right subacromial bursa    3/8/2024:  Today patient reports persistent right shoulder pain which is unchanged from previous exam.  Denies any new injury.    Patient Active Problem List    Diagnosis Date Noted   • Migraine with aura and without status migrainosus, not intractable 02/01/2024   • Bunion of great toe of left foot 10/05/2023   • Anesthesia complication    • Other insomnia 08/14/2023   • Right hip pain  "08/14/2023   • Left ureteral calculus 05/25/2023   • Periumbilical hernia 07/27/2022   • Stage 3a chronic kidney disease (HCC) 06/03/2022   • BOY (obstructive sleep apnea)    • Class 2 obesity in adult 07/15/2021   • Gastroesophageal reflux disease with esophagitis 12/06/2018   • Microscopic hematuria 10/22/2018   • Mild intermittent asthma without complication 04/12/2018   • Psoriasis 04/12/2018   • Hypogonadism in male 04/12/2018   • Anxiety 04/12/2018       Meds/Allergies   Current Outpatient Medications on File Prior to Visit   Medication Sig   • albuterol (Ventolin HFA) 90 mcg/act inhaler Inhale 2 puffs every 6 (six) hours as needed for wheezing   • ALPRAZolam (XANAX) 1 mg tablet Take 1 tablet (1 mg total) by mouth 2 (two) times a day as needed for anxiety   • B-D 3CC LUER-ANTONELLA SYR 25GX1\" 25G X 1\" 3 ML MISC    • baclofen 10 mg tablet Take 1 tablet (10 mg total) by mouth 2 (two) times a day as needed for muscle spasms (Diaphragmatic spasm)   • Diclofenac Epolamine 1.3 % PTCH Apply 1 Application topically every 12 (twelve) hours as needed (apply to right shoulder for pain)   • Diclofenac-Menthol-Lidocaine 1.2-5-4 % PTCH Apply 1 patch topically in the morning   • fluticasone (Flovent HFA) 110 MCG/ACT inhaler Inhale 2 puffs 2 (two) times a day Rinse mouth after use.   • SUMAtriptan (Imitrex) 50 mg tablet Take 1 tablet (50 mg total) by mouth once as needed for migraine for up to 18 doses   • tamsulosin (FLOMAX) 0.4 mg Take 1 capsule (0.4 mg total) by mouth daily with dinner   • testosterone (ANDROGEL) 1.62 % TD gel pump Place on the skin daily   • triamcinolone (KENALOG) 0.5 % cream    • zolpidem (AMBIEN) 10 mg tablet Take 1 tablet (10 mg total) by mouth daily at bedtime as needed for sleep   • magnesium aspartate (MAGINEX) 615 MG tablet Take 1 tablet (615 mg total) by mouth 2 (two) times a day (Patient not taking: Reported on 2/9/2024)     No Known Allergies    Historical Information   Past Medical History: "   Diagnosis Date   • Anesthesia complication     Pt states he stopped breathing during EGD and Colonoscopy   • Anxiety    • Arthritis    • Asthma    • Benign essential HTN     last assessed 05/26/2017   • COVID 2021   • Degenerative joint disease    • Depression 2000    No   • Emphysema of lung (HCC)    • GERD (gastroesophageal reflux disease)    • Hiccoughs    • HTN (hypertension)    • Hypertension    • Kidney stone    • Migraine with aura and without status migrainosus, not intractable 02/01/2024   • Shortness of breath     MOORE   • Skin cancer    • Sleep apnea     no CPAP use   • Spinal stenosis      Past Surgical History:   Procedure Laterality Date   • COLONOSCOPY     • ESOPHAGOGASTRODUODENOSCOPY N/A 03/24/2016    Procedure: ESOPHAGOGASTRODUODENOSCOPY (EGD);  Surgeon: Evin Munoz MD;  Location: [facility];  Service:    • FL RETROGRADE PYELOGRAM  5/25/2023   • JOINT REPLACEMENT      right hip sx   • NJ CYSTO/URETERO W/LITHOTRIPSY &INDWELL STENT INSRT Left 5/25/2023    Procedure: CYSTOSCOPY URETEROSCOPY WITH LITHOTRIPSY HOLMIUM LASER, RETROGRADE PYELOGRAM AND INSERTION STENT URETERAL;  Surgeon: Micah Guillen MD;  Location: [facility];  Service: Urology   • NJ LAPS GSTRC RSTRICTIV PX LONGITUDINAL GASTRECTOMY N/A 10/31/2023    Procedure: GASTRECTOMY  SLEEVE W/ ROBOT&  INTRAOPERATIVE EGD;  Surgeon: Arthur Villar MD;  Location: [facility];  Service: Bariatrics   • NJ REMOVE INT DWELL URETERAL STENT TRANSURETHRAL Left 9/7/2023    Procedure: CYSTOSCOPY,REMOVAL STENT URETERAL;  Surgeon: Ryan Garcia MD;  Location: [facility];  Service: Urology     Social History     Socioeconomic History   • Marital status: /Civil Union     Spouse name: Not on file   • Number of children: Not on file   • Years of education: Not on file   • Highest education level: Not on file   Occupational History   • Not on file   Tobacco Use   • Smoking status: Never   • Smokeless tobacco: Never   • Tobacco comments:     Never  "  Vaping Use   • Vaping status: Some Days   • Substances: THC   Substance and Sexual Activity   • Alcohol use: Yes     Alcohol/week: 2.0 standard drinks of alcohol     Types: 2 Standard drinks or equivalent per week     Comment: One   • Drug use: Yes     Types: Marijuana     Comment: medicinal card obtained; last used a couple days ago   • Sexual activity: Not Currently     Partners: Female     Birth control/protection: None     Comment: None   Other Topics Concern   • Not on file   Social History Narrative   • Not on file     Social Determinants of Health     Financial Resource Strain: Low Risk  (2/1/2024)    Overall Financial Resource Strain (CARDIA)    • Difficulty of Paying Living Expenses: Not hard at all   Food Insecurity: No Food Insecurity (2/1/2024)    Hunger Vital Sign    • Worried About Running Out of Food in the Last Year: Never true    • Ran Out of Food in the Last Year: Never true   Transportation Needs: No Transportation Needs (2/1/2024)    PRAPARE - Transportation    • Lack of Transportation (Medical): No    • Lack of Transportation (Non-Medical): No   Physical Activity: Not on file   Stress: Not on file   Social Connections: Not on file   Intimate Partner Violence: Not on file   Housing Stability: Not on file     Family History   Problem Relation Age of Onset   • Cancer Mother    • Psoriasis Mother         No   • Cancer Father         No   • Crohn's disease Son         No          /94 (BP Location: Left arm, Patient Position: Sitting, Cuff Size: Standard)   Pulse 84   Ht 5' 7\" (1.702 m)   Wt 101 kg (222 lb 10.6 oz)   BMI 34.87 kg/m²      PHYSICAL EXAM:    Ortho Exam:     Body part: right shoulder     Inspection: No visible deformity or asymmetry     Palpation: Tender to palpation in the subacromial bursa and supraspinatus.  Also has some mild discomfort over the long head of the biceps tendon     Range of motion: Forward flexion is 150 degrees, abduction is 130 degrees with pain, internal " rotation is at the lumbar level, external rotation in adduction is 30 degrees, normal cross adduction     Special Tests: Positive empty can test, negative belly press test, no anterior apprehension, no pain with acromioclavicular Gordy's compression test, positive Shahid and positive Neer's.  Rotator cuff strength is subscapularis 5/5, supraspinatus 4+/5, infraspinatus 4/5 and teres minor 5/5     Distal Neurovascular Status: Intact, Yes     Body part: left shoulder     Inspection: No visible deformity     Palpation: Diffusely tender to palpation     Range of motion: Grossly limited left shoulder range of motion in all directions     Special Tests: Deferred     Distal Neurovascular Status: Intact, Yes    Marc Elizabeth MD  03/08/24  _______________________________________________________________________________________  Large joint arthrocentesis: R glenohumeral  Universal Protocol:  Procedure performed by: (Supervised by Dr. Kaur)  Consent: Verbal consent obtained.  Risks and benefits: risks, benefits and alternatives were discussed  Consent given by: patient  Patient understanding: patient states understanding of the procedure being performed  Patient consent: the patient's understanding of the procedure matches consent given  Site marked: the operative site was marked  Radiology Images displayed and confirmed. If images not available, report reviewed: imaging studies available  Required items: required blood products, implants, devices, and special equipment available  Patient identity confirmed: verbally with patient  Supporting Documentation  Indications: pain   Procedure Details  Location: shoulder - R glenohumeral  Preparation: Patient was prepped and draped in the usual sterile fashion  Needle size: 22 G (22G-1.5in needle)  Ultrasound guidance: yes (Curvilinear probe with sterile probe cover and sterile gel)  Approach: lateral  Medications administered: 4 mL bupivacaine 0.25 %; 40 mg triamcinolone acetonide  40 mg/mL    Patient tolerance: patient tolerated the procedure well with no immediate complications  Dressing:  Sterile dressing applied    Injection site U/S and marked. Site cleaned with Betadine and alcohol prior to beginning USG injection procedure. Site again cleaned with chlorhexidine prior to starting USG procedure. Sterile field, gloves, probe cover used for ultrasound-guided injection with visualization of needle in-line with linear array probe in posterolateral to medial fashion injected intra-articular. No artery or nerve visualized in needle path. Injectate visualized filling glenohumeral joint capsule. Patient tolerated procedure well with minimal bleeding and no immediate complications. Sterile bandage placed.      Large joint arthrocentesis: R subacromial bursa  [place] Protocol:  Procedure performed by:  Consent: Verbal consent obtained.  Risks and benefits: risks, benefits and alternatives were discussed  Consent given by: patient  Patient understanding: patient states understanding of the procedure being performed  Patient consent: the patient's understanding of the procedure matches consent given  Site marked: the operative site was marked  Radiology Images displayed and confirmed. If images not available, report reviewed: imaging studies available  Required items: required blood products, implants, devices, and special equipment available  Patient identity confirmed: verbally with patient  Supporting Documentation  Indications: pain   Procedure Details  Location: shoulder - R subacromial bursa  Preparation: Patient was prepped and draped in the usual sterile fashion  Needle size: 22 G (22G-1.5in needle)  Ultrasound guidance: yes (Curvilinear probe with sterile probe cover and sterile gel)  Approach: lateral  Medications administered: 4 mL bupivacaine 0.25 %; 40 mg triamcinolone acetonide 40 mg/mL    Patient tolerance: patient tolerated the procedure well with no immediate  complications  Dressing:  Sterile dressing applied    Injection site U/S and marked. Site cleaned with Betadine and alcohol prior to beginning USG injection procedure. Site again cleaned with chlorhexidine prior to starting USG procedure. Sterile field, gloves, probe cover used for ultrasound-guided injection with visualization of needle in-line with linear array probe in posterolateral to medial fashion injected intra-articular. No artery or nerve visualized in needle path. Injectate visualized filling glenohumeral joint capsule. Patient tolerated procedure well with minimal bleeding and no immediate complications. Sterile bandage placed.

## 2024-03-16 DIAGNOSIS — G43.109 MIGRAINE WITH AURA AND WITHOUT STATUS MIGRAINOSUS, NOT INTRACTABLE: ICD-10-CM

## 2024-03-16 DIAGNOSIS — R06.6 INTRACTABLE HICCUPS: ICD-10-CM

## 2024-03-19 RX ORDER — SUMATRIPTAN 50 MG/1
50 TABLET, FILM COATED ORAL ONCE AS NEEDED
Qty: 9 TABLET | Refills: 2 | Status: SHIPPED | OUTPATIENT
Start: 2024-03-19 | End: 2024-03-20 | Stop reason: SDUPTHER

## 2024-03-19 RX ORDER — BACLOFEN 10 MG/1
10 TABLET ORAL 2 TIMES DAILY PRN
Qty: 30 TABLET | Refills: 3 | Status: SHIPPED | OUTPATIENT
Start: 2024-03-19

## 2024-03-20 DIAGNOSIS — G89.29 CHRONIC RIGHT SHOULDER PAIN: ICD-10-CM

## 2024-03-20 DIAGNOSIS — E29.1 HYPOGONADISM IN MALE: Primary | ICD-10-CM

## 2024-03-20 DIAGNOSIS — M25.551 RIGHT HIP PAIN: ICD-10-CM

## 2024-03-20 DIAGNOSIS — M25.511 CHRONIC RIGHT SHOULDER PAIN: ICD-10-CM

## 2024-03-20 DIAGNOSIS — L40.9 PSORIASIS: ICD-10-CM

## 2024-03-20 DIAGNOSIS — G43.109 MIGRAINE WITH AURA AND WITHOUT STATUS MIGRAINOSUS, NOT INTRACTABLE: ICD-10-CM

## 2024-03-21 RX ORDER — TRIAMCINOLONE ACETONIDE 5 MG/G
CREAM TOPICAL 2 TIMES DAILY
Qty: 15 G | Refills: 2 | Status: SHIPPED | OUTPATIENT
Start: 2024-03-21

## 2024-03-21 RX ORDER — TESTOSTERONE 16.2 MG/G
20.25 GEL TRANSDERMAL DAILY
Qty: 75 G | Refills: 0 | Status: SHIPPED | OUTPATIENT
Start: 2024-03-21

## 2024-03-21 RX ORDER — ONDANSETRON 4 MG/1
1 TABLET, ORALLY DISINTEGRATING ORAL EVERY 12 HOURS PRN
Qty: 30 PATCH | Refills: 0 | Status: SHIPPED | OUTPATIENT
Start: 2024-03-21

## 2024-03-21 RX ORDER — SUMATRIPTAN 50 MG/1
50 TABLET, FILM COATED ORAL ONCE AS NEEDED
Qty: 9 TABLET | Refills: 0 | Status: SHIPPED | OUTPATIENT
Start: 2024-03-21

## 2024-03-21 NOTE — TELEPHONE ENCOUNTER
Appropriate for refill, patient has had several unsuccessful prior authos for this patch AND alternatives. Let me know if there are any further issues

## 2024-03-25 ENCOUNTER — TRANSCRIBE ORDERS (OUTPATIENT)
Dept: LAB | Facility: CLINIC | Age: 54
End: 2024-03-25

## 2024-03-25 ENCOUNTER — APPOINTMENT (OUTPATIENT)
Dept: LAB | Facility: CLINIC | Age: 54
End: 2024-03-25
Payer: COMMERCIAL

## 2024-03-25 DIAGNOSIS — R94.4 DECREASED CALCULATED GLOMERULAR FILTRATION RATE (GFR): ICD-10-CM

## 2024-03-25 DIAGNOSIS — N20.0 NEPHROLITHIASIS: ICD-10-CM

## 2024-03-25 DIAGNOSIS — N18.31 STAGE 3A CHRONIC KIDNEY DISEASE (HCC): ICD-10-CM

## 2024-03-25 DIAGNOSIS — I12.9 BENIGN HYPERTENSION WITH CKD (CHRONIC KIDNEY DISEASE), STAGE II: ICD-10-CM

## 2024-03-25 DIAGNOSIS — E29.1 SECONDARY MALE HYPOGONADISM: Primary | ICD-10-CM

## 2024-03-25 DIAGNOSIS — K91.2 POSTSURGICAL MALABSORPTION: ICD-10-CM

## 2024-03-25 DIAGNOSIS — Z48.815 ENCOUNTER FOR SURGICAL AFTERCARE FOLLOWING SURGERY OF DIGESTIVE SYSTEM: ICD-10-CM

## 2024-03-25 DIAGNOSIS — E29.1 SECONDARY MALE HYPOGONADISM: ICD-10-CM

## 2024-03-25 DIAGNOSIS — E66.9 OBESITY, CLASS I, BMI 30-34.9: ICD-10-CM

## 2024-03-25 DIAGNOSIS — E66.01 CLASS 2 SEVERE OBESITY DUE TO EXCESS CALORIES WITH SERIOUS COMORBIDITY AND BODY MASS INDEX (BMI) OF 37.0 TO 37.9 IN ADULT (HCC): ICD-10-CM

## 2024-03-25 DIAGNOSIS — Z98.84 BARIATRIC SURGERY STATUS: ICD-10-CM

## 2024-03-25 DIAGNOSIS — N18.2 STAGE 2 CHRONIC KIDNEY DISEASE: ICD-10-CM

## 2024-03-25 DIAGNOSIS — N13.30 HYDRONEPHROSIS OF LEFT KIDNEY: ICD-10-CM

## 2024-03-25 DIAGNOSIS — N18.2 BENIGN HYPERTENSION WITH CKD (CHRONIC KIDNEY DISEASE), STAGE II: ICD-10-CM

## 2024-03-25 LAB
25(OH)D3 SERPL-MCNC: 33.6 NG/ML (ref 30–100)
ALBUMIN SERPL BCP-MCNC: 3.8 G/DL (ref 3.5–5)
ALP SERPL-CCNC: 65 U/L (ref 34–104)
ALT SERPL W P-5'-P-CCNC: 19 U/L (ref 7–52)
ANION GAP SERPL CALCULATED.3IONS-SCNC: 9 MMOL/L (ref 4–13)
AST SERPL W P-5'-P-CCNC: 14 U/L (ref 13–39)
BASOPHILS # BLD AUTO: 0.02 THOUSANDS/ÂΜL (ref 0–0.1)
BASOPHILS NFR BLD AUTO: 0 % (ref 0–1)
BILIRUB SERPL-MCNC: 0.6 MG/DL (ref 0.2–1)
BUN SERPL-MCNC: 28 MG/DL (ref 5–25)
CALCIUM SERPL-MCNC: 8.8 MG/DL (ref 8.4–10.2)
CHLORIDE SERPL-SCNC: 106 MMOL/L (ref 96–108)
CO2 SERPL-SCNC: 25 MMOL/L (ref 21–32)
CREAT SERPL-MCNC: 1.01 MG/DL (ref 0.6–1.3)
CREAT UR-MCNC: 268.5 MG/DL
CREAT UR-MCNC: 268.5 MG/DL
EOSINOPHIL # BLD AUTO: 0.01 THOUSAND/ÂΜL (ref 0–0.61)
EOSINOPHIL NFR BLD AUTO: 0 % (ref 0–6)
ERYTHROCYTE [DISTWIDTH] IN BLOOD BY AUTOMATED COUNT: 12.6 % (ref 11.6–15.1)
FERRITIN SERPL-MCNC: 290 NG/ML (ref 24–336)
FOLATE SERPL-MCNC: 12.3 NG/ML
GFR SERPL CREATININE-BSD FRML MDRD: 83 ML/MIN/1.73SQ M
GLUCOSE P FAST SERPL-MCNC: 95 MG/DL (ref 65–99)
HCT VFR BLD AUTO: 42.1 % (ref 36.5–49.3)
HGB BLD-MCNC: 14.5 G/DL (ref 12–17)
IMM GRANULOCYTES # BLD AUTO: 0.02 THOUSAND/UL (ref 0–0.2)
IMM GRANULOCYTES NFR BLD AUTO: 0 % (ref 0–2)
IRON SATN MFR SERPL: 56 % (ref 15–50)
IRON SERPL-MCNC: 172 UG/DL (ref 50–212)
LYMPHOCYTES # BLD AUTO: 1.18 THOUSANDS/ÂΜL (ref 0.6–4.47)
LYMPHOCYTES NFR BLD AUTO: 18 % (ref 14–44)
MCH RBC QN AUTO: 33.2 PG (ref 26.8–34.3)
MCHC RBC AUTO-ENTMCNC: 34.4 G/DL (ref 31.4–37.4)
MCV RBC AUTO: 96 FL (ref 82–98)
MICROALBUMIN UR-MCNC: 8.4 MG/L
MICROALBUMIN/CREAT 24H UR: 3 MG/G CREATININE (ref 0–30)
MONOCYTES # BLD AUTO: 0.49 THOUSAND/ÂΜL (ref 0.17–1.22)
MONOCYTES NFR BLD AUTO: 8 % (ref 4–12)
NEUTROPHILS # BLD AUTO: 4.69 THOUSANDS/ÂΜL (ref 1.85–7.62)
NEUTS SEG NFR BLD AUTO: 74 % (ref 43–75)
NRBC BLD AUTO-RTO: 0 /100 WBCS
PHOSPHATE SERPL-MCNC: 2.4 MG/DL (ref 2.7–4.5)
PLATELET # BLD AUTO: 260 THOUSANDS/UL (ref 149–390)
PMV BLD AUTO: 8.7 FL (ref 8.9–12.7)
POTASSIUM SERPL-SCNC: 3.7 MMOL/L (ref 3.5–5.3)
PROT SERPL-MCNC: 6.2 G/DL (ref 6.4–8.4)
PROT UR-MCNC: 19 MG/DL
PROT/CREAT UR: 0.07 MG/G{CREAT} (ref 0–0.1)
PSA SERPL-MCNC: 1.34 NG/ML (ref 0–4)
PTH-INTACT SERPL-MCNC: 61.5 PG/ML (ref 12–88)
RBC # BLD AUTO: 4.37 MILLION/UL (ref 3.88–5.62)
SODIUM SERPL-SCNC: 140 MMOL/L (ref 135–147)
TESTOST SERPL-MSCNC: 73 NG/DL
TIBC SERPL-MCNC: 307 UG/DL (ref 250–450)
TSH SERPL DL<=0.05 MIU/L-ACNC: 2.16 UIU/ML (ref 0.45–4.5)
UIBC SERPL-MCNC: 135 UG/DL (ref 155–355)
VIT B12 SERPL-MCNC: 131 PG/ML (ref 180–914)
WBC # BLD AUTO: 6.41 THOUSAND/UL (ref 4.31–10.16)

## 2024-03-25 PROCEDURE — 84590 ASSAY OF VITAMIN A: CPT

## 2024-03-25 PROCEDURE — 83550 IRON BINDING TEST: CPT

## 2024-03-25 PROCEDURE — 36415 COLL VENOUS BLD VENIPUNCTURE: CPT

## 2024-03-25 PROCEDURE — 84403 ASSAY OF TOTAL TESTOSTERONE: CPT

## 2024-03-25 PROCEDURE — 84425 ASSAY OF VITAMIN B-1: CPT

## 2024-03-25 PROCEDURE — 80053 COMPREHEN METABOLIC PANEL: CPT

## 2024-03-25 PROCEDURE — 83540 ASSAY OF IRON: CPT

## 2024-03-25 PROCEDURE — 83970 ASSAY OF PARATHORMONE: CPT

## 2024-03-25 PROCEDURE — G0103 PSA SCREENING: HCPCS

## 2024-03-25 PROCEDURE — 82746 ASSAY OF FOLIC ACID SERUM: CPT

## 2024-03-25 PROCEDURE — 84630 ASSAY OF ZINC: CPT

## 2024-03-25 PROCEDURE — 85025 COMPLETE CBC W/AUTO DIFF WBC: CPT

## 2024-03-25 PROCEDURE — 82728 ASSAY OF FERRITIN: CPT

## 2024-03-25 PROCEDURE — 82607 VITAMIN B-12: CPT

## 2024-03-25 PROCEDURE — 84100 ASSAY OF PHOSPHORUS: CPT

## 2024-03-25 PROCEDURE — 84443 ASSAY THYROID STIM HORMONE: CPT

## 2024-03-25 PROCEDURE — 82306 VITAMIN D 25 HYDROXY: CPT

## 2024-03-27 LAB — ZINC SERPL-MCNC: 62 UG/DL (ref 44–115)

## 2024-03-28 LAB — VIT B1 BLD-SCNC: 120.7 NMOL/L (ref 66.5–200)

## 2024-03-29 ENCOUNTER — TELEPHONE (OUTPATIENT)
Dept: NEPHROLOGY | Facility: CLINIC | Age: 54
End: 2024-03-29

## 2024-03-29 DIAGNOSIS — N18.31 STAGE 3A CHRONIC KIDNEY DISEASE (HCC): Primary | ICD-10-CM

## 2024-03-29 NOTE — TELEPHONE ENCOUNTER
----- Message from Richard Dietz MD sent at 3/29/2024  3:35 PM EDT -----  Clinical team, please let the patient know that kidney function is looking better.  There has been a tremendous improvement in amount of protein in the urine which is a very good sign.  Phosphorus level is slightly low and would check with bariatrics team if they would be okay with adding low-dose vitamin D supplement as it may help with normalizing phosphorus level.  Repeat renal function panel, urine albumin to creatinine ratio and urine protein to creatinine ratio in 2 months.

## 2024-03-31 LAB — VIT A SERPL-MCNC: 67.4 UG/DL (ref 20.1–62)

## 2024-04-01 ENCOUNTER — TELEPHONE (OUTPATIENT)
Dept: BARIATRICS | Facility: CLINIC | Age: 54
End: 2024-04-01

## 2024-04-01 ENCOUNTER — TELEPHONE (OUTPATIENT)
Dept: NEPHROLOGY | Facility: CLINIC | Age: 54
End: 2024-04-01

## 2024-04-01 DIAGNOSIS — N18.31 STAGE 3A CHRONIC KIDNEY DISEASE (HCC): Primary | ICD-10-CM

## 2024-04-01 DIAGNOSIS — E83.39 HYPOPHOSPHATEMIA: Primary | ICD-10-CM

## 2024-04-01 DIAGNOSIS — E53.8 VITAMIN B12 DEFICIENCY: ICD-10-CM

## 2024-04-01 DIAGNOSIS — E67.0 HIGH VITAMIN A LEVEL: ICD-10-CM

## 2024-04-01 DIAGNOSIS — Z98.84 BARIATRIC SURGERY STATUS: Primary | ICD-10-CM

## 2024-04-01 NOTE — TELEPHONE ENCOUNTER
----- Message from Richard Dietz MD sent at 4/1/2024  1:10 PM EDT -----  Regarding: Labs and cholecalciferol  Clinical team, please let the patient know that kidney function is looking better.  There has been a tremendous improvement in amount of protein in the urine which is a very good sign.     Phosphorus level is slightly low and would recommend starting cholecalciferol 1000 units (25 mcg) daily.  I have discussed with bariatrics and they are okay with starting vitamin D.  Prescription has been sent to patient's pharmacy.    Repeat renal function panel, urine albumin to creatinine ratio and urine protein to creatinine ratio in 2 months.

## 2024-04-01 NOTE — TELEPHONE ENCOUNTER
"----- Message from DORIAN Mills sent at 4/1/2024 11:17 AM EDT -----  Please call pt to let him know we have received his labs. They are all within limits EXCEPT FOR THE FOLLOWING:     - Your vitamin A level is HIGH. High levels of vitamin A can be toxic to the liver. Symptoms of high vitamin A include nausea, vomiting, blurred vision, headache and vertigo. It is important you stop all vitamins and minerals that contain vitamin A for 4 weeks. Avoid supplements like \"hair skin and nails\" which contain extra vitamin A. Avoid vitamin tiwari and other supplements that contain extra vitamin A. Avoid anti-wrinkle creams that contain retinol in the ingredient panel. Alcohol intake can also raise vitamin A levels.     While holding you multivitamin, recommend you take the following supplements:  One super B complex that contains thiamine  500 mg calcium citrate with vitamin d (three times a day)  One 2000 IU vitamin D3 daily     - Vitamin B12 level is VERY low which can affect nerve health. I would recommend starting on a vitamin B12 injections. If he can come in to our office so we can provide him with B12 injections. I would prefer weekly shots, if he is unable to do so, please arrange for monthly injections x 3 months.     In the meantime, I will also send him a prescription for vitamin B12 to take as well. This will be sent to his pharmacy.     - protein is mildly low. Please incorporate high protein intake/diet for a goal of 70-80 gm of protein per day.     Will recheck you vitamin A and vitamin b12 in 3 months. A lab slip is enclosed.         "

## 2024-04-02 NOTE — TELEPHONE ENCOUNTER
Please call patient to inform him that insurance will not cover this or alternatives despite several calls and prior auths of various equivalent medications. He will need to pay out of pocket, thanks!

## 2024-04-05 ENCOUNTER — TELEPHONE (OUTPATIENT)
Dept: INTERNAL MEDICINE CLINIC | Facility: CLINIC | Age: 54
End: 2024-04-05

## 2024-04-05 RX ORDER — DICLOFENAC SODIUM, LIDOCAINE, AND MENTHOL, (PLUS)MINUS .012; .04; .05 G/1; G/1; G/1
1 PATCH TOPICAL
Qty: 15 PATCH | Refills: 3 | OUTPATIENT
Start: 2024-04-05

## 2024-04-05 NOTE — TELEPHONE ENCOUNTER
Called and left detailed VM advising patient per note and to call back with any further questions.

## 2024-04-05 NOTE — TELEPHONE ENCOUNTER
Prior authorization initiated for patient's Diclofenac Epolamine 1.3% patches on cover my meds. Pending review.

## 2024-04-10 NOTE — TELEPHONE ENCOUNTER
Contacted insurance/pharmacy at . Spoke with Lindsay. Patient's Diclofenac Epolamine 1.3% patches being denied due to non formulary use.     Lindsay faxing copy of denial to office at

## 2024-04-11 ENCOUNTER — TELEPHONE (OUTPATIENT)
Age: 54
End: 2024-04-11

## 2024-04-11 NOTE — TELEPHONE ENCOUNTER
Caller: patient    Doctor: Vincent    Reason for call: pt called stating he has a question about a balance he noticed on his My [employer].  Provided the number for billing    Call back#: [phone]

## 2024-04-17 NOTE — TELEPHONE ENCOUNTER
Received denial from insurance company for Diclofenac Epolamine 1.3% patch.     Denial form in media.     Contacted patient on the phone at . Introduced self and role. Patient updated his Diclofenac Epolamine 1.3% Patch has been denied by the insurance company.     Patient stated he doesn't use the patch anymore. Patient's pain improved. All questions/concerns answered at this time.

## 2024-04-30 DIAGNOSIS — G47.09 OTHER INSOMNIA: ICD-10-CM

## 2024-04-30 DIAGNOSIS — F41.9 ANXIETY: ICD-10-CM

## 2024-05-06 RX ORDER — ZOLPIDEM TARTRATE 10 MG/1
10 TABLET ORAL
Qty: 60 TABLET | Refills: 0 | Status: SHIPPED | OUTPATIENT
Start: 2024-05-06

## 2024-05-09 ENCOUNTER — TELEPHONE (OUTPATIENT)
Age: 54
End: 2024-05-09

## 2024-05-09 ENCOUNTER — OFFICE VISIT (OUTPATIENT)
Dept: BARIATRICS | Facility: CLINIC | Age: 54
End: 2024-05-09
Payer: COMMERCIAL

## 2024-05-09 VITALS
HEIGHT: 67 IN | WEIGHT: 213 LBS | SYSTOLIC BLOOD PRESSURE: 128 MMHG | HEART RATE: 86 BPM | DIASTOLIC BLOOD PRESSURE: 80 MMHG | BODY MASS INDEX: 33.43 KG/M2 | TEMPERATURE: 97.8 F

## 2024-05-09 DIAGNOSIS — N20.0 NEPHROLITHIASIS: ICD-10-CM

## 2024-05-09 DIAGNOSIS — R35.0 URINARY FREQUENCY: Primary | ICD-10-CM

## 2024-05-09 DIAGNOSIS — Z98.84 BARIATRIC SURGERY STATUS: ICD-10-CM

## 2024-05-09 DIAGNOSIS — K91.2 POSTSURGICAL MALABSORPTION: ICD-10-CM

## 2024-05-09 DIAGNOSIS — M79.3 PANNICULITIS: Primary | ICD-10-CM

## 2024-05-09 PROCEDURE — 99213 OFFICE O/P EST LOW 20 MIN: CPT | Performed by: SURGERY

## 2024-05-09 NOTE — PROGRESS NOTES
Assessment/Plan:     Patient ID: Hussain Hooper is a 53 y.o. male.     Bariatric Surgery Status    -s/p Vertical Sleeve Gastrectomy with Dr. Payam Villar on 10/31/2023. Presents to the office today for 3rd post op visit. Overall doing fair, tolerating a regular diet. Denies having any abdominal pain, N/V/D/C, regurgitation, reflux, or dysphagia. Taking his MVI daily.     Of note, states he is able to eat anything he wishes, can drink alcohol without issues. He has not had an issues after surgery. Drinks and eats at the same time due to hx of hiccups.     He is requesting referral to plastic surgery for abdominoplasty, but this may be too early, but he reports that his insurance was cover it.    He is also pursuing an additional 15 to 20 pound weight loss and will want to try medical weight loss options to supplement.    He is inquiring about record of his surgery so when he goes to restaurants they will serve him a smaller portion.    Discussed back-up options including GOKUL if his weight loss is not satisfactory in one year.    See will follow-up in 3 months.    PLAN:     - healthy habits encouraged. Recommended to focus on protein intake. Follow 30/60 minute rule to prevent weight regain.   - Routine follow up 12 months for annual visits  - Continue with healthy lifestyle, adequate protein intake of 60 gm, fluid intake of at least 64 oz.   - Continue with MVI daily.   - Activity as tolerated.   - Labs ordered and will adjust accordingly if any deficiency.   - Follow up with RD and SW as needed.       BOY - resolving    Continued/Maintain healthy weight loss with good nutrition intakes.  Adequate hydration with at least 64oz. fluid intake.  Follow diet as discussed.  Follow vitamin and mineral recommendations as reviewed with you.  Exercise as tolerated.    Colonoscopy referral made: UTD [facility].     Follow-up in 12 months for annual visits. We kindly ask that your arrive 15 minutes before your scheduled  appointment time with your provider to allow our staff to room you, get your vital signs and update your chart.    Get lab work done prior to visit. Please call the office if you need a script.  It is recommended to check with your insurance BEFORE getting labs done to make sure they are covered by your policy.      Call our office if you have any problems with abdominal pain especially associated with fever, chills, nausea, vomiting or any other concerns.    All  Post-bariatric surgery patients should be aware that very small quantities of any alcohol can cause impairment and it is very possible not to feel the effect. The effect can be in the system for several hours.  It is also a stomach irritant.     It is advised to AVOID alcohol, Nonsteroidal antiinflammatory drugs (NSAIDS) and nicotine of all forms . Any of these can cause stomach irritation/pain.    Discussed the effects of alcohol on a bariatric patient and the increased impairment risk.     Keep up the good work!     Postsurgical Malabsorption   -At risk for malabsorption of vitamins/minerals secondary to malabsorption and restriction of intake from bariatric surgery  -Currently taking adequate postop bariatric surgery vitamin supplementation  -Next set of bariatric labs to be ordered next annual appointment  -Patient received education about the importance of adhering to a lifelong supplementation regimen to avoid vitamin/mineral deficiencies      Diagnoses and all orders for this visit:    Panniculitis  -     Ambulatory referral to Plastic Surgery; Future    Bariatric surgery status    Postsurgical malabsorption           Subjective:      Patient ID: Hussain Hooper is a 53 y.o. male.    -s/p Vertical Sleeve Gastrectomy with Dr. Payam Villar on 10/31/2023. Presents to the office today for 3rd post op visit. Overall doing fair, tolerating a regular diet. Denies having any abdominal pain, N/V/D/C, regurgitation, reflux, or dysphagia. Taking his MVI daily.     Of  "note, states he is able to eat anything he wishes, can drink alcohol without issues. He has not had an issues after surgery. Drinks and eats at the same time due to hx of hiccups.     Initial: 253   Current: 213 LBS   EWL: (Weight loss is ahead of schedule at this post surgical period.)  Asael: 213 lbs   Current BMI is Body mass index is 33.36 kg/m².    Tolerating a regular diet-yes  Eating at least 60 grams of protein per day-yes  Following 30/60 minute rule with liquids-no - educated on the importance of this.   Drinking at least 64 ounces of fluid per day-yes  Drinking carbonated beverages-no  Sufficient exercise-yes - but currently limited now with shoulder pain.   Using NSAIDs regularly-yes - took this for shoulder pain   Using nicotine-no  Using alcohol-yes  Supplements: [employer]it[employer]    EWL is 42%, which places the patient ahead of schedule for expected post surgical weight loss at this time.     The following portions of the patient's history were reviewed and updated as appropriate: allergies, current medications, past family history, past medical history, past social history, past surgical history and problem list.    Review of Systems   Constitutional: Negative.    Respiratory: Negative.     Gastrointestinal: Negative.    Musculoskeletal:  Positive for arthralgias.        Shoulder tear of right shoulder.  Stiff left shoulder.   Neurological: Negative.    Psychiatric/Behavioral: Negative.           Objective:    /80   Pulse 86   Temp 97.8 °F (36.6 °C) (Tympanic)   Ht 5' 7\" (1.702 m)   Wt 96.6 kg (213 lb)   BMI 33.36 kg/m²      Physical Exam  Vitals and nursing note reviewed.   Constitutional:       Appearance: Normal appearance.   Cardiovascular:      Rate and Rhythm: Normal rate and regular rhythm.      Pulses: Normal pulses.      Heart sounds: Normal heart sounds.   Pulmonary:      Effort: Pulmonary effort is normal.      Breath sounds: Normal breath sounds.   Abdominal:      General: " Bowel sounds are normal.      Palpations: Abdomen is soft.      Tenderness: There is no abdominal tenderness.      Comments: Well healed incisions.   Musculoskeletal:         General: Normal range of motion.   Skin:     General: Skin is warm and dry.   Neurological:      General: No focal deficit present.      Mental Status: He is alert and oriented to person, place, and time. Mental status is at baseline.   Psychiatric:         Mood and Affect: Mood normal.         Behavior: Behavior normal.

## 2024-05-09 NOTE — TELEPHONE ENCOUNTER
Patient has a history of kidney stones and had a stent removal 9/7/2023. He is having increased urinary frequency, and slow urinary stream, slower than taya, does not present with a split urinary stream. He denies any abdominal or penile pain. No hematuria. He had to take another call and I will call him back. Urine orders tentatively pended. He does not feel like he is emptying completely. I gave him care advice to hydrate well and avoid bladder irritants, and reviewed ER precautions. Urine orders placed to R/O infection and pharmacy was updated during the call. Please advise of further recommendations, testing and follow up if recommended.

## 2024-05-09 NOTE — TELEPHONE ENCOUNTER
Patient seen by Gene in [place]    Patient called stating he is having a lot of frequency to urinate.  He states when he urinates he has a slow stream. This started a few weeks ago.  He has a history of kidney stones .  He is not having any pain.  He just wants to know how to proceed.       Patient can be reached at [phone]

## 2024-05-10 ENCOUNTER — TELEPHONE (OUTPATIENT)
Age: 54
End: 2024-05-10

## 2024-05-10 NOTE — TELEPHONE ENCOUNTER
Pt called with a referral for a PANN. I advised that I would forward a msg to  Tami our coordinator who will be in touch with the criteria needed to move forward

## 2024-05-13 ENCOUNTER — TELEPHONE (OUTPATIENT)
Dept: OBGYN CLINIC | Facility: HOSPITAL | Age: 54
End: 2024-05-13

## 2024-05-13 NOTE — TELEPHONE ENCOUNTER
Caller: Patient    Doctor: Vincent    Reason for call: Please call patient he is requesting a USGI corticosteroid right shoulder joint and right subacromial bursa.  Last USGI 3/8/24.      Call back#: [phone]

## 2024-05-15 DIAGNOSIS — R06.6 INTRACTABLE HICCUPS: ICD-10-CM

## 2024-05-15 DIAGNOSIS — L40.9 PSORIASIS: ICD-10-CM

## 2024-05-15 DIAGNOSIS — G89.29 CHRONIC RIGHT SHOULDER PAIN: ICD-10-CM

## 2024-05-15 DIAGNOSIS — G43.109 MIGRAINE WITH AURA AND WITHOUT STATUS MIGRAINOSUS, NOT INTRACTABLE: ICD-10-CM

## 2024-05-15 DIAGNOSIS — E29.1 HYPOGONADISM IN MALE: ICD-10-CM

## 2024-05-15 DIAGNOSIS — M25.511 CHRONIC RIGHT SHOULDER PAIN: ICD-10-CM

## 2024-05-15 DIAGNOSIS — M25.551 RIGHT HIP PAIN: ICD-10-CM

## 2024-05-15 DIAGNOSIS — F41.9 ANXIETY: ICD-10-CM

## 2024-05-16 RX ORDER — ALPRAZOLAM 1 MG/1
1 TABLET ORAL 2 TIMES DAILY PRN
Qty: 60 TABLET | Refills: 0 | Status: SHIPPED | OUTPATIENT
Start: 2024-05-16 | End: 2024-06-15

## 2024-05-16 RX ORDER — TESTOSTERONE 16.2 MG/G
20.25 GEL TRANSDERMAL DAILY
Qty: 75 G | Refills: 0 | OUTPATIENT
Start: 2024-05-16

## 2024-05-16 RX ORDER — BACLOFEN 10 MG/1
10 TABLET ORAL 2 TIMES DAILY PRN
Qty: 30 TABLET | Refills: 0 | Status: SHIPPED | OUTPATIENT
Start: 2024-05-16

## 2024-05-16 RX ORDER — ONDANSETRON 4 MG/1
1 TABLET, ORALLY DISINTEGRATING ORAL EVERY 12 HOURS PRN
Qty: 30 PATCH | Refills: 0 | OUTPATIENT
Start: 2024-05-16

## 2024-05-16 RX ORDER — SUMATRIPTAN 50 MG/1
50 TABLET, FILM COATED ORAL ONCE AS NEEDED
Qty: 9 TABLET | Refills: 0 | Status: SHIPPED | OUTPATIENT
Start: 2024-05-16

## 2024-05-16 RX ORDER — TRIAMCINOLONE ACETONIDE 5 MG/G
CREAM TOPICAL 2 TIMES DAILY
Qty: 15 G | Refills: 0 | Status: SHIPPED | OUTPATIENT
Start: 2024-05-16

## 2024-05-16 NOTE — TELEPHONE ENCOUNTER
Patient called asking why haven't he gotten a call back yet as was promised.    Asked to give Tami couple weeks for a call back.    Patient agreed and was ok

## 2024-05-16 NOTE — TELEPHONE ENCOUNTER
Xanax-PDMP reviewed. Last filled 3/7/24. Due 4/6/24. Please review for refill    Please refuse refill for androgel. Recently fill yesterday 5/15/24 by another physician.     Thank you

## 2024-05-17 ENCOUNTER — TELEPHONE (OUTPATIENT)
Dept: PLASTIC SURGERY | Facility: CLINIC | Age: 54
End: 2024-05-17

## 2024-05-30 ENCOUNTER — TELEPHONE (OUTPATIENT)
Dept: PLASTIC SURGERY | Facility: CLINIC | Age: 54
End: 2024-05-30

## 2024-05-30 NOTE — TELEPHONE ENCOUNTER
Spoke to patient and reviewed criteria for panniculectomy consultation.  As patient has wt loss surgery in October 2023, he would be eligible in April, 2025.  Re-emailed criteria and patient will keep me posted on meeting requirements.

## 2024-05-31 ENCOUNTER — NURSE TRIAGE (OUTPATIENT)
Age: 54
End: 2024-05-31

## 2024-05-31 RX ORDER — ALPRAZOLAM 1 MG/1
1 TABLET ORAL 2 TIMES DAILY PRN
Qty: 60 TABLET | Refills: 0 | Status: CANCELLED | OUTPATIENT
Start: 2024-05-31 | End: 2024-06-30

## 2024-05-31 NOTE — TELEPHONE ENCOUNTER
Patient would like to start Ozempic for boredom eating.  Spoke with Dr. Payam Villar regarding this and he said have to speak to medical side.   Patient to see Zulema Hidalgo on 8/20 as new patient.  Patient was hoping that something could be prescribed prior to seeing provider so he doesn't keep eating.  Patient CB: [phone]  Routed to clinical.

## 2024-06-03 ENCOUNTER — TELEPHONE (OUTPATIENT)
Dept: INTERNAL MEDICINE CLINIC | Facility: CLINIC | Age: 54
End: 2024-06-03

## 2024-06-03 NOTE — TELEPHONE ENCOUNTER
Received call from patient. Patient stating he had weight loss bariatric surgery last year. Patient followed up with Dr. Payam Villar about two weeks ago. Physician recommends some type of appetite suppressant for night time when patient is snacking/binge eating. Patient has an appointment in August with them. Weight management recommends patient follow up with PCP to discuss a medication to suppress appetite.     Please review and advise.

## 2024-06-03 NOTE — TELEPHONE ENCOUNTER
Patient will need to come in for an appointment to discuss as his history and medical profile leads to additional risks and side effects of the commonly given suppressant meds. We will need to discuss in detail and see what, if any, are right for him. Thanks!

## 2024-06-07 ENCOUNTER — TELEPHONE (OUTPATIENT)
Dept: OBGYN CLINIC | Facility: OTHER | Age: 54
End: 2024-06-07

## 2024-06-07 ENCOUNTER — TELEPHONE (OUTPATIENT)
Dept: BARIATRICS | Facility: CLINIC | Age: 54
End: 2024-06-07

## 2024-06-07 NOTE — TELEPHONE ENCOUNTER
Received transfer call from [facility] - a plastic surgery office - Dr Harpreet Osullivan in [place], [place] (not [facility]) stating that a fax was received regarding patient.  However, patient has been referred to Dr Johnson of [facility] and patient has already been in contact for an appointment.  Informed office of such and that it was a possibly an incorrectly faxed referral to them, they will shred received referral as patient does not have existing relationship with provider.    If patient wants to see another plastic surgeon, will need to have new referral sent to said provider.    Patient has seen Jeremy Jones preivously (located at [facility]).

## 2024-06-07 NOTE — TELEPHONE ENCOUNTER
Patient calling back about [facility] appt on 6/12 he would like to speak with someone - about possibly switching appt - due to taking sleep medication at night he is sometimes not able to drive early in the morning.    Callback # [phone]

## 2024-06-07 NOTE — TELEPHONE ENCOUNTER
Left message for patient regarding scheduled USGI appointment on 6/14/24 with Dr. Kaur. Provider will not be in the office.     Appointment is rescheduled to Wednesday, 6/12/24 at 8:45 am. Informed that there are no other availability that same week. Advised to call office back if the new date and time does not work for him.

## 2024-06-11 ENCOUNTER — OFFICE VISIT (OUTPATIENT)
Dept: INTERNAL MEDICINE CLINIC | Facility: CLINIC | Age: 54
End: 2024-06-11

## 2024-06-11 VITALS
DIASTOLIC BLOOD PRESSURE: 90 MMHG | HEIGHT: 67 IN | TEMPERATURE: 98.1 F | WEIGHT: 216 LBS | BODY MASS INDEX: 33.9 KG/M2 | SYSTOLIC BLOOD PRESSURE: 131 MMHG | HEART RATE: 82 BPM

## 2024-06-11 DIAGNOSIS — R25.1 TREMOR: ICD-10-CM

## 2024-06-11 DIAGNOSIS — G47.33 OSA (OBSTRUCTIVE SLEEP APNEA): ICD-10-CM

## 2024-06-11 DIAGNOSIS — E66.9 CLASS 2 OBESITY WITH BODY MASS INDEX (BMI) OF 35.0 TO 35.9 IN ADULT, UNSPECIFIED OBESITY TYPE, UNSPECIFIED WHETHER SERIOUS COMORBIDITY PRESENT: Primary | ICD-10-CM

## 2024-06-11 PROCEDURE — 99214 OFFICE O/P EST MOD 30 MIN: CPT | Performed by: INTERNAL MEDICINE

## 2024-06-11 RX ORDER — PROPRANOLOL HCL 60 MG
60 CAPSULE, EXTENDED RELEASE 24HR ORAL DAILY
Qty: 30 CAPSULE | Refills: 2 | Status: SHIPPED | OUTPATIENT
Start: 2024-06-11 | End: 2024-09-09

## 2024-06-11 RX ORDER — BUPROPION HYDROCHLORIDE 150 MG/1
150 TABLET ORAL DAILY
Qty: 30 TABLET | Refills: 2 | Status: SHIPPED | OUTPATIENT
Start: 2024-06-11 | End: 2024-09-09

## 2024-06-11 NOTE — PATIENT INSTRUCTIONS
I have started a new medication, Wellbutrin. Please take 1 tablet (150 mg) daily.  I have started a new medication, Propranolol. Please take 1 tablet (60 mg) daily.  I have ordered a sleep study. Please have it done as able.  Please schedule for follow up in 3 months.

## 2024-06-11 NOTE — PROGRESS NOTES
"[facility]  INTERNAL MEDICINE OFFICE VISIT     PATIENT INFORMATION     Hussain Hooper   53 y.o. male   MRN: [id number]    ASSESSMENT/PLAN     Diagnoses and all orders for this visit:    Class 2 obesity with body mass index (BMI) of 35.0 to 35.9 in adult, unspecified obesity type, unspecified whether serious comorbidity present  -     buPROPion (Wellbutrin XL) 150 mg 24 hr tablet; Take 1 tablet (150 mg total) by mouth daily    Tremor  -     propranolol (INDERAL LA) 60 mg 24 hr capsule; Take 1 capsule (60 mg total) by mouth daily    BOY (obstructive sleep apnea)  -     Ambulatory Referral to Sleep Medicine; Future      Obesity  -Patient presents with concern for weight and wants an appetite supressant.  -Patient underwent bariatric surgery in 2023.  -Patient is 216 lbs at this visit.  -Initially 253 pounds prior to bariatric surgery.  -Patient notes that body image is very important to him and wants to get down to \"where he was prior.\"  -Patient currently on testosterone, follows with endocrine.   -Follows with bariatrics.  -Has follow up scheduled with weight management in August.    Plan:  -Discussed healthy diet and exercise habits with patient as well as need to follow up with weight management, as well as the side effects of various medication options, however patient is persistent that he would like to try a medication for weight loss.  -Will trial Wellbutrin 150 mg daily.  -Encourage regular exercise.  -Follow up with bariatrics and weight management.  -Follow up in 3 months - patient is not interested in an earlier follow up.    Tremor  -Patient reports history of tremor with fine movements.    Plan:  -Will trial Propranolol 60 mg daily for improvement.  -Follow up in 3 months.    BOY  -Patient has a reported history of BOY but does not use CPAP.  -Notes snoring.    Plan:  -Will order home study for evaluate for BOY and possible CPAP.    Schedule a follow-up appointment in 3 " "months.    HEALTH MAINTENANCE     There is no immunization history on file for this patient.  CHIEF COMPLAINT     Chief Complaint   Patient presents with    Follow-up     Discuss meds- curve appetitie- weight loss surgery-halloween- hands shakey      HISTORY OF PRESENT ILLNESS     Patient is a 54 y/o male with PMH including BOY, GERD, CKD 3A, anxiety who presents today with concern about his weight. Patient is interested in an appetite suppressant. He underwent bariatric surgery last fall. He feels like he has not lost a sufficient amount of weight. Says that he snacks \"when bored.\" Notes that he snacks a lot at night. Discussed good dietary habits and self control. Patient exercises 3 times weekly for ~35 minutes. Patient follows with bariatrics. Patient has an appointment with weight management scheduled for August. 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 Patient also notes a tremor that has been present for some time. Worse with precise moments of his hand.     REVIEW OF SYSTEMS     Review of Systems   Constitutional:  Negative for chills, fatigue and fever.   HENT:  Negative for hearing loss and sore throat.    Eyes:  Negative for visual disturbance.   Respiratory:  Negative for shortness of breath.    Cardiovascular:  Negative for chest pain, palpitations and leg swelling.   Gastrointestinal:  Negative for abdominal distention, abdominal pain, constipation, diarrhea, nausea and vomiting.   Endocrine: Negative for polyuria.   Genitourinary:  Negative for hematuria.   Musculoskeletal:  Negative for arthralgias and back pain.   Skin:  Negative for rash and wound.   Neurological:  Negative for light-headedness and headaches.   Psychiatric/Behavioral:  The patient is not nervous/anxious.      OBJECTIVE     Vitals:    06/11/24 1447   BP: 131/90   BP Location: Right arm   Patient Position: Sitting   Cuff Size: Large   Pulse: 82 " "  Temp: 98.1 °F (36.7 °C)   TempSrc: Temporal   Weight: 98 kg (216 lb)   Height: 5' 7\" (1.702 m)     Physical Exam  Vitals reviewed.   Constitutional:       Appearance: Normal appearance.   HENT:      Head: Normocephalic and atraumatic.      Right Ear: External ear normal.      Left Ear: External ear normal.      Nose: Nose normal.      Mouth/Throat:      Mouth: Mucous membranes are moist.   Eyes:      Pupils: Pupils are equal, round, and reactive to light.   Cardiovascular:      Rate and Rhythm: Normal rate and regular rhythm.      Pulses: Normal pulses.      Heart sounds: Normal heart sounds.   Pulmonary:      Effort: Pulmonary effort is normal.      Breath sounds: Normal breath sounds.   Abdominal:      General: Abdomen is flat. Bowel sounds are normal.      Palpations: Abdomen is soft.   Musculoskeletal:      Cervical back: No rigidity or tenderness.      Right lower leg: No edema.      Left lower leg: No edema.   Skin:     General: Skin is warm.      Capillary Refill: Capillary refill takes less than 2 seconds.      Comments: Tremor provoked with intentional hand movements, minimal resting tremor.   Neurological:      General: No focal deficit present.      Mental Status: He is alert and oriented to person, place, and time.       CURRENT MEDICATIONS     Current Outpatient Medications:     buPROPion (Wellbutrin XL) 150 mg 24 hr tablet, Take 1 tablet (150 mg total) by mouth daily, Disp: 30 tablet, Rfl: 2    propranolol (INDERAL LA) 60 mg 24 hr capsule, Take 1 capsule (60 mg total) by mouth daily, Disp: 30 capsule, Rfl: 2    albuterol (Ventolin HFA) 90 mcg/act inhaler, Inhale 2 puffs every 6 (six) hours as needed for wheezing, Disp: 8 g, Rfl: 3    ALPRAZolam (XANAX) 1 mg tablet, Take 1 tablet (1 mg total) by mouth 2 (two) times a day as needed for anxiety, Disp: 60 tablet, Rfl: 0    B-D 3CC LUER-ANTONELLA SYR 25GX1\" 25G X 1\" 3 ML MISC, , Disp: , Rfl:     baclofen 10 mg tablet, Take 1 tablet (10 mg total) by mouth 2 " (two) times a day as needed for muscle spasms (Diaphragmatic spasm), Disp: 30 tablet, Rfl: 0    Cholecalciferol (VITAMIN D3) 1,000 units tablet, Take 1 tablet (1,000 Units total) by mouth daily, Disp: 90 tablet, Rfl: 0    cyanocobalamin (VITAMIN B-12) 2000 MCG tablet, Take 1 tablet (2,000 mcg total) by mouth daily (Patient not taking: Reported on 5/9/2024), Disp: 90 tablet, Rfl: 3    Diclofenac Epolamine 1.3 % PTCH, Apply 1 Application topically every 12 (twelve) hours as needed (apply to right shoulder for pain), Disp: 30 patch, Rfl: 0    Diclofenac-Menthol-Lidocaine 1.2-5-4 % PTCH, Apply 1 patch topically in the morning, Disp: 15 patch, Rfl: 0    fluticasone (Flovent HFA) 110 MCG/ACT inhaler, Inhale 2 puffs 2 (two) times a day Rinse mouth after use., Disp: 36 g, Rfl: 3    magnesium aspartate (MAGINEX) 615 MG tablet, Take 1 tablet (615 mg total) by mouth 2 (two) times a day, Disp: 60 tablet, Rfl: 0    SUMAtriptan (Imitrex) 50 mg tablet, Take 1 tablet (50 mg total) by mouth once as needed for migraine for up to 18 doses, Disp: 9 tablet, Rfl: 0    tamsulosin (FLOMAX) 0.4 mg, Take 1 capsule (0.4 mg total) by mouth daily with dinner, Disp: 10 capsule, Rfl: 0    testosterone (ANDROGEL) 1.62 % TD gel pump, Apply 1 actuation (20.25 mg total) topically daily, Disp: 75 g, Rfl: 0    triamcinolone (KENALOG) 0.5 % cream, Apply topically 2 (two) times a day, Disp: 15 g, Rfl: 0    zolpidem (AMBIEN) 10 mg tablet, Take 1 tablet (10 mg total) by mouth daily at bedtime as needed for sleep, Disp: 60 tablet, Rfl: 0    PAST MEDICAL & SURGICAL HISTORY     Past Medical History:   Diagnosis Date    Anesthesia complication     Pt states he stopped breathing during EGD and Colonoscopy    Anxiety     Arthritis     Asthma     Benign essential HTN     last assessed 05/26/2017    COVID 2021    Degenerative joint disease     Depression 2000    No    Emphysema of lung (HCC)     GERD (gastroesophageal reflux disease)     Hiccoughs     HTN  (hypertension)     Hypertension     Kidney stone     Migraine with aura and without status migrainosus, not intractable 02/01/2024    Shortness of breath     MOORE    Skin cancer     Sleep apnea     no CPAP use    Spinal stenosis      Past Surgical History:   Procedure Laterality Date    COLONOSCOPY      ESOPHAGOGASTRODUODENOSCOPY N/A 03/24/2016    Procedure: ESOPHAGOGASTRODUODENOSCOPY (EGD);  Surgeon: Evin Munoz MD;  Location: [facility];  Service:     FL RETROGRADE PYELOGRAM  5/25/2023    JOINT REPLACEMENT      right hip sx    MO CYSTO/URETERO W/LITHOTRIPSY &INDWELL STENT INSRT Left 5/25/2023    Procedure: CYSTOSCOPY URETEROSCOPY WITH LITHOTRIPSY HOLMIUM LASER, RETROGRADE PYELOGRAM AND INSERTION STENT URETERAL;  Surgeon: Micah Guillen MD;  Location: [facility];  Service: Urology    MO LAPS GSTRC RSTRICTIV PX LONGITUDINAL GASTRECTOMY N/A 10/31/2023    Procedure: GASTRECTOMY  SLEEVE W/ ROBOT&  INTRAOPERATIVE EGD;  Surgeon: Arthur Villar MD;  Location: [facility];  Service: Bariatrics    MO REMOVE INT DWELL URETERAL STENT TRANSURETHRAL Left 9/7/2023    Procedure: CYSTOSCOPY,REMOVAL STENT URETERAL;  Surgeon: Ryan Garcia MD;  Location: [facility];  Service: Urology     SOCIAL & FAMILY HISTORY     Social History     Socioeconomic History    Marital status: /Civil Union     Spouse name: Not on file    Number of children: Not on file    Years of education: Not on file    Highest education level: Not on file   Occupational History    Not on file   Tobacco Use    Smoking status: Never    Smokeless tobacco: Never    Tobacco comments:     Never   Vaping Use    Vaping status: Some Days    Substances: THC   Substance and Sexual Activity    Alcohol use: Yes     Alcohol/week: 2.0 standard drinks of alcohol     Types: 2 Standard drinks or equivalent per week     Comment: One    Drug use: Yes     Types: Marijuana     Comment: medicinal card obtained; last used a couple days ago    Sexual activity: Not Currently      Partners: Female     Birth control/protection: None     Comment: None   Other Topics Concern    Not on file   Social History Narrative    Not on file     Social Determinants of Health     Financial Resource Strain: Low Risk  (2/1/2024)    Overall Financial Resource Strain (CARDIA)     Difficulty of Paying Living Expenses: Not hard at all   Food Insecurity: Patient Declined (6/10/2024)    Hunger Vital Sign     Worried About Running Out of Food in the Last Year: Patient declined     Ran Out of Food in the Last Year: Patient declined   Transportation Needs: No Transportation Needs (2/1/2024)    PRAPARE - Transportation     Lack of Transportation (Medical): No     Lack of Transportation (Non-Medical): No   Physical Activity: Not on file   Stress: Not on file   Social Connections: Not on file   Intimate Partner Violence: Not on file   Housing Stability: Patient Declined (6/10/2024)    Housing Stability Vital Sign     Unable to Pay for Housing in the Last Year: Patient declined     Number of Times Moved in the Last Year: Not on file     Homeless in the Last Year: Patient declined     Social History     Substance and Sexual Activity   Alcohol Use Yes    Alcohol/week: 2.0 standard drinks of alcohol    Types: 2 Standard drinks or equivalent per week    Comment: One       Social History     Substance and Sexual Activity   Drug Use Yes    Types: Marijuana    Comment: medicinal card obtained; last used a couple days ago     Social History     Tobacco Use   Smoking Status Never   Smokeless Tobacco Never   Tobacco Comments    Never     Family History   Problem Relation Age of Onset    Cancer Mother     Psoriasis Mother         No    Cancer Father         No    Crohn's disease Son         No             ==  Kedar Mendoza MD PGY2  [facility]    [address], [address]  [place] [address]  Office: [phone]  Fax: [phone]

## 2024-06-12 DIAGNOSIS — G47.33 OSA (OBSTRUCTIVE SLEEP APNEA): Primary | ICD-10-CM

## 2024-06-19 DIAGNOSIS — F41.9 ANXIETY: ICD-10-CM

## 2024-06-20 DIAGNOSIS — G47.09 OTHER INSOMNIA: ICD-10-CM

## 2024-06-20 RX ORDER — ALPRAZOLAM 1 MG/1
1 TABLET ORAL 2 TIMES DAILY PRN
Qty: 60 TABLET | Refills: 0 | Status: SHIPPED | OUTPATIENT
Start: 2024-06-20 | End: 2024-07-20

## 2024-06-20 RX ORDER — ZOLPIDEM TARTRATE 10 MG/1
10 TABLET ORAL
Qty: 60 TABLET | Refills: 0 | Status: SHIPPED | OUTPATIENT
Start: 2024-06-20

## 2024-06-20 NOTE — TELEPHONE ENCOUNTER
Received call from patient stating that his Ambien needs a prior authorization.     Spoke with pharmacist at [facility], [phone]. Patient's Ambien doesn't need prior authorization, but needs a refill.     PDMP reviewed. Last filled 3/7/24. Due for refill 5/6/24.     Please review script for refill.

## 2024-07-12 ENCOUNTER — PROCEDURE VISIT (OUTPATIENT)
Dept: OBGYN CLINIC | Facility: OTHER | Age: 54
End: 2024-07-12
Payer: COMMERCIAL

## 2024-07-12 VITALS
HEIGHT: 67 IN | HEART RATE: 80 BPM | BODY MASS INDEX: 34.25 KG/M2 | SYSTOLIC BLOOD PRESSURE: 144 MMHG | WEIGHT: 218.2 LBS | DIASTOLIC BLOOD PRESSURE: 95 MMHG

## 2024-07-12 DIAGNOSIS — M19.011 GLENOHUMERAL ARTHRITIS, RIGHT: ICD-10-CM

## 2024-07-12 DIAGNOSIS — G89.29 CHRONIC RIGHT SHOULDER PAIN: Primary | ICD-10-CM

## 2024-07-12 DIAGNOSIS — M75.101 ROTATOR CUFF SYNDROME OF RIGHT SHOULDER: ICD-10-CM

## 2024-07-12 DIAGNOSIS — M25.511 CHRONIC RIGHT SHOULDER PAIN: Primary | ICD-10-CM

## 2024-07-12 PROCEDURE — 20611 DRAIN/INJ JOINT/BURSA W/US: CPT | Performed by: ORTHOPAEDIC SURGERY

## 2024-07-12 RX ORDER — TRIAMCINOLONE ACETONIDE 40 MG/ML
40 INJECTION, SUSPENSION INTRA-ARTICULAR; INTRAMUSCULAR
Status: COMPLETED | OUTPATIENT
Start: 2024-07-12 | End: 2024-07-12

## 2024-07-12 RX ORDER — ROPIVACAINE HYDROCHLORIDE 5 MG/ML
4 INJECTION, SOLUTION EPIDURAL; INFILTRATION; PERINEURAL
Status: COMPLETED | OUTPATIENT
Start: 2024-07-12 | End: 2024-07-12

## 2024-07-12 RX ORDER — TRIAMCINOLONE ACETONIDE 5 MG/G
OINTMENT TOPICAL
COMMUNITY
Start: 2024-06-19

## 2024-07-12 RX ORDER — OMEPRAZOLE 20 MG/1
CAPSULE, DELAYED RELEASE ORAL
COMMUNITY
Start: 2024-06-21

## 2024-07-12 RX ADMIN — TRIAMCINOLONE ACETONIDE 40 MG: 40 INJECTION, SUSPENSION INTRA-ARTICULAR; INTRAMUSCULAR at 11:00

## 2024-07-12 RX ADMIN — ROPIVACAINE HYDROCHLORIDE 4 ML: 5 INJECTION, SOLUTION EPIDURAL; INFILTRATION; PERINEURAL at 11:00

## 2024-07-12 NOTE — PROGRESS NOTES
Large joint arthrocentesis: R glenohumeral  [facility] Protocol:  Consent: Verbal consent obtained.  Risks and benefits: risks, benefits and alternatives were discussed  Consent given by: patient  Patient understanding: patient states understanding of the procedure being performed  Site marked: the operative site was marked  Radiology Images displayed and confirmed. If images not available, report reviewed: imaging studies available  Required items: required blood products, implants, devices, and special equipment available  Patient identity confirmed: verbally with patient  Supporting Documentation  Indications: pain   Procedure Details  Location: shoulder - R glenohumeral  Needle size: 20 G (3-1/2 inch spinal needle)  Ultrasound guidance: yes (Curvilinear ultrasound transducer used with sterile ultrasound gel using a sterile technique and an inline approach)  Approach: posterolateral  Medications administered: 40 mg triamcinolone acetonide 40 mg/mL; 4 mL ropivacaine 0.5 %    Patient tolerance: patient tolerated the procedure well with no immediate complications  Dressing:  Sterile dressing applied      Large joint arthrocentesis: R subacromial bursa  [facility] Protocol:  Consent: Verbal consent obtained.  Risks and benefits: risks, benefits and alternatives were discussed  Consent given by: patient  Patient understanding: patient states understanding of the procedure being performed  Site marked: the operative site was marked  Radiology Images displayed and confirmed. If images not available, report reviewed: imaging studies available  Required items: required blood products, implants, devices, and special equipment available  Patient identity confirmed: verbally with patient  Supporting Documentation  Indications: pain   Procedure Details  Location: shoulder - R subacromial bursa  Preparation: Patient was prepped and draped in the usual sterile fashion  Needle size: 22 G (3-1/2 inch spinal needle)  Ultrasound  guidance: yes (Linear ultrasound transducer used with sterile ultrasound gel using a sterile technique and inline approach)  Approach: lateral  Medications administered: 40 mg triamcinolone acetonide 40 mg/mL; 4 mL ropivacaine 0.5 %    Patient tolerance: patient tolerated the procedure well with no immediate complications  Dressing:  Sterile dressing applied

## 2024-07-18 ENCOUNTER — VBI (OUTPATIENT)
Dept: ADMINISTRATIVE | Facility: OTHER | Age: 54
End: 2024-07-18

## 2024-07-29 ENCOUNTER — APPOINTMENT (OUTPATIENT)
Dept: LAB | Facility: CLINIC | Age: 54
End: 2024-07-29
Payer: COMMERCIAL

## 2024-07-29 ENCOUNTER — TELEPHONE (OUTPATIENT)
Age: 54
End: 2024-07-29

## 2024-07-29 ENCOUNTER — TRANSCRIBE ORDERS (OUTPATIENT)
Dept: LAB | Facility: CLINIC | Age: 54
End: 2024-07-29

## 2024-07-29 DIAGNOSIS — E67.0 HIGH VITAMIN A LEVEL: ICD-10-CM

## 2024-07-29 DIAGNOSIS — Z98.84 BARIATRIC SURGERY STATUS: ICD-10-CM

## 2024-07-29 DIAGNOSIS — E53.8 VITAMIN B12 DEFICIENCY: ICD-10-CM

## 2024-07-29 DIAGNOSIS — E29.1 SECONDARY MALE HYPOGONADISM: Primary | ICD-10-CM

## 2024-07-29 DIAGNOSIS — N18.31 STAGE 3A CHRONIC KIDNEY DISEASE (HCC): ICD-10-CM

## 2024-07-29 DIAGNOSIS — N20.0 NEPHROLITHIASIS: ICD-10-CM

## 2024-07-29 DIAGNOSIS — E29.1 SECONDARY MALE HYPOGONADISM: ICD-10-CM

## 2024-07-29 DIAGNOSIS — R35.0 URINARY FREQUENCY: ICD-10-CM

## 2024-07-29 LAB
ALBUMIN SERPL BCG-MCNC: 3.8 G/DL (ref 3.5–5)
ANION GAP SERPL CALCULATED.3IONS-SCNC: 9 MMOL/L (ref 4–13)
BACTERIA UR QL AUTO: ABNORMAL /HPF
BILIRUB UR QL STRIP: NEGATIVE
BUN SERPL-MCNC: 30 MG/DL (ref 5–25)
CALCIUM SERPL-MCNC: 9.1 MG/DL (ref 8.4–10.2)
CAOX CRY URNS QL MICRO: ABNORMAL /HPF
CHLORIDE SERPL-SCNC: 103 MMOL/L (ref 96–108)
CLARITY UR: CLEAR
CO2 SERPL-SCNC: 31 MMOL/L (ref 21–32)
COLOR UR: YELLOW
CREAT SERPL-MCNC: 1.35 MG/DL (ref 0.6–1.3)
CREAT UR-MCNC: 406.2 MG/DL
CREAT UR-MCNC: 406.2 MG/DL
GFR SERPL CREATININE-BSD FRML MDRD: 58 ML/MIN/1.73SQ M
GLUCOSE P FAST SERPL-MCNC: 86 MG/DL (ref 65–99)
GLUCOSE UR STRIP-MCNC: NEGATIVE MG/DL
HGB UR QL STRIP.AUTO: ABNORMAL
KETONES UR STRIP-MCNC: ABNORMAL MG/DL
LEUKOCYTE ESTERASE UR QL STRIP: NEGATIVE
MICROALBUMIN UR-MCNC: 57.7 MG/L
MICROALBUMIN/CREAT 24H UR: 14 MG/G CREATININE (ref 0–30)
MUCOUS THREADS UR QL AUTO: ABNORMAL
NITRITE UR QL STRIP: NEGATIVE
NON-SQ EPI CELLS URNS QL MICRO: ABNORMAL /HPF
PH UR STRIP.AUTO: 6 [PH]
PHOSPHATE SERPL-MCNC: 4 MG/DL (ref 2.7–4.5)
POTASSIUM SERPL-SCNC: 3.9 MMOL/L (ref 3.5–5.3)
PROT UR STRIP-MCNC: ABNORMAL MG/DL
PROT UR-MCNC: 25 MG/DL
PROT/CREAT UR: 0.06 MG/G{CREAT} (ref 0–0.1)
RBC #/AREA URNS AUTO: ABNORMAL /HPF
SODIUM SERPL-SCNC: 143 MMOL/L (ref 135–147)
SP GR UR STRIP.AUTO: 1.03 (ref 1–1.03)
UROBILINOGEN UR STRIP-ACNC: 3 MG/DL
VIT B12 SERPL-MCNC: 156 PG/ML (ref 180–914)
WBC #/AREA URNS AUTO: ABNORMAL /HPF

## 2024-07-29 PROCEDURE — 83918 ORGANIC ACIDS TOTAL QUANT: CPT

## 2024-07-29 PROCEDURE — 36415 COLL VENOUS BLD VENIPUNCTURE: CPT

## 2024-07-29 PROCEDURE — 82607 VITAMIN B-12: CPT

## 2024-07-29 PROCEDURE — 84590 ASSAY OF VITAMIN A: CPT

## 2024-07-29 PROCEDURE — 82570 ASSAY OF URINE CREATININE: CPT

## 2024-07-29 PROCEDURE — 84403 ASSAY OF TOTAL TESTOSTERONE: CPT | Performed by: INTERNAL MEDICINE

## 2024-07-29 PROCEDURE — 81001 URINALYSIS AUTO W/SCOPE: CPT

## 2024-07-29 PROCEDURE — 83883 ASSAY NEPHELOMETRY NOT SPEC: CPT

## 2024-07-29 PROCEDURE — 84156 ASSAY OF PROTEIN URINE: CPT

## 2024-07-29 PROCEDURE — 82043 UR ALBUMIN QUANTITATIVE: CPT

## 2024-07-29 PROCEDURE — 80069 RENAL FUNCTION PANEL: CPT

## 2024-07-29 PROCEDURE — 84402 ASSAY OF FREE TESTOSTERONE: CPT

## 2024-07-29 NOTE — TELEPHONE ENCOUNTER
LVM to discuss that we would need to schedule a consult to evaluate. We would take an appropriate approach to treatment, and this would be discussed at time of visit.   
Patient called back. Relayed message. Patient does not want to schedule at this time.   
Patient called with questions about his potential treatment.    He is not one of our patients and previously was seen by what he describes as an old school provider. He wants to have testosterone therapy, notes he has been on it for years and cannot function properly without it. He wants to know if we have a more aggressive approach or what we do.    I tried to explain to the patient that he is not our patient and we cannot answer questions about his medical care. He insist on speaking with someone so he does not waste his time.     I attempted to reach office staff endo clinical, unsuccessful.     Patient is requesting a call back  
Patient baseline mental status

## 2024-07-30 DIAGNOSIS — N18.2 STAGE 2 CHRONIC KIDNEY DISEASE: Primary | ICD-10-CM

## 2024-07-31 ENCOUNTER — TELEPHONE (OUTPATIENT)
Dept: NEPHROLOGY | Facility: CLINIC | Age: 54
End: 2024-07-31

## 2024-07-31 DIAGNOSIS — N18.31 STAGE 3A CHRONIC KIDNEY DISEASE (HCC): Primary | ICD-10-CM

## 2024-07-31 NOTE — TELEPHONE ENCOUNTER
----- Message from Richard Dietz MD sent at 7/30/2024  7:17 PM EDT -----  Kidney function is looking stable at historical baseline.  There is no protein in the urine which is significantly improved from a year ago which is a very good sign.  No changes from nephrology perspective at this stage.  Continue oral hydration to at least 60 to 64 ounces on a daily basis.  Avoid NSAIDs such as ibuprofen or Aleve or naproxen or Advil or Motrin.  Repeat renal function panel in 2 months, order placed.

## 2024-07-31 NOTE — TELEPHONE ENCOUNTER
Called and spoke to the patient to review message above. Patient expressed understanding. Expressed concern for recent urinary symptoms. States he has been having urinary frequency and urgency. States it is easy to start urinating, but then he only urinates a small amount. Denies any flank pain or other urinary symptoms. Mentioned urgency worsens with cold feet. Please advise    Patient also questions presence of Ca Ox in his urine as well as the UA protein result of 50

## 2024-08-01 LAB — VIT A SERPL-MCNC: 25.6 UG/DL (ref 20.1–62)

## 2024-08-02 LAB
METHYLMALONATE SERPL-SCNC: 479 NMOL/L (ref 0–378)
RETINOL BIND PROT SERPL-MCNC: 7.1 MG/DL (ref 1.6–6.1)

## 2024-08-05 ENCOUNTER — TELEPHONE (OUTPATIENT)
Dept: OTHER | Facility: OTHER | Age: 54
End: 2024-08-05

## 2024-08-05 ENCOUNTER — TELEPHONE (OUTPATIENT)
Dept: BARIATRICS | Facility: CLINIC | Age: 54
End: 2024-08-05

## 2024-08-05 ENCOUNTER — TELEPHONE (OUTPATIENT)
Dept: INTERNAL MEDICINE CLINIC | Facility: CLINIC | Age: 54
End: 2024-08-05

## 2024-08-05 DIAGNOSIS — Z98.84 BARIATRIC SURGERY STATUS: Primary | ICD-10-CM

## 2024-08-05 DIAGNOSIS — E53.8 VITAMIN B12 DEFICIENCY: ICD-10-CM

## 2024-08-05 NOTE — TELEPHONE ENCOUNTER
----- Message from DORIAN Meeks sent at 8/5/2024  9:13 AM EDT -----  Please call pt to let him know his B12 level is very low. It is recommended to get B12 injections. I would recommend him coming into our office once a month for this or he can also call his PCP to see if they office him B12 injections as well if it is closer for him. If it very important for him to start on this.     - Please start on his bariatric multivitamins if he isn't on this.     Vitamin A has normalized.     Repeat B12 levels in 3 months.

## 2024-08-05 NOTE — TELEPHONE ENCOUNTER
Patient is calling looking for the results about the Testosterone, I dont see it in his chart but patient states when he went to get the blood work done that he told the phleb that he needed that. Is it in his labwork under a different name ? Or can we add it to his chart ?

## 2024-08-07 LAB — MISCELLANEOUS LAB TEST RESULT: NORMAL

## 2024-08-09 ENCOUNTER — TELEPHONE (OUTPATIENT)
Dept: BARIATRICS | Facility: CLINIC | Age: 54
End: 2024-08-09

## 2024-08-09 ENCOUNTER — TELEPHONE (OUTPATIENT)
Age: 54
End: 2024-08-09

## 2024-08-09 DIAGNOSIS — R79.89 ELEVATED SERUM CREATININE: Primary | ICD-10-CM

## 2024-08-09 NOTE — TELEPHONE ENCOUNTER
Spoke to patient about making his appt with Dr Payam Villar on 8/9 a virtual. Patient stated that wasn't sure of why he was being seen. Informed patient about checking on weight gain. Also let patient know that he had a [facility] consult appt on 8/20 with Jeffy Hidalgo and at that appt they will can discus medication for weight loss. Patient did not want the virtual then

## 2024-08-09 NOTE — TELEPHONE ENCOUNTER
Patient wants to change his appt today to a virtual due to his ambien not being worn off yet. Unable to reach office to check. Please return call to pt to let him know after checking with Dr.El Villar

## 2024-08-09 NOTE — TELEPHONE ENCOUNTER
Attempted to call pt again today. Unable to contact. He needs to restart B12 supplements that he was not taking prior. Will also reorder BMP lab if he is able to complete. Testosterone lab on 7/29 is low. Please make an office appointment since there is none schedule as well to discuss all of this if he picks ups.

## 2024-08-12 DIAGNOSIS — Z98.84 BARIATRIC SURGERY STATUS: Primary | ICD-10-CM

## 2024-08-12 DIAGNOSIS — E53.8 VITAMIN B12 DEFICIENCY: ICD-10-CM

## 2024-08-12 RX ORDER — CYANOCOBALAMIN 1000 UG/ML
1000 INJECTION, SOLUTION INTRAMUSCULAR; SUBCUTANEOUS
Qty: 3 ML | Refills: 0 | Status: SHIPPED | OUTPATIENT
Start: 2024-08-12

## 2024-08-13 ENCOUNTER — TELEPHONE (OUTPATIENT)
Dept: BARIATRICS | Facility: CLINIC | Age: 54
End: 2024-08-13

## 2024-08-13 NOTE — TELEPHONE ENCOUNTER
Pt declining appt at this time. States he sees weight management for B12 and endo for testosterone.

## 2024-08-18 DIAGNOSIS — E83.39 HYPOPHOSPHATEMIA: ICD-10-CM

## 2024-08-18 DIAGNOSIS — K21.00 GASTROESOPHAGEAL REFLUX DISEASE WITH ESOPHAGITIS, UNSPECIFIED WHETHER HEMORRHAGE: Primary | ICD-10-CM

## 2024-08-18 DIAGNOSIS — G47.09 OTHER INSOMNIA: ICD-10-CM

## 2024-08-18 DIAGNOSIS — F41.9 ANXIETY: ICD-10-CM

## 2024-08-19 RX ORDER — ALPRAZOLAM 1 MG
1 TABLET ORAL 2 TIMES DAILY PRN
Qty: 60 TABLET | Refills: 0 | Status: SHIPPED | OUTPATIENT
Start: 2024-08-19 | End: 2024-09-18

## 2024-08-19 RX ORDER — ZOLPIDEM TARTRATE 10 MG/1
10 TABLET ORAL
Qty: 60 TABLET | Refills: 0 | Status: SHIPPED | OUTPATIENT
Start: 2024-08-19

## 2024-08-19 NOTE — TELEPHONE ENCOUNTER
Please review scripts for refill.    Xanax PDMP reviewed. Last filled on 6/20/24. Due for refill on 7/20/24.   Ambien PDMP reviewed. Last filled on 6/20/24. Due for refill on 8/19/24

## 2024-08-20 ENCOUNTER — TELEPHONE (OUTPATIENT)
Age: 54
End: 2024-08-20

## 2024-08-20 NOTE — TELEPHONE ENCOUNTER
Patient is requesting a virtual visit today with Tyra.  He took Ambien by mistake and does not want to drive.  Please call him and let him know if this is ok, his appt is at 1:00

## 2024-08-21 ENCOUNTER — OFFICE VISIT (OUTPATIENT)
Age: 54
End: 2024-08-21
Payer: COMMERCIAL

## 2024-08-21 VITALS
HEIGHT: 67 IN | WEIGHT: 217.4 LBS | SYSTOLIC BLOOD PRESSURE: 148 MMHG | HEART RATE: 89 BPM | TEMPERATURE: 98.1 F | DIASTOLIC BLOOD PRESSURE: 100 MMHG | BODY MASS INDEX: 34.12 KG/M2

## 2024-08-21 DIAGNOSIS — Z98.84 BARIATRIC SURGERY STATUS: ICD-10-CM

## 2024-08-21 DIAGNOSIS — G47.33 OSA (OBSTRUCTIVE SLEEP APNEA): ICD-10-CM

## 2024-08-21 DIAGNOSIS — E29.1 HYPOGONADISM IN MALE: ICD-10-CM

## 2024-08-21 DIAGNOSIS — E66.9 OBESITY, CLASS I, BMI 30-34.9: Primary | ICD-10-CM

## 2024-08-21 PROCEDURE — 99214 OFFICE O/P EST MOD 30 MIN: CPT | Performed by: PHYSICIAN ASSISTANT

## 2024-08-21 RX ORDER — TIRZEPATIDE 2.5 MG/.5ML
2.5 INJECTION, SOLUTION SUBCUTANEOUS WEEKLY
Qty: 2 ML | Refills: 0 | Status: SHIPPED | OUTPATIENT
Start: 2024-08-21 | End: 2024-09-18

## 2024-08-21 NOTE — PROGRESS NOTES
Assessment/Plan:  1. Obesity, Class I, BMI 30-34.9  tirzepatide (Zepbound) 2.5 mg/0.5 mL auto-injector      2. BOY (obstructive sleep apnea)        3. Bariatric surgery status        4. Hypogonadism in male             s/p Vertical Sleeve Gastrectomy with Dr. Payam Villar on 10/31/2023   Initial: 253 lbs  Asael: 213 lbs   Current: 217 lbs  Goal: 200 lbs    - Weight not at goal  - Patient is interested in Conservative Program  - Labs reviewed: As below.    General Recommendations:  Nutrition:  Eat breakfast daily.  Do not skip meals.     Food log (ie.) www."[employer].com, sparkpeople.com, loseit.com, calorieking.com, etc.    Practice mindful eating.  Be sure to set aside time to eat, eat slowly, and savor your food.    Hydration:    At least 64oz of water daily.  No sugar sweetened beverages.  No juice (eat the fruit instead).    Exercise:  Studies have shown that the ideal exercise goal is somewhere between 150 to 300 minutes of moderate intensity exercise a week.  Start with exercising 10 minutes every other day and gradually increase physical activity with a goal of at least 150 minutes of moderate intensity exercise a week, divided over at least 3 days a week.  An example of this would be exercising 30 minutes a day, 5 days a week.  Resistance training can increase muscle mass and increase our resting metabolic rate.   FULL BODY resistance training is recommended 2-3 times a week.  Do not do this on consecutive days to allow for muscle recovery.    Aim for a bare minimum 5000 steps, even on days you do not exercise.    Monitoring:   Weigh yourself daily.  If this causes undue stress, then just weigh yourself once a week.  Weigh yourself the same time of the day with the same amount of clothing on.  Preferably this should be done after waking up, before you eat, and with no clothing or minimal clothing on.    Specific Goals:  No sugary beverages. At least 64oz of water daily.  Gradually increase physical activity to  a goal of 5 days per week for 30 minutes of MODERATE intensity PLUS 2 days per week of FULL BODY resistance training  Goal protein intake of 60-80 grams per day    Calorie goal:  7806-4802 yaya/day    Return visit:     Calorie tracking/deficit  Physical activity goals reviewed  AOM  Not a good candidate for phentermine d/t xanax and insomnia  Not a good candidate for topamax d/t nephrolithiasis  Discussed contrave vs GLP1 RA  Will trial Zepbound  - Patient denies personal history of pancreatitis. Patient also denies personal and family history of thyroid cancer and multiple endocrine neoplasia type 2 (MEN 2 tumor).   Use and side effect profile reviewed  RTC: 3 month weight check, 6 month follow up      ZEPBOUND:  I have sent Zebound to your pharmacy. The prior authorization process will been done through our prior authorization team and can take up to 3-4 weeks to process through the insurance.     - Start Zepbound 2.5 mg subcutaneously weekly. After you have taken the second pen, please give me an update, as we will likely increase the dose the next month if you are tolerating it well.    - Side effects of Zepbound include nausea, vomiting, diarrhea, or constipation. Keep an eye on your heart rate while on Zepbound. If you resting heart rate is greater than 100 beats per minutes, please notify me. If you develop severe abdominal pain, stop Zepbound and go to the emergency room, as that could be a sign of pancreatitis.     - Please notify me if you have surgery, upper endoscopy, or colonoscopy scheduled, as we typically hold Zepbound for one week prior to the procedure.     - Zepbound can reduce the effectiveness of oral hormonal birth control (birth control pills). Recommend a barrier backup method such as condoms to prevent pregnancy.          Total time spent reviewing chart, interviewing patient, examining patient, discussing plan, answering all questions, and documentin min.        ______________________________________________________________________        Subjective:   Chief Complaint   Patient presents with    Consult     MWM CONSULT       HPI: Hussain Hooper  is a 53 y.o. male with history of migraines, BOY, GERD, hypogonadism, nephrolithiasis,  and excess weight, here to pursue weight loss management.  Previous notes and records have been reviewed.    TSH WNL  Total testosterone 73. On adrogel and following with endocrinology    s/p Vertical Sleeve Gastrectomy with Dr. Payam Villar on 10/31/2023   Initial: 253 lbs  Asael: 213 lbs   Current: 217 lbs  Goal: 200 lbs    Already prescribed wellbutrin 150mg for weight loss purposes - however did not start.     Panniculitis - plastic surgery referral placed    Hx of uric acid nephrolithiasis - avoid topamax    Limited physical activity:  S/p hip replacement  Spinal stenosis  Fractured vertebra  Herniated disc  Rotator cuff torn      HPI  Wt Readings from Last 20 Encounters:   08/21/24 98.6 kg (217 lb 6.4 oz)   07/12/24 99 kg (218 lb 3.2 oz)   06/11/24 98 kg (216 lb)   05/09/24 96.6 kg (213 lb)   03/08/24 101 kg (222 lb 10.6 oz)   02/12/24 101 kg (222 lb)   02/09/24 99.8 kg (220 lb)   02/01/24 102 kg (225 lb)   11/10/23 107 kg (235 lb 8 oz)   10/31/23 115 kg (253 lb 1.4 oz)   10/12/23 116 kg (255 lb 8 oz)   10/02/23 115 kg (253 lb)   09/12/23 113 kg (250 lb)   09/07/23 114 kg (251 lb 15.8 oz)   09/01/23 115 kg (253 lb 4.8 oz)   08/14/23 113 kg (249 lb 9.6 oz)   08/11/23 113 kg (249 lb)   07/27/23 112 kg (247 lb 3.2 oz)   06/29/23 113 kg (248 lb 12.8 oz)   06/21/23 112 kg (248 lb)         Past Medical History:   Diagnosis Date    Anesthesia complication     Pt states he stopped breathing during EGD and Colonoscopy    Anxiety     Arthritis     Asthma     Benign essential HTN     last assessed 05/26/2017    COVID 2021    Degenerative joint disease     Depression 2000    No    Emphysema of lung (HCC)     GERD (gastroesophageal reflux disease)      Hiccoughs     HTN (hypertension)     Hypertension     Kidney stone     Migraine with aura and without status migrainosus, not intractable 02/01/2024    Shortness of breath     MOORE    Skin cancer     Sleep apnea     no CPAP use    Spinal stenosis      Patient denies personal and family history of  pancreatitis, thyroid cancer, MEN-2 tumors.  Denies any hx of glaucoma, seizures,  + kidney stones, gallstones.  Denies Hx of CAD, PAD, palpitations, arrhythmia.   Denies uncontrolled anxiety or depression, suicidal behavior or thinking , insomnia or sleep disturbance.     Past Surgical History:   Procedure Laterality Date    COLONOSCOPY      ESOPHAGOGASTRODUODENOSCOPY N/A 03/24/2016    Procedure: ESOPHAGOGASTRODUODENOSCOPY (EGD);  Surgeon: Evin Munoz MD;  Location: [facility];  Service:     FL RETROGRADE PYELOGRAM  5/25/2023    JOINT REPLACEMENT      right hip sx    PA CYSTO/URETERO W/LITHOTRIPSY &INDWELL STENT INSRT Left 5/25/2023    Procedure: CYSTOSCOPY URETEROSCOPY WITH LITHOTRIPSY HOLMIUM LASER, RETROGRADE PYELOGRAM AND INSERTION STENT URETERAL;  Surgeon: Micah Guillen MD;  Location: [facility];  Service: Urology    PA LAPS GSTRC RSTRICTIV PX LONGITUDINAL GASTRECTOMY N/A 10/31/2023    Procedure: GASTRECTOMY  SLEEVE W/ ROBOT&  INTRAOPERATIVE EGD;  Surgeon: Arthur Villar MD;  [facility];  Service: Bariatrics    PA REMOVE INT DWELL URETERAL STENT TRANSURETHRAL Left 9/7/2023    Procedure: CYSTOSCOPY,REMOVAL STENT URETERAL;  Surgeon: Ryan Garcia MD;  [facility];  Service: Urology     The following portions of the patient's history were reviewed and updated as appropriate: allergies, current medications, past family history, past medical history, past social history, past surgical history, and problem list.    Review Of Systems:  Review of Systems   Constitutional:  Negative for fatigue and fever.   HENT:  Negative for sore throat, trouble swallowing and voice change.    Respiratory:   "Negative for shortness of breath.    Cardiovascular:  Negative for chest pain.   Gastrointestinal:  Negative for abdominal pain, constipation, diarrhea, nausea and vomiting.   Endocrine: Negative for cold intolerance and heat intolerance.   Genitourinary:  Negative for difficulty urinating.   Musculoskeletal:  Negative for arthralgias and back pain.   Psychiatric/Behavioral:  Negative for suicidal ideas. The patient is not nervous/anxious.    All other systems reviewed and are negative.      Objective:  /100   Pulse 89   Temp 98.1 °F (36.7 °C) (Tympanic)   Ht 5' 7\" (1.702 m)   Wt 98.6 kg (217 lb 6.4 oz)   BMI 34.05 kg/m²   Physical Exam  Vitals and nursing note reviewed.   Constitutional:       General: He is not in acute distress.     Appearance: Normal appearance. He is obese. He is not ill-appearing, toxic-appearing or diaphoretic.   HENT:      Head: Normocephalic and atraumatic.   Eyes:      General:         Right eye: No discharge.         Left eye: No discharge.      Conjunctiva/sclera: Conjunctivae normal.   Pulmonary:      Effort: Pulmonary effort is normal. No respiratory distress.   Musculoskeletal:         General: Normal range of motion.      Cervical back: Normal range of motion. No rigidity.      Right lower leg: No edema.      Left lower leg: No edema.   Skin:     Coloration: Skin is not pale.      Findings: No erythema or rash.   Neurological:      General: No focal deficit present.      Mental Status: He is alert.   Psychiatric:         Mood and Affect: Mood normal.         Labs and Imaging  Recent labs and imaging have been personally reviewed.  Lab Results   Component Value Date    WBC 6.41 03/25/2024    HGB 14.5 03/25/2024    HCT 42.1 03/25/2024    MCV 96 03/25/2024     03/25/2024     Lab Results   Component Value Date    SODIUM 143 07/29/2024    K 3.9 07/29/2024     07/29/2024    CO2 31 07/29/2024    AGAP 9 07/29/2024    BUN 30 (H) 07/29/2024    CREATININE 1.35 (H) " 07/29/2024    GLUC 95 11/01/2023    GLUF 86 07/29/2024    CALCIUM 9.1 07/29/2024    AST 14 03/25/2024    ALT 19 03/25/2024    ALKPHOS 65 03/25/2024    TP 6.2 (L) 03/25/2024    TBILI 0.60 03/25/2024    EGFR 58 07/29/2024     Lab Results   Component Value Date    HGBA1C 5.1 05/14/2018     Lab Results   Component Value Date    YLA9WZVWUCMX 2.159 03/25/2024     Lab Results   Component Value Date    CHOLESTEROL 231 (H) 07/20/2023     Lab Results   Component Value Date    HDL 52 07/20/2023     Lab Results   Component Value Date    TRIG 227 (H) 07/20/2023     Lab Results   Component Value Date    LDLCALC 134 (H) 07/20/2023

## 2024-08-21 NOTE — PATIENT INSTRUCTIONS
I have sent Zebound to your pharmacy. The prior authorization process will been done through our prior authorization team and can take up to 3-4 weeks to process through the insurance.     - Start Zepbound 2.5 mg subcutaneously weekly. After you have taken the second pen, please give me an update, as we will likely increase the dose the next month if you are tolerating it well.    - Side effects of Zepbound include nausea, vomiting, diarrhea, or constipation. Keep an eye on your heart rate while on Zepbound. If you resting heart rate is greater than 100 beats per minutes, please notify me. If you develop severe abdominal pain, stop Zepbound and go to the emergency room, as that could be a sign of pancreatitis.     - Please notify me if you have surgery, upper endoscopy, or colonoscopy scheduled, as we typically hold Zepbound for one week prior to the procedure.     - Zepbound can reduce the effectiveness of oral hormonal birth control (birth control pills). Recommend a barrier backup method such as condoms to prevent pregnancy.

## 2024-08-23 ENCOUNTER — TELEPHONE (OUTPATIENT)
Age: 54
End: 2024-08-23

## 2024-08-23 NOTE — TELEPHONE ENCOUNTER
[facility] got [facility] call from someone looking for the cover my meds key and was calling back to provide it. I advised the PA was already submitted

## 2024-08-23 NOTE — TELEPHONE ENCOUNTER
PA for zepbound 2.5mg SUBMITTED     Via phone call [facility] [phone]  or [phone] pharmacy provider services. Spoke to Tami HERNANDEZ @ [facility]     []TONY-KEY:   []Fan-Case ID #   []Faxed to plan        Other website   [x]Phone call Case ID # [id number]    Office notes sent, clinical questions answered. Awaiting determination    Turnaround time for your insurance to make a decision on your Prior Authorization can take 7-21 business days.       Primary is medicare no coverage for anti-obesity medication submitted to secondary awaiting status.

## 2024-09-19 ENCOUNTER — TELEPHONE (OUTPATIENT)
Age: 54
End: 2024-09-19

## 2024-09-19 DIAGNOSIS — F41.9 ANXIETY: ICD-10-CM

## 2024-09-19 NOTE — TELEPHONE ENCOUNTER
Pt called in to request refill for Zepbound 2.5 mg. Pt had last shot 09/17/24. Pt is doing good with current dose, no side effects.

## 2024-09-20 DIAGNOSIS — E66.9 OBESITY, CLASS I, BMI 30-34.9: Primary | ICD-10-CM

## 2024-09-20 RX ORDER — TIRZEPATIDE 5 MG/.5ML
5 INJECTION, SOLUTION SUBCUTANEOUS WEEKLY
Qty: 2 ML | Refills: 0 | Status: SHIPPED | OUTPATIENT
Start: 2024-09-20 | End: 2024-10-18

## 2024-09-20 RX ORDER — ALPRAZOLAM 1 MG
1 TABLET ORAL 2 TIMES DAILY PRN
Qty: 60 TABLET | Refills: 0 | Status: SHIPPED | OUTPATIENT
Start: 2024-09-20 | End: 2024-10-20

## 2024-10-03 DIAGNOSIS — E66.811 OBESITY, CLASS I, BMI 30-34.9: ICD-10-CM

## 2024-10-03 DIAGNOSIS — J45.20 MILD INTERMITTENT ASTHMA WITHOUT COMPLICATION: ICD-10-CM

## 2024-10-03 DIAGNOSIS — M25.551 RIGHT HIP PAIN: ICD-10-CM

## 2024-10-04 RX ORDER — ALBUTEROL SULFATE 90 UG/1
2 INHALANT RESPIRATORY (INHALATION) EVERY 6 HOURS PRN
Qty: 18 G | Refills: 5 | Status: SHIPPED | OUTPATIENT
Start: 2024-10-04 | End: 2025-10-04

## 2024-10-04 RX ORDER — TIRZEPATIDE 5 MG/.5ML
5 INJECTION, SOLUTION SUBCUTANEOUS WEEKLY
Qty: 2 ML | Refills: 0 | Status: SHIPPED | OUTPATIENT
Start: 2024-10-04 | End: 2024-11-01

## 2024-10-10 ENCOUNTER — TELEPHONE (OUTPATIENT)
Dept: INTERNAL MEDICINE CLINIC | Facility: CLINIC | Age: 54
End: 2024-10-10

## 2024-10-16 DIAGNOSIS — E66.811 OBESITY, CLASS I, BMI 30-34.9: ICD-10-CM

## 2024-10-17 RX ORDER — TIRZEPATIDE 5 MG/.5ML
5 INJECTION, SOLUTION SUBCUTANEOUS WEEKLY
Qty: 2 ML | Refills: 0 | OUTPATIENT
Start: 2024-10-17 | End: 2024-11-14

## 2024-10-17 NOTE — TELEPHONE ENCOUNTER
Medication Refill Request     Name Zepbound   Dose/Frequency previously on 5 mg  Quantity 2 ml   Verified pharmacy   [x]  Verified ordering Provider   [x]  Does patient have enough for the next 3 days? Yes [x] No []     Patient thinks the dosage needs to be increased.

## 2024-10-22 ENCOUNTER — TELEPHONE (OUTPATIENT)
Age: 54
End: 2024-10-22

## 2024-10-22 DIAGNOSIS — E66.811 OBESITY, CLASS I, BMI 30-34.9: Primary | ICD-10-CM

## 2024-10-22 RX ORDER — TIRZEPATIDE 7.5 MG/.5ML
7.5 INJECTION, SOLUTION SUBCUTANEOUS WEEKLY
Qty: 2 ML | Refills: 0 | Status: SHIPPED | OUTPATIENT
Start: 2024-10-22 | End: 2024-10-24

## 2024-10-22 NOTE — TELEPHONE ENCOUNTER
Hussain Hidalgo PA-C     tirzepatide (Zepbound) 5 mg/0.5 mL auto-injector     How are you tolerating the medication?   [] Nausea  [] Vomiting  [] Diarrhea  [] Asymptomatic  [x] Other:    Last visit weight:    Current weight:204    Date of last injection: Oct 17    How many injections do you have left: no    Would you like an increase in your dose?  [x] Yes   [] No   It is up to the Dr per patient.

## 2024-10-24 DIAGNOSIS — E66.811 OBESITY, CLASS I, BMI 30-34.9: ICD-10-CM

## 2024-10-24 RX ORDER — TIRZEPATIDE 7.5 MG/.5ML
7.5 INJECTION, SOLUTION SUBCUTANEOUS WEEKLY
Qty: 2 ML | Refills: 0 | Status: SHIPPED | OUTPATIENT
Start: 2024-10-24 | End: 2024-11-21

## 2024-11-05 NOTE — PROGRESS NOTES
Assessment/Plan:    Diagnoses and all orders for this visit:    Obesity, Class I, BMI 30-34.9    BOY (obstructive sleep apnea)    Bariatric surgery status    Gastroesophageal reflux disease with esophagitis, unspecified whether hemorrhage    Dyslipidemia    S/p vertical sleeve gastrectomy with Dr. Payam Villar on 10/31/23  Initial weight: 253 lbs  Asael: 213 lbs  Current :197 lbs  Goal: 190 lbs        - Weight not at goal  - Patient is interested in Conservative Program  - Labs reviewed: As below.    General Recommendations:  Nutrition:  Eat breakfast daily.  Do not skip meals.     Food log (ie.) www.[employer]fitnesspal.com, sparkpeople.com, loseit.com, calorieking.com, etc.    Practice mindful eating.  Be sure to set aside time to eat, eat slowly, and savor your food.    Hydration:    At least 64oz of water daily.  No sugar sweetened beverages.  No juice (eat the fruit instead).    Exercise:  Studies have shown that the ideal exercise goal is somewhere between 150 to 300 minutes of moderate intensity exercise a week.  Start with exercising 10 minutes every other day and gradually increase physical activity with a goal of at least 150 minutes of moderate intensity exercise a week, divided over at least 3 days a week.  An example of this would be exercising 30 minutes a day, 5 days a week.  Resistance training can increase muscle mass and increase our resting metabolic rate.   FULL BODY resistance training is recommended 2-3 times a week.  Do not do this on consecutive days to allow for muscle recovery.    Aim for a bare minimum 5000 steps, even on days you do not exercise.    Monitoring:   Weigh yourself daily.  If this causes undue stress, then just weigh yourself once a week.  Weigh yourself the same time of the day with the same amount of clothing on.  Preferably this should be done after waking up, before you eat, and with no clothing or minimal clothing on.    Specific Goals:  Food log (ie.)  www.[employer]fitnesspal.com,[employer].com,loseit.com,[employer].com,etc. , Goal protein intake of 60-80 grams per day, and 25-35 grams of dietary fiber per day  Increase physical activity by 10 minutes daily goal of 150 mins - 200 mins per week  Protein goal- 100 grams per day (30-35 grams per meal)    Calorie goal:  [id number]    Return visit:  3 months.   - Discussed protein goals today; 30 grams per meal. Handouts provided as well  - Continue physical activity- goal of 150-200 mins per week  - Discussed side effect management     No problem-specific Assessment & Plan notes found for this encounter.     Zepbound:  I have sent Zebound to your pharmacy. The prior authorization process will been done through our prior authorization team and can take up to 3-4 weeks to process through the insurance.     - Continue Zepbound 7.5 mg subcutaneously weekly. We will determine whether or not to stay at 7.5mg at our next visit or decrease to 5mg if you are still having some nausea, and decreased interest in eating.     - Side effects of Zepbound include nausea, vomiting, diarrhea, or constipation. Keep an eye on your heart rate while on Zepbound. If you resting heart rate is greater than 100 beats per minutes, please notify me. If you develop severe abdominal pain, stop Zepbound and go to the emergency room, as that could be a sign of pancreatitis.     - Please notify me if you have surgery, upper endoscopy, or colonoscopy scheduled, as we typically hold Zepbound for one week prior to the procedure.           Total time spent reviewing chart, interviewing patient, examining patient, discussing plan, answering all questions, and documentin min.       ______________________________________________________________________        Subjective:   No chief complaint on file.      HPI: Hussain Hooper  is a 53 y.o. male with history of bariatric surgery (10/2023) , sleep apnea, CKD stage 3a, hx of nephrolithiasis, GERD and excess weight,  here to pursue weight loss management.  Previous notes and records have been reviewed.    Not having cravings.  Had some nausea with 7.5mg dose usually first 2 days after injection.   Noticed decreased bowel movements/quantity; still having 1 BM daily.   Was a  in the past, eats more when he is hungry more grazing.       Exercises- resistance training 3x per week. 10 mins of cardio.  120 mins per week   Active working on cars,   Job is sedentary     Prior to bariatric surgery 253 lbs   Goal of 190 lbs   HPI  Wt Readings from Last 20 Encounters:   08/21/24 98.6 kg (217 lb 6.4 oz)   07/12/24 99 kg (218 lb 3.2 oz)   06/11/24 98 kg (216 lb)   05/09/24 96.6 kg (213 lb)   03/08/24 101 kg (222 lb 10.6 oz)   02/12/24 101 kg (222 lb)   02/09/24 99.8 kg (220 lb)   02/01/24 102 kg (225 lb)   11/10/23 107 kg (235 lb 8 oz)   10/31/23 115 kg (253 lb 1.4 oz)   10/12/23 116 kg (255 lb 8 oz)   10/02/23 115 kg (253 lb)   09/12/23 113 kg (250 lb)   09/07/23 114 kg (251 lb 15.8 oz)   09/01/23 115 kg (253 lb 4.8 oz)   08/14/23 113 kg (249 lb 9.6 oz)   08/11/23 113 kg (249 lb)   07/27/23 112 kg (247 lb 3.2 oz)   06/29/23 113 kg (248 lb 12.8 oz)   06/21/23 112 kg (248 lb)       Food logging:  B: pineapple 2 squares, strawberry, grapes  S: grazes when hungry (peanuts, nuts)  L: grazing, eats when he is hungry but skips a lot   S: peanuts  D: protein (fish, ground beef, chicken) with vegetable, wrap  S:    Hunger/Cravings: decreased cravings, hunger with zepbound  Dining out:  Hydration: water  Alcohol:  Smoking: no  Exercise: see above- 3 days per week 20 mins strength + 10 mins cardio        Plan:  Continue zepbound 7.5mg  Increase protein to 100 grams/day   Increase physical activity to 150mins per week   Follow up in 3 months   *We will discuss decreasing to 5mg dose if patient still having nausea.       Past Medical History:   Diagnosis Date    Anesthesia complication     Pt states he stopped breathing during EGD and  Colonoscopy    Anxiety     Arthritis     Asthma     Benign essential HTN     last assessed 05/26/2017    COVID 2021    Degenerative joint disease     Depression 2000    No    Emphysema of lung (HCC)     GERD (gastroesophageal reflux disease)     Hiccoughs     HTN (hypertension)     Hypertension     Kidney stone     Migraine with aura and without status migrainosus, not intractable 02/01/2024    Shortness of breath     MOORE    Skin cancer     Sleep apnea     no CPAP use    Spinal stenosis      Patient denies personal and family history of  pancreatitis, thyroid cancer, MEN-2 tumors.  Denies any hx of glaucoma, seizures, + kidney stones, gallstones.  Denies Hx of CAD, PAD, palpitations, arrhythmia.   Denies uncontrolled anxiety or depression, suicidal behavior or thinking , insomnia or sleep disturbance.   Past Surgical History:   Procedure Laterality Date    COLONOSCOPY      ESOPHAGOGASTRODUODENOSCOPY N/A 03/24/2016    Procedure: ESOPHAGOGASTRODUODENOSCOPY (EGD);  Surgeon: Evin Munoz MD;  Location: [facility];  Service:     FL RETROGRADE PYELOGRAM  5/25/2023    JOINT REPLACEMENT      right hip sx    [place] CYSTO/URETERO W/LITHOTRIPSY &INDWELL STENT INSRT Left 5/25/2023    Procedure: CYSTOSCOPY URETEROSCOPY WITH LITHOTRIPSY HOLMIUM LASER, RETROGRADE PYELOGRAM AND INSERTION STENT URETERAL;  Surgeon: Micah Guillen MD;  [facility];  Service: Urology    IL LAPS GSTRC RSTRICTIV PX LONGITUDINAL GASTRECTOMY N/A 10/31/2023    Procedure: GASTRECTOMY  SLEEVE W/ ROBOT&  INTRAOPERATIVE EGD;  Surgeon: Arthur Villar MD;  [facility];  Service: Bariatrics    IL REMOVE INT DWELL URETERAL STENT TRANSURETHRAL Left 9/7/2023    Procedure: CYSTOSCOPY,REMOVAL STENT URETERAL;  Surgeon: Ryan Garcia MD;  [facility];  Service: Urology     The following portions of the patient's history were reviewed and updated as appropriate: allergies, current medications, past family history, past medical history, past  social history, past surgical history, and problem list.    Review Of Systems:  Review of Systems    Objective:  There were no vitals taken for this visit.  Physical Exam    Labs and Imaging  Recent labs and imaging have been personally reviewed.  Lab Results   Component Value Date    WBC 6.41 03/25/2024    HGB 14.5 03/25/2024    HCT 42.1 03/25/2024    MCV 96 03/25/2024     03/25/2024     Lab Results   Component Value Date    SODIUM 143 07/29/2024    K 3.9 07/29/2024     07/29/2024    CO2 31 07/29/2024    AGAP 9 07/29/2024    BUN 30 (H) 07/29/2024    CREATININE 1.35 (H) 07/29/2024    GLUC 95 11/01/2023    GLUF 86 07/29/2024    CALCIUM 9.1 07/29/2024    AST 14 03/25/2024    ALT 19 03/25/2024    ALKPHOS 65 03/25/2024    TP 6.2 (L) 03/25/2024    TBILI 0.60 03/25/2024    EGFR 58 07/29/2024     Lab Results   Component Value Date    HGBA1C 5.1 05/14/2018     Lab Results   Component Value Date    FPC5ZLYDHBXF 2.159 03/25/2024     Lab Results   Component Value Date    CHOLESTEROL 231 (H) 07/20/2023     Lab Results   Component Value Date    HDL 52 07/20/2023     Lab Results   Component Value Date    TRIG 227 (H) 07/20/2023     Lab Results   Component Value Date    LDLCALC 134 (H) 07/20/2023

## 2024-11-09 DIAGNOSIS — L40.9 PSORIASIS: ICD-10-CM

## 2024-11-09 DIAGNOSIS — G47.09 OTHER INSOMNIA: ICD-10-CM

## 2024-11-09 DIAGNOSIS — J45.20 MILD INTERMITTENT ASTHMA WITHOUT COMPLICATION: ICD-10-CM

## 2024-11-11 ENCOUNTER — OFFICE VISIT (OUTPATIENT)
Age: 54
End: 2024-11-11
Payer: COMMERCIAL

## 2024-11-11 VITALS
TEMPERATURE: 98.1 F | DIASTOLIC BLOOD PRESSURE: 88 MMHG | SYSTOLIC BLOOD PRESSURE: 120 MMHG | BODY MASS INDEX: 31.01 KG/M2 | HEIGHT: 67 IN | HEART RATE: 97 BPM | WEIGHT: 197.6 LBS

## 2024-11-11 DIAGNOSIS — E66.811 OBESITY, CLASS I, BMI 30-34.9: Primary | ICD-10-CM

## 2024-11-11 DIAGNOSIS — Z98.84 BARIATRIC SURGERY STATUS: ICD-10-CM

## 2024-11-11 DIAGNOSIS — E78.5 DYSLIPIDEMIA: ICD-10-CM

## 2024-11-11 DIAGNOSIS — K21.00 GASTROESOPHAGEAL REFLUX DISEASE WITH ESOPHAGITIS, UNSPECIFIED WHETHER HEMORRHAGE: ICD-10-CM

## 2024-11-11 DIAGNOSIS — G47.33 OSA (OBSTRUCTIVE SLEEP APNEA): ICD-10-CM

## 2024-11-11 PROCEDURE — 99213 OFFICE O/P EST LOW 20 MIN: CPT | Performed by: PHYSICIAN ASSISTANT

## 2024-11-11 RX ORDER — ALBUTEROL SULFATE 90 UG/1
2 INHALANT RESPIRATORY (INHALATION) EVERY 6 HOURS PRN
Qty: 18 G | Refills: 0 | Status: SHIPPED | OUTPATIENT
Start: 2024-11-11 | End: 2025-11-11

## 2024-11-11 RX ORDER — TIRZEPATIDE 7.5 MG/.5ML
7.5 INJECTION, SOLUTION SUBCUTANEOUS WEEKLY
Qty: 2 ML | Refills: 2 | Status: SHIPPED | OUTPATIENT
Start: 2024-11-11 | End: 2024-11-19 | Stop reason: DRUGHIGH

## 2024-11-11 RX ORDER — TRIAMCINOLONE ACETONIDE 5 MG/G
CREAM TOPICAL 2 TIMES DAILY
Qty: 15 G | Refills: 0 | Status: SHIPPED | OUTPATIENT
Start: 2024-11-11

## 2024-11-11 NOTE — LETTER
November 5, 2024       No Recipients    Patient: Hussain Hooper   YOB: 1970   Date of Visit: 11/11/2024       Dear Dr. Gross Recipients:    Thank you for referring Hussain Hooper to me for evaluation. Below are my notes for this consultation.    If you have questions, please do not hesitate to call me. I look forward to following your patient along with you.         Sincerely,        Sofiya Ryan PA-C        CC:   No Recipients

## 2024-11-11 NOTE — PATIENT INSTRUCTIONS
GLP-1 Managing Side Effects Tips:  - focus on small frequent meals  - moderate sugar and fat intake  - change the injection site (abdomen --> thigh--> back of arm)  - eat protein, crackers, mints and loraine-based drinks  - nighttime injections  - drink plenty of water  - consider fiber supplements like miralax    Tips for Nausea:  - Don't drink and eat simultaneously: separate fluids 30-60 minutes before and after meals when experiencing nausea or if you notice you become full quickly  - Choose mild smelling foods- strong smells can exacerbate nausea  - Loraine and peppermint- consider drinking loraine or peppermint tea, which can help alleviate symptoms  - Eat- don't skip meals, if you have nausea, it is understandable that you may not feel like eating. However, skipping meals is generally not recommended as this can lead to lower blood sugar and fatigue. Furthermore, it is essential to consume adequate protein during weight loss. You can opt for a protein shake, a meal replacement supplement, bone broth or soup.     Tips for Constipation:   - Drink plenty of water  - Eat food with a high fiber content: increase your fiber intake by consuming these types of foods:   Eat more whole grain products like barley, oats, farro, brown or wild rice and quinoa.    Look for choices with 100% whole wheat, rye, oats or bran as the first ingredient listed    Check nutrition fact labels and choose products with 4gm of dietary fiber or more per serving   Add more beans to your diet   Eat more fresh fruits and vegetables with skins on them    Exercise- increase physical activity as it helps stimulate gastric mobility, which moves stool through the digestive tract     I have sent Zebound to your pharmacy.     - Continue Zepbound 7.5 mg subcutaneously weekly.Please let me know if you are having trouble with nausea or decreased interest in food before our next visit.   - Side effects of Zepbound include nausea, vomiting, diarrhea, or  constipation. Keep an eye on your heart rate while on Zepbound. If you resting heart rate is greater than 100 beats per minutes, please notify me. If you develop severe abdominal pain, stop Zepbound and go to the emergency room, as that could be a sign of pancreatitis.     - Please notify me if you have surgery, upper endoscopy, or colonoscopy scheduled, as we typically hold Zepbound for one week prior to the procedure.     - Discussed increasing protein to ~100 grams per day

## 2024-11-12 RX ORDER — ZOLPIDEM TARTRATE 10 MG/1
10 TABLET ORAL
Qty: 60 TABLET | Refills: 0 | Status: SHIPPED | OUTPATIENT
Start: 2024-11-12

## 2024-11-18 ENCOUNTER — TELEPHONE (OUTPATIENT)
Age: 54
End: 2024-11-18

## 2024-11-18 NOTE — TELEPHONE ENCOUNTER
Patient called in because his pharmacy has been out of the 7.5 mg Zepbound for a week.  He is asking if he can get a script for the 5mg Zepbound.     He said he is still having issues when eating and having discomfort after a few bites.  He said that they discussed getting a test done. He wanted to ask if Sofiya could order the test. He did say that if he has to go under with anesthesia, he doesn't wasn't to do it.     Please call the patient back in regard to the medication and test.

## 2024-11-19 DIAGNOSIS — E66.811 OBESITY, CLASS I, BMI 30-34.9: Primary | ICD-10-CM

## 2024-11-19 RX ORDER — TIRZEPATIDE 5 MG/.5ML
5 INJECTION, SOLUTION SUBCUTANEOUS WEEKLY
Qty: 2 ML | Refills: 3 | Status: SHIPPED | OUTPATIENT
Start: 2024-11-19 | End: 2025-03-11

## 2024-11-29 DIAGNOSIS — R06.6 INTRACTABLE HICCUPS: ICD-10-CM

## 2024-11-29 RX ORDER — BACLOFEN 10 MG/1
10 TABLET ORAL 2 TIMES DAILY PRN
Qty: 30 TABLET | Refills: 1 | Status: SHIPPED | OUTPATIENT
Start: 2024-11-29

## 2024-12-03 DIAGNOSIS — J45.20 MILD INTERMITTENT ASTHMA WITHOUT COMPLICATION: ICD-10-CM

## 2024-12-05 ENCOUNTER — TELEPHONE (OUTPATIENT)
Dept: OTHER | Facility: OTHER | Age: 54
End: 2024-12-05

## 2024-12-05 DIAGNOSIS — M25.551 RIGHT HIP PAIN: ICD-10-CM

## 2024-12-05 DIAGNOSIS — F41.9 ANXIETY: ICD-10-CM

## 2024-12-05 DIAGNOSIS — E66.811 OBESITY, CLASS I, BMI 30-34.9: ICD-10-CM

## 2024-12-05 DIAGNOSIS — G89.29 CHRONIC RIGHT SHOULDER PAIN: ICD-10-CM

## 2024-12-05 DIAGNOSIS — M25.511 CHRONIC RIGHT SHOULDER PAIN: ICD-10-CM

## 2024-12-05 DIAGNOSIS — G47.09 OTHER INSOMNIA: ICD-10-CM

## 2024-12-05 RX ORDER — ONDANSETRON 4 MG/1
1 TABLET, ORALLY DISINTEGRATING ORAL EVERY 12 HOURS PRN
Qty: 30 PATCH | Refills: 1 | Status: SHIPPED | OUTPATIENT
Start: 2024-12-05

## 2024-12-05 RX ORDER — TIRZEPATIDE 5 MG/.5ML
5 INJECTION, SOLUTION SUBCUTANEOUS WEEKLY
Qty: 2 ML | Refills: 0 | Status: SHIPPED | OUTPATIENT
Start: 2024-12-05 | End: 2024-12-10 | Stop reason: DRUGHIGH

## 2024-12-05 RX ORDER — ALPRAZOLAM 1 MG/1
1 TABLET ORAL 2 TIMES DAILY PRN
Qty: 60 TABLET | Refills: 0 | Status: SHIPPED | OUTPATIENT
Start: 2024-12-05 | End: 2025-01-04

## 2024-12-05 RX ORDER — ZOLPIDEM TARTRATE 10 MG/1
10 TABLET ORAL
Qty: 60 TABLET | Refills: 0 | Status: SHIPPED | OUTPATIENT
Start: 2024-12-05

## 2024-12-05 RX ORDER — ALBUTEROL SULFATE 90 UG/1
2 INHALANT RESPIRATORY (INHALATION) EVERY 6 HOURS PRN
Qty: 18 G | Refills: 1 | Status: SHIPPED | OUTPATIENT
Start: 2024-12-05 | End: 2025-12-05

## 2024-12-05 NOTE — TELEPHONE ENCOUNTER
Patient called to check on refill of his Zepbound. I let him know that it was sent in to his pharmacy this morning for him and her verbalized understanding and is going to give them a call.

## 2024-12-05 NOTE — TELEPHONE ENCOUNTER
Spoke with patient offer to make his Medicare Annual Wellness seth. Patient stated that he already made an appointment with [facility] for Dec 29th which their going to his house.

## 2024-12-05 NOTE — TELEPHONE ENCOUNTER
"Pt stated, \"I believe there was an error made with my Zepbound medication that was sent today. I believe it should be 7.5 mg.\"    Please call pt when office reopens   "

## 2024-12-05 NOTE — TELEPHONE ENCOUNTER
Medication: ALPRAZolam (XANAX) 1 mg tablet     Dose/Frequency: 2 times daily PRN for anxiety    Quantity: 60 tablet    Pharmacy: [facility] [place] - [place], [place] - [address]  [address], [place] [address]  Phone: [phone]  Fax: [phone]  KADY #: --    Office:   [x] PCP/Provider -   [] Speciality/Provider -     Does the patient have enough for 3 days?   [x] Yes   [] No - Send as HP to POD

## 2024-12-06 NOTE — TELEPHONE ENCOUNTER
"Per phone call 11/18 \"Patient called in because his pharmacy has been out of the 7.5 mg Zepbound for a week. He is asking if he can get a script for the 5mg Zepbound. \"    I sent Zepbound 5mg over. Please clarify what patient's concern is.   "

## 2024-12-08 ENCOUNTER — TELEPHONE (OUTPATIENT)
Dept: OTHER | Facility: OTHER | Age: 54
End: 2024-12-08

## 2024-12-08 NOTE — TELEPHONE ENCOUNTER
"Pt stated, \" I was supposed to have my Zepbound 7.5 mg sent to the pharmacy and the 5 mg was sent over.\"    Please follow up with pt, thank you.   "

## 2024-12-10 DIAGNOSIS — E66.811 OBESITY, CLASS I, BMI 30-34.9: Primary | ICD-10-CM

## 2024-12-10 RX ORDER — TIRZEPATIDE 7.5 MG/.5ML
7.5 INJECTION, SOLUTION SUBCUTANEOUS WEEKLY
Qty: 2 ML | Refills: 0 | Status: SHIPPED | OUTPATIENT
Start: 2024-12-10 | End: 2025-01-07

## 2024-12-27 DIAGNOSIS — G47.09 OTHER INSOMNIA: ICD-10-CM

## 2025-01-15 DIAGNOSIS — F41.9 ANXIETY: ICD-10-CM

## 2025-01-15 DIAGNOSIS — J45.20 MILD INTERMITTENT ASTHMA WITHOUT COMPLICATION: ICD-10-CM

## 2025-01-15 DIAGNOSIS — G47.09 OTHER INSOMNIA: ICD-10-CM

## 2025-01-16 DIAGNOSIS — E29.1 HYPOGONADISM IN MALE: ICD-10-CM

## 2025-01-16 DIAGNOSIS — L40.9 PSORIASIS: ICD-10-CM

## 2025-01-16 DIAGNOSIS — J45.20 MILD INTERMITTENT ASTHMA WITHOUT COMPLICATION: ICD-10-CM

## 2025-01-16 RX ORDER — ALPRAZOLAM 1 MG/1
1 TABLET ORAL 2 TIMES DAILY PRN
Qty: 60 TABLET | Refills: 0 | Status: SHIPPED | OUTPATIENT
Start: 2025-01-16 | End: 2025-02-15

## 2025-01-16 RX ORDER — ALBUTEROL SULFATE 90 UG/1
2 INHALANT RESPIRATORY (INHALATION) EVERY 6 HOURS PRN
Qty: 18 G | Refills: 3 | Status: SHIPPED | OUTPATIENT
Start: 2025-01-16 | End: 2026-01-16

## 2025-01-16 RX ORDER — ZOLPIDEM TARTRATE 10 MG/1
10 TABLET ORAL
Qty: 60 TABLET | Refills: 0 | Status: SHIPPED | OUTPATIENT
Start: 2025-01-16

## 2025-01-19 DIAGNOSIS — G47.09 OTHER INSOMNIA: ICD-10-CM

## 2025-01-20 RX ORDER — SYRINGE WITH NEEDLE, 1 ML 25GX5/8"
SYRINGE, EMPTY DISPOSABLE MISCELLANEOUS
Refills: 0 | OUTPATIENT
Start: 2025-01-20

## 2025-01-20 RX ORDER — ZOLPIDEM TARTRATE 10 MG/1
10 TABLET ORAL
Qty: 60 TABLET | Refills: 0 | OUTPATIENT
Start: 2025-01-20

## 2025-01-20 RX ORDER — TRIAMCINOLONE ACETONIDE 5 MG/G
OINTMENT TOPICAL
Refills: 0 | OUTPATIENT
Start: 2025-01-20

## 2025-01-20 RX ORDER — TRIAMCINOLONE ACETONIDE 5 MG/G
CREAM TOPICAL 2 TIMES DAILY
Qty: 15 G | Refills: 0 | Status: SHIPPED | OUTPATIENT
Start: 2025-01-20

## 2025-01-20 RX ORDER — ALBUTEROL SULFATE 90 UG/1
2 INHALANT RESPIRATORY (INHALATION) EVERY 6 HOURS PRN
Qty: 18 G | Refills: 0 | OUTPATIENT
Start: 2025-01-20 | End: 2026-01-20

## 2025-01-20 RX ORDER — TESTOSTERONE 1.62 MG/G
20.25 GEL TRANSDERMAL DAILY
Qty: 75 G | Refills: 0 | Status: SHIPPED | OUTPATIENT
Start: 2025-01-20

## 2025-02-04 DIAGNOSIS — G89.29 CHRONIC RIGHT SHOULDER PAIN: ICD-10-CM

## 2025-02-04 DIAGNOSIS — M25.511 CHRONIC RIGHT SHOULDER PAIN: ICD-10-CM

## 2025-02-04 NOTE — TELEPHONE ENCOUNTER
Reason for call:   [x] Refill   [] Prior Auth  [] Other:     Office:   [] PCP/Provider -   [x] Specialty/Provider - Ortho    Medication:     Diclofenac Epolamine 1.3 % PTCH       Dose/Frequency:     1 Application, Apply externally, Every 12 hours PRN       Quantity: 30    Pharmacy:  [facility] [place] - [place], [place] - [address]     Does the patient have enough for 3 days?   [] Yes   [] No - Send as HP to POD

## 2025-02-05 NOTE — TELEPHONE ENCOUNTER
Caller: Patient    Doctor: Dr. Kaur    Reason for call:     Patient is calling about the script for Diclofenac Epolamine 1.3 % PTCH, which was called in on 2/4/25, [facility] called and stated it needs a prior authorization.   Please call patient once the auth is approved...     Call back#: [phone]

## 2025-02-06 RX ORDER — ONDANSETRON 4 MG/1
1 TABLET, ORALLY DISINTEGRATING ORAL EVERY 12 HOURS PRN
Qty: 30 PATCH | Refills: 2 | Status: SHIPPED | OUTPATIENT
Start: 2025-02-06

## 2025-02-07 DIAGNOSIS — G89.29 CHRONIC RIGHT SHOULDER PAIN: ICD-10-CM

## 2025-02-07 DIAGNOSIS — J45.20 MILD INTERMITTENT ASTHMA WITHOUT COMPLICATION: ICD-10-CM

## 2025-02-07 DIAGNOSIS — M25.511 CHRONIC RIGHT SHOULDER PAIN: ICD-10-CM

## 2025-02-10 ENCOUNTER — TELEPHONE (OUTPATIENT)
Age: 55
End: 2025-02-10

## 2025-02-10 RX ORDER — ALBUTEROL SULFATE 90 UG/1
2 INHALANT RESPIRATORY (INHALATION) EVERY 6 HOURS PRN
Qty: 18 G | Refills: 0 | OUTPATIENT
Start: 2025-02-10 | End: 2026-02-10

## 2025-02-10 RX ORDER — ONDANSETRON 4 MG/1
1 TABLET, ORALLY DISINTEGRATING ORAL EVERY 12 HOURS PRN
Qty: 30 PATCH | Refills: 0 | OUTPATIENT
Start: 2025-02-10

## 2025-02-10 NOTE — TELEPHONE ENCOUNTER
PA for Diclofenac Epolamine 1.3 % PTCH SUBMITTED to [facility]    via    []CMM-KEY:   [x]Surescripts-Case ID # [id number]   []Availity-Auth ID # NDC #   []Faxed to plan   []Other website   []Phone call Case ID #     [x]PA sent as URGENT    All office notes, labs and other pertaining documents and studies sent. Clinical questions answered. Awaiting determination from insurance company.     Turnaround time for your insurance to make a decision on your Prior Authorization can take 7-21 business days.

## 2025-02-14 ENCOUNTER — TELEPHONE (OUTPATIENT)
Dept: INTERNAL MEDICINE CLINIC | Facility: CLINIC | Age: 55
End: 2025-02-14

## 2025-02-14 RX ORDER — ZOLPIDEM TARTRATE 10 MG/1
10 TABLET ORAL
Qty: 60 TABLET | Refills: 0 | Status: SHIPPED | OUTPATIENT
Start: 2025-02-14

## 2025-02-14 NOTE — TELEPHONE ENCOUNTER
Folder Color-Red    Name of Form-[facility]     Form to be filled out by-Dr Antoine    Form to be Faxed [phone]

## 2025-02-18 NOTE — TELEPHONE ENCOUNTER
Called [facility] 1 [phone] for status of pt PA for Diclofenac Epolamine 1.3 % PTCH. Insurance is requiring diclofenac topical drops 1.5% or diclofenac topical gel 1%. If there is a clinical reason he can not try these two formulary alternatives please send clinicals back to PA team. Thank you

## 2025-02-19 ENCOUNTER — NURSE TRIAGE (OUTPATIENT)
Dept: OTHER | Facility: OTHER | Age: 55
End: 2025-02-19

## 2025-02-19 DIAGNOSIS — E66.812 CLASS 2 OBESITY WITH BODY MASS INDEX (BMI) OF 35.0 TO 35.9 IN ADULT, UNSPECIFIED OBESITY TYPE, UNSPECIFIED WHETHER SERIOUS COMORBIDITY PRESENT: Primary | ICD-10-CM

## 2025-02-20 RX ORDER — TIRZEPATIDE 7.5 MG/.5ML
7.5 INJECTION, SOLUTION SUBCUTANEOUS WEEKLY
Qty: 2 ML | Refills: 0 | Status: SHIPPED | OUTPATIENT
Start: 2025-02-20 | End: 2025-03-20

## 2025-02-20 NOTE — TELEPHONE ENCOUNTER
"Reason for Disposition   Patient has refills remaining on their prescription    Answer Assessment - Initial Assessment Questions  1. DRUG NAME: \"What medicine do you need to have refilled?\"      Zepbound 7.5 mg     2. REFILLS REMAINING: \"How many refills are remaining?\" (Note: The label on the medicine or pill bottle will show how many refills are remaining. If there are no refills remaining, then a renewal may be needed.)      Yes     4. PRESCRIBING HCP: \"Who prescribed it?\" Reason: If prescribed by specialist, call should be referred to that group.      [facility]    Protocols used: Medication Refill and Renewal Call-Adult-    "

## 2025-02-22 DIAGNOSIS — E66.812 CLASS 2 OBESITY WITH BODY MASS INDEX (BMI) OF 35.0 TO 35.9 IN ADULT, UNSPECIFIED OBESITY TYPE, UNSPECIFIED WHETHER SERIOUS COMORBIDITY PRESENT: ICD-10-CM

## 2025-02-24 DIAGNOSIS — F41.9 ANXIETY: ICD-10-CM

## 2025-02-24 DIAGNOSIS — L40.9 PSORIASIS: Primary | ICD-10-CM

## 2025-02-24 DIAGNOSIS — G43.109 MIGRAINE WITH AURA AND WITHOUT STATUS MIGRAINOSUS, NOT INTRACTABLE: ICD-10-CM

## 2025-02-24 RX ORDER — TIRZEPATIDE 7.5 MG/.5ML
7.5 INJECTION, SOLUTION SUBCUTANEOUS WEEKLY
Qty: 2 ML | Refills: 0 | OUTPATIENT
Start: 2025-02-24 | End: 2025-03-24

## 2025-02-24 RX ORDER — ALPRAZOLAM 1 MG/1
1 TABLET ORAL 2 TIMES DAILY PRN
Qty: 60 TABLET | Refills: 0 | Status: SHIPPED | OUTPATIENT
Start: 2025-02-24 | End: 2025-03-26

## 2025-02-25 ENCOUNTER — TELEPHONE (OUTPATIENT)
Age: 55
End: 2025-02-25

## 2025-02-25 RX ORDER — SUMATRIPTAN 50 MG/1
50 TABLET, FILM COATED ORAL ONCE AS NEEDED
Qty: 9 TABLET | Refills: 0 | Status: SHIPPED | OUTPATIENT
Start: 2025-02-25

## 2025-02-25 RX ORDER — TRIAMCINOLONE ACETONIDE 5 MG/G
OINTMENT TOPICAL 2 TIMES DAILY
Qty: 15 G | Refills: 3 | Status: SHIPPED | OUTPATIENT
Start: 2025-02-25

## 2025-02-25 NOTE — TELEPHONE ENCOUNTER
Caller: [facility] [employer]    Doctor: Dr. Kaur / Hernandez    Reason for call: Chantel is calling to states the Diclofenac Epolamine 1.3 % PTCH has been denied because a previous gel would have to have been tried first and proven as it didn't work. Patient states he was previously prescribed the gel (Diclofenax 1%) from a PCP and it did not work. Chantel is calling to request this outlined by the Dr so they can approve the new patch medication for the patient; please advise.     Call back#: [phone] (Patient)

## 2025-02-26 ENCOUNTER — TELEPHONE (OUTPATIENT)
Age: 55
End: 2025-02-26

## 2025-02-26 NOTE — TELEPHONE ENCOUNTER
Patient called back because he is due for his injection today and was at the pharmacy to  and was advised he needs a new prior auth. Patient is hoping we can put in an urgent request and would like a call once approved. Patient can be reached at [phone]

## 2025-02-27 ENCOUNTER — TELEPHONE (OUTPATIENT)
Dept: INTERNAL MEDICINE CLINIC | Facility: CLINIC | Age: 55
End: 2025-02-27

## 2025-02-27 NOTE — TELEPHONE ENCOUNTER
Renetta from [facility] is calling to request prior authorization for Zepbound. Please follow up and advise. Thank you in advance.

## 2025-02-27 NOTE — TELEPHONE ENCOUNTER
PA for Zepbound SUBMITTED to [facility]    via    []CMM-KEY:   []Surescripts-Case ID #   []Availity-Auth ID # NDC #   []Faxed to plan   []Other website   [x]Phone call Case ID # EOC# [id number]    [x]PA sent as URGENT    All office notes, labs and other pertaining documents and studies sent. Clinical questions answered. Awaiting determination from insurance company.     Turnaround time for your insurance to make a decision on your Prior Authorization can take 7-21 business days.

## 2025-02-27 NOTE — TELEPHONE ENCOUNTER
Patient called in stating that he didn't need his Medicare Annual Wellness at this time said that he already did his with [facility] on Dec 20th and was on the phone for about an hour and didn't need anything else at this time

## 2025-03-03 ENCOUNTER — TELEPHONE (OUTPATIENT)
Age: 55
End: 2025-03-03

## 2025-03-03 NOTE — TELEPHONE ENCOUNTER
Caller: patient     Doctor: Dr. Kaur     Reason for call: Patient has tried Lidoderm and Voltaren in the past and neither worked.  Without the patch he's taking 25 ibuprofen pills daily. Insurance needs proof that other meds have failed prior to approving Diclofenac.     Call back#: [phone]

## 2025-03-17 ENCOUNTER — TELEPHONE (OUTPATIENT)
Dept: OTHER | Facility: OTHER | Age: 55
End: 2025-03-17

## 2025-03-17 DIAGNOSIS — J45.20 MILD INTERMITTENT ASTHMA WITHOUT COMPLICATION: ICD-10-CM

## 2025-03-18 NOTE — TELEPHONE ENCOUNTER
"Patient called and stated \"I spoke with [facility] for life / Medical rx coverage for them to approve Diclofenac Epolamine 1.3 % PTCH- it has to be stated accute pain not chronic pain. It's for my right shoulder Dr. Kaur  treated. I need to use two a day because they are 12 hour a patches. If not I will have to take 30 Advil a day - I don't want to go on opoid's.\"    Patient would like a call back with updates on refill.  "

## 2025-03-19 ENCOUNTER — TELEPHONE (OUTPATIENT)
Dept: INTERNAL MEDICINE CLINIC | Facility: CLINIC | Age: 55
End: 2025-03-19

## 2025-03-19 DIAGNOSIS — M25.511 CHRONIC RIGHT SHOULDER PAIN: ICD-10-CM

## 2025-03-19 DIAGNOSIS — M19.011 GLENOHUMERAL ARTHRITIS, RIGHT: Primary | ICD-10-CM

## 2025-03-19 DIAGNOSIS — G89.29 CHRONIC RIGHT SHOULDER PAIN: ICD-10-CM

## 2025-03-19 RX ORDER — ALBUTEROL SULFATE 90 UG/1
2 INHALANT RESPIRATORY (INHALATION) EVERY 6 HOURS PRN
Qty: 18 G | Refills: 0 | OUTPATIENT
Start: 2025-03-19 | End: 2026-03-19

## 2025-03-19 NOTE — TELEPHONE ENCOUNTER
Patient is requesting a script for Diclofenac Epolamine 1.3 % PTCH. This medication was prescribed by patients comprehensive pain doctor a few weeks ago. Medication was denied by [facility]. Patient spoke to [facility] and was advised that medication can not be approved for chronic pain they will only approved for acute pain. He was told to see if pcp can write a script that specifies that his pain is acute so that med can be approved.    He is also requesting a script for the generic brand of one a day Cialis.

## 2025-03-19 NOTE — TELEPHONE ENCOUNTER
Caller: Patient     Doctor: Dr. Kaur     Reason for call: Patient needs to have the Diclofenac patch submitted stating that it is for acute pain not chronic. He said his left shoulder is chronic but the right shoulder is acute. He is taking 40-50 tabs of Motrin every day for the pain at this time. He goes thru a bottle of 500 tabs in 2 weeks. I advised that it is not recommended to do that but he said he doesn't have a choice because the pain is that bad and he needs the patches. He said it's the only thing that helps.      Call back#: [phone]

## 2025-03-19 NOTE — TELEPHONE ENCOUNTER
Patient hasn't been seen since July. Has injections for R shoulder at that time so I do not think I can justify saying this is acute pain. Please advise.

## 2025-03-24 DIAGNOSIS — N52.1 ERECTILE DYSFUNCTION DUE TO ENDOCRINE DISEASE: ICD-10-CM

## 2025-03-24 DIAGNOSIS — L40.9 PSORIASIS: ICD-10-CM

## 2025-03-24 DIAGNOSIS — G47.09 OTHER INSOMNIA: ICD-10-CM

## 2025-03-24 DIAGNOSIS — E29.1 HYPOGONADISM IN MALE: Primary | ICD-10-CM

## 2025-03-24 DIAGNOSIS — E29.1 HYPOGONADISM IN MALE: ICD-10-CM

## 2025-03-24 DIAGNOSIS — G43.109 MIGRAINE WITH AURA AND WITHOUT STATUS MIGRAINOSUS, NOT INTRACTABLE: ICD-10-CM

## 2025-03-24 DIAGNOSIS — E34.9 ERECTILE DYSFUNCTION DUE TO ENDOCRINE DISEASE: ICD-10-CM

## 2025-03-24 DIAGNOSIS — F41.9 ANXIETY: ICD-10-CM

## 2025-03-24 RX ORDER — TADALAFIL 10 MG/1
5 TABLET ORAL DAILY PRN
Qty: 20 TABLET | Refills: 0 | Status: SHIPPED | OUTPATIENT
Start: 2025-03-24

## 2025-03-24 RX ORDER — ALPRAZOLAM 1 MG/1
1 TABLET ORAL 2 TIMES DAILY PRN
Qty: 60 TABLET | Refills: 0 | Status: SHIPPED | OUTPATIENT
Start: 2025-03-24 | End: 2025-04-23

## 2025-03-25 ENCOUNTER — OFFICE VISIT (OUTPATIENT)
Age: 55
End: 2025-03-25
Payer: COMMERCIAL

## 2025-03-25 VITALS
RESPIRATION RATE: 16 BRPM | SYSTOLIC BLOOD PRESSURE: 140 MMHG | HEART RATE: 89 BPM | DIASTOLIC BLOOD PRESSURE: 80 MMHG | TEMPERATURE: 97.9 F | BODY MASS INDEX: 30.64 KG/M2 | HEIGHT: 67 IN | WEIGHT: 195.2 LBS

## 2025-03-25 DIAGNOSIS — E66.812 CLASS 2 OBESITY WITH BODY MASS INDEX (BMI) OF 35.0 TO 35.9 IN ADULT, UNSPECIFIED OBESITY TYPE, UNSPECIFIED WHETHER SERIOUS COMORBIDITY PRESENT: Primary | ICD-10-CM

## 2025-03-25 DIAGNOSIS — E78.5 DYSLIPIDEMIA: ICD-10-CM

## 2025-03-25 DIAGNOSIS — G47.33 OSA (OBSTRUCTIVE SLEEP APNEA): ICD-10-CM

## 2025-03-25 DIAGNOSIS — K21.00 GASTROESOPHAGEAL REFLUX DISEASE WITH ESOPHAGITIS, UNSPECIFIED WHETHER HEMORRHAGE: ICD-10-CM

## 2025-03-25 DIAGNOSIS — Z98.84 BARIATRIC SURGERY STATUS: ICD-10-CM

## 2025-03-25 PROCEDURE — 99214 OFFICE O/P EST MOD 30 MIN: CPT | Performed by: PHYSICIAN ASSISTANT

## 2025-03-25 RX ORDER — ALPRAZOLAM 1 MG/1
1 TABLET ORAL 2 TIMES DAILY PRN
Qty: 60 TABLET | Refills: 0 | OUTPATIENT
Start: 2025-03-25 | End: 2025-04-24

## 2025-03-25 RX ORDER — TIRZEPATIDE 5 MG/.5ML
5 INJECTION, SOLUTION SUBCUTANEOUS WEEKLY
Qty: 2 ML | Refills: 2 | Status: SHIPPED | OUTPATIENT
Start: 2025-03-25 | End: 2025-06-17

## 2025-03-25 RX ORDER — TIRZEPATIDE 7.5 MG/.5ML
INJECTION, SOLUTION SUBCUTANEOUS
COMMUNITY
Start: 2025-03-03 | End: 2025-03-25 | Stop reason: DRUGHIGH

## 2025-03-25 RX ORDER — OXYMETAZOLINE HYDROCHLORIDE 0.05 G/100ML
SPRAY NASAL
COMMUNITY

## 2025-03-25 NOTE — PROGRESS NOTES
Assessment/Plan:        Hussain was seen today for follow-up.    Diagnoses and all orders for this visit:    Class 2 obesity with body mass index (BMI) of 35.0 to 35.9 in adult, unspecified obesity type, unspecified whether serious comorbidity present  -     tirzepatide (Zepbound) 5 mg/0.5 mL auto-injector; Inject 0.5 mL (5 mg total) under the skin once a week    BOY (obstructive sleep apnea)  -     tirzepatide (Zepbound) 5 mg/0.5 mL auto-injector; Inject 0.5 mL (5 mg total) under the skin once a week    Dyslipidemia  -     tirzepatide (Zepbound) 5 mg/0.5 mL auto-injector; Inject 0.5 mL (5 mg total) under the skin once a week    Gastroesophageal reflux disease with esophagitis, unspecified whether hemorrhage  -     tirzepatide (Zepbound) 5 mg/0.5 mL auto-injector; Inject 0.5 mL (5 mg total) under the skin once a week    Bariatric surgery status  -     tirzepatide (Zepbound) 5 mg/0.5 mL auto-injector; Inject 0.5 mL (5 mg total) under the skin once a week      S/p vertical sleeve gastrectomy with Dr. Payam Villar on 10/31/23  Initial weight: 253 lbs  Asael: 213 lbs  Current :197 lbs  Goal: 190 lbs        - Weight not at goal  - Patient is interested in Conservative Program  - Labs reviewed: As below.    General Recommendations:  Nutrition:  Eat breakfast daily.  Do not skip meals.     Food log (ie.) www.[employer].com, sparkpeople.com, loseit.com, calorieking.com, etc.    Practice mindful eating.  Be sure to set aside time to eat, eat slowly, and savor your food.    Hydration:    At least 64oz of water daily.  No sugar sweetened beverages.  No juice (eat the fruit instead).    Exercise:  Studies have shown that the ideal exercise goal is somewhere between 150 to 300 minutes of moderate intensity exercise a week.  Start with exercising 10 minutes every other day and gradually increase physical activity with a goal of at least 150 minutes of moderate intensity exercise a week, divided over at least 3 days a week.  An example  of this would be exercising 30 minutes a day, 5 days a week.  Resistance training can increase muscle mass and increase our resting metabolic rate.   FULL BODY resistance training is recommended 2-3 times a week.  Do not do this on consecutive days to allow for muscle recovery.    Aim for a bare minimum 5000 steps, even on days you do not exercise.    Monitoring:   Weigh yourself daily.  If this causes undue stress, then just weigh yourself once a week.  Weigh yourself the same time of the day with the same amount of clothing on.  Preferably this should be done after waking up, before you eat, and with no clothing or minimal clothing on.    Specific Goals:  Food log (ie.) www.[employer].com,sparkpeople.com,loseit.com,Yahoo!.com,etc. , Goal protein intake of 60-80 grams per day, and 25-35 grams of dietary fiber per day  Increase physical activity by 10 minutes daily goal of 150 mins - 200 mins per week  Protein goal- 100 grams per day (30-35 grams per meal)    Calorie goal:  3930-1886    Return visit:  3 months.   1) Decrease zepbound to 5mg weekly to every 10 days    2)   - Discussed protein goals today; 30 grams per meal. Handouts provided as well  - Continue physical activity- goal of 150-200 mins per week  - Discussed side effect management     No problem-specific Assessment & Plan notes found for this encounter.     Zepbound:  I have sent Zebound to your pharmacy. The prior authorization process will been done through our prior authorization team and can take up to 3-4 weeks to process through the insurance.     - Continue Zepbound 5 mg subcutaneously weekly.   - Side effects of Zepbound include nausea, vomiting, diarrhea, or constipation. Keep an eye on your heart rate while on Zepbound. If you resting heart rate is greater than 100 beats per minutes, please notify me. If you develop severe abdominal pain, stop Zepbound and go to the emergency room, as that could be a sign of pancreatitis.     - Please  "notify me if you have surgery, upper endoscopy, or colonoscopy scheduled, as we typically hold Zepbound for one week prior to the procedure.           Total time spent reviewing chart, interviewing patient, examining patient, discussing plan, answering all questions, and documenting :35  min.       ______________________________________________________________________        Subjective:   Chief Complaint   Patient presents with    Follow-up     MWM- 4 mo f/u; Waist-38in   Waist circumference 43\" (initial)  Current waist circumference: 38\"     HPI: Hussain Hooper  is a 54 y.o. male with history of bariatric surgery (10/2023) , sleep apnea, CKD stage 3a, hx of nephrolithiasis, GERD and excess weight, here to pursue weight loss management.  Previous notes and records have been reviewed.    Physical Activity  3-4 days of weight training   Cardio 10 mins daily-- joints are bothering him     Diet:  100-150 grams of protein daily   Eating vegetables   >60 oz water daily     Patient was having some nausea with the Zepbound 7.5mg; we will decrease dose at this point.     Not having cravings.  Had some nausea with 7.5mg dose usually first 2 days after injection.   Noticed decreased bowel movements/quantity; still having 1 BM daily.   Was a  in the past, eats more when he is hungry more grazing.       Exercises- resistance training 3x per week. 10 mins of cardio.  120 mins per week   Active working on cars,   Job is sedentary     Prior to bariatric surgery 253 lbs   Goal of 190 lbs   HPI  Wt Readings from Last 20 Encounters:   03/25/25 88.5 kg (195 lb 3.2 oz)   11/11/24 89.6 kg (197 lb 9.6 oz)   08/21/24 98.6 kg (217 lb 6.4 oz)   07/12/24 99 kg (218 lb 3.2 oz)   06/11/24 98 kg (216 lb)   05/09/24 96.6 kg (213 lb)   03/08/24 101 kg (222 lb 10.6 oz)   02/12/24 101 kg (222 lb)   02/09/24 99.8 kg (220 lb)   02/01/24 102 kg (225 lb)   11/10/23 107 kg (235 lb 8 oz)   10/31/23 115 kg (253 lb 1.4 oz)   10/12/23 116 kg (255 " lb 8 oz)   10/02/23 115 kg (253 lb)   09/12/23 113 kg (250 lb)   09/07/23 114 kg (251 lb 15.8 oz)   09/01/23 115 kg (253 lb 4.8 oz)   08/14/23 113 kg (249 lb 9.6 oz)   08/11/23 113 kg (249 lb)   07/27/23 112 kg (247 lb 3.2 oz)       Food logging:  B: pineapple 2 squares, strawberry, grapes  S: grazes when hungry (peanuts, nuts)  L: grazing, eats when he is hungry but skips a lot   S: peanuts  D: protein (fish, ground beef, chicken) with vegetable, wrap  S:    Hunger/Cravings: decreased cravings, hunger with zepbound  Dining out:  Hydration: water  Alcohol:  Smoking: no  Exercise: see above- 3 days per week 20 mins strength + 10 mins cardio        Plan:  Continue zepbound 7.5mg  Increase protein to 100 grams/day   Increase physical activity to 150mins per week   Follow up in 3 months   *We will discuss decreasing to 5mg dose if patient still having nausea.       Past Medical History:   Diagnosis Date    Anesthesia complication     Pt states he stopped breathing during EGD and Colonoscopy    Anxiety     Arthritis     Asthma     Benign essential HTN     last assessed 05/26/2017    COVID 2021    Degenerative joint disease     Depression 2000    No    Emphysema of lung (HCC)     GERD (gastroesophageal reflux disease)     Hiccoughs     HTN (hypertension) 7/15/2021    Hypertension     Kidney stone     Migraine with aura and without status migrainosus, not intractable 02/01/2024    Shortness of breath     MOORE    Skin cancer     Sleep apnea     no CPAP use    Spinal stenosis      Patient denies personal and family history of  pancreatitis, thyroid cancer, MEN-2 tumors.  Denies any hx of glaucoma, seizures, + kidney stones, gallstones.  Denies Hx of CAD, PAD, palpitations, arrhythmia.   Denies uncontrolled anxiety or depression, suicidal behavior or thinking , insomnia or sleep disturbance.   Past Surgical History:   Procedure Laterality Date    COLONOSCOPY      ESOPHAGOGASTRODUODENOSCOPY N/A 03/24/2016    Procedure:  "ESOPHAGOGASTRODUODENOSCOPY (EGD);  Surgeon: Evin Munoz MD;  Location: [facility];  Service:     FL RETROGRADE PYELOGRAM  5/25/2023    JOINT REPLACEMENT      right hip sx    TN CYSTO/URETERO W/LITHOTRIPSY &INDWELL STENT INSRT Left 5/25/2023    Procedure: CYSTOSCOPY URETEROSCOPY WITH LITHOTRIPSY HOLMIUM LASER, RETROGRADE PYELOGRAM AND INSERTION STENT URETERAL;  Surgeon: Micah Guillen MD;  Location: [facility];  Service: Urology    TN LAPS GSTRC RSTRICTIV PX LONGITUDINAL GASTRECTOMY N/A 10/31/2023    Procedure: GASTRECTOMY  SLEEVE W/ ROBOT&  INTRAOPERATIVE EGD;  Surgeon: Arthur Villar MD;  Location: [facility];  Service: Bariatrics    TN REMOVE INT DWELL URETERAL STENT TRANSURETHRAL Left 9/7/2023    Procedure: CYSTOSCOPY,REMOVAL STENT URETERAL;  Surgeon: Ryan Garcia MD;  Location: [facility];  Service: Urology     The following portions of the patient's history were reviewed and updated as appropriate: allergies, current medications, past family history, past medical history, past social history, past surgical history, and problem list.    Review Of Systems:  Review of Systems    Objective:  /80 (BP Location: Left arm, Patient Position: Sitting)   Pulse 89   Temp 97.9 °F (36.6 °C) (Tympanic)   Resp 16   Ht 5' 7\" (1.702 m)   Wt 88.5 kg (195 lb 3.2 oz) Comment: with sneakers on  BMI 30.57 kg/m²   Physical Exam    Labs and Imaging  Recent labs and imaging have been personally reviewed.  Lab Results   Component Value Date    WBC 6.41 03/25/2024    HGB 14.5 03/25/2024    HCT 42.1 03/25/2024    MCV 96 03/25/2024     03/25/2024     Lab Results   Component Value Date    SODIUM 143 07/29/2024    K 3.9 07/29/2024     07/29/2024    CO2 31 07/29/2024    AGAP 9 07/29/2024    BUN 30 (H) 07/29/2024    CREATININE 1.35 (H) 07/29/2024    GLUC 95 11/01/2023    GLUF 86 07/29/2024    CALCIUM 9.1 07/29/2024    AST 14 03/25/2024    ALT 19 03/25/2024    ALKPHOS 65 03/25/2024    TP 6.2 (L) 03/25/2024    " TBILI 0.60 03/25/2024    EGFR 58 07/29/2024     Lab Results   Component Value Date    HGBA1C 5.1 05/14/2018     Lab Results   Component Value Date    DQM3GVYTDRND 2.159 03/25/2024     Lab Results   Component Value Date    CHOLESTEROL 231 (H) 07/20/2023     Lab Results   Component Value Date    HDL 52 07/20/2023     Lab Results   Component Value Date    TRIG 227 (H) 07/20/2023     Lab Results   Component Value Date    LDLCALC 134 (H) 07/20/2023

## 2025-03-25 NOTE — TELEPHONE ENCOUNTER
Patient called back because he said that he was made aware that only the script for the Cialis.     Patient said he really needs Diclofenac Epolamine 1.3 % PTCH.  He also said that he already know that a prior auth will be needed and it is important to mention that it is for acute pain and that he tried other meds like lidocaine and another gel that did not work.     Please call patient prior to writing script and he said he can explain exactly what needs to be said in order for it to be approved.

## 2025-03-25 NOTE — TELEPHONE ENCOUNTER
Medication Refill Request       Medication:     zolpidem (AMBIEN) 10 mg tablet     Dose/Frequency:   Take 1 tablet (10 mg total) by mouth daily at bedtime as needed for sleep        Quantity: 60 tablets    [facility]    Office:   [x] PCP/Provider -   [] Specialty/Provider -     Does the patient have enough for 3 days?   [] Yes   [x] No - Send as HP to POD    Patient did states he needs a generic brand since the originals make him sick.

## 2025-03-25 NOTE — TELEPHONE ENCOUNTER
Pt requested cialis via [facility], filled 3/24. Diclofenac epolamine patch sent in several months ago for acute hip pain and was under prior auth. If someone could please look into prior auth status and inform patient of pending review vs approval/denial that would be great, thanks!

## 2025-03-25 NOTE — PATIENT INSTRUCTIONS
GLP-1 Managing Side Effects Tips:  - focus on small frequent meals  - moderate sugar and fat intake  - change the injection site (abdomen --> thigh--> back of arm)  - eat protein, crackers, mints and loraine-based drinks  - nighttime injections  - drink plenty of water  - consider fiber supplements like miralax    Tips for Nausea:  - Don't drink and eat simultaneously: separate fluids 30-60 minutes before and after meals when experiencing nausea or if you notice you become full quickly  - Choose mild smelling foods- strong smells can exacerbate nausea  - Loraine and peppermint- consider drinking loraine or peppermint tea, which can help alleviate symptoms  - Eat- don't skip meals, if you have nausea, it is understandable that you may not feel like eating. However, skipping meals is generally not recommended as this can lead to lower blood sugar and fatigue. Furthermore, it is essential to consume adequate protein during weight loss. You can opt for a protein shake, a meal replacement supplement, bone broth or soup.     Tips for Constipation:   - Drink plenty of water  - Eat food with a high fiber content: increase your fiber intake by consuming these types of foods:   Eat more whole grain products like barley, oats, farro, brown or wild rice and quinoa.    Look for choices with 100% whole wheat, rye, oats or bran as the first ingredient listed    Check nutrition fact labels and choose products with 4gm of dietary fiber or more per serving   Add more beans to your diet   Eat more fresh fruits and vegetables with skins on them    Exercise- increase physical activity as it helps stimulate gastric mobility, which moves stool through the digestive tract

## 2025-03-26 ENCOUNTER — TELEPHONE (OUTPATIENT)
Dept: INTERNAL MEDICINE CLINIC | Facility: CLINIC | Age: 55
End: 2025-03-26

## 2025-03-26 DIAGNOSIS — Z98.84 BARIATRIC SURGERY STATUS: ICD-10-CM

## 2025-03-26 DIAGNOSIS — E66.812 CLASS 2 OBESITY WITH BODY MASS INDEX (BMI) OF 35.0 TO 35.9 IN ADULT, UNSPECIFIED OBESITY TYPE, UNSPECIFIED WHETHER SERIOUS COMORBIDITY PRESENT: ICD-10-CM

## 2025-03-26 DIAGNOSIS — G47.33 OSA (OBSTRUCTIVE SLEEP APNEA): ICD-10-CM

## 2025-03-26 DIAGNOSIS — K21.00 GASTROESOPHAGEAL REFLUX DISEASE WITH ESOPHAGITIS, UNSPECIFIED WHETHER HEMORRHAGE: ICD-10-CM

## 2025-03-26 DIAGNOSIS — E78.5 DYSLIPIDEMIA: ICD-10-CM

## 2025-03-26 RX ORDER — SUMATRIPTAN 50 MG/1
50 TABLET, FILM COATED ORAL ONCE AS NEEDED
Qty: 9 TABLET | Refills: 0 | Status: SHIPPED | OUTPATIENT
Start: 2025-03-26

## 2025-03-26 RX ORDER — TRIAMCINOLONE ACETONIDE 5 MG/G
OINTMENT TOPICAL 2 TIMES DAILY
Qty: 15 G | Refills: 0 | OUTPATIENT
Start: 2025-03-26

## 2025-03-26 RX ORDER — TIRZEPATIDE 5 MG/.5ML
5 INJECTION, SOLUTION SUBCUTANEOUS WEEKLY
Qty: 2 ML | Refills: 0 | OUTPATIENT
Start: 2025-03-26 | End: 2025-06-18

## 2025-03-26 RX ORDER — TRIAMCINOLONE ACETONIDE 5 MG/G
CREAM TOPICAL 2 TIMES DAILY
Qty: 15 G | Refills: 0 | OUTPATIENT
Start: 2025-03-26

## 2025-03-26 NOTE — TELEPHONE ENCOUNTER
Dr Cruz  ( [phone] )  from [facility] called in rerquesting a returned call from pcp or nurse regarding the below request. Please advise

## 2025-03-26 NOTE — TELEPHONE ENCOUNTER
Patient stated he has an endo physician Dr Mcmahon(retired) and now Dr Cruz and he [facility]. He thought he was getting the medication from his endo. He stated he doesn't use the gel.     He called Endo to try an get the correct meds. Endo told him he broke an agreement and he could not get any more refills from them. He stated they didn't say what he can do continue getting medication from endo    He said he has the gel meds in the box untouched, he is not sure of what to do next. He needs to get his testosterone injections from endo

## 2025-03-26 NOTE — TELEPHONE ENCOUNTER
Please review script for refill. PDMP reviewed. Last filled on 1/20/25 for 75 day/75 quantity. Due for refill on 4/4/25

## 2025-03-27 DIAGNOSIS — Z13.9 SCREENING DUE: Primary | ICD-10-CM

## 2025-03-28 ENCOUNTER — TELEPHONE (OUTPATIENT)
Age: 55
End: 2025-03-28

## 2025-03-28 NOTE — TELEPHONE ENCOUNTER
Called the patient and relayed this information. He was checking on the PA because he received a call from the insurance today stating it was denied. Thank you.

## 2025-03-28 NOTE — TELEPHONE ENCOUNTER
Patient's Cialis denied. Per insurance, Medicare Part D does not cover medications for the treatment of sexual or erectile dysfunction. Patient will have to pay out of pocket with discount card.     Patient messaged via [employer] with update regarding Cialis.     Reviewed chart. Patient has been communicating with [facility] regarding the Diclofenac Epolamine 1.3% patch. Per their team, multiple conversations have been done with the prior authorization team. Insurance is not approving patch due to patient having chronic pain and needing shoulder surgery.

## 2025-03-28 NOTE — TELEPHONE ENCOUNTER
Prior authorization was already started today. Please review encounter. Waiting on insurance decision.

## 2025-03-28 NOTE — TELEPHONE ENCOUNTER
How are you tolerating the medication?   [] Nausea  [] Vomiting  [] Diarrhea  [x] Asymptomatic  [] Other:    Last visit weight: 195    Current weight: 190    Date of last injection: 3/25/25    How many injections do you have left: one      Patient received word from his insurance that they require a new prior auth for the Zepbound. They suggested two other medications on their formulary (he does not know the names), but patient does not want to switch. Patient is due for his next dose on Tuesday, 4/1/25. He is asking if the PA can be sent today.

## 2025-03-28 NOTE — TELEPHONE ENCOUNTER
PA for Zepbound SUBMITTED to [facility]    via    [x]CMM-KEY: [id number]  []Surescripts-Case ID #    []Availity-Auth ID #  NDC #    []Faxed to plan   []Other website    []Phone call Case ID #      []PA sent as URGENT    All office notes, labs and other pertaining documents and studies sent. Clinical questions answered. Awaiting determination from insurance company.     Turnaround time for your insurance to make a decision on your Prior Authorization can take 7-21 business days.

## 2025-03-31 RX ORDER — TESTOSTERONE CYPIONATE 200 MG/ML
200 INJECTION, SOLUTION INTRAMUSCULAR
COMMUNITY
Start: 2025-03-26

## 2025-03-31 NOTE — TELEPHONE ENCOUNTER
Patient informed that his medication for Zepbound was previously approved through his secondary insurance [facility] medicaid approval dates from 2/27/2025-8/26/2025.

## 2025-03-31 NOTE — TELEPHONE ENCOUNTER
Spoke with Dr. Cruz personally. Pt to bring unused unopened testosterone gel to endocrine office for proper disposal and proof of no use so he can proceed with testoesterone shots at endocrine. I have a feeling he wanted to request refill for the shots, didn't know he couldn't do it over mychart, but saw the gel labeled as testosterone, and requested refill. Dr. Cruz to speak to his clerical team to schedule pt for injections. I have removed gel from pt med list. Thanks!

## 2025-03-31 NOTE — TELEPHONE ENCOUNTER
Received call from [facility] regarding Zepbound PA.     They are requesting info related to patients BOY and if he has tried any medications on formulary yet.  Specifically asked if he has ever tried Modafanil or Armodafanil.    Please clarify with [facility].  They can be reached directly at

## 2025-03-31 NOTE — TELEPHONE ENCOUNTER
PA for Zepbound  DENIED- Primary Ins    Reason:(Screenshot if applicable) Under [facility] Medicare If filing for PA under BOY patient has to have try and fail Modafinil or Armodafinil.        Message sent to office clinical pool No      Denial letter scanned into Media No      Appeal started No (Provider will need to decide if appeal is warranted and send clinical documentation to Prior Authorization Team for initiation.)    **Please follow up with your patient regarding denial and next steps**

## 2025-03-31 NOTE — TELEPHONE ENCOUNTER
PA for Zepbound SUBMITTED to [facility] Medicaid Secondary Ins    via    []CMM-KEY:   []Surescripts-Case ID #   []Availity-Auth ID # NDC #   []Faxed to plan   [x]Other website [facility]- [id number]  []Phone call Case ID #     []PA sent as URGENT    All office notes, labs and other pertaining documents and studies sent. Clinical questions answered. Awaiting determination from insurance company.     Turnaround time for your insurance to make a decision on your Prior Authorization can take 7-21 business days.

## 2025-03-31 NOTE — TELEPHONE ENCOUNTER
PA for Zepbound CANCELLED due to     [x]Approval on file-dates approved 2/27/2025-8/26/2025  []Medication already on Formulary  []Brand Name Preferred  []Patient no longer covered by insurance    Patient advised by     []My Chart Message  []Phone call    Message sent to office clinical pool   No      Scanned into Media  No- on the phone with [facility]     Patient was informed.

## 2025-04-01 ENCOUNTER — TELEPHONE (OUTPATIENT)
Dept: INTERNAL MEDICINE CLINIC | Facility: CLINIC | Age: 55
End: 2025-04-01

## 2025-04-02 ENCOUNTER — APPOINTMENT (OUTPATIENT)
Dept: LAB | Facility: CLINIC | Age: 55
End: 2025-04-02
Payer: COMMERCIAL

## 2025-04-02 DIAGNOSIS — N18.2 STAGE 2 CHRONIC KIDNEY DISEASE: ICD-10-CM

## 2025-04-02 DIAGNOSIS — R79.89 ELEVATED SERUM CREATININE: ICD-10-CM

## 2025-04-02 DIAGNOSIS — Z13.9 SCREENING DUE: ICD-10-CM

## 2025-04-02 DIAGNOSIS — E53.8 VITAMIN B12 DEFICIENCY: ICD-10-CM

## 2025-04-02 DIAGNOSIS — Z98.84 BARIATRIC SURGERY STATUS: ICD-10-CM

## 2025-04-02 DIAGNOSIS — E29.1 3-OXO-5 ALPHA-STEROID DELTA 4-DEHYDROGENASE DEFICIENCY: ICD-10-CM

## 2025-04-02 LAB
25(OH)D3 SERPL-MCNC: 33.2 NG/ML (ref 30–100)
ALBUMIN SERPL BCG-MCNC: 4.1 G/DL (ref 3.5–5)
ALP SERPL-CCNC: 44 U/L (ref 34–104)
ALT SERPL W P-5'-P-CCNC: 20 U/L (ref 7–52)
ANION GAP SERPL CALCULATED.3IONS-SCNC: 9 MMOL/L (ref 4–13)
AST SERPL W P-5'-P-CCNC: 21 U/L (ref 13–39)
BASOPHILS # BLD AUTO: 0.04 THOUSANDS/ÂΜL (ref 0–0.1)
BASOPHILS NFR BLD AUTO: 1 % (ref 0–1)
BILIRUB SERPL-MCNC: 0.77 MG/DL (ref 0.2–1)
BUN SERPL-MCNC: 26 MG/DL (ref 5–25)
CALCIUM SERPL-MCNC: 9.2 MG/DL (ref 8.4–10.2)
CHLORIDE SERPL-SCNC: 104 MMOL/L (ref 96–108)
CO2 SERPL-SCNC: 28 MMOL/L (ref 21–32)
CREAT SERPL-MCNC: 1.05 MG/DL (ref 0.6–1.3)
EOSINOPHIL # BLD AUTO: 0.24 THOUSAND/ÂΜL (ref 0–0.61)
EOSINOPHIL NFR BLD AUTO: 4 % (ref 0–6)
ERYTHROCYTE [DISTWIDTH] IN BLOOD BY AUTOMATED COUNT: 13.8 % (ref 11.6–15.1)
EST. AVERAGE GLUCOSE BLD GHB EST-MCNC: 91 MG/DL
GFR SERPL CREATININE-BSD FRML MDRD: 78 ML/MIN/1.73SQ M
GLUCOSE SERPL-MCNC: 105 MG/DL (ref 65–140)
HBA1C MFR BLD: 4.8 %
HCT VFR BLD AUTO: 48.9 % (ref 36.5–49.3)
HGB BLD-MCNC: 16.9 G/DL (ref 12–17)
IMM GRANULOCYTES # BLD AUTO: 0.03 THOUSAND/UL (ref 0–0.2)
IMM GRANULOCYTES NFR BLD AUTO: 0 % (ref 0–2)
LYMPHOCYTES # BLD AUTO: 1.03 THOUSANDS/ÂΜL (ref 0.6–4.47)
LYMPHOCYTES NFR BLD AUTO: 15 % (ref 14–44)
MCH RBC QN AUTO: 34.1 PG (ref 26.8–34.3)
MCHC RBC AUTO-ENTMCNC: 34.6 G/DL (ref 31.4–37.4)
MCV RBC AUTO: 99 FL (ref 82–98)
MONOCYTES # BLD AUTO: 0.4 THOUSAND/ÂΜL (ref 0.17–1.22)
MONOCYTES NFR BLD AUTO: 6 % (ref 4–12)
NEUTROPHILS # BLD AUTO: 4.93 THOUSANDS/ÂΜL (ref 1.85–7.62)
NEUTS SEG NFR BLD AUTO: 74 % (ref 43–75)
NRBC BLD AUTO-RTO: 0 /100 WBCS
PHOSPHATE SERPL-MCNC: 1.9 MG/DL (ref 2.7–4.5)
PLATELET # BLD AUTO: 232 THOUSANDS/UL (ref 149–390)
PMV BLD AUTO: 9.1 FL (ref 8.9–12.7)
POTASSIUM SERPL-SCNC: 4 MMOL/L (ref 3.5–5.3)
PROT SERPL-MCNC: 6.1 G/DL (ref 6.4–8.4)
PSA SERPL-MCNC: 1.09 NG/ML (ref 0–4)
RBC # BLD AUTO: 4.95 MILLION/UL (ref 3.88–5.62)
SODIUM SERPL-SCNC: 141 MMOL/L (ref 135–147)
VIT B12 SERPL-MCNC: 162 PG/ML (ref 180–914)
WBC # BLD AUTO: 6.67 THOUSAND/UL (ref 4.31–10.16)

## 2025-04-02 PROCEDURE — 84100 ASSAY OF PHOSPHORUS: CPT

## 2025-04-02 PROCEDURE — 36415 COLL VENOUS BLD VENIPUNCTURE: CPT

## 2025-04-02 PROCEDURE — 80053 COMPREHEN METABOLIC PANEL: CPT

## 2025-04-02 PROCEDURE — 85025 COMPLETE CBC W/AUTO DIFF WBC: CPT

## 2025-04-02 PROCEDURE — 83918 ORGANIC ACIDS TOTAL QUANT: CPT

## 2025-04-02 PROCEDURE — 82306 VITAMIN D 25 HYDROXY: CPT

## 2025-04-02 PROCEDURE — G0103 PSA SCREENING: HCPCS

## 2025-04-02 PROCEDURE — 83036 HEMOGLOBIN GLYCOSYLATED A1C: CPT

## 2025-04-02 PROCEDURE — 82607 VITAMIN B-12: CPT

## 2025-04-02 PROCEDURE — 84402 ASSAY OF FREE TESTOSTERONE: CPT

## 2025-04-02 PROCEDURE — 84403 ASSAY OF TOTAL TESTOSTERONE: CPT

## 2025-04-03 ENCOUNTER — RESULTS FOLLOW-UP (OUTPATIENT)
Dept: INTERNAL MEDICINE CLINIC | Facility: CLINIC | Age: 55
End: 2025-04-03

## 2025-04-03 ENCOUNTER — RESULTS FOLLOW-UP (OUTPATIENT)
Dept: NEPHROLOGY | Facility: CLINIC | Age: 55
End: 2025-04-03

## 2025-04-03 DIAGNOSIS — E83.39 HYPOPHOSPHATEMIA: Primary | ICD-10-CM

## 2025-04-03 LAB
TESTOST FREE SERPL-MCNC: 2.5 PG/ML (ref 7.2–24)
TESTOST SERPL-MCNC: 140 NG/DL (ref 264–916)

## 2025-04-03 NOTE — RESULT ENCOUNTER NOTE
"My chart message sent to pt: Junior Nixon!    Just wanted to let you know that your kidneys, liver, and electrolytes are all normal!. Vitamin D, blood cells, and A1c (diabetes screen) also all within normal limits. Your B12 and phosphorous are low. Phosphorous is mostly from diet, so no need for supplementation at this time, however I would like to give you b12 supplements. Do you prefer daily pill or series of injections? Let me know, I can sent to your pharmacy. If you would prefer injection series, you can  the vials and bring them here for a nurse visit to get the injections. Let me know!    Thanks!  -Dr. Antoine\"    Just zack"

## 2025-04-04 RX ORDER — TESTOSTERONE 1.62 MG/G
20.25 GEL TRANSDERMAL DAILY
Qty: 75 G | Refills: 0 | Status: SHIPPED | OUTPATIENT
Start: 2025-04-04 | End: 2025-04-04 | Stop reason: CLARIF

## 2025-04-04 NOTE — TELEPHONE ENCOUNTER
Per physician note on 3/31/25, patient's testosterone gel discontinued.     Script discontinued.     Contacted [facility] pharmacy at . Spoke with Tash. Pharmacy did not receive script.     No other needs noted.

## 2025-04-06 NOTE — PROGRESS NOTES
Pt to resume vit D OTC as not covered by insurance. Pt prefers pills of B12, although insurance covers injections. My chart message relaying this information and awaiting pt decision re; B12 injections vs pills considering insurance coverage

## 2025-04-07 ENCOUNTER — TELEPHONE (OUTPATIENT)
Dept: INTERNAL MEDICINE CLINIC | Facility: CLINIC | Age: 55
End: 2025-04-07

## 2025-04-07 DIAGNOSIS — L40.9 PSORIASIS: ICD-10-CM

## 2025-04-07 DIAGNOSIS — J45.20 MILD INTERMITTENT ASTHMA WITHOUT COMPLICATION: ICD-10-CM

## 2025-04-07 LAB — METHYLMALONATE SERPL-SCNC: 573 NMOL/L (ref 0–378)

## 2025-04-07 NOTE — TELEPHONE ENCOUNTER
Folder Color-Blue    Name of Form-Prior Auth Request Form    Form to be filled out by-Dr Antoine    Form to be Faxed [phone]

## 2025-04-08 ENCOUNTER — RESULTS FOLLOW-UP (OUTPATIENT)
Dept: BARIATRICS | Facility: CLINIC | Age: 55
End: 2025-04-08

## 2025-04-08 DIAGNOSIS — Z98.84 BARIATRIC SURGERY STATUS: ICD-10-CM

## 2025-04-08 DIAGNOSIS — E53.8 VITAMIN B12 DEFICIENCY: Primary | ICD-10-CM

## 2025-04-08 RX ORDER — TRIAMCINOLONE ACETONIDE 5 MG/G
CREAM TOPICAL 2 TIMES DAILY
Qty: 15 G | Refills: 0 | Status: SHIPPED | OUTPATIENT
Start: 2025-04-08

## 2025-04-08 RX ORDER — ALBUTEROL SULFATE 90 UG/1
2 INHALANT RESPIRATORY (INHALATION) EVERY 6 HOURS PRN
Qty: 18 G | Refills: 3 | Status: SHIPPED | OUTPATIENT
Start: 2025-04-08 | End: 2026-04-08

## 2025-04-08 RX ORDER — TRIAMCINOLONE ACETONIDE 5 MG/G
OINTMENT TOPICAL 2 TIMES DAILY
Qty: 15 G | Refills: 0 | OUTPATIENT
Start: 2025-04-08

## 2025-04-08 RX ORDER — CYANOCOBALAMIN 1000 UG/ML
1000 INJECTION, SOLUTION INTRAMUSCULAR; SUBCUTANEOUS
Qty: 10 ML | Refills: 0 | Status: SHIPPED | OUTPATIENT
Start: 2025-04-08

## 2025-04-08 RX ORDER — OXYMETAZOLINE HYDROCHLORIDE 0.05 G/100ML
SPRAY NASAL
Refills: 0 | OUTPATIENT
Start: 2025-04-08

## 2025-04-08 NOTE — TELEPHONE ENCOUNTER
----- Message from DORIAN Meeks sent at 4/8/2025  1:35 PM EDT -----  Can we please call pt to see if he can come in once a month for b12 injections?

## 2025-04-14 DIAGNOSIS — E29.1 HYPOGONADISM IN MALE: Primary | ICD-10-CM

## 2025-04-14 DIAGNOSIS — N62 GYNECOMASTIA, MALE: ICD-10-CM

## 2025-04-15 RX ORDER — ZOLPIDEM TARTRATE 10 MG/1
10 TABLET ORAL
Qty: 60 TABLET | Refills: 0 | Status: SHIPPED | OUTPATIENT
Start: 2025-04-15

## 2025-04-25 ENCOUNTER — OFFICE VISIT (OUTPATIENT)
Dept: PLASTIC SURGERY | Facility: CLINIC | Age: 55
End: 2025-04-25

## 2025-04-25 DIAGNOSIS — L90.5 SCAR: Primary | ICD-10-CM

## 2025-04-25 DIAGNOSIS — E29.1 HYPOGONADISM IN MALE: ICD-10-CM

## 2025-04-25 DIAGNOSIS — N52.1 ERECTILE DYSFUNCTION DUE TO ENDOCRINE DISEASE: ICD-10-CM

## 2025-04-25 DIAGNOSIS — E34.9 ERECTILE DYSFUNCTION DUE TO ENDOCRINE DISEASE: ICD-10-CM

## 2025-04-25 NOTE — PROGRESS NOTES
"Assessment/Plan:     Diagnoses and all orders for this visit:    Scar  Pt presents to discuss options for scar removal. 3 cm hyperpigmented lower left leg scar. We discussed excision, however I did recommend possible lightening cream vs laser treatment first. He also tans regularly which may be contributing. We will see him back as needed.         Subjective:      Patient ID: Hussain Hooper is a 54 y.o. male.    HPI    Pt presents to discuss options for scar removal. He states he got bit by a dog 3 years ago & he needed to see wound care as the wound wouldn't heal. He states that it is some what painful & he doesn't like the appearance of it (too dark). He has a PMH of GERD, gastric sleeve & asthma.     Patient Active Problem List   Diagnosis    Mild intermittent asthma without complication    Psoriasis    Hypogonadism in male    Anxiety    Microscopic hematuria    Gastroesophageal reflux disease with esophagitis    Class 2 obesity in adult    BOY (obstructive sleep apnea)    Stage 3a chronic kidney disease (HCC)    Periumbilical hernia    Left ureteral calculus    Other insomnia    Right hip pain    Anesthesia complication    Bunion of great toe of left foot    Migraine with aura and without status migrainosus, not intractable     No Known Allergies  Current Outpatient Medications on File Prior to Visit   Medication Sig    albuterol (Ventolin HFA) 90 mcg/act inhaler Inhale 2 puffs every 6 (six) hours as needed for wheezing    ALPRAZolam (XANAX) 1 mg tablet Take 1 tablet (1 mg total) by mouth 2 (two) times a day as needed for anxiety    B-D 3CC LUER-ANTONELLA SYR 25GX1\" 25G X 1\" 3 ML MISC     baclofen 10 mg tablet Take 1 tablet (10 mg total) by mouth 2 (two) times a day as needed for muscle spasms (Diaphragmatic spasm)    cyanocobalamin 1,000 mcg/mL Inject 1 mL (1,000 mcg total) into a muscle every 30 (thirty) days    Diclofenac Epolamine 1.3 % PTCH Apply 1 Application topically every 12 (twelve) hours as needed (apply to " "right shoulder for pain)    Diclofenac-Menthol-Lidocaine 1.2-5-4 % PTCH Apply 1 patch topically in the morning (Patient not taking: Reported on 3/25/2025)    oxymetazoline (Afrin 12 Hour) 0.05 % nasal spray into each nostril    SUMAtriptan (Imitrex) 50 mg tablet Take 1 tablet (50 mg total) by mouth once as needed for migraine for up to 18 doses    SYRINGE/NEEDLE, DISP, 1 ML 25G X 5/8\" 1 ML MISC Use every 30 (thirty) days    tadalafil (CIALIS) 10 MG tablet Take 0.5 tablets (5 mg total) by mouth daily as needed for erectile dysfunction (Patient not taking: Reported on 3/25/2025)    tamsulosin (FLOMAX) 0.4 mg Take 1 capsule (0.4 mg total) by mouth daily with dinner (Patient not taking: Reported on 3/25/2025)    testosterone cypionate (DEPO-TESTOSTERONE) 200 mg/mL SOLN Inject 200 mg into a muscle every 14 (fourteen) days    tirzepatide (Zepbound) 5 mg/0.5 mL auto-injector Inject 0.5 mL (5 mg total) under the skin once a week    triamcinolone (KENALOG) 0.5 % cream Apply topically 2 (two) times a day    triamcinolone (KENALOG) 0.5 % ointment Apply topically 2 (two) times a day (Patient taking differently: Apply topically if needed)    zolpidem (AMBIEN) 10 mg tablet Take 1 tablet (10 mg total) by mouth daily at bedtime as needed for sleep     No current facility-administered medications on file prior to visit.     Family History   Problem Relation Age of Onset    Cancer Mother     Psoriasis Mother         No    Cancer Father         No    Crohn's disease Son         No     Past Medical History:   Diagnosis Date    Anesthesia complication     Pt states he stopped breathing during EGD and Colonoscopy    Anxiety     Arthritis     Asthma     Benign essential HTN     last assessed 05/26/2017    COVID 2021    Degenerative joint disease     Depression 2000    No    Emphysema of lung (HCC)     GERD (gastroesophageal reflux disease)     Hiccoughs     HTN (hypertension) 7/15/2021    Hypertension     Kidney stone     Migraine with " aura and without status migrainosus, not intractable 02/01/2024    Shortness of breath     MOORE    Skin cancer     Sleep apnea     no CPAP use    Spinal stenosis      Social History     Socioeconomic History    Marital status: Single     Spouse name: None    Number of children: None    Years of education: None    Highest education level: None   Occupational History    None   Tobacco Use    Smoking status: Never    Smokeless tobacco: Never    Tobacco comments:     Never   Vaping Use    Vaping status: Some Days    Substances: THC   Substance and Sexual Activity    Alcohol use: Yes     Alcohol/week: 2.0 standard drinks of alcohol     Types: 2 Standard drinks or equivalent per week     Comment: One    Drug use: Yes     Types: Marijuana     Comment: medicinal card obtained; last used a couple days ago    Sexual activity: Not Currently     Partners: Female     Birth control/protection: None     Comment: None   Other Topics Concern    None   Social History Narrative    None     Social Drivers of Health     Financial Resource Strain: Low Risk  (2/1/2024)    Overall Financial Resource Strain (CARDIA)     Difficulty of Paying Living Expenses: Not hard at all   Food Insecurity: Patient Declined (6/10/2024)    Nursing - Inadequate Food Risk Classification     Worried About Running Out of Food in the Last Year: Patient declined     Ran Out of Food in the Last Year: Patient declined     Ran Out of Food in the Last Year: Not on file   Transportation Needs: No Transportation Needs (2/1/2024)    PRAPARE - Transportation     Lack of Transportation (Medical): No     Lack of Transportation (Non-Medical): No   Physical Activity: Not on file   Stress: Not on file   Social Connections: Unknown (6/18/2024)    Received from [employer]    Social Connections     How often do you feel lonely or isolated from those around you? (Adult - for ages 18 years and over): Not on file   Intimate Partner Violence: Not on file   Housing Stability: Patient  Declined (6/10/2024)    Housing Stability Vital Sign     Unable to Pay for Housing in the Last Year: Patient declined     Number of Times Moved in the Last Year: Not on file     Homeless in the Last Year: Patient declined     Past Surgical History:   Procedure Laterality Date    COLONOSCOPY      ESOPHAGOGASTRODUODENOSCOPY N/A 03/24/2016    Procedure: ESOPHAGOGASTRODUODENOSCOPY (EGD);  Surgeon: Evin Munoz MD;  Location: [facility];  Service:     FL RETROGRADE PYELOGRAM  5/25/2023    JOINT REPLACEMENT      right hip sx    AL CYSTO/URETERO W/LITHOTRIPSY &INDWELL STENT INSRT Left 5/25/2023    Procedure: CYSTOSCOPY URETEROSCOPY WITH LITHOTRIPSY HOLMIUM LASER, RETROGRADE PYELOGRAM AND INSERTION STENT URETERAL;  Surgeon: Micah Guillen MD;  Location: [facility];  Service: Urology    AL LAPS GSTRC RSTRICTIV PX LONGITUDINAL GASTRECTOMY N/A 10/31/2023    Procedure: GASTRECTOMY  SLEEVE W/ ROBOT&  INTRAOPERATIVE EGD;  Surgeon: Arthur Villar MD;  Location: [facility];  Service: Bariatrics    AL REMOVE INT DWELL URETERAL STENT TRANSURETHRAL Left 9/7/2023    Procedure: CYSTOSCOPY,REMOVAL STENT URETERAL;  Surgeon: Ryan Garcia MD;  Location: [facility];  Service: Urology         Review of Systems   All other systems reviewed and are negative.        Objective:      There were no vitals taken for this visit.         Physical Exam  Constitutional:       Appearance: Normal appearance. He is well-developed.   HENT:      Head: Normocephalic and atraumatic.   Eyes:      Conjunctiva/sclera: Conjunctivae normal.   Pulmonary:      Effort: Pulmonary effort is normal.   Abdominal:      Palpations: Abdomen is soft.   Musculoskeletal:         General: Normal range of motion.      Cervical back: Normal range of motion.   Skin:     General: Skin is warm and dry.      Comments: 3 cm hyperpigmented lower left leg scar, see photo.    Neurological:      Mental Status: He is alert and oriented to person, place, and time.   Psychiatric:          Mood and Affect: Mood normal.         Behavior: Behavior normal.

## 2025-04-28 ENCOUNTER — RA CDI HCC (OUTPATIENT)
Dept: OTHER | Facility: HOSPITAL | Age: 55
End: 2025-04-28

## 2025-04-28 NOTE — PROGRESS NOTES
HCC coding opportunities       Chart reviewed, no opportunity found: CHART REVIEWED, NO OPPORTUNITY FOUND        Patients Insurance     Medicare Insurance: Medicare / [facility]

## 2025-04-29 RX ORDER — TADALAFIL 20 MG/1
20 TABLET ORAL DAILY PRN
Qty: 20 TABLET | Refills: 0 | Status: SHIPPED | OUTPATIENT
Start: 2025-04-29

## 2025-05-01 ENCOUNTER — OFFICE VISIT (OUTPATIENT)
Dept: INTERNAL MEDICINE CLINIC | Facility: CLINIC | Age: 55
End: 2025-05-01

## 2025-05-01 VITALS
HEIGHT: 69 IN | SYSTOLIC BLOOD PRESSURE: 112 MMHG | TEMPERATURE: 98.1 F | DIASTOLIC BLOOD PRESSURE: 76 MMHG | BODY MASS INDEX: 29.09 KG/M2 | WEIGHT: 196.4 LBS | HEART RATE: 91 BPM

## 2025-05-01 DIAGNOSIS — Z00.00 MEDICARE ANNUAL WELLNESS VISIT, SUBSEQUENT: Primary | ICD-10-CM

## 2025-05-01 DIAGNOSIS — N18.31 STAGE 3A CHRONIC KIDNEY DISEASE (HCC): ICD-10-CM

## 2025-05-01 DIAGNOSIS — G47.33 OSA (OBSTRUCTIVE SLEEP APNEA): ICD-10-CM

## 2025-05-01 DIAGNOSIS — J34.2 DEVIATED SEPTUM: ICD-10-CM

## 2025-05-01 DIAGNOSIS — E29.1 HYPOGONADISM IN MALE: ICD-10-CM

## 2025-05-01 NOTE — ASSESSMENT & PLAN NOTE
Does have history of anabolic steroid use as was  several years ago  Formal dx 1999 by Dr Antunez    Does follow endocrinology and has testosterone replacement, will defer to them    Establishing with new provider currently as previous provider retired   Referral provided  Stable  Pt reported gynecomastia   Ordered estrogen, SBG, and Testerone levels, which were ordered but not completed; encouraged pt to get labs at earliest convenience   In depth discussion regarding diurnal rhythms, assessment of levels, effects of lifestyle and long term effects of hormones on body physiology

## 2025-05-01 NOTE — ASSESSMENT & PLAN NOTE
Lab Results   Component Value Date    EGFR 78 04/02/2025    EGFR 58 07/29/2024    EGFR 83 03/25/2024    CREATININE 1.05 04/02/2025    CREATININE 1.35 (H) 07/29/2024    CREATININE 1.01 03/25/2024     Stable, improved Cr back to baseline ~1    Likely 2/2 HTN and or anabolic steroid use in past    If contrast imaging needed, will repeat BMP to monitor Cr and BUN levels for next 48- 72 hrs as this is generally when damage presents  continue BP control   avoid NSAIDs (Ibuprofen, Motrin, Aleve, Advil, Naproxen, Celebrex, etc.) or other nephrotoxic agents  goal hgb 10

## 2025-05-01 NOTE — PROGRESS NOTES
Name: Hussain Hooper      : 1970      MRN: [id number]  Encounter Provider: Rosa Antoine DO  Encounter Date: 2025   Encounter department: [facility] [place]  :  Assessment & Plan  Stage 3a chronic kidney disease (HCC)  Lab Results   Component Value Date    EGFR 78 2025    EGFR 58 2024    EGFR 83 2024    CREATININE 1.05 2025    CREATININE 1.35 (H) 2024    CREATININE 1.01 2024     Stable, improved Cr back to baseline ~1    Likely 2/2 HTN and or anabolic steroid use in past    If contrast imaging needed, will repeat BMP to monitor Cr and BUN levels for next 48- 72 hrs as this is generally when damage presents  continue BP control   avoid NSAIDs (Ibuprofen, Motrin, Aleve, Advil, Naproxen, Celebrex, etc.) or other nephrotoxic agents  goal hgb 10         Hypogonadism in male  Does have history of anabolic steroid use as was  several years ago  Formal dx  by Dr Antunez    Does follow endocrinology and has testosterone replacement, will defer to them    Establishing with new provider currently as previous provider retired   Referral provided  Stable  Pt reported gynecomastia   Ordered estrogen, SBG, and Testerone levels, which were ordered but not completed; encouraged pt to get labs at earliest convenience   In depth discussion regarding diurnal rhythms, assessment of levels, effects of lifestyle and long term effects of hormones on body physiology         BOY (obstructive sleep apnea)  Previous sleep study indicative of severe BOY  S/p gastric sleeve and weight loss 35 lbs and additional 30 pounds since last year    Does still snore and not compliant with mouth guard nor machine  Encourage compliance with nightly mouthguard since BOY and migraines may be tied/connected  Encouraged continued diet and lifestyle modifications  Encouraged pt to re-establish with sleep medicine and to inquire regarding inspire implant  but more importantly,  "encouraged to get eval by ENT for severely deviated septum, which may be largely contributing to BOY and afrin addiction   Patient notes to use afrin >20 daily simply to \"be able to breathe\"       Medicare annual wellness visit, subsequent         Deviated septum  May reduce need for apnea machine, insomnia meds, and overall fatigue  See BOY for further details  Orders:    Ambulatory Referral to Otolaryngology; Future      Depression Screening and Follow-up Plan: Patient's depression screening was positive with a PHQ-2 score of 3. Their PHQ-9 score was 7.   Depression likely due to other medical condition. Will treat underlying condition. Likely secondary to hypogonadism. Treating hypogonadism      Preventive health issues were discussed with patient, and age appropriate screening tests were ordered as noted in patient's After Visit Summary. Personalized health advice and appropriate referrals for health education or preventive services given if needed, as noted in patient's After Visit Summary.    History of Present Illness     PMHX secondary hypogonadism, BOY non compliant on machine, deviated septum, insomnia, and prior anabolic testosterone abuse in 80's and 90's who presents today for Annual wellness. He is in the process of re-establishing with endocrinology as his most recent provider retired. He complains of gynecomastia and continued fatigue. He states he feels best when his T levels are 800-1000. Otherwise motivated to build muscle           Patient Care Team:  Rosa Antoine DO as PCP - General (Internal Medicine)  Richard Dietz MD (Nephrology)    Review of Systems   Constitutional:  Negative for chills, fatigue and fever.   HENT:  Negative for congestion, hearing loss, sinus pressure, sinus pain and tinnitus.    Eyes:  Negative for visual disturbance.   Respiratory:  Negative for cough, chest tightness, shortness of breath and wheezing.    Cardiovascular:  Negative for chest pain and palpitations. "   Gastrointestinal:  Negative for abdominal pain, blood in stool, constipation, diarrhea, nausea and vomiting.   Endocrine: Negative for cold intolerance and heat intolerance.   Genitourinary:  Negative for dysuria, frequency and hematuria.   Musculoskeletal:  Negative for arthralgias and back pain.   Allergic/Immunologic: Negative for environmental allergies.   Neurological:  Negative for dizziness, syncope and headaches.   Psychiatric/Behavioral:  Negative for dysphoric mood and sleep disturbance. The patient is not nervous/anxious.      Medical History Reviewed by provider this encounter:  [facility]       Annual Wellness Visit Questionnaire       Health Risk Assessment:   Patient rates overall health as fair. Patient feels that their physical health rating is same. Patient is satisfied with their life. Eyesight was rated as slightly worse. Hearing was rated as slightly worse. Patient feels that their emotional and mental health rating is same. Patients states they are never, rarely angry. Patient states they are sometimes unusually tired/fatigued. Pain experienced in the last 7 days has been some. Patient's pain rating has been 8/10. Patient states that he has experienced no weight loss or gain in last 6 months.     Depression Screening:   PHQ-2 Score: 3  PHQ-9 Score: 7      Fall Risk Screening:   In the past year, patient has experienced: no history of falling in past year      Home Safety:  Patient does not have trouble with stairs inside or outside of their home. Patient has working smoke alarms and has working carbon monoxide detector. Home safety hazards include: none.     Nutrition:   Current diet is Regular.     Medications:   Patient is not currently taking any over-the-counter supplements. Patient is able to manage medications.     Activities of Daily Living (ADLs)/Instrumental Activities of Daily Living (IADLs):   Walk and transfer into and out of bed and chair?: Yes  Dress and groom yourself?: Yes    Bathe  or shower yourself?: Yes    Feed yourself? Yes  Do your laundry/housekeeping?: Yes  Manage your money, pay your bills and track your expenses?: Yes  Make your own meals?: Yes    Do your own shopping?: Yes    Previous Hospitalizations:   Any hospitalizations or ED visits within the last 12 months?: No      Advance Care Planning:   Living will: Yes    Durable POA for healthcare: No    Advanced directive: Yes      Preventive Screenings      Cardiovascular Screening:    General: Screening Current      Diabetes Screening:     General: Screening Current      Prostate Cancer Screening:    General: Screening Current      Lung Cancer Screening:     General: Screening Not Indicated      Hepatitis C Screening:    General: Screening Current    Immunizations:  - Immunizations due: Prevnar 20 and Zoster (Shingrix)    Screening, Brief Intervention, and Referral to Treatment (SBIRT)     Screening  Typical number of drinks in a day: 0  Typical number of drinks in a week: 2  Interpretation: Low risk drinking behavior.    AUDIT-C Screenin) How often did you have a drink containing alcohol in the past year? 2 to 4 times a month  2) How many drinks did you have on a typical day when you were drinking in the past year? 0  3) How often did you have 6 or more drinks on one occasion in the past year? never    AUDIT-C Score: 2  Interpretation: Score 0-3 (male): Negative screen for alcohol misuse    Single Item Drug Screening:  How often have you used an illegal drug (including marijuana) or a prescription medication for non-medical reasons in the past year? never    Single Item Drug Screen Score: 0  Interpretation: Negative screen for possible drug use disorder    Social Drivers of Health     Financial Resource Strain: Low Risk  (2025)    Overall Financial Resource Strain (CARDIA)     Difficulty of Paying Living Expenses: Not hard at all   Food Insecurity: No Food Insecurity (2025)    Hunger Vital Sign     Worried About Running  "Out of Food in the Last Year: Never true     Ran Out of Food in the Last Year: Never true   Transportation Needs: No Transportation Needs (5/1/2025)    PRAPARE - Transportation     Lack of Transportation (Medical): No     Lack of Transportation (Non-Medical): No   Housing Stability: Low Risk  (5/1/2025)    Housing Stability Vital Sign     Unable to Pay for Housing in the Last Year: No     Number of Times Moved in the Last Year: 1     Homeless in the Last Year: No   Utilities: Not At Risk (5/1/2025)    [facility] Utilities     Threatened with loss of utilities: No     No results found.    Objective   /76 (BP Location: Left arm, Patient Position: Sitting, Cuff Size: Large)   Pulse 91   Temp 98.1 °F (36.7 °C) (Temporal)   Ht 5' 8.5\" (1.74 m)   Wt 89.1 kg (196 lb 6.4 oz)   BMI 29.43 kg/m²     Physical Exam  Vitals reviewed.   Constitutional:       General: He is not in acute distress.     Appearance: He is obese. He is not ill-appearing.   HENT:      Right Ear: Tympanic membrane, ear canal and external ear normal. There is no impacted cerumen.      Left Ear: Tympanic membrane, ear canal and external ear normal. There is no impacted cerumen.      Mouth/Throat:      Mouth: Mucous membranes are moist.      Pharynx: No oropharyngeal exudate.   Eyes:      General: No scleral icterus.     Conjunctiva/sclera: Conjunctivae normal.      Pupils: Pupils are equal, round, and reactive to light.   Cardiovascular:      Rate and Rhythm: Normal rate and regular rhythm.      Heart sounds: No murmur heard.     No gallop.   Pulmonary:      Effort: Pulmonary effort is normal.      Breath sounds: Normal breath sounds. No wheezing or rales.   Abdominal:      General: Bowel sounds are normal.      Palpations: Abdomen is soft.      Tenderness: There is no abdominal tenderness. There is no guarding or rebound.   Musculoskeletal:      Right lower leg: No edema.      Left lower leg: No edema.   Skin:     General: Skin is warm and dry.      " Findings: No lesion.   Neurological:      Mental Status: He is alert.   Psychiatric:         Behavior: Behavior normal.         Thought Content: Thought content normal.         Judgment: Judgment normal.           Administrative Statements   I have spent a total time of 45 minutes in caring for this patient on the day of the visit/encounter including Diagnostic results, Prognosis, Risks and benefits of tx options, Instructions for management, Patient and family education, Importance of tx compliance, Risk factor reductions, Impressions, Counseling / Coordination of care, Documenting in the medical record, Reviewing/placing orders in the medical record (including tests, medications, and/or procedures), Obtaining or reviewing history  , and Communicating with other healthcare professionals .

## 2025-05-01 NOTE — ASSESSMENT & PLAN NOTE
"Previous sleep study indicative of severe BOY  S/p gastric sleeve and weight loss 35 lbs and additional 30 pounds since last year    Does still snore and not compliant with mouth guard nor machine  Encourage compliance with nightly mouthguard since BOY and migraines may be tied/connected  Encouraged continued diet and lifestyle modifications  Encouraged pt to re-establish with sleep medicine and to inquire regarding inspire implant  but more importantly, encouraged to get eval by ENT for severely deviated septum, which may be largely contributing to BOY and afrin addiction   Patient notes to use afrin >20 daily simply to \"be able to breathe\"       "

## 2025-05-01 NOTE — PATIENT INSTRUCTIONS
Medicare Preventive Visit Patient Instructions  Thank you for completing your Welcome to Medicare Visit or Medicare Annual Wellness Visit today. Your next wellness visit will be due in one year (5/2/2026).  The screening/preventive services that you may require over the next 5-10 years are detailed below. Some tests may not apply to you based off risk factors and/or age. Screening tests ordered at today's visit but not completed yet may show as past due. Also, please note that scanned in results may not display below.  Preventive Screenings:  Service Recommendations Previous Testing/Comments   Colorectal Cancer Screening  Colonoscopy    Fecal Occult Blood Test (FOBT)/Fecal Immunochemical Test (FIT)  Fecal DNA/Cologuard Test  Flexible Sigmoidoscopy Age: 45-75 years old   Colonoscopy: every 10 years (May be performed more frequently if at higher risk)  OR  FOBT/FIT: every 1 year  OR  Cologuard: every 3 years  OR  Sigmoidoscopy: every 5 years  Screening may be recommended earlier than age 45 if at higher risk for colorectal cancer. Also, an individualized decision between you and your healthcare provider will decide whether screening between the ages of 76-85 would be appropriate. Colonoscopy: Not on file  FOBT/FIT: Not on file  Cologuard: Not on file  Sigmoidoscopy: Not on file          Prostate Cancer Screening Individualized decision between patient and health care provider in men between ages of 55-69   Medicare will cover every 12 months beginning on the day after your 50th birthday PSA: 1.092 ng/mL           Hepatitis C Screening Once for adults born between 1945 and 1965  More frequently in patients at high risk for Hepatitis C Hep C Antibody: 06/01/2021        Diabetes Screening 1-2 times per year if you're at risk for diabetes or have pre-diabetes Fasting glucose: 86 mg/dL (7/29/2024)  A1C: 4.8 % (4/2/2025)      Cholesterol Screening Once every 5 years if you don't have a lipid disorder. May order more often  based on risk factors. Lipid panel: 07/20/2023         Other Preventive Screenings Covered by Medicare:  Abdominal Aortic Aneurysm (AAA) Screening: covered once if your at risk. You're considered to be at risk if you have a family history of AAA or a male between the age of 65-75 who smoking at least 100 cigarettes in your lifetime.  Lung Cancer Screening: covers low dose CT scan once per year if you meet all of the following conditions: (1) Age 55-77; (2) No signs or symptoms of lung cancer; (3) Current smoker or have quit smoking within the last 15 years; (4) You have a tobacco smoking history of at least 20 pack years (packs per day x number of years you smoked); (5) You get a written order from a healthcare provider.  Glaucoma Screening: covered annually if you're considered high risk: (1) You have diabetes OR (2) Family history of glaucoma OR (3)  aged 50 and older OR (4)  American aged 65 and older  Osteoporosis Screening: covered every 2 years if you meet one of the following conditions: (1) Have a vertebral abnormality; (2) On glucocorticoid therapy for more than 3 months; (3) Have primary hyperparathyroidism; (4) On osteoporosis medications and need to assess response to drug therapy.  HIV Screening: covered annually if you're between the age of 15-65. Also covered annually if you are younger than 15 and older than 65 with risk factors for HIV infection. For pregnant patients, it is covered up to 3 times per pregnancy.    Immunizations:  Immunization Recommendations   Influenza Vaccine Annual influenza vaccination during flu season is recommended for all persons aged >= 6 months who do not have contraindications   Pneumococcal Vaccine   * Pneumococcal conjugate vaccine = PCV13 (Prevnar 13), PCV15 (Vaxneuvance), PCV20 (Prevnar 20)  * Pneumococcal polysaccharide vaccine = PPSV23 (Pneumovax) Adults 19-63 yo with certain risk factors or if 65+ yo  If never received any pneumonia vaccine:  recommend Prevnar 20 (PCV20)  Give PCV20 if previously received 1 dose of PCV13 or PPSV23   Hepatitis B Vaccine 3 dose series if at intermediate or high risk (ex: diabetes, end stage renal disease, liver disease)   Respiratory syncytial virus (RSV) Vaccine - COVERED BY MEDICARE PART D  * RSVPreF3 (Arexvy) CDC recommends that adults 60 years of age and older may receive a single dose of RSV vaccine using shared clinical decision-making (SCDM)   Tetanus (Td) Vaccine - COST NOT COVERED BY MEDICARE PART B Following completion of primary series, a booster dose should be given every 10 years to maintain immunity against tetanus. Td may also be given as tetanus wound prophylaxis.   Tdap Vaccine - COST NOT COVERED BY MEDICARE PART B Recommended at least once for all adults. For pregnant patients, recommended with each pregnancy.   Shingles Vaccine (Shingrix) - COST NOT COVERED BY MEDICARE PART B  2 shot series recommended in those 19 years and older who have or will have weakened immune systems or those 50 years and older     Health Maintenance Due:      Topic Date Due   • Colorectal Cancer Screening  06/11/2025 (Originally 12/3/2013)   • HIV Screening  Completed   • Hepatitis C Screening  Completed     Immunizations Due:      Topic Date Due   • Pneumococcal Vaccine: Pediatrics (0 to 5 Years) and At-Risk Patients (6 to 64 Years) (1 of 2 - PCV) Never done   • COVID-19 Vaccine (1 - 2024-25 season) Never done     Advance Directives   What are advance directives?  Advance directives are legal documents that state your wishes and plans for medical care. These plans are made ahead of time in case you lose your ability to make decisions for yourself. Advance directives can apply to any medical decision, such as the treatments you want, and if you want to donate organs.   What are the types of advance directives?  There are many types of advance directives, and each state has rules about how to use them. You may choose a  combination of any of the following:  Living will:  This is a written record of the treatment you want. You can also choose which treatments you do not want, which to limit, and which to stop at a certain time. This includes surgery, medicine, IV fluid, and tube feedings.   Durable power of  for healthcare (DPAHC):  This is a written record that states who you want to make healthcare choices for you when you are unable to make them for yourself. This person, called a proxy, is usually a family member or a friend. You may choose more than 1 proxy.  Do not resuscitate (DNR) order:  A DNR order is used in case your heart stops beating or you stop breathing. It is a request not to have certain forms of treatment, such as CPR. A DNR order may be included in other types of advance directives.  Medical directive:  This covers the care that you want if you are in a coma, near death, or unable to make decisions for yourself. You can list the treatments you want for each condition. Treatment may include pain medicine, surgery, blood transfusions, dialysis, IV or tube feedings, and a ventilator (breathing machine).  Values history:  This document has questions about your views, beliefs, and how you feel and think about life. This information can help others choose the care that you would choose.  Why are advance directives important?  An advance directive helps you control your care. Although spoken wishes may be used, it is better to have your wishes written down. Spoken wishes can be misunderstood, or not followed. Treatments may be given even if you do not want them. An advance directive may make it easier for your family to make difficult choices about your care.   Weight Management   Why it is important to manage your weight:  Being overweight increases your risk of health conditions such as heart disease, high blood pressure, type 2 diabetes, and certain types of cancer. It can also increase your risk for  osteoarthritis, sleep apnea, and other respiratory problems. Aim for a slow, steady weight loss. Even a small amount of weight loss can lower your risk of health problems.  How to lose weight safely:  A safe and healthy way to lose weight is to eat fewer calories and get regular exercise. You can lose up about 1 pound a week by decreasing the number of calories you eat by 500 calories each day.   Healthy meal plan for weight management:  A healthy meal plan includes a variety of foods, contains fewer calories, and helps you stay healthy. A healthy meal plan includes the following:  Eat whole-grain foods more often.  A healthy meal plan should contain fiber. Fiber is the part of grains, fruits, and vegetables that is not broken down by your body. Whole-grain foods are healthy and provide extra fiber in your diet. Some examples of whole-grain foods are whole-wheat breads and pastas, oatmeal, brown rice, and bulgur.  Eat a variety of vegetables every day.  Include dark, leafy greens such as spinach, kale, sylvia greens, and mustard greens. Eat yellow and orange vegetables such as carrots, sweet potatoes, and winter squash.   Eat a variety of fruits every day.  Choose fresh or canned fruit (canned in its own juice or light syrup) instead of juice. Fruit juice has very little or no fiber.  Eat low-fat dairy foods.  Drink fat-free (skim) milk or 1% milk. Eat fat-free yogurt and low-fat cottage cheese. Try low-fat cheeses such as mozzarella and other reduced-fat cheeses.  Choose meat and other protein foods that are low in fat.  Choose beans or other legumes such as split peas or lentils. Choose fish, skinless poultry (chicken or turkey), or lean cuts of red meat (beef or pork). Before you cook meat or poultry, cut off any visible fat.   Use less fat and oil.  Try baking foods instead of frying them. Add less fat, such as margarine, sour cream, regular salad dressing and mayonnaise to foods. Eat fewer high-fat foods. Some  examples of high-fat foods include french fries, doughnuts, ice cream, and cakes.  Eat fewer sweets.  Limit foods and drinks that are high in sugar. This includes candy, cookies, regular soda, and sweetened drinks.  Exercise:  Exercise at least 30 minutes per day on most days of the week. Some examples of exercise include walking, biking, dancing, and swimming. You can also fit in more physical activity by taking the stairs instead of the elevator or parking farther away from stores. Ask your healthcare provider about the best exercise plan for you.      © Copyright [employer] 2018 Information is for End User's use only and may not be sold, redistributed or otherwise used for commercial purposes. All illustrations and images included in CareNotes® are the copyrighted property of [employer].A[employer]M., Inc. or [employer]

## 2025-05-12 ENCOUNTER — TELEPHONE (OUTPATIENT)
Dept: INTERNAL MEDICINE CLINIC | Facility: CLINIC | Age: 55
End: 2025-05-12

## 2025-05-12 NOTE — TELEPHONE ENCOUNTER
Patient called in because he said he gets a credit from his insurance whenever he completed his AWV. Patient said that he spoke to [facility] and they informed him that insurance was never billed for pt.    I gave patient the number to [facility] [phone] and [facility] [phone]

## 2025-05-29 ENCOUNTER — TELEPHONE (OUTPATIENT)
Dept: INTERNAL MEDICINE CLINIC | Facility: CLINIC | Age: 55
End: 2025-05-29

## 2025-05-29 DIAGNOSIS — G47.09 OTHER INSOMNIA: ICD-10-CM

## 2025-05-29 NOTE — TELEPHONE ENCOUNTER
Called and spoke to Eve from \[facility]\"" pharmacy. Per Eve patient's insurance does not cover brand name Ambien and would need a PA if brand name. I advise Eve per the prescription's pharmacy note it states for brand name only and not generic. Per Eve there is no refills on the prescription, once they send a refill they can run it under brand name and will let us know if a PA is needed. Refill request sent and will be pending until attending approves.

## 2025-05-29 NOTE — TELEPHONE ENCOUNTER
Per chart review the medication Ambien was to sent to [facility] pharmacy with a pharmacy note stating patient must have brand name and not generic. I attempted to contact [facility] pharmacy to verify if they are giving generic or brand but they did not answer I left a VM to call back.

## 2025-05-30 NOTE — TELEPHONE ENCOUNTER
Mercedes Moreira RN to [facility]      5/30/25  8:58 AM  Note      PDMP reviewed. Last filled on 4/15/25 for 60 day/60 quantity. Due for refill on 6/14/25

## 2025-05-30 NOTE — TELEPHONE ENCOUNTER
Called and spoke to patient and made him aware as per note. Patient understood and had no questions t this time. Patient did state he would like for his next script to be brand name as only brand name works for him. This will most likely need a PA.      Will follow up around June 10th.

## 2025-05-31 DIAGNOSIS — N52.1 ERECTILE DYSFUNCTION DUE TO ENDOCRINE DISEASE: ICD-10-CM

## 2025-05-31 DIAGNOSIS — E34.9 ERECTILE DYSFUNCTION DUE TO ENDOCRINE DISEASE: ICD-10-CM

## 2025-05-31 DIAGNOSIS — E29.1 HYPOGONADISM IN MALE: ICD-10-CM

## 2025-06-01 DIAGNOSIS — J45.20 MILD INTERMITTENT ASTHMA WITHOUT COMPLICATION: ICD-10-CM

## 2025-06-01 DIAGNOSIS — Z98.84 BARIATRIC SURGERY STATUS: ICD-10-CM

## 2025-06-01 DIAGNOSIS — E29.1 HYPOGONADISM IN MALE: ICD-10-CM

## 2025-06-01 DIAGNOSIS — E34.9 ERECTILE DYSFUNCTION DUE TO ENDOCRINE DISEASE: ICD-10-CM

## 2025-06-01 DIAGNOSIS — G47.09 OTHER INSOMNIA: ICD-10-CM

## 2025-06-01 DIAGNOSIS — E53.8 VITAMIN B12 DEFICIENCY: ICD-10-CM

## 2025-06-01 DIAGNOSIS — N52.1 ERECTILE DYSFUNCTION DUE TO ENDOCRINE DISEASE: ICD-10-CM

## 2025-06-02 ENCOUNTER — TELEPHONE (OUTPATIENT)
Dept: INTERNAL MEDICINE CLINIC | Facility: CLINIC | Age: 55
End: 2025-06-02

## 2025-06-02 RX ORDER — ZOLPIDEM TARTRATE 10 MG/1
10 TABLET ORAL
Qty: 60 TABLET | Refills: 0 | OUTPATIENT
Start: 2025-06-02

## 2025-06-02 RX ORDER — ALBUTEROL SULFATE 90 UG/1
2 INHALANT RESPIRATORY (INHALATION) EVERY 6 HOURS PRN
Qty: 18 G | Refills: 3 | Status: SHIPPED | OUTPATIENT
Start: 2025-06-02 | End: 2026-06-02

## 2025-06-02 RX ORDER — TADALAFIL 20 MG/1
TABLET ORAL
Qty: 20 TABLET | Refills: 2 | Status: SHIPPED | OUTPATIENT
Start: 2025-06-02

## 2025-06-02 RX ORDER — CYANOCOBALAMIN 1000 UG/ML
1000 INJECTION, SOLUTION INTRAMUSCULAR; SUBCUTANEOUS
Qty: 10 ML | Refills: 0 | OUTPATIENT
Start: 2025-06-02

## 2025-06-02 RX ORDER — TADALAFIL 20 MG/1
20 TABLET ORAL DAILY PRN
Qty: 20 TABLET | Refills: 0 | OUTPATIENT
Start: 2025-06-02

## 2025-06-02 NOTE — TELEPHONE ENCOUNTER
Please feliz as duplicate request. Patient due for refill on 6/14. Patient made aware. Office will follow up if needing authorization for brand name at that time

## 2025-06-12 RX ORDER — ZOLPIDEM TARTRATE 10 MG/1
10 TABLET ORAL
Qty: 60 TABLET | Refills: 0 | Status: SHIPPED | OUTPATIENT
Start: 2025-06-12

## 2025-06-19 ENCOUNTER — TELEPHONE (OUTPATIENT)
Dept: INTERNAL MEDICINE CLINIC | Facility: CLINIC | Age: 55
End: 2025-06-19

## 2025-06-19 NOTE — TELEPHONE ENCOUNTER
Prior authorization form completed and faxed to [facility] for review at     Pending insurance review for Brand Ambien approval.

## 2025-06-23 NOTE — TELEPHONE ENCOUNTER
Prior authorization for Brand Ambien denied by insurance. Per insurance, patient must try and fail all of the formulary drugs before they can cover a drug that is not on formulary.     Contacted insurance at  to initiate an expedited appeal. Spoke with Blanca. Per Blanca, for an expedited appeal form must be completed. Form being faxed to office at . Once completed, form to be faxed to  with any supporting documentation.

## 2025-06-25 ENCOUNTER — TELEPHONE (OUTPATIENT)
Age: 55
End: 2025-06-25

## 2025-06-25 NOTE — TELEPHONE ENCOUNTER
Patient cancelled appt today and rescheduled for first available in October. If he needs to be seen sooner or have a NV to continue medication, please call him.

## 2025-06-25 NOTE — TELEPHONE ENCOUNTER
Patient needs a refill of Zepbound 5mg. (Maintenance dose)    How are you tolerating the medication?   [] Nausea  [] Vomiting  [] Diarrhea  [x] Asymptomatic  [] Other:    Last visit weight:195    Current weight:  195    Date of last injection: 06/20    How many injections do you have left: 1    Please send to [facility] in [place]

## 2025-06-26 NOTE — TELEPHONE ENCOUNTER
Contacted patient's insurance at . Spoke with Ignacia. Introduced self and role. Ignacia faxing over another expedited appeal form to office to be completed for Ambien.     Pending fax

## 2025-06-27 DIAGNOSIS — Z98.84 BARIATRIC SURGERY STATUS: ICD-10-CM

## 2025-06-27 DIAGNOSIS — E78.5 DYSLIPIDEMIA: ICD-10-CM

## 2025-06-27 DIAGNOSIS — G47.33 OSA (OBSTRUCTIVE SLEEP APNEA): ICD-10-CM

## 2025-06-27 DIAGNOSIS — E66.812 CLASS 2 OBESITY WITH BODY MASS INDEX (BMI) OF 35.0 TO 35.9 IN ADULT, UNSPECIFIED OBESITY TYPE, UNSPECIFIED WHETHER SERIOUS COMORBIDITY PRESENT: ICD-10-CM

## 2025-06-27 DIAGNOSIS — Z98.84 BARIATRIC SURGERY STATUS: Primary | ICD-10-CM

## 2025-06-27 RX ORDER — TIRZEPATIDE 5 MG/.5ML
5 INJECTION, SOLUTION SUBCUTANEOUS WEEKLY
Qty: 2 ML | Refills: 0 | Status: SHIPPED | OUTPATIENT
Start: 2025-06-27 | End: 2025-07-25

## 2025-06-30 RX ORDER — TIRZEPATIDE 5 MG/.5ML
5 INJECTION, SOLUTION SUBCUTANEOUS WEEKLY
Qty: 2 ML | Refills: 0 | OUTPATIENT
Start: 2025-06-30 | End: 2025-07-28

## 2025-07-02 NOTE — TELEPHONE ENCOUNTER
Form from insurance never received x 2    Standard appeal request faxed to [facility] at     Contact number provided for any questions.

## 2025-07-08 NOTE — TELEPHONE ENCOUNTER
Contacted patient's Pharmacy service line at . Spoke with Danya. Introduced self and role. Per Danya, patient's appeal for Ambien (Brand) denied. Per insurance, patient must try and fail all the preferred medications:  Eszopiclone  Lorazepam  Ramelteon  Temazepam 15 mg, 30 mg  Trazodone  Zaleplon  Zolpidem or Zolpidem ER    Ambien (Brand) is a non preferred medication    Reached out to patient's [facility] to confirm if he has another insurance listed. Per pharmacist, only prescription plan listed is the [facility].     Sent updated message via [employer] to patient with plan of care. Patient will need to provide alternate prescription plan to process another prior authorization for Ambien (Brand) under.

## 2025-08-13 ENCOUNTER — TELEPHONE (OUTPATIENT)
Age: 55
End: 2025-08-13

## 2025-08-18 DIAGNOSIS — Z98.84 BARIATRIC SURGERY STATUS: Primary | ICD-10-CM

## 2025-08-18 DIAGNOSIS — E78.5 DYSLIPIDEMIA: ICD-10-CM

## 2025-08-18 DIAGNOSIS — E66.812 CLASS 2 OBESITY WITH BODY MASS INDEX (BMI) OF 35.0 TO 35.9 IN ADULT, UNSPECIFIED OBESITY TYPE, UNSPECIFIED WHETHER SERIOUS COMORBIDITY PRESENT: ICD-10-CM

## 2025-08-18 DIAGNOSIS — G47.33 OSA (OBSTRUCTIVE SLEEP APNEA): ICD-10-CM

## 2025-08-18 RX ORDER — TIRZEPATIDE 5 MG/.5ML
5 INJECTION, SOLUTION SUBCUTANEOUS WEEKLY
Qty: 2 ML | Refills: 0 | Status: SHIPPED | OUTPATIENT
Start: 2025-08-18 | End: 2025-09-15

## (undated) DEVICE — STAPLER 60 RELOAD WHITE: Brand: SUREFORM

## (undated) DEVICE — SPECIMEN CONTAINER STERILE PEEL PACK

## (undated) DEVICE — PACK TUR

## (undated) DEVICE — STAPLER 60 RELOAD BLUE: Brand: SUREFORM

## (undated) DEVICE — STRL COTTON TIP APPLCTR 6IN PK: Brand: CARDINAL HEALTH

## (undated) DEVICE — GUARDIAN LVC: Brand: GUARDIAN

## (undated) DEVICE — VISIGI 3D®  CALIBRATION SYSTEM  SIZE 36FR SLEEVE/STD: Brand: BOEHRINGER® VISIGI 3D™ SLEEVE GASTRECTOMY CALIBRATION SYSTEM, SIZE 36FR

## (undated) DEVICE — PLUMEPEN PRO 10FT

## (undated) DEVICE — DECANTER: Brand: UNBRANDED

## (undated) DEVICE — KIT, ROBOTIC BARIATRIC: Brand: CARDINAL HEALTH

## (undated) DEVICE — ASTOUND STANDARD SURGICAL GOWN, XL: Brand: CONVERTORS

## (undated) DEVICE — GUIDEWIRE STRGHT TIP 0.035 IN  SOLO PLUS

## (undated) DEVICE — 3M™ STERI-STRIP™ COMPOUND BENZOIN TINCTURE 40 BAGS/CARTON 4 CARTONS/CASE C1544: Brand: 3M™ STERI-STRIP™

## (undated) DEVICE — INVIEW CLEAR LEGGINGS: Brand: CONVERTORS

## (undated) DEVICE — PREMIUM DRY TRAY LF: Brand: MEDLINE INDUSTRIES, INC.

## (undated) DEVICE — TROCAR: Brand: KII FIOS FIRST ENTRY

## (undated) DEVICE — VIOLET BRAIDED (POLYGLACTIN 910), SYNTHETIC ABSORBABLE SUTURE: Brand: COATED VICRYL

## (undated) DEVICE — CATH URETERAL 5FR X 70 CM FLEX TIP POLYUR BARD

## (undated) DEVICE — SUT MONOCRYL 4-0 PS-2 27 IN Y426H

## (undated) DEVICE — GLOVE SRG BIOGEL 7.5

## (undated) DEVICE — ARM DRAPE

## (undated) DEVICE — MEGA SUTURECUT ND: Brand: ENDOWRIST

## (undated) DEVICE — ABSORBABLE WOUND CLOSURE DEVICE: Brand: V-LOC 90

## (undated) DEVICE — COLUMN DRAPE

## (undated) DEVICE — SYRINGE 10ML LL

## (undated) DEVICE — STAPLER CANNULA SEAL: Brand: ENDOWRIST

## (undated) DEVICE — REDUCER: Brand: ENDOWRIST

## (undated) DEVICE — DISPOSABLE OR TOWEL: Brand: CARDINAL HEALTH

## (undated) DEVICE — GLOVE SRG BIOGEL ECLIPSE 7.5

## (undated) DEVICE — STAPLER 60: Brand: SUREFORM

## (undated) DEVICE — DRAPE EQUIPMENT RF WAND

## (undated) DEVICE — SHEATH URETERAL ACCESS 12/14FR 35CM PROXIS

## (undated) DEVICE — TELFA NON-ADHERENT ABSORBENT DRESSING: Brand: TELFA

## (undated) DEVICE — STERILE SURGICAL LUBRICANT,  TUBE: Brand: SURGILUBE

## (undated) DEVICE — TUBING SUCTION 5MM X 12 FT

## (undated) DEVICE — VESSEL SEALER EXTEND: Brand: ENDOWRIST

## (undated) DEVICE — BLADELESS OBTURATOR: Brand: WECK VISTA

## (undated) DEVICE — CANNULA SEAL

## (undated) DEVICE — SCD SEQUENTIAL COMPRESSION COMFORT SLEEVE MEDIUM KNEE LENGTH: Brand: KENDALL SCD

## (undated) DEVICE — EXIDINE 4 PCT

## (undated) DEVICE — CATH URET .038 10FR 50CM DUAL LUMEN

## (undated) DEVICE — UROLOGIC DRAIN BAG: Brand: UNBRANDED

## (undated) DEVICE — CADIERE FORCEPS: Brand: ENDOWRIST